# Patient Record
Sex: FEMALE | Race: WHITE | NOT HISPANIC OR LATINO | Employment: OTHER | URBAN - METROPOLITAN AREA
[De-identification: names, ages, dates, MRNs, and addresses within clinical notes are randomized per-mention and may not be internally consistent; named-entity substitution may affect disease eponyms.]

---

## 2017-03-06 ENCOUNTER — TRANSFERRED RECORDS (OUTPATIENT)
Dept: HEALTH INFORMATION MANAGEMENT | Facility: CLINIC | Age: 77
End: 2017-03-06

## 2017-03-06 LAB
ALT SERPL-CCNC: 18 IU/L (ref 5–35)
AST SERPL-CCNC: 22 U/L (ref 5–34)
CREAT SERPL-MCNC: 0.67 MG/DL (ref 0.5–1.3)
GFR SERPL CREATININE-BSD FRML MDRD: 90.5 ML/MIN/1.73M2

## 2017-03-22 ENCOUNTER — DOCUMENTATION ONLY (OUTPATIENT)
Dept: OTHER | Facility: CLINIC | Age: 77
End: 2017-03-22

## 2017-03-22 DIAGNOSIS — Z71.89 ACP (ADVANCE CARE PLANNING): Chronic | ICD-10-CM

## 2017-05-01 ENCOUNTER — OFFICE VISIT (OUTPATIENT)
Dept: FAMILY MEDICINE | Facility: CLINIC | Age: 77
End: 2017-05-01
Payer: MEDICARE

## 2017-05-01 VITALS
RESPIRATION RATE: 16 BRPM | SYSTOLIC BLOOD PRESSURE: 110 MMHG | HEART RATE: 61 BPM | WEIGHT: 130 LBS | DIASTOLIC BLOOD PRESSURE: 60 MMHG | TEMPERATURE: 97.5 F | BODY MASS INDEX: 26.21 KG/M2 | HEIGHT: 59 IN

## 2017-05-01 DIAGNOSIS — R29.898 LEFT HAND WEAKNESS: Primary | ICD-10-CM

## 2017-05-01 DIAGNOSIS — M05.79 RHEUMATOID ARTHRITIS INVOLVING MULTIPLE SITES WITH POSITIVE RHEUMATOID FACTOR (H): ICD-10-CM

## 2017-05-01 PROCEDURE — 99213 OFFICE O/P EST LOW 20 MIN: CPT | Performed by: FAMILY MEDICINE

## 2017-05-01 NOTE — NURSING NOTE
"Chief Complaint   Patient presents with     Musculoskeletal Problem     lt hand       Initial /60  Pulse 61  Temp 97.5  F (36.4  C) (Tympanic)  Resp 16  Ht 4' 11\" (1.499 m)  Wt 130 lb (59 kg)  BMI 26.26 kg/m2 Estimated body mass index is 26.26 kg/(m^2) as calculated from the following:    Height as of this encounter: 4' 11\" (1.499 m).    Weight as of this encounter: 130 lb (59 kg).  Medication Reconciliation: complete     Vicki Mcfadden CMA      "

## 2017-05-01 NOTE — PATIENT INSTRUCTIONS
I will send the patient for an EMG of her left upper extremity.  I did not find anything on her neurologic exam today that would fit with carpal tunnel syndrome.  Her neurologic exam is completely normal.  I will contact her when we get the results of her test back.

## 2017-05-01 NOTE — MR AVS SNAPSHOT
After Visit Summary   5/1/2017    Britt Pichardo    MRN: [id number]           Patient Information     Date Of Birth          1940        Visit Information        Provider Department      5/1/2017 2:00 PM Vineet Mendoza MD [facility]        Today's Diagnoses     Left hand weakness    -  1       Follow-ups after your visit        Additional Services     NEUROLOGY ADULT REFERRAL       Your provider has referred you to: N: [facility]rology - [place] [phone]   [email or web address]    Reason for Referral: EMG    Please be aware that coverage of these services is subject to the terms and limitations of your health insurance plan.  Call member services at your health plan with any benefit or coverage questions.      Please bring the following with you to your appointment:    (1) Any X-Rays, CTs or MRIs which have been performed.  Contact the facility where they were done to arrange for  prior to your scheduled appointment.    (2) List of current medications  (3) This referral request   (4) Any documents/labs given to you for this referral                  Who to contact     If you have questions or need follow up information about today's clinic visit or your schedule please contact [facility] directly at [phone].  Normal or non-critical lab and imaging results will be communicated to you by MyChart, letter or phone within 4 business days after the clinic has received the results. If you do not hear from us within 7 days, please contact the clinic through MyChart or phone. If you have a critical or abnormal lab result, we will notify you by phone as soon as possible.  Submit refill requests through [employer] or call your pharmacy and they will forward the refill request to us. Please allow 3 business days for your refill to be completed.          Additional Information About  "Your Visit        [email or web address] Information     [employer] lets you send messages to your doctor, view your test results, renew your prescriptions, schedule appointments and more. To sign up, go to [email or web address] . Click on \"Log in\" on the left side of the screen, which will take you to the Welcome page. Then click on \"Sign up Now\" on the right side of the page.     You will be asked to enter the access code listed below, as well as some personal information. Please follow the directions to create your username and password.     Your access code is: [email or web address]  Expires: 2017  2:13 PM     Your access code will  in 90 days. If you need help or a new code, please call your [place] clinic or [phone].        Care EveryWhere ID     This is your Care EveryWhere ID. This could be used by other organizations to access your [place] medical records  [id number]        Your Vitals Were     Pulse Temperature Respirations Height BMI (Body Mass Index)       61 97.5  F (36.4  C) (Tympanic) 16 4' 11\" (1.499 m) 26.26 kg/m2        Blood Pressure from Last 3 Encounters:   17 110/60   16 108/68   16 120/70    Weight from Last 3 Encounters:   17 130 lb (59 kg)   16 123 lb (55.8 kg)   16 124 lb (56.2 kg)              We Performed the Following     NEUROLOGY ADULT REFERRAL          Today's Medication Changes          These changes are accurate as of: 17  2:13 PM.  If you have any questions, ask your nurse or doctor.               These medicines have changed or have updated prescriptions.        Dose/Directions    PREMARIN cream   This may have changed:  See the new instructions.   Used for:  Estrogen deficient vulvovaginitis   Generic drug:  conjugated estrogens        PLACE 0.5 GRAMS VAGINALLY TWICE A WEEK AS DIRECTED.   Quantity:  30 g   Refills:  3                Primary Care Provider Office Phone # Fax #    Vineet Mendoza [phone]       FV " [facility] [address]  [facility] [address]        Thank you!     Thank you for choosing [facility]  for your care. Our goal is always to provide you with excellent care. Hearing back from our patients is one way we can continue to improve our services. Please take a few minutes to complete the written survey that you may receive in the mail after your visit with us. Thank you!             Your Updated Medication List - Protect others around you: Learn how to safely use, store and throw away your medicines at www.disposemymeds.org.          This list is accurate as of: 5/1/17  2:13 PM.  Always use your most recent med list.                   Brand Name Dispense Instructions for use    aspirin 81 MG tablet      Take 1 tablet by mouth daily.       calcium + D 600-200 MG-UNIT Tabs   Generic drug:  calcium carbonate-vitamin D      Take 1 tablet by mouth every other day       Ferrous Gluconate 240 (27 FE) MG Tabs      Take 1 tablet by mouth every other day       folic acid 1 MG tablet    FOLVITE     Take 1 mg by mouth daily.       lovastatin 20 MG tablet    MEVACOR    90 tablet    TAKE ONE TABLET BY MOUTH AT BEDTIME       methotrexate 2.5 MG tablet CHEMO      Take 7 tablets by mouth once a week       oxybutynin 5 MG 24 hr tablet    DITROPAN-XL    90 tablet    TAKE 1 TABLET (5 MG) BY ORAL ROUTE ONCE DAILY       PREMARIN cream   Generic drug:  conjugated estrogens     30 g    PLACE 0.5 GRAMS VAGINALLY TWICE A WEEK AS DIRECTED.       REMICADE IV      Inject 200 mg into the vein Patient takes this every 8 weeks       valACYclovir 1000 mg tablet    VALTREX    4 tablet    Take 2 tablets (2,000 mg) by mouth 2 times daily

## 2017-05-01 NOTE — PROGRESS NOTES
SUBJECTIVE:                                                    Britt Pichardo is a 77 year old female who presents to clinic today for the following health issues:      Musculoskeletal problem/pain      Duration: 6 months    Description  Location: lt hand and fingers    Intensity:  moderate    Accompanying signs and symptoms: weakness of hand, no pain at all    History  Previous similar problem: no   Previous evaluation:  none    Precipitating or alleviating factors:  Trauma or overuse: no   Aggravating factors include: cold or damp weather    Therapies tried and outcome: nothing       Musculoskeletal problem/pain      Duration: years    Description  Location: Multiple joints    Intensity:  moderate    Accompanying signs and symptoms: weakness of left hand    History  Previous similar problem: YES  Previous evaluation:  Rheumatology    Precipitating or alleviating factors:  Trauma or overuse: no   Aggravating factors include: exercise and cold or damp weather    Therapies tried and outcome: Remicade and methotrexate with good results.  Followed by rheumatology.         Problem list and histories reviewed & adjusted, as indicated.  Additional history: as documented    Patient Active Problem List   Diagnosis     Health Care Home     Family history of coronary artery disease     Hyperlipidemia LDL goal <130     Vitamin D deficiency     Estrogen deficient vulvovaginitis     Recurrent HSV (herpes simplex virus)     ACP (advance care planning)     Rheumatoid arthritis involving multiple sites with positive rheumatoid factor (H)     Past Surgical History:   Procedure Laterality Date     BACK SURGERY  2002,2004       Social History   Substance Use Topics     Smoking status: Former Smoker     Types: Cigarettes     Quit date: 12/28/1976     Smokeless tobacco: Never Used     Alcohol use Yes      Comment: occasional - special events/holidays only     Family History   Problem Relation Age of Onset     C.A.D. Mother 88      "CANCER Father      leukemia     HEART DISEASE Brother      C.A.D. Brother      CANCER Other      esophagus           Reviewed and updated as needed this visit by clinical staff  Tobacco  Allergies  Meds  Med Hx  Surg Hx  Fam Hx  Soc Hx      Reviewed and updated as needed this visit by Provider         ROS:  CONSTITUTIONAL:NEGATIVE for fever, chills, change in weight  MUSCULOSKELETAL: POSITIVE  for arthralgias   NEURO: POSITIVE for weakness left hand particularly in the pincer movements of the thumb and index finger.    OBJECTIVE:                                                    /60  Pulse 61  Temp 97.5  F (36.4  C) (Tympanic)  Resp 16  Ht 4' 11\" (1.499 m)  Wt 130 lb (59 kg)  BMI 26.26 kg/m2  Body mass index is 26.26 kg/(m^2).  GENERAL APPEARANCE: healthy, alert and no distress  MS: extremities normal- no gross deformities noted  NEURO: Normal strength and tone, mentation intact, speech normal and Tinel's sign is negative.  Strength is symmetric in both upper extremities.         ASSESSMENT/PLAN:                                                        ICD-10-CM    1. Left hand weakness M62.81 NEUROLOGY ADULT REFERRAL   2. Rheumatoid arthritis involving multiple sites with positive rheumatoid factor (H) M05.79        Patient Instructions   I will send the patient for an EMG of her left upper extremity.  I did not find anything on her neurologic exam today that would fit with carpal tunnel syndrome.  Her neurologic exam is completely normal.  I will contact her when we get the results of her test back.      Vineet Mendoza MD  [facility]    "

## 2017-05-08 ENCOUNTER — TRANSFERRED RECORDS (OUTPATIENT)
Dept: HEALTH INFORMATION MANAGEMENT | Facility: CLINIC | Age: 77
End: 2017-05-08

## 2017-05-08 LAB
ALT SERPL-CCNC: 13 IU/L (ref 5–35)
AST SERPL-CCNC: 23 U/L (ref 5–34)
CREAT SERPL-MCNC: 0.66 MG/DL (ref 0.5–1.3)
GFR SERPL CREATININE-BSD FRML MDRD: 92.1 ML/MIN/1.73M2

## 2017-05-09 DIAGNOSIS — R32 URINARY INCONTINENCE: ICD-10-CM

## 2017-05-09 DIAGNOSIS — E78.5 HYPERLIPIDEMIA LDL GOAL <130: ICD-10-CM

## 2017-05-10 RX ORDER — LOVASTATIN 20 MG
TABLET ORAL
Qty: 90 TABLET | Refills: 1 | Status: SHIPPED | OUTPATIENT
Start: 2017-05-10 | End: 2017-10-30

## 2017-05-10 RX ORDER — OXYBUTYNIN CHLORIDE 5 MG/1
TABLET, EXTENDED RELEASE ORAL
Qty: 90 TABLET | Refills: 3 | Status: SHIPPED | OUTPATIENT
Start: 2017-05-10 | End: 2018-05-01

## 2017-05-16 ENCOUNTER — TRANSFERRED RECORDS (OUTPATIENT)
Dept: HEALTH INFORMATION MANAGEMENT | Facility: CLINIC | Age: 77
End: 2017-05-16

## 2017-06-05 ENCOUNTER — TELEPHONE (OUTPATIENT)
Dept: FAMILY MEDICINE | Facility: CLINIC | Age: 77
End: 2017-06-05

## 2017-06-05 DIAGNOSIS — R94.131 DENERVATION CHANGES PRESENT ON ELECTROMYOGRAPHY: ICD-10-CM

## 2017-06-05 DIAGNOSIS — R29.898 LEFT HAND WEAKNESS: Primary | ICD-10-CM

## 2017-06-05 NOTE — TELEPHONE ENCOUNTER
Reason for Call:  Report for EMG      Detailed comments: Pt said they had an EMG at the neurologist that we referred them to. But she has not heard anything on the results. Have we received their report? Please call her back.     Phone Number Patient can be reached at: Cell number on file:    Telephone Information:   Mobile [phone]       Best Time: anytime     Can we leave a detailed message on this number? YES    Call taken on 6/5/2017 at 9:41 AM by Aura Elizabeth

## 2017-06-05 NOTE — TELEPHONE ENCOUNTER
Called [place] of Neurology @ [phone] to request EMG report be faxed to  [phone].     Patient would like a call when the results are received and read by the PCP. Has not heard anything since she was seen in early May.     Lizeth Rowe RN  06/05/17  10:17 AM

## 2017-06-07 NOTE — TELEPHONE ENCOUNTER
Patient was called, she already talked to the provider and is waiting for a neurosurgery call.   Informed to call if she does not hear from them: [facility]: [facility] [phone]

## 2017-06-19 ENCOUNTER — OFFICE VISIT (OUTPATIENT)
Dept: NEUROSURGERY | Facility: CLINIC | Age: 77
End: 2017-06-19
Attending: FAMILY MEDICINE
Payer: MEDICARE

## 2017-06-19 ENCOUNTER — HOSPITAL ENCOUNTER (OUTPATIENT)
Dept: MRI IMAGING | Facility: CLINIC | Age: 77
Discharge: HOME | End: 2017-06-19
Attending: NURSE PRACTITIONER | Admitting: NURSE PRACTITIONER
Payer: MEDICARE

## 2017-06-19 VITALS
OXYGEN SATURATION: 95 % | SYSTOLIC BLOOD PRESSURE: 119 MMHG | BODY MASS INDEX: 25.84 KG/M2 | TEMPERATURE: 97 F | DIASTOLIC BLOOD PRESSURE: 62 MMHG | HEIGHT: 59 IN | HEART RATE: 60 BPM | WEIGHT: 128.2 LBS

## 2017-06-19 DIAGNOSIS — M54.12 CERVICAL RADICULOPATHY: ICD-10-CM

## 2017-06-19 DIAGNOSIS — M54.12 CERVICAL RADICULOPATHY: Primary | ICD-10-CM

## 2017-06-19 PROCEDURE — 72141 MRI NECK SPINE W/O DYE: CPT

## 2017-06-19 PROCEDURE — 99203 OFFICE O/P NEW LOW 30 MIN: CPT | Performed by: NURSE PRACTITIONER

## 2017-06-19 PROCEDURE — 99211 OFF/OP EST MAY X REQ PHY/QHP: CPT | Mod: 25 | Performed by: NURSE PRACTITIONER

## 2017-06-19 NOTE — PATIENT INSTRUCTIONS
Schedule cervical MRI  We will contact you with results.  Please contact the clinic if pain persists at [phone].

## 2017-06-19 NOTE — NURSING NOTE
"Britt Pichardo is a 77 year old female who presents for:  Chief Complaint   Patient presents with     Neurologic Problem     Denervation changes present on electromyography primarily affecting the left hand (weakness)        Initial Vitals:  /62 (BP Location: Right arm, Patient Position: Chair, Cuff Size: Adult Large)  Pulse 60  Temp 97  F (36.1  C) (Oral)  Ht 4' 11.06\" (1.5 m)  Wt 128 lb 3.2 oz (58.2 kg)  SpO2 95%  BMI 25.85 kg/m2 Estimated body mass index is 25.85 kg/(m^2) as calculated from the following:    Height as of this encounter: 4' 11.06\" (1.5 m).    Weight as of this encounter: 128 lb 3.2 oz (58.2 kg).. Body surface area is 1.56 meters squared. BP completed using cuff size: large  Data Unavailable    Do you feel safe in your environment?  Yes  Do you need any refills today? No    Nursing Comments: .Denervation changes present on electromyography primarily affecting the left hand (weakness)  Patient rates 0 pain today as 6/19/17      5 min. nursing intake time  Gisselle Kim CMA      Discharge plan: See Nurse Practioner dictation  2 min. nursing discharge time  Gisselle Kim CMA         "

## 2017-06-19 NOTE — PROGRESS NOTES
"Dr. Chuck Krause  [facility]  Neurosurgery Clinic Visit    CC: Left hand weakness    Primary care Provider: Vineet Mendoza      Reason For Visit:   I was asked by Dr. Mendoza to consult on the patient for left upper extremity weakness.      HPI: Britt Pichardo is a 77 year old female with left hand weakness for the past year, and she states it seems to have worsened over the past 6 months.  She states on occasion she experiences a sharp pain to the left posterior neck, \"And it goes away right away.\"  She denies arm pain or associated symptoms such as N/T.  She denies dropping objects, although she states she often switches to the right hand when holding objects.  She notes weakness in her grasp on the left.  She denies weakness to BLE or changes to bowel or bladder.  She has not had PT or injections.    Her history is significant for previous lumbar fusion 10 years ago and she states she did well afterwards.     Pain right now:  0    Past Medical History:   Diagnosis Date     Hypercholesteremia      Hyperlipidemia with target LDL less than 130      Recurrent HSV (herpes simplex virus)      Rheumatoid arthritis(714.0)     see Dr. Crawford's note 12/12       Past Medical History reviewed with patient during visit.    Past Surgical History:   Procedure Laterality Date     BACK SURGERY  2002,2004     Past Surgical History reviewed with patient during visit.    Current Outpatient Prescriptions   Medication     oxybutynin (DITROPAN-XL) 5 MG 24 hr tablet     lovastatin (MEVACOR) 20 MG tablet     PREMARIN vaginal cream     valACYclovir (VALTREX) 1000 mg tablet     InFLIXimab (REMICADE IV)     methotrexate 2.5 MG tablet     folic acid (FOLVITE) 1 MG tablet     aspirin 81 MG tablet     calcium carbonate-vitamin D (CALCIUM + D) 600-200 MG-UNIT TABS     Ferrous Gluconate 240 (27 FE) MG TABS     [DISCONTINUED] oxybutynin (DITROPAN-XL) 5 MG 24 hr tablet     No current facility-administered medications " "for this visit.        No Known Allergies    Social History     Social History     Marital status:      Spouse name: N/A     Number of children: N/A     Years of education: N/A     Social History Main Topics     Smoking status: Former Smoker     Types: Cigarettes     Quit date: 12/28/1976     Smokeless tobacco: Never Used     Alcohol use Yes      Comment: occasional - special events/holidays only     Drug use: No     Sexual activity: No     Other Topics Concern     Parent/Sibling W/ Cabg, Mi Or Angioplasty Before 65f 55m? Yes     brother had bypass in his 50's     Social History Narrative       Family History   Problem Relation Age of Onset     C.A.D. Mother 88     CANCER Father      leukemia     HEART DISEASE Brother      C.A.D. Brother      CANCER Other      esophagus         Review Of Systems  ROS: 10 point ROS neg other than the symptoms noted above in the HPI.    Vital Signs: /62 (BP Location: Right arm, Patient Position: Chair, Cuff Size: Adult Large)  Pulse 60  Temp 97  F (36.1  C) (Oral)  Ht 4' 11.06\" (1.5 m)  Wt 128 lb 3.2 oz (58.2 kg)  SpO2 95%  BMI 25.85 kg/m2    Examination:  Constitutional:  Alert, well nourished, NAD.  HEENT: Normocephalic, atraumatic.   Pulm:  Without shortness of breath   CV:  No pitting edema of BLE.    Neurological:  Awake  Alert  Oriented x 3  Speech clear  Cranial nerves II - XII intact    Motor exam   Shoulder Abduction:  Right:  5/5   Left:  5/5  Biceps:                      Right:  5/5   Left:  5/5  Triceps:                     Right:  5/5   Left:  5/5  Wrist Extensors:       Right:  5/5   Left:  5/5  Wrist Flexors:           Right:  5/5   Left:  5/5  Intrinsics:                   Right:  5/5   Left:  5/5   Hip Flexor:                Right: 5/5  Left:  5/5  Hip Adductor:             Right:  5/5  Left:  5/5  Hip Abductor:             Right:  5/5  Left:  5/5  Gastroc Soleus:        Right:  5/5  Left:  5/5  Tib/Ant:                      Right:  5/5  Left:  " "5/5  EHL:                          Right:  5/5  Left:  5/5   Sensation normal to bilateral upper and lower extremities  Clonus negative  DTRs 2+ symmetric  Gait: Able to stand from a seated position. Normal non-antalgic, non-myelopathic gait.  Able to heel/toe walk without loss of balance  Cervical examination reveals good range of motion.  No tenderness to palpation of the cervical spine or paraspinous muscles bilaterally.    Lumbar examination reveals no tenderness of the spine or paraspinous muscles.  Hip height is symmetrical. Negative SI joint, sciatic notch or greater trochanteric tenderness to palpation bilaterally.  Straight leg raise is negative bilaterally.      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  She states on occasion she experiences a sharp pain to the left posterior neck, \"And it goes away right away.\"  She denies arm pain or associated symptoms such as N/T.  She denies dropping objects or issues with fine motor skills on the left or right.  She has full strength on exam and no neuro deficits noted.  We reviewed EMG findings and are recommending a cervical MRI.  The patient is agreeable and will schedule at her convenience.    Patient Instructions   Schedule cervical MRI  We will contact you with results.  Please contact the clinic if pain persists at [phone].        Torri Grossman [facility]  [facility]  [address] [address]    Tel [phone]  Pager [phone]    "

## 2017-06-19 NOTE — MR AVS SNAPSHOT
"              After Visit Summary   6/19/2017    Britt Pichardo    MRN: [id number]           Patient Information     Date Of Birth          1940        Visit Information        Provider Department      6/19/2017 10:10 AM Torri Grossman NP [facility] Neurosurgery [facility]        Today's Diagnoses     Cervical radiculopathy    -  1      Care Instructions    Schedule cervical MRI  We will contact you with results.  Please contact the clinic if pain persists at [phone].            Follow-ups after your visit        Future tests that were ordered for you today     Open Future Orders        Priority Expected Expires Ordered    MR Cervical Spine w/o Contrast Routine  6/19/2018 6/19/2017            Who to contact     If you have questions or need follow up information about today's clinic visit or your schedule please contact [facility] NEUROSURGERY [facility] directly at [phone].  Normal or non-critical lab and imaging results will be communicated to you by MyChart, letter or phone within 4 business days after the clinic has received the results. If you do not hear from us within 7 days, please contact the clinic through MyChart or phone. If you have a critical or abnormal lab result, we will notify you by phone as soon as possible.  Submit refill requests through [employer] or call your pharmacy and they will forward the refill request to us. Please allow 3 business days for your refill to be completed.          Additional Information About Your Visit        MyChart Information     [employer] lets you send messages to your doctor, view your test results, renew your prescriptions, schedule appointments and more. To sign up, go to [email or web address] . Click on \"Log in\" on the left side of the screen, which will take you to the Welcome page. Then click on \"Sign up Now\" on the right side of the page.     You will be asked to enter the access code listed below, as well as some personal " "information. Please follow the directions to create your username and password.     Your access code is: [email or web address]  Expires: 2017  2:13 PM     Your access code will  in 90 days. If you need help or a new code, please call your [place] clinic or [phone].        Care EveryWhere ID     This is your Care EveryWhere ID. This could be used by other organizations to access your [place] medical records  [id number]        Your Vitals Were     Pulse Temperature Height Pulse Oximetry BMI (Body Mass Index)       60 97  F (36.1  C) (Oral) 4' 11.06\" (1.5 m) 95% 25.85 kg/m2        Blood Pressure from Last 3 Encounters:   17 119/62   17 110/60   16 108/68    Weight from Last 3 Encounters:   17 128 lb 3.2 oz (58.2 kg)   17 130 lb (59 kg)   16 123 lb (55.8 kg)                 Today's Medication Changes          These changes are accurate as of: 17 10:27 AM.  If you have any questions, ask your nurse or doctor.               These medicines have changed or have updated prescriptions.        Dose/Directions    PREMARIN cream   This may have changed:  See the new instructions.   Used for:  Estrogen deficient vulvovaginitis   Generic drug:  conjugated estrogens        PLACE 0.5 GRAMS VAGINALLY TWICE A WEEK AS DIRECTED.   Quantity:  30 g   Refills:  3                Primary Care Provider Office Phone # Fax #    Vineet Mendoza [phone]       [facility] [address] [address] [facility] [address]        Thank you!     Thank you for choosing [facility] NEUROSURGERY [facility]  for your care. Our goal is always to provide you with excellent care. Hearing back from our patients is one way we can continue to improve our services. Please take a few minutes to complete the written survey that you may receive in the mail after your visit with us. Thank you!             Your Updated Medication List - Protect others around you: Learn how to " safely use, store and throw away your medicines at www.disposemymeds.org.          This list is accurate as of: 6/19/17 10:27 AM.  Always use your most recent med list.                   Brand Name Dispense Instructions for use    aspirin 81 MG tablet      Take 1 tablet by mouth daily.       calcium + D 600-200 MG-UNIT Tabs   Generic drug:  calcium carbonate-vitamin D      Take 1 tablet by mouth every other day       Ferrous Gluconate 240 (27 FE) MG Tabs      Take 1 tablet by mouth every other day       folic acid 1 MG tablet    FOLVITE     Take 1 mg by mouth daily.       lovastatin 20 MG tablet    MEVACOR    90 tablet    TAKE ONE TABLET BY MOUTH AT BEDTIME       methotrexate 2.5 MG tablet CHEMO      Take 7 tablets by mouth once a week       oxybutynin 5 MG 24 hr tablet    DITROPAN-XL    90 tablet    TAKE 1 TABLET (5 MG) BY ORAL ROUTE ONCE DAILY       PREMARIN cream   Generic drug:  conjugated estrogens     30 g    PLACE 0.5 GRAMS VAGINALLY TWICE A WEEK AS DIRECTED.       REMICADE IV      Inject 200 mg into the vein Patient takes this every 8 weeks       valACYclovir 1000 mg tablet    VALTREX    4 tablet    Take 2 tablets (2,000 mg) by mouth 2 times daily

## 2017-06-22 ENCOUNTER — TELEPHONE (OUTPATIENT)
Dept: NEUROSURGERY | Facility: CLINIC | Age: 77
End: 2017-06-22

## 2017-06-22 NOTE — TELEPHONE ENCOUNTER
TC to the patient to review MRI results.  Per Dr. Krause he would like patient to schedule appointment with him in clinic to review MRI and discussion options.  Patient is agreeable to calling.

## 2017-06-26 ENCOUNTER — OFFICE VISIT (OUTPATIENT)
Dept: NEUROSURGERY | Facility: CLINIC | Age: 77
End: 2017-06-26
Attending: NEUROLOGICAL SURGERY
Payer: MEDICARE

## 2017-06-26 VITALS
HEART RATE: 54 BPM | SYSTOLIC BLOOD PRESSURE: 115 MMHG | BODY MASS INDEX: 25.17 KG/M2 | TEMPERATURE: 97 F | HEIGHT: 60 IN | OXYGEN SATURATION: 97 % | WEIGHT: 128.2 LBS | DIASTOLIC BLOOD PRESSURE: 72 MMHG

## 2017-06-26 DIAGNOSIS — R29.898 LEFT HAND WEAKNESS: Primary | ICD-10-CM

## 2017-06-26 PROCEDURE — 99214 OFFICE O/P EST MOD 30 MIN: CPT | Performed by: NEUROLOGICAL SURGERY

## 2017-06-26 PROCEDURE — 99211 OFF/OP EST MAY X REQ PHY/QHP: CPT | Performed by: PHYSICIAN ASSISTANT

## 2017-06-26 ASSESSMENT — PAIN SCALES - GENERAL: PAINLEVEL: NO PAIN (0)

## 2017-06-26 NOTE — NURSING NOTE
"Britt Pichardo is a 77 year old female who presents for:  Chief Complaint   Patient presents with     Neurologic Problem     Review MRI, Discuss surgery L hand weakness and twitching         Initial Vitals:  /72 (BP Location: Right arm, Patient Position: Sitting, Cuff Size: Adult Regular)  Pulse 54  Temp 97  F (36.1  C) (Oral)  Ht 5' 0.24\" (1.53 m)  Wt 128 lb 3.2 oz (58.2 kg)  SpO2 97%  BMI 24.84 kg/m2 Estimated body mass index is 24.84 kg/(m^2) as calculated from the following:    Height as of this encounter: 5' 0.24\" (1.53 m).    Weight as of this encounter: 128 lb 3.2 oz (58.2 kg).. Body surface area is 1.57 meters squared. BP completed using cuff size: regular  No Pain (0)    Do you feel safe in your environment?  Yes  Do you need any refills today? No    Nursing Comments: Review MRI, Discuss surgery L hand weakness and twitching.        5 min. nursing intake time  Joan Green MA       Discharge plan: Follow up with Dr. Miller in clinic: 7/11/17 at 8:40am   2 min. nursing discharge time  Joan Green MA        "

## 2017-06-26 NOTE — MR AVS SNAPSHOT
"              After Visit Summary   6/26/2017    Britt Pichardo    MRN: [id number]           Patient Information     Date Of Birth          1940        Visit Information        Provider Department      6/26/2017 10:20 AM Chuck Krause MD [facility]        Care Instructions    Follow up with Dr. Miller in clinic: 7/11/17 at 8:40am          Follow-ups after your visit        Your next 10 appointments already scheduled     Jul 11, 2017  8:40 AM CDT   Return Visit with Wild Miller MD   [facility] ([facility])    [address] [place] [id number] [phone]              Who to contact     If you have questions or need follow up information about today's clinic visit or your schedule please contact [facility] directly at [phone].  Normal or non-critical lab and imaging results will be communicated to you by MyChart, letter or phone within 4 business days after the clinic has received the results. If you do not hear from us within 7 days, please contact the clinic through MyChart or phone. If you have a critical or abnormal lab result, we will notify you by phone as soon as possible.  Submit refill requests through BlueKite or call your pharmacy and they will forward the refill request to us. Please allow 3 business days for your refill to be completed.          Additional Information About Your Visit        CloudEndurehart Information     BlueKite lets you send messages to your doctor, view your test results, renew your prescriptions, schedule appointments and more. To sign up, go to [email or web address] . Click on \"Log in\" on the left side of the screen, which will take you to the Welcome page. Then click on \"Sign up Now\" on the right side of the page.     You will be asked to enter the access code listed below, as well as some personal information. Please follow the " "directions to create your username and password.     Your access code is: [email or web address]  Expires: 2017  2:13 PM     Your access code will  in 90 days. If you need help or a new code, please call your [facility] clinic or [phone].        Care EveryWhere ID     This is your Care EveryWhere ID. This could be used by other organizations to access your [facility] medical records  [id number]        Your Vitals Were     Pulse Temperature Height Pulse Oximetry BMI (Body Mass Index)       54 97  F (36.1  C) (Oral) 5' 0.24\" (1.53 m) 97% 24.84 kg/m2        Blood Pressure from Last 3 Encounters:   17 115/72   17 119/62   17 110/60    Weight from Last 3 Encounters:   17 128 lb 3.2 oz (58.2 kg)   17 128 lb 3.2 oz (58.2 kg)   17 130 lb (59 kg)              Today, you had the following     No orders found for display         Today's Medication Changes          These changes are accurate as of: 17 10:57 AM.  If you have any questions, ask your nurse or doctor.               These medicines have changed or have updated prescriptions.        Dose/Directions    PREMARIN cream   This may have changed:  See the new instructions.   Used for:  Estrogen deficient vulvovaginitis   Generic drug:  conjugated estrogens        PLACE 0.5 GRAMS VAGINALLY TWICE A WEEK AS DIRECTED.   Quantity:  30 g   Refills:  3                Primary Care Provider Office Phone # Fax #    Vineet Perfecto Mendoza [phone]       [facility] [address]  [facility] [address]        Equal Access to Services     IMCHAEL HAMILTON AH: Hadii amy Vazquez, wagraceda luqadaha, qaybta kaalmada osile escoto. So [facility] [phone].    ATENCIÓN: Si habla español, tiene a glass disposición servicios gratuitos de asistencia lingüística. Llame al [phone].    We comply with applicable federal civil rights laws and [place] laws. We do not " discriminate on the basis of race, color, national origin, age, disability sex, sexual orientation or gender identity.            Thank you!     Thank you for choosing [facility]  for your care. Our goal is always to provide you with excellent care. Hearing back from our patients is one way we can continue to improve our services. Please take a few minutes to complete the written survey that you may receive in the mail after your visit with us. Thank you!             Your Updated Medication List - Protect others around you: Learn how to safely use, store and throw away your medicines at www.disposemymeds.org.          This list is accurate as of: 6/26/17 10:57 AM.  Always use your most recent med list.                   Brand Name Dispense Instructions for use Diagnosis    aspirin 81 MG tablet      Take 1 tablet by mouth daily.        calcium + D 600-200 MG-UNIT Tabs   Generic drug:  calcium carbonate-vitamin D      Take 1 tablet by mouth every other day        Ferrous Gluconate 240 (27 FE) MG Tabs      Take 1 tablet by mouth every other day        folic acid 1 MG tablet    FOLVITE     Take 1 mg by mouth daily.        lovastatin 20 MG tablet    MEVACOR    90 tablet    TAKE ONE TABLET BY MOUTH AT BEDTIME    Hyperlipidemia LDL goal <130       methotrexate 2.5 MG tablet CHEMO      Take 7 tablets by mouth once a week        oxybutynin 5 MG 24 hr tablet    DITROPAN-XL    90 tablet    TAKE 1 TABLET (5 MG) BY ORAL ROUTE ONCE DAILY    Urinary incontinence       PREMARIN cream   Generic drug:  conjugated estrogens     30 g    PLACE 0.5 GRAMS VAGINALLY TWICE A WEEK AS DIRECTED.    Estrogen deficient vulvovaginitis       REMICADE IV      Inject 200 mg into the vein Patient takes this every 8 weeks        valACYclovir 1000 mg tablet    VALTREX    4 tablet    Take 2 tablets (2,000 mg) by mouth 2 times daily    Recurrent herpes simplex

## 2017-06-26 NOTE — PROGRESS NOTES
"Britt Pichardo is a 77 year old female with left hand weakness for the past year, and she states it seems to have worsened over the past 6 months.  She states on occasion she experiences a sharp pain to the left posterior neck, \"And it goes away right away.\"  She denies arm pain or associated symptoms such as N/T.  She denies dropping objects, although she states she often switches to the right hand when holding objects.  She notes weakness in her grasp on the left.  She denies weakness to BLE or changes to bowel or bladder.  She has not had PT or injections.    Presents today for follow-up.  She underwent an MRI of the neck.  This was interpreted by radiology as not demonstrating significant left-sided stenosis.  She does note significant hand weakness, worst in the second and third digits.  She also notes significant fasciculations and tremor at night in the hand.  Denies significant pain or sensory changes.    Past Medical History:   Diagnosis Date     Hypercholesteremia      Hyperlipidemia with target LDL less than 130      Recurrent HSV (herpes simplex virus)      Rheumatoid arthritis(714.0)     see Dr. Crawford's note 12/12       Past Medical History reviewed with patient during visit.    Past Surgical History:   Procedure Laterality Date     BACK SURGERY  2002,2004     Past Surgical History reviewed with patient during visit.    Current Outpatient Prescriptions   Medication     oxybutynin (DITROPAN-XL) 5 MG 24 hr tablet     lovastatin (MEVACOR) 20 MG tablet     PREMARIN vaginal cream     valACYclovir (VALTREX) 1000 mg tablet     InFLIXimab (REMICADE IV)     methotrexate 2.5 MG tablet     folic acid (FOLVITE) 1 MG tablet     aspirin 81 MG tablet     calcium carbonate-vitamin D (CALCIUM + D) 600-200 MG-UNIT TABS     Ferrous Gluconate 240 (27 FE) MG TABS     [DISCONTINUED] oxybutynin (DITROPAN-XL) 5 MG 24 hr tablet     No current facility-administered medications for this visit.        No Known " "Allergies    Social History     Social History     Marital status:      Spouse name: N/A     Number of children: N/A     Years of education: N/A     Social History Main Topics     Smoking status: Former Smoker     Types: Cigarettes     Quit date: 12/28/1976     Smokeless tobacco: Never Used     Alcohol use Yes      Comment: occasional - special events/holidays only     Drug use: No     Sexual activity: No     Other Topics Concern     Parent/Sibling W/ Cabg, Mi Or Angioplasty Before 65f 55m? Yes     brother had bypass in his 50's     Social History Narrative       Family History   Problem Relation Age of Onset     C.A.D. Mother 88     CANCER Father      leukemia     HEART DISEASE Brother      C.A.D. Brother      CANCER Other      esophagus         Review Of Systems  ROS: 10 point ROS neg other than the symptoms noted above in the HPI.    Vital Signs: /72 (BP Location: Right arm, Patient Position: Sitting, Cuff Size: Adult Regular)  Pulse 54  Temp 97  F (36.1  C) (Oral)  Ht 1.53 m (5' 0.24\")  Wt 58.2 kg (128 lb 3.2 oz)  SpO2 97%  BMI 24.84 kg/m2    Examination:  Constitutional:  Alert, well nourished, NAD.  HEENT: Normocephalic, atraumatic.   Pulm:  Without shortness of breath   CV:  No pitting edema of BLE.    Neurological:  Awake  Alert  Oriented x 3  Speech clear  Cranial nerves II - XII intact    Motor exam   Shoulder Abduction:  Right:  5/5   Left:  5/5  Biceps:                      Right:  5/5   Left:  5/5  Triceps:                     Right:  5/5   Left:  5/5  Wrist Extensors:       Right:  5/5   Left:  5/5  Wrist Flexors:           Right:  5/5   Left:  5/5  Intrinsics:                   Right:  5/5   Left:  3/5   Hip Flexor:                Right: 5/5  Left:  5/5  Hip Adductor:             Right:  5/5  Left:  5/5  Hip Abductor:             Right:  5/5  Left:  5/5  Gastroc Soleus:        Right:  5/5  Left:  5/5  Tib/Ant:                      Right:  5/5  Left:  5/5  EHL:                        "   Right:  5/5  Left:  5/5   Sensation normal to bilateral upper and lower extremities  Clonus negative  DTRs 2+ symmetric  Gait: Able to stand from a seated position. Normal non-antalgic, non-myelopathic gait.  Able to heel/toe walk without loss of balance  Cervical examination reveals good range of motion.  No tenderness to palpation of the cervical spine or paraspinous muscles bilaterally.    Lumbar examination reveals no tenderness of the spine or paraspinous muscles.  Hip height is symmetrical. Negative SI joint, sciatic notch or greater trochanteric tenderness to palpation bilaterally.  Straight leg raise is negative bilaterally.      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    We discussed that although her MRI was not interpreted as demonstrating a left-sided stenosis, there was concern by a left paracentral disc herniation at C7-T1, which could because of some pressure on the C8 nerve root  I was also concerned by an abnormal of the vertebral artery at C5 6 on the left side, that given the limitations of the study, it is difficult to exclude for vascular compression of the C6 nerve root  However, we discussed that most radicular symptoms will also consist of pain and sensory changes in addition to motor changes  Given the risk profile of a interventional procedure involving the vertebral artery, I counseled her that I felt that the lowest risk, realistic intervention, was to simply start with the C7-T1 ACDF, and determine how much resolution of her symptoms she achieved  She is also considering observation without intervention  I referred her to also meet Dr. Miller, as she was interested in a second opinion

## 2017-07-10 ENCOUNTER — TRANSFERRED RECORDS (OUTPATIENT)
Dept: HEALTH INFORMATION MANAGEMENT | Facility: CLINIC | Age: 77
End: 2017-07-10

## 2017-07-10 LAB
ALT SERPL-CCNC: 15 IU/L (ref 5–35)
AST SERPL-CCNC: 25 U/L (ref 5–34)
CREAT SERPL-MCNC: 0.58 MG/DL (ref 0.5–1.3)

## 2017-07-11 ENCOUNTER — OFFICE VISIT (OUTPATIENT)
Dept: NEUROSURGERY | Facility: CLINIC | Age: 77
End: 2017-07-11
Attending: NEUROLOGICAL SURGERY
Payer: MEDICARE

## 2017-07-11 VITALS
DIASTOLIC BLOOD PRESSURE: 58 MMHG | TEMPERATURE: 97.4 F | HEIGHT: 59 IN | SYSTOLIC BLOOD PRESSURE: 98 MMHG | OXYGEN SATURATION: 95 % | WEIGHT: 127.8 LBS | BODY MASS INDEX: 25.76 KG/M2 | HEART RATE: 71 BPM

## 2017-07-11 DIAGNOSIS — M54.12 CERVICAL RADICULOPATHY: Primary | ICD-10-CM

## 2017-07-11 DIAGNOSIS — R29.898 LEFT HAND WEAKNESS: ICD-10-CM

## 2017-07-11 PROCEDURE — 99211 OFF/OP EST MAY X REQ PHY/QHP: CPT | Performed by: NEUROLOGICAL SURGERY

## 2017-07-11 PROCEDURE — 99213 OFFICE O/P EST LOW 20 MIN: CPT | Performed by: NEUROLOGICAL SURGERY

## 2017-07-11 RX ORDER — METHYLPREDNISOLONE 4 MG
TABLET, DOSE PACK ORAL
Qty: 21 TABLET | Refills: 0 | Status: SHIPPED | OUTPATIENT
Start: 2017-07-11 | End: 2018-01-09

## 2017-07-11 ASSESSMENT — PAIN SCALES - GENERAL: PAINLEVEL: NO PAIN (0)

## 2017-07-11 NOTE — PATIENT INSTRUCTIONS
-Medrol dose pack  -Physical therapy for neck and left hand  -if no better from PT, then cervical epidural steroid injection. Please call clinic [phone] and we can place order.   -follow up with Dr. Krause as needed

## 2017-07-11 NOTE — PROGRESS NOTES
Neurosurgery Follow Up Clinic Visit      Britt Pichardo returns for follow up visit regarding her LUE    Currently the patient describes having numbness in the left index finger. She says the pain is intermittent and she feels that she can live with it    Exam is stable  No focal weakness    We reviewed the MRI and EMG/NCV      Plan:   Pt would like to avoid surgery  Will try Medrol dospak, PT and FORREST  If she wants to further discuss surgery she will follow up with Dr. Krause

## 2017-07-11 NOTE — NURSING NOTE
"Britt Pichardo is a 77 year old female who presents for:  Chief Complaint   Patient presents with     Neurologic Problem     2nd opinion, cervical pain, weakness in the thumb and pointer finger, on ocassion has L shoulder pain         Initial Vitals:  BP 98/58 (BP Location: Right arm, Patient Position: Sitting, Cuff Size: Adult Regular)  Pulse 71  Temp 97.4  F (36.3  C) (Oral)  Ht 4' 11\" (1.499 m)  Wt 127 lb 12.8 oz (58 kg)  SpO2 95%  BMI 25.81 kg/m2 Estimated body mass index is 25.81 kg/(m^2) as calculated from the following:    Height as of this encounter: 4' 11\" (1.499 m).    Weight as of this encounter: 127 lb 12.8 oz (58 kg).. Body surface area is 1.55 meters squared. BP completed using cuff size: regular  No Pain (0)    Do you feel safe in your environment?  \"I guess so\"  Do you need any refills today? No    Nursing Comments: 2nd opinion, cervical pain, weakness in the thumb and pointer finger, on ocassion has L shoulder pain.        5 min. nursing intake time  Joan Green MA       Discharge plan: -Medrol dose pack  -Physical therapy for neck and left hand  -if no better from PT, then cervical epidural steroid injection. Please call clinic [phone] and we can place order.   -follow up with Dr. Krause as needed     2 min. nursing discharge time  Joan Green MA        "

## 2017-07-11 NOTE — MR AVS SNAPSHOT
After Visit Summary   7/11/2017    Britt Pichardo    MRN: [id number]           Patient Information     Date Of Birth          1940        Visit Information        Provider Department      7/11/2017 8:40 AM Wild Miller MD [facility]        Today's Diagnoses     Cervical radiculopathy    -  1    Left hand weakness          Care Instructions    -Medrol dose pack  -Physical therapy for neck and left hand  -if no better from PT, then cervical epidural steroid injection. Please call clinic [phone] and we can place order.   -follow up with Dr. Krause as needed               Follow-ups after your visit        Additional Services     NAYE PT, HAND, AND CHIROPRACTIC REFERRAL       **This order will print in the [facility] Scheduling Office**    Physical Therapy, Hand Therapy and Chiropractic Care are available through:    *[facility]  *[facility]  *[facility]    Call one number to schedule at any of the above locations: [phone].    Your provider has referred you to: Physical Therapy at [facility] or [facility]    Indication/Reason for Referral: Neck Pain and left hand weakness  Onset of Illness:   Therapy Orders: Evaluate and Treat  Special Programs: None  Special Request: None    Hernesto Eric      Additional Comments for the Therapist or Chiropractor:     Please be aware that coverage of these services is subject to the terms and limitations of your health insurance plan.  Call member services at your health plan with any benefit or coverage questions.      Please bring the following to your appointment:    *Your personal calendar for scheduling future appointments  *Comfortable clothing                  Who to contact     If you have questions or need follow up information about today's clinic visit or your schedule please contact [facility] directly at [phone].  Normal or  "non-critical lab and imaging results will be communicated to you by MyChart, letter or phone within 4 business days after the clinic has received the results. If you do not hear from us within 7 days, please contact the clinic through Appiphanyt or phone. If you have a critical or abnormal lab result, we will notify you by phone as soon as possible.  Submit refill requests through Cherry or call your pharmacy and they will forward the refill request to us. Please allow 3 business days for your refill to be completed.          Additional Information About Your Visit        Cherry Information     Cherry lets you send messages to your doctor, view your test results, renew your prescriptions, schedule appointments and more. To sign up, go to [email or web address] . Click on \"Log in\" on the left side of the screen, which will take you to the Welcome page. Then click on \"Sign up Now\" on the right side of the page.     You will be asked to enter the access code listed below, as well as some personal information. Please follow the directions to create your username and password.     Your access code is: [email or web address]  Expires: 2017  2:13 PM     Your access code will  in 90 days. If you need help or a new code, please call your [place] clinic or [phone].        Care EveryWhere ID     This is your Care EveryWhere ID. This could be used by other organizations to access your [place] medical records  [id number]        Your Vitals Were     Pulse Temperature Height Pulse Oximetry BMI (Body Mass Index)       71 97.4  F (36.3  C) (Oral) 4' 11\" (1.499 m) 95% 25.81 kg/m2        Blood Pressure from Last 3 Encounters:   17 98/58   17 115/72   17 119/62    Weight from Last 3 Encounters:   17 127 lb 12.8 oz (58 kg)   17 128 lb 3.2 oz (58.2 kg)   17 128 lb 3.2 oz (58.2 kg)              We Performed the Following     NAYE PT, HAND, AND CHIROPRACTIC REFERRAL          Today's Medication " Changes          These changes are accurate as of: 7/11/17  9:16 AM.  If you have any questions, ask your nurse or doctor.               Start taking these medicines.        Dose/Directions    methylPREDNISolone 4 MG tablet   Commonly known as:  MEDROL DOSEPAK   Used for:  Cervical radiculopathy   Started by:  Wild Miller MD        Follow package instructions   Quantity:  21 tablet   Refills:  0         These medicines have changed or have updated prescriptions.        Dose/Directions    PREMARIN cream   This may have changed:  See the new instructions.   Used for:  Estrogen deficient vulvovaginitis   Generic drug:  conjugated estrogens        PLACE 0.5 GRAMS VAGINALLY TWICE A WEEK AS DIRECTED.   Quantity:  30 g   Refills:  3            Where to get your medicines      These medications were sent to [id number] - [facility] [address]  [address] [facility] [address]     Phone:  [phone]     methylPREDNISolone 4 MG tablet                Primary Care Provider Office Phone # Fax #    Vineet Mendoza [phone]       [facility] [address] [facility] [address]        Equal Access to Services     DARREN HAMILTON : Hadii aad ku hadasho Soomaali, waaxda luqadaha, qaybta kaalmada aderimmayakathleen, osiel blum. So [facility] [phone].    ATENCIÓN: Si habla español, tiene a glass disposición servicios gratuitos de asistencia lingüística. Ll[facility] [phone].    We comply with applicable federal civil rights laws and [place] laws. We do not discriminate on the basis of race, color, national origin, age, disability sex, sexual orientation or gender identity.            Thank you!     Thank you for choosing [facility]  for your care. Our goal is always to provide you with excellent care. Hearing back from our patients is one way we can continue to improve our services. Please  take a few minutes to complete the written survey that you may receive in the mail after your visit with us. Thank you!             Your Updated Medication List - Protect others around you: Learn how to safely use, store and throw away your medicines at www.disposemymeds.org.          This list is accurate as of: 7/11/17  9:16 AM.  Always use your most recent med list.                   Brand Name Dispense Instructions for use Diagnosis    aspirin 81 MG tablet      Take 1 tablet by mouth daily.        calcium + D 600-200 MG-UNIT Tabs   Generic drug:  calcium carbonate-vitamin D      Take 1 tablet by mouth every other day        Ferrous Gluconate 240 (27 FE) MG Tabs      Take 1 tablet by mouth every other day        folic acid 1 MG tablet    FOLVITE     Take 1 mg by mouth daily.        lovastatin 20 MG tablet    MEVACOR    90 tablet    TAKE ONE TABLET BY MOUTH AT BEDTIME    Hyperlipidemia LDL goal <130       methotrexate 2.5 MG tablet CHEMO      Take 7 tablets by mouth once a week        methylPREDNISolone 4 MG tablet    MEDROL DOSEPAK    21 tablet    Follow package instructions    Cervical radiculopathy       oxybutynin 5 MG 24 hr tablet    DITROPAN-XL    90 tablet    TAKE 1 TABLET (5 MG) BY ORAL ROUTE ONCE DAILY    Urinary incontinence       PREMARIN cream   Generic drug:  conjugated estrogens     30 g    PLACE 0.5 GRAMS VAGINALLY TWICE A WEEK AS DIRECTED.    Estrogen deficient vulvovaginitis       REMICADE IV      Inject 200 mg into the vein Patient takes this every 8 weeks        valACYclovir 1000 mg tablet    VALTREX    4 tablet    Take 2 tablets (2,000 mg) by mouth 2 times daily    Recurrent herpes simplex

## 2017-08-08 ENCOUNTER — THERAPY VISIT (OUTPATIENT)
Dept: OCCUPATIONAL THERAPY | Facility: CLINIC | Age: 77
End: 2017-08-08
Payer: MEDICARE

## 2017-08-08 ENCOUNTER — THERAPY VISIT (OUTPATIENT)
Dept: PHYSICAL THERAPY | Facility: CLINIC | Age: 77
End: 2017-08-08
Payer: MEDICARE

## 2017-08-08 DIAGNOSIS — M54.2 CERVICALGIA: Primary | ICD-10-CM

## 2017-08-08 DIAGNOSIS — M54.12 CERVICAL RADICULOPATHY: ICD-10-CM

## 2017-08-08 DIAGNOSIS — R29.898 DEFICIENCIES OF LIMBS: ICD-10-CM

## 2017-08-08 DIAGNOSIS — M54.12 BRACHIAL NEURITIS: Primary | ICD-10-CM

## 2017-08-08 PROCEDURE — 97110 THERAPEUTIC EXERCISES: CPT | Mod: GO | Performed by: OCCUPATIONAL THERAPIST

## 2017-08-08 PROCEDURE — G8984 CARRY CURRENT STATUS: HCPCS | Mod: GO | Performed by: OCCUPATIONAL THERAPIST

## 2017-08-08 PROCEDURE — 97112 NEUROMUSCULAR REEDUCATION: CPT | Mod: GP | Performed by: PHYSICAL THERAPIST

## 2017-08-08 PROCEDURE — G8984 CARRY CURRENT STATUS: HCPCS | Mod: GP | Performed by: PHYSICAL THERAPIST

## 2017-08-08 PROCEDURE — 97161 PT EVAL LOW COMPLEX 20 MIN: CPT | Mod: GP | Performed by: PHYSICAL THERAPIST

## 2017-08-08 PROCEDURE — G8985 CARRY GOAL STATUS: HCPCS | Mod: GO | Performed by: OCCUPATIONAL THERAPIST

## 2017-08-08 PROCEDURE — 97110 THERAPEUTIC EXERCISES: CPT | Mod: GP | Performed by: PHYSICAL THERAPIST

## 2017-08-08 PROCEDURE — G8985 CARRY GOAL STATUS: HCPCS | Mod: GP | Performed by: PHYSICAL THERAPIST

## 2017-08-08 PROCEDURE — 97165 OT EVAL LOW COMPLEX 30 MIN: CPT | Mod: GO | Performed by: OCCUPATIONAL THERAPIST

## 2017-08-08 NOTE — MR AVS SNAPSHOT
After Visit Summary   8/8/2017    Britt Pichardo    MRN: [id number]           Patient Information     Date Of Birth          1940        Visit Information        Provider Department      8/8/2017 2:30 PM Montse Serrano, PT [facility]        Today's Diagnoses     Cervicalgia    -  1    Cervical radiculopathy           Follow-ups after your visit        Your next 10 appointments already scheduled     Aug 18, 2017 12:40 PM CDT   NAYE Spine with Montse Serrano PT   [facility] ([facility]  )    [address]  [place] [address]   [phone]            Aug 25, 2017 10:00 AM CDT   NAYE Spine with Montse Serrano PT   [facility] ([facility]  )    [address] [place] [address] [address]  [place] [id number] [phone]            Sep 05, 2017 11:00 AM CDT   NAYE Hand with Radha Og   [facility]    [address] [place] [address] [address]  [facility] [id number] [phone]              Who to contact     If you have questions or need follow up information about today's clinic visit or your schedule please contact [facility] directly at [phone].  Normal or non-critical lab and imaging results will be communicated to you by Catervahart, letter or phone within 4 business days after the clinic has received the results. If you do not hear from us within 7 days, please contact the clinic through [employer] or phone. If you have a critical or abnormal lab result, we will notify you by phone as soon as possible.  Submit refill requests through [employer] or call your pharmacy and they will forward the refill request to us. Please allow 3 business days for your refill to be completed.          Additional Information About Your Visit        CatervaharGimado Information     [employer] lets you send messages to  "your doctor, view your test results, renew your prescriptions, schedule appointments and more. To sign up, go to [email or web address] . Click on \"Log in\" on the left side of the screen, which will take you to the Welcome page. Then click on \"Sign up Now\" on the right side of the page.     You will be asked to enter the access code listed below, as well as some personal information. Please follow the directions to create your username and password.     Your access code is: [email or web address]  Expires: 2017  2:19 PM     Your access code will  in 90 days. If you need help or a new code, please call your [place] clinic or [phone].        Care EveryWhere ID     This is your Care EveryWhere ID. This could be used by other organizations to access your [place] medical records  [id number]         Blood Pressure from Last 3 Encounters:   17 98/58   17 115/72   17 119/62    Weight from Last 3 Encounters:   17 58 kg (127 lb 12.8 oz)   17 58.2 kg (128 lb 3.2 oz)   17 58.2 kg (128 lb 3.2 oz)              We Performed the Following     HC PT EVAL, LOW COMPLEXITY     NAYE CERT REPORT     NAYE INITIAL EVAL REPORT     NEUROMUSCULAR RE-EDUCATION     THERAPEUTIC EXERCISES          Today's Medication Changes          These changes are accurate as of: 17  3:09 PM.  If you have any questions, ask your nurse or doctor.               These medicines have changed or have updated prescriptions.        Dose/Directions    PREMARIN cream   This may have changed:  See the new instructions.   Used for:  Estrogen deficient vulvovaginitis   Generic drug:  conjugated estrogens        PLACE 0.5 GRAMS VAGINALLY TWICE A WEEK AS DIRECTED.   Quantity:  30 g   Refills:  3                Primary Care Provider Office Phone # Fax #    Vineet Mendoza [phone]       [facility] [address]  [facility] [address]        Equal Access to Services     MICHAEL HAMILTON AH: " Hadii aad ku hadronnieo Sojunieali, waaxda luqadaha, qaybta kaalfelisha escoto, osiel estradadayo blum. So [facility] [phone].    ATENCIÓN: Si clare clements, tiene a glass disposición servicios gratuitos de asistencia lingüística. Llame al [phone].    We comply with applicable federal civil rights laws and [place] laws. We do not discriminate on the basis of race, color, national origin, age, disability sex, sexual orientation or gender identity.            Thank you!     Thank you for choosing [facility]  for your care. Our goal is always to provide you with excellent care. Hearing back from our patients is one way we can continue to improve our services. Please take a few minutes to complete the written survey that you may receive in the mail after your visit with us. Thank you!             Your Updated Medication List - Protect others around you: Learn how to safely use, store and throw away your medicines at www.disposemymeds.org.          This list is accurate as of: 8/8/17  3:09 PM.  Always use your most recent med list.                   Brand Name Dispense Instructions for use Diagnosis    aspirin 81 MG tablet      Take 1 tablet by mouth daily.        calcium + D 600-200 MG-UNIT Tabs   Generic drug:  calcium carbonate-vitamin D      Take 1 tablet by mouth every other day        Ferrous Gluconate 240 (27 FE) MG Tabs      Take 1 tablet by mouth every other day        folic acid 1 MG tablet    FOLVITE     Take 1 mg by mouth daily.        lovastatin 20 MG tablet    MEVACOR    90 tablet    TAKE ONE TABLET BY MOUTH AT BEDTIME    Hyperlipidemia LDL goal <130       methotrexate 2.5 MG tablet CHEMO      Take 7 tablets by mouth once a week        methylPREDNISolone 4 MG tablet    MEDROL DOSEPAK    21 tablet    Follow package instructions    Cervical radiculopathy       oxybutynin 5 MG 24 hr tablet    DITROPAN-XL    90 tablet    TAKE 1 TABLET (5 MG) BY ORAL  ROUTE ONCE DAILY    Urinary incontinence       PREMARIN cream   Generic drug:  conjugated estrogens     30 g    PLACE 0.5 GRAMS VAGINALLY TWICE A WEEK AS DIRECTED.    Estrogen deficient vulvovaginitis       REMICADE IV      Inject 200 mg into the vein Patient takes this every 8 weeks        valACYclovir 1000 mg tablet    VALTREX    4 tablet    Take 2 tablets (2,000 mg) by mouth 2 times daily    Recurrent herpes simplex

## 2017-08-08 NOTE — LETTER
DEPARTMENT OF HEALTH AND HUMAN SERVICES  CENTERS FOR MEDICARE & MEDICAID SERVICES    PLAN/UPDATED PLAN OF PROGRESS FOR OUTPATIENT REHABILITATION    PATIENTS NAME:  Britt Pichardo   : 1940  PROVID[facility] NUMBER:  [id number]  [id number]   PROVIDER NAME: [facility]  MEDICAL RECORD NUMBER: [id number]   START OF CARE DATE:    SOC Date: 17   TYPE:  OT  PRIMARY/TREATMENT DIAGNOSIS: (Pertinent Medical Diagnosis)  Brachial neuritis  Deficiencies of limbs  VISITS FROM START OF CARE:  Rxs Used: 1     Hand Therapy Initial Evaluation  Current Date:  2017  Diagnosis:  Hand weakness  DOI:  One year ago (MD date of 2017 used for Medicare)     Subjective:  Patient reports symptoms of weakness/loss of strength of the left index and thumb which occurred due to radiculopathy. Since onset symptoms are Gradually getting worse.  Special tests:  EMG and MRI (+) neck.  Previous treatment: none.    General health as reported by patient is good.  Pertinent medical history includes:rheumatoid arthritis, osteoarthritis  Medical allergies:none.  Surgical history: other: 2 lumbar surgeries.  Medication history: high blood pressure.  Occupational Profile Information:  Current occupation is arthritis  Currently retired  Prior functional level:  no limitations  Barriers include:none  Mobility: No difficulty  Transportation: drives  Leisure activities/hobbies: knit, cook, cleaning, mow, pull weeds  Upper Extremity Functional Index Score:  SCORE:   Column Totals: /80: 62   (A lower score indicates greater disability.)    Objective:  Pain Level Report  VAS(0-10) 2017   At Rest: 0   With Use: 2 neck  0 hand   Report of Pain:  No pain  Edema:  NONE      PATIENTS NAME:  Britt Pichardo   : 1940    Sensation: WNL throughout all nerve distributions; per patient report  ROM:  Full  Median Nerve MMT: (Scale 0/5)   17      Pronator Teres 5/5      FCR 4/5      FDS 4/5      FDP I, II 4/5      FPL 4/5      APB 3+/5       Opponens Pollicus 3+/5      FPB 4-/5      Lumricals I, II 4/5        STRENGTH:   (Measured in pounds)   2017      Trials Right Left Right Left Right Left  Right Left   1  2  3  Av#       20#           3 pt Pinch 2017      Trials Right Left Right Left Right Left  Right Left   1  2  3  Avg:       10#       4#           Lateral Pinch 2017      Trials Right Left Right Left Right Left  Right Left   1  2  3  Avg:       10#       3#           Assessment:  Patient presents with symptoms consistent with diagnosis of hand weakness,  with conservative intervention.   Patient's limitations or Problem List includes:  Weakness of the left hand, thumb, index finger and long finger which interferes with the patient's ability to perform Self Care Tasks (dressing, eating, bathing, hygiene/toileting), Sleep Patterns, Recreational Activities, Household Chores and Driving  as compared to previous level of function.  Rehab Potential:  Good - Return to full activity, some limitations  Patient will benefit from skilled Occupational Therapy to increase overall strength,  strength and pinch strength to return to previous activity level and resume normal daily tasks and to reach their rehab potential.  Barriers to Learning:  No barrier    PATIENTS NAME:  Britt Pichardo   : 1940    Communication Issues:  Patient appears to be able to clearly communicate and understand verbal and written communication and follow directions correctly.  Chart Review: Simple history review with patient  Identified Performance Deficits: bathing/showering, dressing, hygiene and grooming, driving and community mobility, health management and maintenance, home establishment and management, meal preparation and cleanup, shopping and leisure activities    Assessment of Occupational Performance:  5 or more Performance Deficits  Clinical Decision Making (Complexity): Low complexity  Treatment Explanation:  The following has been  "discussed with the patient:  RX ordered/plan of care  Anticipated outcomes  Possible risks and side effects  Plan:  Frequency:  2 X per month, once daily  Duration:  for 3 months  Treatment Plan:   Therapeutic Exercise:  Isotonics  Discharge Plan:  Achieve all LTG.  Independent in home treatment program.  Reach maximal therapeutic benefit.  Home Exercise Program:  FDS strengthening with rubber ball  FDS/FDP strengthening with yellow digigrip  FPL strengthening with yellow digigrip  Pink nerf block for 3 Pt and Lateral pinch  Yellow nerf block for oppenens strengthening  APB resistance strengthening  Next Visit:  Advance strengthening exercises      Caregiver Signature/Credentials ______________________________ Date ________       Treating Provider: CHRISTIANE Johnson, CHT    I have reviewed and certified the need for these services and plan of treatment while under my care.    PHYSICIAN'S SIGNATURE:   _____________________________________ Date___________      Wild Miller    Certification period: Beginning of Cert date period: 08/08/17 End of Cert period date: 11/05/17     Functional Level Progress Report: Please see attached \"Goal Flow sheet for Functional level.\"    ___X_____ Continue Services or       ________ DC Services                Service dates: SOC Date: 08/08/17  to present                                                                     "

## 2017-08-08 NOTE — LETTER
DEPARTMENT OF HEALTH AND HUMAN SERVICES  CENTERS FOR MEDICARE & MEDICAID SERVICES    PLAN/UPDATED PLAN OF PROGRESS FOR OUTPATIENT REHABILITATION    PATIENTS NAME:  Britt Pichardo   : 1940  PROVIDER NUMBER:    [id number]  [id number]   PROVIDER NAME: [facility]  MEDICAL RECORD NUMBER: [id number]   START OF CARE DATE:  SOC Date: 17   TYPE:  PT  PRIMARY/TREATMENT DIAGNOSIS: (Pertinent Medical Diagnosis)  Cervicalgia  Cervical radiculopathy  VISITS FROM START OF CARE: 1 Rxs Used: 1   Physical Therapy Initial Evaluation   17   Precautions/Restrictions/MD instructions: PT eval and treat    Therapist Impression:   Pt is a 78 y/o female, with 1 year history of neck pain and left sided cervical radiculopathy (into left hand). Pt presents with pain, decreased ROM/moblilty, poor posture and decreased strength. These impairments limit their ability to dress and bath herself . Skilled PT services necessary in order to reduce impairments and improve independent function.  Subjective:   Chief Complaint: Left sided neck pain and radiculopathy into left hand. Noted left hand weakness. No trauma, no accident.   DOI/onset: 17 DOS: none  Location: left sided neck  Quality: sharp   Frequency: intermittent  Radiates: none really to note, other than left hand weakness; left hand intermittent twitching  Pain scale: Rest 1/10 Activity 3/10    Sleeping: not affected    Exacerbated by: dressing (buttoning) and bathing  Relieved by: none to note (currently taking predisone pack)  Progression: worse  Previous Treatment: none Effect of prior treatment: n/a    PMH and/or surgical history: lumbar spine surgery (X2 L2-L5 fusion )   Imaging: MRI (indicated pinch nerve on left side)     Occupation: retired  Job duties/hobbies: knitting, yard work, gardening    Current HEP/exercise regimen: walking  Patient's goals: hand stronger   Medications: RA meds; cholesterol    PATIENTS NAME:  Britt Pichardo YOB: 1940    General health as reported by patient: good  Return to MD: as needed   Red Flags: RA     Objective:  CERVICAL:  Posture: forward head, and rounded shoulders  Neurological:  Motor Deficit:  Myotomes L R   C4 (shoulder elevation)    C5 (shoulder abduction)    C6 (elbow flexion)    C7 (elbow extension)    C8 (thumb extension) 3/5 5/5   T1 (finger add/abd) 3/5 5/5    Strength (lb) 21lbs 45lbs   Sensory Deficit, Reflexes, Dural Signs: light touch reduced on L compared R (C4-T1)  AROM: (Major, Moderate, Minimal or Nil loss)  Movement Loss Junior Mod Min Nil Pain   Protrusion   X  none   Flexion   X  none   Retraction   X  L pain   Extension   X  none   Left Rotation   X  L pain   Right Rotation   X  none   Left Side Bending   X  none   Right Side bending   X  L pain   SPECIAL TESTS   R L   Spurlings/Quadrant - +   ULTT 1 (median) - +   ULTT 2 (ulnar)     ULTT 3 (radial)     Distraction     Alar Ligament Test - -   Transverse Ligament Test - -   Other Tests:  - Supine: Deep Neck Flexor Activation: poor    Assessment/Plan:    Patient is a 77 year old female with cervical complaints.    Patient has the following significant findings with corresponding treatment plan.                    PATIENTS NAME:  Britt Pichardo YOB: 1940    Diagnosis 1:  Neck pain, and L cervical radiculopathy  Pain -  hot/cold therapy, manual therapy, splint/taping/bracing/orthotics, self management, education, directional preference exercise and home program  Decreased ROM/flexibility - manual therapy and therapeutic exercise  Decreased joint mobility - manual therapy and therapeutic exercise  Decreased strength - therapeutic exercise and therapeutic activities  Inflammation - cold therapy and self management/home program  Impaired muscle performance - neuro re-education  Decreased function - therapeutic activities  Impaired posture - neuro  re-education  Instability -  Therapeutic Activity  Therapeutic Exercise  Therapy Evaluation Codes:   1) History comprised of:   Personal factors that impact the plan of care:      None.    Comorbidity factors that impact the plan of care are:      Rheumatoid arthritis and Weakness.     Medications impacting care: Steroids.  2) Examination of Body Systems comprised of:   Body structures and functions that impact the plan of care:      Cervical spine and Hand.   Activity limitations that impact the plan of care are:      Bathing, Cooking, Dressing and Grasping.  3) Clinical presentation characteristics are:   Stable/Uncomplicated.  4) Decision-Making    Low complexity using standardized patient assessment instrument and/or measureable assessment of functional outcome.  Cumulative Therapy Evaluation is: Low complexity.  Previous and current functional limitations:  (See Goal Flow Sheet for this information)    Short term and Long term goals: (See Goal Flow Sheet for this information)   Communication ability:  Patient appears to be able to clearly communicate and understand verbal and written communication and follow directions correctly.  Treatment Explanation - The following has been discussed with the patient:   RX ordered/plan of care  Anticipated outcomes  Possible risks and side effects  This patient would benefit from PT intervention to resume normal activities.   Rehab potential is good.  Frequency:  1 X week, once daily  Duration:  for 6 weeks  Discharge Plan:  Achieve all LTG.  Independent in home treatment program.  Reach maximal therapeutic benefit.          Caregiver Signature/Credentials _____________________________ Date ________       Treating Provider: Montse Serrano, PT, SCS   PATIENTS NAME:  Britt Pichardo   : 1940    I have reviewed and certified the need for these services and plan of treatment while under my care.        PHYSICIAN'S SIGNATURE:    "____________________________________Date___________     Wild Miller    Certification period:  Beginning of Cert date period: 08/08/17 to  End of Cert period date: 11/05/17     Functional Level Progress Report: Please see attached \"Goal Flow sheet for Functional level.\"    ____X____ Continue Services or       ________ DC Services                Service dates: From  SOC Date: 08/08/17 date to present                         "

## 2017-08-08 NOTE — PROGRESS NOTES
Hand Therapy Initial Evaluation    Current Date:  2017    Diagnosis:  Hand weakness  DOI:  One year ago (MD date of 2017 used for Medicare)     Subjective:  Britt Pichardo is a 77 year old right hand dominant female.    Patient reports symptoms of weakness/loss of strength of the left index and thumb which occurred due to radiculopathy. Since onset symptoms are Gradually getting worse.  Special tests:  EMG and MRI (+) neck.  Previous treatment: none.    General health as reported by patient is good.  Pertinent medical history includes:rheumatoid arthritis, osteoarthritis  Medical allergies:none.  Surgical history: other: 2 lumbar surgeries.  Medication history: high blood pressure.    Occupational Profile Information:  Current occupation is arthritis  Currently retired  Prior functional level:  no limitations  Barriers include:none  Mobility: No difficulty  Transportation: drives  Leisure activities/hobbies: knit, cook, cleaning, mow, pull weeds    Upper Extremity Functional Index Score:  SCORE:   Column Totals: /80: 62   (A lower score indicates greater disability.)    Objective:  Pain Level Report  VAS(0-10) 2017   At Rest: 0   With Use: 2 neck  0 hand     Report of Pain:  No pain  Edema:  NONE  Sensation: WNL throughout all nerve distributions; per patient report    ROM:  Full    Median Nerve MMT: (Scale 0/5)   17      Pronator Teres 5/5      FCR 4/5      FDS 4/5      FDP I, II 4/5      FPL 4/5      APB 3+/5      Opponens Pollicus 3+/5      FPB 4-/5      Lumricals I, II 4/5            STRENGTH:   (Measured in pounds)     2017      Trials Right Left Right Left Right Left  Right Left   1  2  3  Av#       20#           3 pt Pinch 2017      Trials Right Left Right Left Right Left  Right Left   1  2  3  Avg:       10#       4#           Lateral Pinch 2017      Trials Right Left Right Left Right Left  Right Left   1  2  3  Avg:       10#       3#                Assessment:  Patient presents with symptoms consistent with diagnosis of hand weakness,  with conservative intervention.     Patient's limitations or Problem List includes:  Weakness of the left hand, thumb, index finger and long finger which interferes with the patient's ability to perform Self Care Tasks (dressing, eating, bathing, hygiene/toileting), Sleep Patterns, Recreational Activities, Household Chores and Driving  as compared to previous level of function.    Rehab Potential:  Good - Return to full activity, some limitations    Patient will benefit from skilled Occupational Therapy to increase overall strength,  strength and pinch strength to return to previous activity level and resume normal daily tasks and to reach their rehab potential.    Barriers to Learning:  No barrier    Communication Issues:  Patient appears to be able to clearly communicate and understand verbal and written communication and follow directions correctly.    Chart Review: Simple history review with patient    Identified Performance Deficits: bathing/showering, dressing, hygiene and grooming, driving and community mobility, health management and maintenance, home establishment and management, meal preparation and cleanup, shopping and leisure activities    Assessment of Occupational Performance:  5 or more Performance Deficits    Clinical Decision Making (Complexity): Low complexity    Treatment Explanation:  The following has been discussed with the patient:  RX ordered/plan of care  Anticipated outcomes  Possible risks and side effects    Plan:  Frequency:  2 X per month, once daily  Duration:  for 3 months    Treatment Plan:   Therapeutic Exercise:  Isotonics  Discharge Plan:  Achieve all LTG.  Independent in home treatment program.  Reach maximal therapeutic benefit.    Home Exercise Program:  FDS strengthening with rubber ball  FDS/FDP strengthening with yellow digigrip  FPL strengthening with yellow digigrip  Pink  nerf block for 3 Pt and Lateral pinch  Yellow nerf block for oppenens strengthening  APB resistance strengthening    Next Visit:  Advance strengthening exercises

## 2017-08-08 NOTE — PROGRESS NOTES
Subjective:  Physical Therapy Initial Evaluation   8/8/17   Precautions/Restrictions/MD instructions: PT eval and treat    Therapist Impression:   Pt is a 76 y/o female, with 1 year history of neck pain and left sided cervical radiculopathy (into left hand). Pt presents with pain, decreased ROM/moblilty, poor posture and decreased strength. These impairments limit their ability to dress and bath herself . Skilled PT services necessary in order to reduce impairments and improve independent function.    Subjective:   Chief Complaint: Left sided neck pain and radiculopathy into left hand. Noted left hand weakness. No trauma, no accident.   DOI/onset: 7/21/17 DOS: none  Location: left sided neck  Quality: sharp   Frequency: intermittent  Radiates: none really to note, other than left hand weakness; left hand intermittent twitching  Pain scale: Rest 1/10 Activity 3/10    Sleeping: not affected    Exacerbated by: dressing (buttoning) and bathing  Relieved by: none to note (currently taking predisone pack)  Progression: worse  Previous Treatment: none Effect of prior treatment: n/a    PMH and/or surgical history: lumbar spine surgery (X2 L2-L5 fusion 2003)   Imaging: MRI (indicated pinch nerve on left side)     Occupation: retired  Job duties/hobbies: knitting, yard work, gardening    Current HEP/exercise regimen: walking  Patient's goals: hand stronger   Medications: RA meds; cholesterol   General health as reported by patient: good  Return to MD: as needed   Red Flags: RA       HPI                    Objective:  CERVICAL:    Posture: forward head, and rounded shoulders    Neurological:    Motor Deficit:  Myotomes L R   C4 (shoulder elevation) 5/5 5/5   C5 (shoulder abduction) 5/5 5/5   C6 (elbow flexion) 5/5 5/5   C7 (elbow extension) 5/5 5/5   C8 (thumb extension) 3/5 5/5   T1 (finger add/abd) 3/5 5/5    Strength (lb) 21lbs 45lbs     Sensory Deficit, Reflexes, Dural Signs: light touch reduced on L compared R  (C4-T1)    AROM: (Major, Moderate, Minimal or Nil loss)  Movement Loss Junior Mod Min Nil Pain   Protrusion   X  none   Flexion   X  none   Retraction   X  L pain   Extension   X  none   Left Rotation   X  L pain   Right Rotation   X  none   Left Side Bending   X  none   Right Side bending   X  L pain     SPECIAL TESTS   R L   Spurlings/Quadrant - +   ULTT 1 (median) - +   ULTT 2 (ulnar)     ULTT 3 (radial)     Distraction     Alar Ligament Test - -   Transverse Ligament Test - -       Other Tests:  - Supine: Deep Neck Flexor Activation: poor      System    Physical Exam    General     ROS    Assessment/Plan:      Patient is a 77 year old female with cervical complaints.    Patient has the following significant findings with corresponding treatment plan.                Diagnosis 1:  Neck pain, and L cervical radiculopathy  Pain -  hot/cold therapy, manual therapy, splint/taping/bracing/orthotics, self management, education, directional preference exercise and home program  Decreased ROM/flexibility - manual therapy and therapeutic exercise  Decreased joint mobility - manual therapy and therapeutic exercise  Decreased strength - therapeutic exercise and therapeutic activities  Inflammation - cold therapy and self management/home program  Impaired muscle performance - neuro re-education  Decreased function - therapeutic activities  Impaired posture - neuro re-education  Instability -  Therapeutic Activity  Therapeutic Exercise    Therapy Evaluation Codes:   1) History comprised of:   Personal factors that impact the plan of care:      None.    Comorbidity factors that impact the plan of care are:      Rheumatoid arthritis and Weakness.     Medications impacting care: Steroids.  2) Examination of Body Systems comprised of:   Body structures and functions that impact the plan of care:      Cervical spine and Hand.   Activity limitations that impact the plan of care are:      Bathing, Cooking, Dressing and  Grasping.  3) Clinical presentation characteristics are:   Stable/Uncomplicated.  4) Decision-Making    Low complexity using standardized patient assessment instrument and/or measureable assessment of functional outcome.  Cumulative Therapy Evaluation is: Low complexity.    Previous and current functional limitations:  (See Goal Flow Sheet for this information)    Short term and Long term goals: (See Goal Flow Sheet for this information)     Communication ability:  Patient appears to be able to clearly communicate and understand verbal and written communication and follow directions correctly.  Treatment Explanation - The following has been discussed with the patient:   RX ordered/plan of care  Anticipated outcomes  Possible risks and side effects  This patient would benefit from PT intervention to resume normal activities.   Rehab potential is good.    Frequency:  1 X week, once daily  Duration:  for 6 weeks  Discharge Plan:  Achieve all LTG.  Independent in home treatment program.  Reach maximal therapeutic benefit.    Please refer to the daily flowsheet for treatment today, total treatment time and time spent performing 1:1 timed codes.

## 2017-08-08 NOTE — MR AVS SNAPSHOT
"              After Visit Summary   8/8/2017    Britt Pichardo    MRN: [id number]           Patient Information     Date Of Birth          1940        Visit Information        Provider Department      8/8/2017 1:00 PM Radha Og [facility]        Today's Diagnoses     Brachial neuritis    -  1    Deficiencies of limbs           Follow-ups after your visit        Your next 10 appointments already scheduled     Aug 08, 2017  2:30 PM CDT   NAYE Spine with Montse Serrano PT   [facility] ([facility]  )    [address]  [place] [address]   [phone]            Sep 05, 2017 11:00 AM CDT   NAYE Hand with Radha Og   [facility] ([facility])    [address]  [place] [id number] [phone]              Who to contact     If you have questions or need follow up information about today's clinic visit or your schedule please contact [facility] directly at [phone].  Normal or non-critical lab and imaging results will be communicated to you by [employer], letter or phone within 4 business days after the clinic has received the results. If you do not hear from us within 7 days, please contact the clinic through [employer] or phone. If you have a critical or abnormal lab result, we will notify you by phone as soon as possible.  Submit refill requests through [employer] or call your pharmacy and they will forward the refill request to us. Please allow 3 business days for your refill to be completed.          Additional Information About Your Visit        [employer] Information     [employer] lets you send messages to your doctor, view your test results, renew your prescriptions, schedule appointments and more. To sign up, go to [email or web address] . Click on \"Log in\" on the left side of the screen, which will take you to the Welcome page. Then click on \"Sign up Now\" on the right side of the page. "     You will be asked to enter the access code listed below, as well as some personal information. Please follow the directions to create your username and password.     Your access code is: [email or web address]  Expires: 2017  2:19 PM     Your access code will  in 90 days. If you need help or a new code, please call your [facility] clinic or [phone].        Care EveryWhere ID     This is your Care EveryWhere ID. This could be used by other organizations to access your [facility] medical records  [id number]         Blood Pressure from Last 3 Encounters:   17 98/58   17 115/72   17 119/62    Weight from Last 3 Encounters:   17 58 kg (127 lb 12.8 oz)   17 58.2 kg (128 lb 3.2 oz)   17 58.2 kg (128 lb 3.2 oz)              We Performed the Following     HC OT EVAL, LOW COMPLEXITY     NAYE CERT REPORT     THERAPEUTIC EXERCISES          Today's Medication Changes          These changes are accurate as of: 17  2:19 PM.  If you have any questions, ask your nurse or doctor.               These medicines have changed or have updated prescriptions.        Dose/Directions    PREMARIN cream   This may have changed:  See the new instructions.   Used for:  Estrogen deficient vulvovaginitis   Generic drug:  conjugated estrogens        PLACE 0.5 GRAMS VAGINALLY TWICE A WEEK AS DIRECTED.   Quantity:  30 g   Refills:  3                Primary Care Provider Office Phone # Fax #    Vineet Mendoza [phone]       [facility] [facility] [address]        Equal Access to Services     [facility] : Hadii aad ku hadasho Soomaali, waaxda luqadaha, qaybta kaalmada osiel escoto. So [facility] [phone].    ATENCIÓN: Si habla español, tiene a glass disposición servicios gratuitos de asistencia lingüística. Llame al [phone].    We comply with applicable federal civil rights laws and [place] laws. We do not  discriminate on the basis of race, color, national origin, age, disability sex, sexual orientation or gender identity.            Thank you!     Thank you for choosing [facility]  for your care. Our goal is always to provide you with excellent care. Hearing back from our patients is one way we can continue to improve our services. Please take a few minutes to complete the written survey that you may receive in the mail after your visit with us. Thank you!             Your Updated Medication List - Protect others around you: Learn how to safely use, store and throw away your medicines at www.disposemymeds.org.          This list is accurate as of: 8/8/17  2:19 PM.  Always use your most recent med list.                   Brand Name Dispense Instructions for use Diagnosis    aspirin 81 MG tablet      Take 1 tablet by mouth daily.        calcium + D 600-200 MG-UNIT Tabs   Generic drug:  calcium carbonate-vitamin D      Take 1 tablet by mouth every other day        Ferrous Gluconate 240 (27 FE) MG Tabs      Take 1 tablet by mouth every other day        folic acid 1 MG tablet    FOLVITE     Take 1 mg by mouth daily.        lovastatin 20 MG tablet    MEVACOR    90 tablet    TAKE ONE TABLET BY MOUTH AT BEDTIME    Hyperlipidemia LDL goal <130       methotrexate 2.5 MG tablet CHEMO      Take 7 tablets by mouth once a week        methylPREDNISolone 4 MG tablet    MEDROL DOSEPAK    21 tablet    Follow package instructions    Cervical radiculopathy       oxybutynin 5 MG 24 hr tablet    DITROPAN-XL    90 tablet    TAKE 1 TABLET (5 MG) BY ORAL ROUTE ONCE DAILY    Urinary incontinence       PREMARIN cream   Generic drug:  conjugated estrogens     30 g    PLACE 0.5 GRAMS VAGINALLY TWICE A WEEK AS DIRECTED.    Estrogen deficient vulvovaginitis       REMICADE IV      Inject 200 mg into the vein Patient takes this every 8 weeks        valACYclovir 1000 mg tablet    VALTREX    4 tablet    Take 2 tablets (2,000 mg) by mouth 2  times daily    Recurrent herpes simplex

## 2017-08-18 ENCOUNTER — THERAPY VISIT (OUTPATIENT)
Dept: PHYSICAL THERAPY | Facility: CLINIC | Age: 77
End: 2017-08-18
Payer: MEDICARE

## 2017-08-18 DIAGNOSIS — R29.898 DEFICIENCIES OF LIMBS: ICD-10-CM

## 2017-08-18 DIAGNOSIS — M54.12 BRACHIAL NEURITIS: ICD-10-CM

## 2017-08-18 DIAGNOSIS — M54.12 CERVICAL RADICULOPATHY: ICD-10-CM

## 2017-08-18 DIAGNOSIS — M54.2 CERVICALGIA: ICD-10-CM

## 2017-08-18 PROCEDURE — 97112 NEUROMUSCULAR REEDUCATION: CPT | Mod: GP | Performed by: PHYSICAL THERAPIST

## 2017-08-18 PROCEDURE — 97140 MANUAL THERAPY 1/> REGIONS: CPT | Mod: GP | Performed by: PHYSICAL THERAPIST

## 2017-08-25 ENCOUNTER — THERAPY VISIT (OUTPATIENT)
Dept: PHYSICAL THERAPY | Facility: CLINIC | Age: 77
End: 2017-08-25
Payer: MEDICARE

## 2017-08-25 DIAGNOSIS — M54.2 CERVICALGIA: ICD-10-CM

## 2017-08-25 DIAGNOSIS — M54.12 CERVICAL RADICULOPATHY: ICD-10-CM

## 2017-08-25 PROCEDURE — 97112 NEUROMUSCULAR REEDUCATION: CPT | Mod: GP | Performed by: PHYSICAL THERAPIST

## 2017-08-25 PROCEDURE — 97140 MANUAL THERAPY 1/> REGIONS: CPT | Mod: GP | Performed by: PHYSICAL THERAPIST

## 2017-08-27 DIAGNOSIS — B00.9 RECURRENT HERPES SIMPLEX: ICD-10-CM

## 2017-08-28 NOTE — TELEPHONE ENCOUNTER
Valacyclovir 1 gm     Last Written Prescription Date: 12/21/15  Last Fill Quantity: 4, # refills: PRN  Last Office Visit with [facility], [facility] or [facility] prescribing provider: 5/1/17        Creatinine   Date Value Ref Range Status   07/10/2017 0.580 0.500 - 1.300 mg/dL Final

## 2017-08-29 RX ORDER — VALACYCLOVIR HYDROCHLORIDE 1 G/1
TABLET, FILM COATED ORAL
Qty: 4 TABLET | Refills: 5 | Status: SHIPPED | OUTPATIENT
Start: 2017-08-29 | End: 2018-11-26

## 2017-08-31 ENCOUNTER — THERAPY VISIT (OUTPATIENT)
Dept: PHYSICAL THERAPY | Facility: CLINIC | Age: 77
End: 2017-08-31
Payer: MEDICARE

## 2017-08-31 DIAGNOSIS — M54.12 CERVICAL RADICULOPATHY: ICD-10-CM

## 2017-08-31 DIAGNOSIS — M54.2 CERVICALGIA: ICD-10-CM

## 2017-08-31 PROCEDURE — 97112 NEUROMUSCULAR REEDUCATION: CPT | Mod: GP | Performed by: PHYSICAL THERAPIST

## 2017-08-31 PROCEDURE — 97140 MANUAL THERAPY 1/> REGIONS: CPT | Mod: GP | Performed by: PHYSICAL THERAPIST

## 2017-09-05 ENCOUNTER — THERAPY VISIT (OUTPATIENT)
Dept: OCCUPATIONAL THERAPY | Facility: CLINIC | Age: 77
End: 2017-09-05
Payer: MEDICARE

## 2017-09-05 DIAGNOSIS — R29.898 DEFICIENCIES OF LIMBS: ICD-10-CM

## 2017-09-05 DIAGNOSIS — M54.12 BRACHIAL NEURITIS: ICD-10-CM

## 2017-09-05 PROCEDURE — 97110 THERAPEUTIC EXERCISES: CPT | Mod: GO | Performed by: OCCUPATIONAL THERAPIST

## 2017-09-05 NOTE — MR AVS SNAPSHOT
"              After Visit Summary   9/5/2017    Britt Pichardo    MRN: [id number]           Patient Information     Date Of Birth          1940        Visit Information        Provider Department      9/5/2017 11:00 AM Radha Og [facility]        Today's Diagnoses     Deficiencies of limbs        Brachial neuritis           Follow-ups after your visit        Your next 10 appointments already scheduled     Oct 03, 2017  1:00 PM CDT   NAYE Hand with Radha Earle   [facility] ([facility])    [address] [place] [id number] [phone]              Who to contact     If you have questions or need follow up information about today's clinic visit or your schedule please contact [facility] directly at [phone].  Normal or non-critical lab and imaging results will be communicated to you by MyChart, letter or phone within 4 business days after the clinic has received the results. If you do not hear from us within 7 days, please contact the clinic through MyChart or phone. If you have a critical or abnormal lab result, we will notify you by phone as soon as possible.  Submit refill requests through [employer] or call your pharmacy and they will forward the refill request to us. Please allow 3 business days for your refill to be completed.          Additional Information About Your Visit        MyChart Information     [employer] lets you send messages to your doctor, view your test results, renew your prescriptions, schedule appointments and more. To sign up, go to [email or web address] . Click on \"Log in\" on the left side of the screen, which will take you to the Welcome page. Then click on \"Sign up Now\" on the right side of the page.     You will be asked to enter the access code listed below, as well as some personal information. Please follow the directions to create your username and password.     Your access code is: [email or web address]  Expires: 11/6/2017  " 2:19 PM     Your access code will  in 90 days. If you need help or a new code, please call your [facility] or [phone].        Care EveryWhere ID     This is your Care EveryWhere ID. This could be used by other organizations to access your [facility] records  [id number]         Blood Pressure from Last 3 Encounters:   17 98/58   17 115/72   17 119/62    Weight from Last 3 Encounters:   17 58 kg (127 lb 12.8 oz)   17 58.2 kg (128 lb 3.2 oz)   17 58.2 kg (128 lb 3.2 oz)              We Performed the Following     NAYE PROGRESS NOTES REPORT     THERAPEUTIC EXERCISES          Today's Medication Changes          These changes are accurate as of: 17 11:45 AM.  If you have any questions, ask your nurse or doctor.               These medicines have changed or have updated prescriptions.        Dose/Directions    PREMARIN cream   This may have changed:  See the new instructions.   Used for:  Estrogen deficient vulvovaginitis   Generic drug:  conjugated estrogens        PLACE 0.5 GRAMS VAGINALLY TWICE A WEEK AS DIRECTED.   Quantity:  30 g   Refills:  3                Primary Care Provider Office Phone # Fax #    Vineet Mendoza [phone]       [address] [facility] [address]        Equal Access to Services     MICHAEL [place] : Hadii amy ku hadasho Soomaali, waaxda luqadaha, qaybta kaalmada adeegyada, osiel blum. So [facility] [phone].    ATENCIÓN: Si habla español, tiene a glass disposición servicios gratuitos de asistencia lingüística. Llame al [phone].    We comply with applicable federal civil rights laws and [place] laws. We do not discriminate on the basis of race, color, national origin, age, disability sex, sexual orientation or gender identity.            Thank you!     Thank you for choosing [facility]  for your care. Our goal is always to provide you with excellent care. Hearing back  from our patients is one way we can continue to improve our services. Please take a few minutes to complete the written survey that you may receive in the mail after your visit with us. Thank you!             Your Updated Medication List - Protect others around you: Learn how to safely use, store and throw away your medicines at www.disposemymeds.org.          This list is accurate as of: 9/5/17 11:45 AM.  Always use your most recent med list.                   Brand Name Dispense Instructions for use Diagnosis    aspirin 81 MG tablet      Take 1 tablet by mouth daily.        calcium + D 600-200 MG-UNIT Tabs   Generic drug:  calcium carbonate-vitamin D      Take 1 tablet by mouth every other day        Ferrous Gluconate 240 (27 FE) MG Tabs      Take 1 tablet by mouth every other day        folic acid 1 MG tablet    FOLVITE     Take 1 mg by mouth daily.        lovastatin 20 MG tablet    MEVACOR    90 tablet    TAKE ONE TABLET BY MOUTH AT BEDTIME    Hyperlipidemia LDL goal <130       methotrexate 2.5 MG tablet CHEMO      Take 7 tablets by mouth once a week        methylPREDNISolone 4 MG tablet    MEDROL DOSEPAK    21 tablet    Follow package instructions    Cervical radiculopathy       oxybutynin 5 MG 24 hr tablet    DITROPAN-XL    90 tablet    TAKE 1 TABLET (5 MG) BY ORAL ROUTE ONCE DAILY    Urinary incontinence       PREMARIN cream   Generic drug:  conjugated estrogens     30 g    PLACE 0.5 GRAMS VAGINALLY TWICE A WEEK AS DIRECTED.    Estrogen deficient vulvovaginitis       REMICADE IV      Inject 200 mg into the vein Patient takes this every 8 weeks        valACYclovir 1000 mg tablet    VALTREX    4 tablet    TAKE 2 TABLETS (2,000 MG) BY MOUTH 2 TIMES DAILY    Recurrent herpes simplex

## 2017-09-05 NOTE — PROGRESS NOTES
Progress Note - Hand Therapy    Current Date:  2017    Diagnosis:  Hand weakness  DOI:  One year ago (MD date of 2017 used for Medicare)    Number of visits to date:  1    Reporting period is 2017 to 2017.    Subjectve:   Subjective changes as noted by patient:  I have been doing my exercises.  I have a few questions.  I feel  like my hand strength is about the same.     Functional changes noted by patient:  Improvement in Self Care Tasks (eating) and Household Chores  Patient has noted adverse reaction to:  None        Objective:  Pain Level Report  VAS(0-10) 2017   At Rest: 0 0   With Use: 2 neck  0 hand 1 neck  0 hand     Report of Pain:  No pain  Edema:  NONE  Sensation: WNL throughout all nerve distributions; per patient report    ROM:  Full    Median Nerve MMT: (Scale 0/5)   17     Pronator Teres 5/5 5/5     FCR 4/5 4/5     FDS 4/5 4/5     FDP I, II 4/5 4/5     FPL 4/5 5/5     APB 3+/5 4-/5     Opponens Pollicus 3+/5 4-/5     FPB 4-/5 4/5     Lumricals I, II 4/5 4/5       Ulnar Nerve MMT: (Scale 0/5)   17      FCU 4+/5      FDP II, IV 4/5, 3+/5      ADM 3+/5      ODM 3+/5      FDM 3+/5      Palmar Interossei 3+/5      Dorsal Interossei 3+/5      Lumbricals III, IV 3+/5      Adductor Pollicis 4-/5      FPB 4/5            STRENGTH:   (Measured in pounds)     2017     Trials Right Left Right Left Right Left  Right Left   1  2  3  Av#       20#        23#         3 pt Pinch 2017     Trials Right Left Right Left Right Left  Right Left   1  2  3  Avg:       10#       4#        4#         Lateral Pinch 2017     Trials Right Left Right Left Right Left  Right Left   1  2  3  Avg:       10#       3#        3#           Assessment:  Response to therapy has been improvement to:  Strength:   and pinch and median innervated muscles.  Ulnar nerve muscles weak.  Assessed further today.    Overall Assessment:  Patient's  symptoms are resolving.  STG/LTG:  STGoals have been reviewed and no progress has been made;  see goal sheet for details and changes.    Plan:  Frequency/Duration:  Recommend continuing to see patient  1 X a month, 3 months  Appropriateness of Rx I have re-evaluated this patient and find that the nature, scope, duration and intensity of the therapy is appropriate for the medical condition of the patient.    Recommendations for Continued Therapy      Home Exercise Program:  FDS strengthening with rubber ball  FDS/FDP strengthening with yellow digigrip  FPL strengthening with yellow digigrip  Pink nerf block for 3 Pt and Lateral pinch  Yellow nerf block for opponens strengthening  APB resistance strengthening    Next Visit:  Instruct in more ulnar nerve strengthening (Pt. Doesn't want anymore exercises today).  Advance strengthening exercises

## 2017-09-18 ENCOUNTER — TRANSFERRED RECORDS (OUTPATIENT)
Dept: HEALTH INFORMATION MANAGEMENT | Facility: CLINIC | Age: 77
End: 2017-09-18

## 2017-09-18 LAB
ALT SERPL-CCNC: 13 IU/L (ref 5–35)
AST SERPL-CCNC: 23 U/L (ref 5–34)
CREAT SERPL-MCNC: 0.67 MG/DL (ref 0.5–1.3)
GFR SERPL CREATININE-BSD FRML MDRD: 90.5 ML/MIN/1.73M2

## 2017-10-03 ENCOUNTER — THERAPY VISIT (OUTPATIENT)
Dept: OCCUPATIONAL THERAPY | Facility: CLINIC | Age: 77
End: 2017-10-03
Payer: MEDICARE

## 2017-10-03 DIAGNOSIS — R29.898 DEFICIENCIES OF LIMBS: ICD-10-CM

## 2017-10-03 DIAGNOSIS — M54.12 BRACHIAL NEURITIS: ICD-10-CM

## 2017-10-03 PROCEDURE — 97110 THERAPEUTIC EXERCISES: CPT | Mod: GO | Performed by: OCCUPATIONAL THERAPIST

## 2017-10-03 NOTE — MR AVS SNAPSHOT
"              After Visit Summary   10/3/2017    Britt Pichardo    MRN: [id number]           Patient Information     Date Of Birth          1940        Visit Information        Provider Department      10/3/2017 1:00 PM Radha Og [facility]        Today's Diagnoses     Deficiencies of limbs        Brachial neuritis           Follow-ups after your visit        Your next 10 appointments already scheduled     Nov 02, 2017  1:00 PM CDT   NAYE Hand with Radha Earle   [facility] ([facility])    [address] [place] [id number] [phone]              Who to contact     If you have questions or need follow up information about today's clinic visit or your schedule please contact [facility] directly at [phone].  Normal or non-critical lab and imaging results will be communicated to you by MyChart, letter or phone within 4 business days after the clinic has received the results. If you do not hear from us within 7 days, please contact the clinic through MyChart or phone. If you have a critical or abnormal lab result, we will notify you by phone as soon as possible.  Submit refill requests through [email or web address] or call your pharmacy and they will forward the refill request to us. Please allow 3 business days for your refill to be completed.          Additional Information About Your Visit        MyChart Information     [email or web address] lets you send messages to your doctor, view your test results, renew your prescriptions, schedule appointments and more. To sign up, go to [email or web address] . Click on \"Log in\" on the left side of the screen, which will take you to the Welcome page. Then click on \"Sign up Now\" on the right side of the page.     You will be asked to enter the access code listed below, as well as some personal information. Please follow the directions to create your username and password.     Your access code is: [email or web address]  Expires: 11/6/2017 "  2:19 PM     Your access code will  in 90 days. If you need help or a new code, please call your [place] clinic or [phone].        Care EveryWhere ID     This is your Care EveryWhere ID. This could be used by other organizations to access your [place] medical records  [id number]         Blood Pressure from Last 3 Encounters:   17 98/58   17 115/72   17 119/62    Weight from Last 3 Encounters:   17 58 kg (127 lb 12.8 oz)   17 58.2 kg (128 lb 3.2 oz)   17 58.2 kg (128 lb 3.2 oz)              We Performed the Following     NAYE PROGRESS NOTES REPORT     THERAPEUTIC EXERCISES          Today's Medication Changes          These changes are accurate as of: 10/3/17  2:18 PM.  If you have any questions, ask your nurse or doctor.               These medicines have changed or have updated prescriptions.        Dose/Directions    PREMARIN cream   This may have changed:  See the new instructions.   Used for:  Estrogen deficient vulvovaginitis   Generic drug:  conjugated estrogens        PLACE 0.5 GRAMS VAGINALLY TWICE A WEEK AS DIRECTED.   Quantity:  30 g   Refills:  3                Primary Care Provider Office Phone # Fax #    Vineet Mendoza [phone]       [address] [facility] [address]        Equal Access to Services     MICHAEL HAMILTON : Hadii amy ku hadasho Soomaali, waaxda luqadaha, qaybta kaalmada adeegyada, osiel blum. So [facility] [phone].    ATENCIÓN: Si habla español, tiene a glass disposición servicios gratuitos de asistencia lingüística. Llame al [phone].    We comply with applicable federal civil rights laws and [place] laws. We do not discriminate on the basis of race, color, national origin, age, disability, sex, sexual orientation, or gender identity.            Thank you!     Thank you for choosing [facility]  for your care. Our goal is always to provide you with excellent care. Hearing  back from our patients is one way we can continue to improve our services. Please take a few minutes to complete the written survey that you may receive in the mail after your visit with us. Thank you!             Your Updated Medication List - Protect others around you: Learn how to safely use, store and throw away your medicines at www.disposemymeds.org.          This list is accurate as of: 10/3/17  2:18 PM.  Always use your most recent med list.                   Brand Name Dispense Instructions for use Diagnosis    aspirin 81 MG tablet      Take 1 tablet by mouth daily.        calcium + D 600-200 MG-UNIT Tabs   Generic drug:  calcium carbonate-vitamin D      Take 1 tablet by mouth every other day        Ferrous Gluconate 240 (27 FE) MG Tabs      Take 1 tablet by mouth every other day        folic acid 1 MG tablet    FOLVITE     Take 1 mg by mouth daily.        lovastatin 20 MG tablet    MEVACOR    90 tablet    TAKE ONE TABLET BY MOUTH AT BEDTIME    Hyperlipidemia LDL goal <130       methotrexate 2.5 MG tablet CHEMO      Take 7 tablets by mouth once a week        methylPREDNISolone 4 MG tablet    MEDROL DOSEPAK    21 tablet    Follow package instructions    Cervical radiculopathy       oxybutynin 5 MG 24 hr tablet    DITROPAN-XL    90 tablet    TAKE 1 TABLET (5 MG) BY ORAL ROUTE ONCE DAILY    Urinary incontinence       PREMARIN cream   Generic drug:  conjugated estrogens     30 g    PLACE 0.5 GRAMS VAGINALLY TWICE A WEEK AS DIRECTED.    Estrogen deficient vulvovaginitis       REMICADE IV      Inject 200 mg into the vein Patient takes this every 8 weeks        valACYclovir 1000 mg tablet    VALTREX    4 tablet    TAKE 2 TABLETS (2,000 MG) BY MOUTH 2 TIMES DAILY    Recurrent herpes simplex

## 2017-10-03 NOTE — PROGRESS NOTES
Progress Note - Hand Therapy    Current Date: 10/3/2017    Diagnosis:  Hand weakness  DOI:  One year ago (MD date of 2017 used for Medicare)    Number of visits to date:  2    Reporting period is 2017 to 10/3/2017    Subjectve:   Subjective changes as noted by patient:   I feel like my hand strength is about the same.  I feel like some of my function has decreased.  Functional changes noted by patient:  Decrease in Self Care Tasks (eating) and Household Chores  Patient has noted adverse reaction to:  None        Objective:  Pain Level Report  VAS(0-10) 2017 2017 10/3/2017   At Rest: 0 0 0   With Use: 2 neck  0 hand 1 neck  0 hand 0 neck  0 hand     Report of Pain:  No pain  Edema:  NONE  Sensation: WNL throughout all nerve distributions; per patient report    ROM:  Full    Median Nerve MMT: (Scale 0/5)   8/8/17 9/5/17 10/3/17    Pronator Teres 5/5 5/5 5/5    FCR 4/5 4/5 4/5    FDS 4/5 4/5 4/5    FDP I, II 4/5 4/5 4-/5    FPL 4/5 5/5 5/5    APB 3+/5 4-/5 4/5    Opponens Pollicus 3+/5 4-/5 4-/5    FPB 4-/5 4/5 4/5    Lumricals I, II 4/5 4/5 4/5      Ulnar Nerve MMT: (Scale 0/5)   9/5/17 10/3/17     FCU 4+/5 4+/5     FDP II, IV 4/5, 3+/5 4-/5  4-/5     ADM 3+/5 4-/5     ODM 3+/5 3+/5     FDM 3+/5 4-/5     Palmar Interossei 3+/5 3+/5     Dorsal Interossei 3+/5 3+/5     Lumbricals III, IV 3+/5 3+/5     Adductor Pollicis 4-/5 4/5     FPB 4/5 4/5           STRENGTH:   (Measured in pounds)     2017 2017 10/3/2017    Trials Right Left Right Left Right Left  Right Left   1  2  3  Av#       20#        23#       40#       20#       3 pt Pinch 2017 2017 10/3/2017    Trials Right Left Right Left Right Left  Right Left   1  2  3  Avg:       10#       4#        4#       10#       4#       Lateral Pinch 2017 2017 10/3/2017    Trials Right Left Right Left Right Left  Right Left   1  2  3  Avg:       10#       3#        3#       10#       3#         Assessment:  Response to  therapy has been no change to:  Strength:   and pinch and median innervated muscles.    Overall Assessment:  Patient's symptoms are resolving.  STG/LTG:  STGoals have been reviewed and no progress has been made;  see goal sheet for details and changes.    Plan:  Frequency/Duration:  Recommend continuing to see patient  1 X a month, 3 months  Appropriateness of Rx I have re-evaluated this patient and find that the nature, scope, duration and intensity of the therapy is appropriate for the medical condition of the patient.    Recommendations for Continued Therapy      Home Exercise Program:  FDS strengthening with rubber ball  FDS/FDP strengthening with yellow digigrip  FPL strengthening with yellow digigrip  Pink nerf block for 3 Pt and Lateral pinch  Yellow nerf block for opponens strengthening  APB resistance strengthening (APB and EPB) -- rubber band and self resistance  Ignacio and Dorsal interossei (using putty and rubber bands)  FPB with putty    Next Visit:  Instruct in more ulnar nerve strengthening (Pt. Doesn't want anymore exercises today).  Advance strengthening exercises

## 2017-10-30 DIAGNOSIS — E78.5 HYPERLIPIDEMIA LDL GOAL <130: ICD-10-CM

## 2017-10-31 RX ORDER — LOVASTATIN 20 MG
TABLET ORAL
Qty: 90 TABLET | Refills: 0 | Status: SHIPPED | OUTPATIENT
Start: 2017-10-31 | End: 2018-01-29

## 2017-11-02 ENCOUNTER — THERAPY VISIT (OUTPATIENT)
Dept: OCCUPATIONAL THERAPY | Facility: CLINIC | Age: 77
End: 2017-11-02
Payer: MEDICARE

## 2017-11-02 ENCOUNTER — TELEPHONE (OUTPATIENT)
Dept: NEUROSURGERY | Facility: CLINIC | Age: 77
End: 2017-11-02

## 2017-11-02 DIAGNOSIS — M54.12 BRACHIAL NEURITIS: ICD-10-CM

## 2017-11-02 DIAGNOSIS — R29.898 DEFICIENCIES OF LIMBS: ICD-10-CM

## 2017-11-02 PROCEDURE — 97110 THERAPEUTIC EXERCISES: CPT | Mod: GO | Performed by: OCCUPATIONAL THERAPIST

## 2017-11-02 NOTE — TELEPHONE ENCOUNTER
Called and spoke to patient. Patient said PT is going well and helping. Dr Miller also recommended FORREST from clinic visit 7/11/17. Discussed injection with patient. She is going to think about it and call clinic if she would like an order for it. Currently would like to avoid surgery at this time but will call back if she wishes to proceed. Dr Miller recommends patient f/u with Dr Krause to discuss surgery.

## 2017-11-02 NOTE — MR AVS SNAPSHOT
"              After Visit Summary   2017    Britt Pichardo    MRN: [id number]           Patient Information     Date Of Birth          1940        Visit Information        Provider Department      2017 1:00 PM Radha Og [facility]        Today's Diagnoses     Deficiencies of limbs        Brachial neuritis           Follow-ups after your visit        Who to contact     If you have questions or need follow up information about today's clinic visit or your schedule please contact [facility] directly at [phone].  Normal or non-critical lab and imaging results will be communicated to you by MyChart, letter or phone within 4 business days after the clinic has received the results. If you do not hear from us within 7 days, please contact the clinic through Zoomingohart or phone. If you have a critical or abnormal lab result, we will notify you by phone as soon as possible.  Submit refill requests through BeMyEye or call your pharmacy and they will forward the refill request to us. Please allow 3 business days for your refill to be completed.          Additional Information About Your Visit        MyChart Information     BeMyEye lets you send messages to your doctor, view your test results, renew your prescriptions, schedule appointments and more. To sign up, go to [email or web address] . Click on \"Log in\" on the left side of the screen, which will take you to the Welcome page. Then click on \"Sign up Now\" on the right side of the page.     You will be asked to enter the access code listed below, as well as some personal information. Please follow the directions to create your username and password.     Your access code is: [email or web address]  Expires: 2017  2:19 PM     Your access code will  in 90 days. If you need help or a new code, please call your [place] clinic or [phone].        Care EveryWhere ID     This is your Care EveryWhere ID. This could be used by other " organizations to access your [place] medical records  [id number]         Blood Pressure from Last 3 Encounters:   07/11/17 98/58   06/26/17 115/72   06/19/17 119/62    Weight from Last 3 Encounters:   07/11/17 58 kg (127 lb 12.8 oz)   06/26/17 58.2 kg (128 lb 3.2 oz)   06/19/17 58.2 kg (128 lb 3.2 oz)              We Performed the Following     NAYE PROGRESS NOTES REPORT     THERAPEUTIC EXERCISES          Today's Medication Changes          These changes are accurate as of: 11/2/17  2:14 PM.  If you have any questions, ask your nurse or doctor.               These medicines have changed or have updated prescriptions.        Dose/Directions    PREMARIN cream   This may have changed:  See the new instructions.   Used for:  Estrogen deficient vulvovaginitis   Generic drug:  conjugated estrogens        PLACE 0.5 GRAMS VAGINALLY TWICE A WEEK AS DIRECTED.   Quantity:  30 g   Refills:  3                Primary Care Provider Office Phone # Fax #    Vineet Mendoza [phone] [address] [facility] [address]        Equal Access to Services     [facility] : Hadii amy Vazquez, waaxda gelacio, qaybta norma escoto, osiel blum. So [facility] [phone].    ATENCIÓN: Si habla español, tiene a glass disposición servicios gratuitos de asistencia lingüística. [facility] [phone].    We comply with applicable federal civil rights laws and [place] laws. We do not discriminate on the basis of race, color, national origin, age, disability, sex, sexual orientation, or gender identity.            Thank you!     Thank you for choosing [facility]  for your care. Our goal is always to provide you with excellent care. Hearing back from our patients is one way we can continue to improve our services. Please take a few minutes to complete the written survey that you may receive in the mail after your visit with us. Thank you!             Your Updated  Medication List - Protect others around you: Learn how to safely use, store and throw away your medicines at www.disposemymeds.org.          This list is accurate as of: 11/2/17  2:14 PM.  Always use your most recent med list.                   Brand Name Dispense Instructions for use Diagnosis    aspirin 81 MG tablet      Take 1 tablet by mouth daily.        calcium + D 600-200 MG-UNIT Tabs   Generic drug:  calcium carbonate-vitamin D      Take 1 tablet by mouth every other day        Ferrous Gluconate 240 (27 FE) MG Tabs      Take 1 tablet by mouth every other day        folic acid 1 MG tablet    FOLVITE     Take 1 mg by mouth daily.        lovastatin 20 MG tablet    MEVACOR    90 tablet    TAKE ONE TABLET BY MOUTH AT BEDTIME    Hyperlipidemia LDL goal <130       methotrexate 2.5 MG tablet CHEMO      Take 7 tablets by mouth once a week        methylPREDNISolone 4 MG tablet    MEDROL DOSEPAK    21 tablet    Follow package instructions    Cervical radiculopathy       oxybutynin 5 MG 24 hr tablet    DITROPAN-XL    90 tablet    TAKE 1 TABLET (5 MG) BY ORAL ROUTE ONCE DAILY    Urinary incontinence       PREMARIN cream   Generic drug:  conjugated estrogens     30 g    PLACE 0.5 GRAMS VAGINALLY TWICE A WEEK AS DIRECTED.    Estrogen deficient vulvovaginitis       REMICADE IV      Inject 200 mg into the vein Patient takes this every 8 weeks        valACYclovir 1000 mg tablet    VALTREX    4 tablet    TAKE 2 TABLETS (2,000 MG) BY MOUTH 2 TIMES DAILY    Recurrent herpes simplex

## 2017-11-02 NOTE — PROGRESS NOTES
D/C Progress Note - Hand Therapy    Current Date: 2017    Diagnosis:  Hand weakness  DOI:  One year ago (MD date of 2017 used for Medicare)    Number of visits to date:  3    Reporting period is 10/3/2017 to 2017    Subjectve:   Subjective changes as noted by patient:  Hand function has decreased.  The cold weather makes it hard to do anything.  Functional changes noted by patient:  Decrease in Self Care Tasks (eating) and Household Chores  Patient has noted adverse reaction to:  None        Objective:  Pain Level Report  VAS(0-10) 2017 2017 10/3/2017 11/2/17   At Rest: 0 0 0 0   With Use: 2 neck  0 hand 1 neck  0 hand 0 neck  0 hand 0 neck  0 hand     Report of Pain:  No pain  Edema:  NONE  Sensation: WNL throughout all nerve distributions; per patient report    ROM:  Full    Median Nerve MMT: (Scale 0/5)   8/8/17 9/5/17 10/3/17 2017   Pronator Teres 5/5 5/5 5/5 4+/5   FCR 4/5 4/5 4/5 4-/5   FDS 4/5 4/5 4/5 4/5   FDP I, II 4/5 4/5 4-/5 4/5   FPL 4/5 5/5 5/5 4/5   APB 3+/5 4-/5 4/5 4-/5   Opponens Pollicus 3+/5 4-/5 4-/5 4-/5   FPB 4-/5 4/5 4/5 4/5   Lumricals I, II 4/5 4/5 4/5 4/5     Ulnar Nerve MMT: (Scale 0/5)   9/5/17 10/3/17 11/2/17    FCU 4+/5 4+/5 4+/5    FDP II, IV 4/5, 3+/5 4-/5  4-/5 4-/5  4-/5    ADM 3+/5 4-/5 4-/5    ODM 3+/5 3+/5 4/5    FDM 3+/5 4-/5 4/5    Palmar Interossei 3+/5 3+/5 4-/5    Dorsal Interossei 3+/5 3+/5 4-/5    Lumbricals III, IV 3+/5 3+/5 3+/5    Adductor Pollicis 4-/5 4/5 4/5    FPB 4/5 4/5 4/5          STRENGTH:   (Measured in pounds)     2017 2017 10/3/2017 2017   Trials Right Left Right Left Right Left  Right Left   1  2  3  Av#       20#        23#       40#       20#       43#       21#     3 pt Pinch 2017 2017 10/3/2017 2017   Trials Right Left Right Left Right Left  Right Left   1  2  3  Avg:       10#       4#        4#       10#       4#       13#       5#     Lateral Pinch 2017 2017 10/3/2017  11/2/2017   Trials Right Left Right Left Right Left  Right Left   1  2  3  Avg:       10#       3#        3#       10#       3#       13#       5#       Assessment:  Response to therapy has been small improvement to:  Strength:   and pinch.  However, during individual testing of muscles, a small decrease in strength noted.  (Due to cold weather?)    Overall Assessment:  Patient's symptoms are resolving very slowly.  Functional activity report is worsening (due to cold weather?)  STG/LTG:  STGoals have been reviewed and no progress has been made;  see goal sheet for details and changes.    Plan:  Frequency/Duration: D/C [facility].    Home Exercise Program:  FDS strengthening with rubber ball  FDS/FDP strengthening with yellow digigrip  FPL strengthening with yellow digigrip  Pink nerf block for 3 Pt and Lateral pinch  Yellow nerf block for opponens strengthening  APB resistance strengthening (APB and EPB) -- rubber band and self resistance  Ignacio and Dorsal interossei (using putty and rubber bands)  FPB with putty

## 2017-11-13 ENCOUNTER — TRANSFERRED RECORDS (OUTPATIENT)
Dept: HEALTH INFORMATION MANAGEMENT | Facility: CLINIC | Age: 77
End: 2017-11-13

## 2017-11-13 LAB
ALT SERPL-CCNC: 13 IU/L (ref 5–35)
AST SERPL-CCNC: 21 U/L (ref 5–34)
CREAT SERPL-MCNC: 0.57 MG/DL (ref 0.5–1.3)
GFR SERPL CREATININE-BSD FRML MDRD: 109 ML/MIN/1.73M2

## 2017-12-11 ENCOUNTER — TELEPHONE (OUTPATIENT)
Dept: NEUROSURGERY | Facility: CLINIC | Age: 77
End: 2017-12-11

## 2017-12-11 DIAGNOSIS — M54.12 CERVICAL RADICULOPATHY: Primary | ICD-10-CM

## 2017-12-11 NOTE — TELEPHONE ENCOUNTER
Order placed for cervical epidural steroid injection for [facility]. Called and informed patient order has been placed. Provided number to central scheduling.  Advised patient to contact clinic if pain persists 2 weeks post FORREST. Patient verbalized understanding.

## 2017-12-11 NOTE — TELEPHONE ENCOUNTER
REASON FOR CALL:  Patient interested in moving forward with FORREST as previously discussed.  She would like to do this at the  location.  She would like to do this on 12/18/17 if available as her daughter is in town at that time and could accompany her.      Detailed message can be left:  YES

## 2017-12-15 ENCOUNTER — TELEPHONE (OUTPATIENT)
Dept: OCCUPATIONAL THERAPY | Facility: CLINIC | Age: 77
End: 2017-12-15

## 2017-12-15 DIAGNOSIS — M54.12 BRACHIAL NEURITIS: ICD-10-CM

## 2017-12-15 DIAGNOSIS — R29.898 DEFICIENCIES OF LIMBS: ICD-10-CM

## 2017-12-18 ENCOUNTER — HOSPITAL ENCOUNTER (OUTPATIENT)
Facility: CLINIC | Age: 77
Discharge: HOME | End: 2017-12-18
Attending: RADIOLOGY | Admitting: RADIOLOGY
Payer: MEDICARE

## 2017-12-18 ENCOUNTER — HOSPITAL ENCOUNTER (OUTPATIENT)
Dept: GENERAL RADIOLOGY | Facility: CLINIC | Age: 77
End: 2017-12-18
Attending: NEUROLOGICAL SURGERY | Admitting: RADIOLOGY
Payer: MEDICARE

## 2017-12-18 VITALS
SYSTOLIC BLOOD PRESSURE: 120 MMHG | HEIGHT: 59 IN | WEIGHT: 123 LBS | BODY MASS INDEX: 24.8 KG/M2 | RESPIRATION RATE: 16 BRPM | OXYGEN SATURATION: 97 % | HEART RATE: 59 BPM | DIASTOLIC BLOOD PRESSURE: 62 MMHG | TEMPERATURE: 96.5 F

## 2017-12-18 DIAGNOSIS — M54.12 CERVICAL RADICULOPATHY: ICD-10-CM

## 2017-12-18 PROCEDURE — 25500064 ZZH RX 255 OP 636: Performed by: PHYSICIAN ASSISTANT

## 2017-12-18 PROCEDURE — 25000128 H RX IP 250 OP 636: Performed by: PHYSICIAN ASSISTANT

## 2017-12-18 PROCEDURE — 40000863 ZZH STATISTIC RADIOLOGY XRAY, US, CT, MAR, NM

## 2017-12-18 PROCEDURE — 25000125 ZZHC RX 250: Performed by: PHYSICIAN ASSISTANT

## 2017-12-18 PROCEDURE — 27211111 XR CERVICAL/THORACIC EPIDURAL INJ, INCL IMAGING

## 2017-12-18 PROCEDURE — 27210986 XR CERVICAL/THORACIC EPIDURAL INJ, INCL IMAGING

## 2017-12-18 RX ORDER — DEXAMETHASONE SODIUM PHOSPHATE 10 MG/ML
10 INJECTION, SOLUTION INTRAMUSCULAR; INTRAVENOUS ONCE
Status: COMPLETED | OUTPATIENT
Start: 2017-12-18 | End: 2017-12-18

## 2017-12-18 RX ORDER — IOPAMIDOL 408 MG/ML
10 INJECTION, SOLUTION INTRATHECAL ONCE
Status: COMPLETED | OUTPATIENT
Start: 2017-12-18 | End: 2017-12-18

## 2017-12-18 RX ADMIN — DEXAMETHASONE SODIUM PHOSPHATE 20 MG: 10 INJECTION, SOLUTION INTRAMUSCULAR; INTRAVENOUS at 11:11

## 2017-12-18 RX ADMIN — IOPAMIDOL 1 ML: 408 INJECTION, SOLUTION INTRATHECAL at 11:13

## 2017-12-18 RX ADMIN — LIDOCAINE HYDROCHLORIDE 2 ML: 10 INJECTION, SOLUTION EPIDURAL; INFILTRATION; INTRACAUDAL; PERINEURAL at 11:10

## 2017-12-18 NOTE — IP AVS SNAPSHOT
[place] [address]    [place] [address]    Phone:  [phone]                                       After Visit Summary   12/18/2017    Britt Pichardo    MRN: [id number]           After Visit Summary Signature Page     I have received my discharge instructions, and my questions have been answered. I have discussed any challenges I see with this plan with the nurse or doctor.    ..........................................................................................................................................  Patient/Patient Representative Signature      ..........................................................................................................................................  Patient Representative Print Name and Relationship to Patient    ..................................................               ................................................  Date                                            Time    ..........................................................................................................................................  Reviewed by Signature/Title    ...................................................              ..............................................  Date                                                            Time

## 2017-12-18 NOTE — PROGRESS NOTES
Patient was back in her room. Denied pain. Up without any help or difficulties. Discharge instructions gone over with patient and she was discharged home with her family member.

## 2017-12-18 NOTE — DISCHARGE INSTRUCTIONS
Steroid Injection Discharge Instructions     After you go home:      You may resume your normal diet.    Care of Puncture Site:      If you have a bandaid on your puncture site, you may remove it the next morning    You may shower tomorrow    No bath tubs, whirlpools or swimming for at least 3 days     Activity:      You may go back to normal activity in 24 hours    You should let pain be your guide as to the extent of your activities    Maintain any activity limitations as ordered by your provider    Do NOT drive a vehicle until tomorrow    Medicines:      You may resume all medications    Resume your Warfarin/Coumadin at your regular dose today. Follow up with your provider to have your INR rechecked    Resume your Platelet Inhibitors and Aspirin tomorrow at your regular dose    For minor pain, you may take Acetaminophen (Tylenol) or Ibuprofen (Advil)    Pain:       You may experience increased or different pain over the next 24-48 hours    For the next 48 hrs - you may use ice packs for discomfort     Call your primary care doctor if:      You have severe pain that does not improve with pain medication    You have chills or a fever greater than 101 F (38 C)    The site is red, swollen, hot or tender    New problems with your bowel or bladder    Any questions or concerns    Other Instructions:      New numbness down your leg post injection is temporary and may last for up to 6 hours. You may need assistance with activity until your leg has normal sensation.    If you are diabetic, monitor your blood sugar closely. Contact the provider who manages your diabetes to help you control your blood sugar if needed.    For Your Information:      A steroid was injected to help decrease swelling and may help to reduce pain. It may take up to 7-10 days to obtain full results.    Some patients will get lasting relief from a single injection. Others may require up to 3 injections to get results. If you have more than one  steroid injection, they should be given 2 weeks apart.    Side effects of your steroid injection are mild and will go away in 2-3 days  - Insomnia  - Heartburn  - Flushed face  - Water retention  - Increased appetite  - Increased blood sugar      If you have questions call:        [facility] Radiology Dept @ [phone]      The provider who performed your procedure was ____Dr. borrero_____________.

## 2017-12-18 NOTE — IP AVS SNAPSHOT
MRN:[id number]                      After Visit Summary   12/18/2017    Britt Pichardo    MRN: [id number]           Visit Information        Department      12/18/2017  9:57 AM [facility]          Review of your medicines      UNREVIEWED medicines. Ask your doctor about these medicines        Dose / Directions    aspirin 81 MG tablet        Dose:  1 tablet   Take 1 tablet by mouth daily.   Refills:  0       calcium + D 600-200 MG-UNIT Tabs   Generic drug:  calcium carbonate-vitamin D        Dose:  1 tablet   Take 1 tablet by mouth every other day   Refills:  0       Ferrous Gluconate 240 (27 FE) MG Tabs        Dose:  1 tablet   Take 1 tablet by mouth every other day   Refills:  0       folic acid 1 MG tablet   Commonly known as:  FOLVITE        Dose:  1 mg   Take 1 mg by mouth daily.   Refills:  0       lovastatin 20 MG tablet   Commonly known as:  MEVACOR   Used for:  Hyperlipidemia LDL goal <130        TAKE ONE TABLET BY MOUTH AT BEDTIME   Quantity:  90 tablet   Refills:  0       methotrexate 2.5 MG tablet CHEMO        Dose:  7 tablet   Take 7 tablets by mouth once a week   Refills:  0       methylPREDNISolone 4 MG tablet   Commonly known as:  MEDROL DOSEPAK   Used for:  Cervical radiculopathy        Follow package instructions   Quantity:  21 tablet   Refills:  0       oxybutynin 5 MG 24 hr tablet   Commonly known as:  DITROPAN-XL   Used for:  Urinary incontinence        TAKE 1 TABLET (5 MG) BY ORAL ROUTE ONCE DAILY   Quantity:  90 tablet   Refills:  3       PREMARIN cream   Used for:  Estrogen deficient vulvovaginitis   Generic drug:  conjugated estrogens        PLACE 0.5 GRAMS VAGINALLY TWICE A WEEK AS DIRECTED.   Quantity:  30 g   Refills:  3       REMICADE IV        Dose:  200 mg   Inject 200 mg into the vein Patient takes this every 8 weeks   Refills:  0       valACYclovir 1000 mg tablet   Commonly known as:  VALTREX   Used for:  Recurrent herpes simplex        TAKE 2  TABLETS (2,000 MG) BY MOUTH 2 TIMES DAILY   Quantity:  4 tablet   Refills:  5                Protect others around you: Learn how to safely use, store and throw away your medicines at www.disposemymeds.org.         Follow-ups after your visit         Care Instructions        Further instructions from your care team       Steroid Injection Discharge Instructions     After you go home:      You may resume your normal diet.    Care of Puncture Site:      If you have a bandaid on your puncture site, you may remove it the next morning    You may shower tomorrow    No bath tubs, whirlpools or swimming for at least 3 days     Activity:      You may go back to normal activity in 24 hours    You should let pain be your guide as to the extent of your activities    Maintain any activity limitations as ordered by your provider    Do NOT drive a vehicle until tomorrow    Medicines:      You may resume all medications    Resume your Warfarin/Coumadin at your regular dose today. Follow up with your provider to have your INR rechecked    Resume your Platelet Inhibitors and Aspirin tomorrow at your regular dose    For minor pain, you may take Acetaminophen (Tylenol) or Ibuprofen (Advil)    Pain:       You may experience increased or different pain over the next 24-48 hours    For the next 48 hrs - you may use ice packs for discomfort     Call your primary care doctor if:      You have severe pain that does not improve with pain medication    You have chills or a fever greater than 101 F (38 C)    The site is red, swollen, hot or tender    New problems with your bowel or bladder    Any questions or concerns    Other Instructions:      New numbness down your leg post injection is temporary and may last for up to 6 hours. You may need assistance with activity until your leg has normal sensation.    If you are diabetic, monitor your blood sugar closely. Contact the provider who manages your diabetes to help you control your blood sugar  "if needed.    For Your Information:      A steroid was injected to help decrease swelling and may help to reduce pain. It may take up to 7-10 days to obtain full results.    Some patients will get lasting relief from a single injection. Others may require up to 3 injections to get results. If you have more than one steroid injection, they should be given 2 weeks apart.    Side effects of your steroid injection are mild and will go away in 2-3 days  - Insomnia  - Heartburn  - Flushed face  - Water retention  - Increased appetite  - Increased blood sugar      If you have questions call:        [facility] Radiology Dept @ [phone]      The provider who performed your procedure was ____Dr. borrero_____________.         Additional Information About Your Visit        MyChart Information     [employer] lets you send messages to your doctor, view your test results, renew your prescriptions, schedule appointments and more. To sign up, go to [email or web address] . Click on \"Log in\" on the left side of the screen, which will take you to the Welcome page. Then click on \"Sign up Now\" on the right side of the page.     You will be asked to enter the access code listed below, as well as some personal information. Please follow the directions to create your username and password.     Your access code is: [email or web address]  Expires: 3/18/2018 11:23 AM     Your access code will  in 90 days. If you need help or a new code, please call your [place] clinic or [phone].        Care EveryWhere ID     This is your Care EveryWhere ID. This could be used by other organizations to access your [place] medical records  [id number]        Your Vitals Were     Blood Pressure Pulse Temperature Respirations Height Weight    120/51 (BP Location: Right arm) 65 95.5  F (35.3  C) (Oral) 16 1.499 m (4' 11\") 55.8 kg (123 lb)    Pulse Oximetry BMI (Body Mass Index)                96% 24.84 kg/m2           Primary Care Provider Office " Phone # Fax #    Vineet Mendoza [phone]      Equal Access to Services     MICHAEL HAMILTON : Hadii aad ku hadronniesharmin Vazquez, min amandamilanha, karo jeffkari vianeystefano, osiel estradain hayaajose wintersrimma ahumada lafritzjose viktoria. So [facility] [phone].    ATENCIÓN: Si habla español, tiene a glass disposición servicios gratuitos de asistencia lingüística. Llame al [phone].    We comply with applicable federal civil rights laws and [place] laws. We do not discriminate on the basis of race, color, national origin, age, disability, sex, sexual orientation, or gender identity.            Thank you!     Thank you for choosing [facility] for your care. Our goal is always to provide you with excellent care. Hearing back from our patients is one way we can continue to improve our services. Please take a few minutes to complete the written survey that you may receive in the mail after you visit with us. Thank you!             Medication List: This is a list of all your medications and when to take them. Check marks below indicate your daily home schedule. Keep this list as a reference.      Medications           Morning Afternoon Evening Bedtime As Needed    aspirin 81 MG tablet   Take 1 tablet by mouth daily.                                calcium + D 600-200 MG-UNIT Tabs   Take 1 tablet by mouth every other day   Generic drug:  calcium carbonate-vitamin D                                Ferrous Gluconate 240 (27 FE) MG Tabs   Take 1 tablet by mouth every other day                                folic acid 1 MG tablet   Commonly known as:  FOLVITE   Take 1 mg by mouth daily.                                lovastatin 20 MG tablet   Commonly known as:  MEVACOR   TAKE ONE TABLET BY MOUTH AT BEDTIME                                methotrexate 2.5 MG tablet CHEMO   Take 7 tablets by mouth once a week                                methylPREDNISolone 4 MG tablet   Commonly known as:  MEDROL DOSEPAK   Follow package  instructions                                oxybutynin 5 MG 24 hr tablet   Commonly known as:  DITROPAN-XL   TAKE 1 TABLET (5 MG) BY ORAL ROUTE ONCE DAILY                                PREMARIN cream   PLACE 0.5 GRAMS VAGINALLY TWICE A WEEK AS DIRECTED.   Generic drug:  conjugated estrogens                                REMICADE IV   Inject 200 mg into the vein Patient takes this every 8 weeks                                valACYclovir 1000 mg tablet   Commonly known as:  VALTREX   TAKE 2 TABLETS (2,000 MG) BY MOUTH 2 TIMES DAILY

## 2017-12-23 DIAGNOSIS — N76.0 ESTROGEN DEFICIENT VULVOVAGINITIS: ICD-10-CM

## 2017-12-27 NOTE — TELEPHONE ENCOUNTER
Requested Prescriptions   Pending Prescriptions Disp Refills     PREMARIN cream [Pharmacy Med Name: Premarin Vaginal Cream 0.625 MG/GM]  Last Written Prescription Date:  2/8/2016  Last Fill Quantity: 30 g,  # refills: 3   Last Office Visit with FMYONAS, KARI or [facility] prescribing provider:  5/1/2017   Future Office Visit:     Last Mammogram: 6/19/2017 30 g PRN     Sig: PLACE 0.5 G VAGINALLY ONCE A WEEK    Hormone Replacement Therapy Passed    12/23/2017  1:25 PM       Passed - Blood pressure on record and is less than 140/90 in past year    BP Readings from Last 3 Encounters:   12/18/17 120/62   07/11/17 98/58   06/26/17 115/72          Passed - Recent or future visit with authorizing provider's specialty    Patient had office visit in the last year or has a visit in the next 30 days with authorizing provider.  See chart review.        Passed - Patient is 18 years of age or older       Passed - No active pregnancy on record       Passed - No positive pregnancy test on record in past 12 months

## 2017-12-28 ENCOUNTER — TELEPHONE (OUTPATIENT)
Dept: FAMILY MEDICINE | Facility: CLINIC | Age: 77
End: 2017-12-28

## 2017-12-28 RX ORDER — CONJUGATED ESTROGENS 0.62 MG/G
CREAM VAGINAL
Qty: 30 G | Refills: 3 | Status: SHIPPED | OUTPATIENT
Start: 2017-12-28 | End: 2018-12-05

## 2017-12-28 NOTE — TELEPHONE ENCOUNTER
Prior Authorization Request    1. Prior Authorization for the medication PREMARIN cream        Requesting Provider: Vineet Mendoza          Pt name: Britt Pichardo        Pt : 1940        Pt MRN: [id number]        Last Office Visit: Visit date not found           Insurance: Payor: MEDICARE / Plan: MEDICARE / Product Type: Medicare /         Insurance ID Number: [unfilled]         Prior Auth Contact Phone number:     BIN#:   PCN#:     CMM KEY: [id number]       To be completed by provider:     2.   Refuse or Start Prior Auth:  Please start Prior Auth.      If requesting prior auth initiation:       Diagnosis (with code): estrogen deficient vulvovaginitis (N 76.0)      Patient has been on this medication since:       Prior Medications, dates used, reason for failure: the patient has been on this for 13 years and done well. Other medications have not been tried.

## 2018-01-03 ENCOUNTER — HOSPITAL ENCOUNTER (OUTPATIENT)
Dept: MAMMOGRAPHY | Facility: CLINIC | Age: 78
Discharge: HOME | End: 2018-01-03
Attending: FAMILY MEDICINE | Admitting: FAMILY MEDICINE
Payer: MEDICARE

## 2018-01-03 DIAGNOSIS — Z12.31 VISIT FOR SCREENING MAMMOGRAM: ICD-10-CM

## 2018-01-03 PROCEDURE — 77067 SCR MAMMO BI INCL CAD: CPT

## 2018-01-08 ENCOUNTER — TRANSFERRED RECORDS (OUTPATIENT)
Dept: HEALTH INFORMATION MANAGEMENT | Facility: CLINIC | Age: 78
End: 2018-01-08

## 2018-01-08 LAB
ALT SERPL-CCNC: 21 IU/L (ref 5–35)
AST SERPL-CCNC: 23 U/L (ref 5–34)
CREAT SERPL-MCNC: 0.65 MG/DL (ref 0.5–1.3)
GFR SERPL CREATININE-BSD FRML MDRD: 93.7 ML/MIN/1.73M2

## 2018-01-09 ENCOUNTER — OFFICE VISIT (OUTPATIENT)
Dept: FAMILY MEDICINE | Facility: CLINIC | Age: 78
End: 2018-01-09
Payer: MEDICARE

## 2018-01-09 VITALS
HEIGHT: 59 IN | WEIGHT: 128 LBS | DIASTOLIC BLOOD PRESSURE: 60 MMHG | TEMPERATURE: 98 F | BODY MASS INDEX: 25.8 KG/M2 | HEART RATE: 68 BPM | SYSTOLIC BLOOD PRESSURE: 116 MMHG | RESPIRATION RATE: 16 BRPM

## 2018-01-09 DIAGNOSIS — E55.9 VITAMIN D DEFICIENCY: ICD-10-CM

## 2018-01-09 DIAGNOSIS — M54.12 CERVICAL RADICULOPATHY: ICD-10-CM

## 2018-01-09 DIAGNOSIS — N76.0 ESTROGEN DEFICIENT VULVOVAGINITIS: ICD-10-CM

## 2018-01-09 DIAGNOSIS — M05.79 RHEUMATOID ARTHRITIS INVOLVING MULTIPLE SITES WITH POSITIVE RHEUMATOID FACTOR (H): ICD-10-CM

## 2018-01-09 DIAGNOSIS — B00.9 RECURRENT HSV (HERPES SIMPLEX VIRUS): ICD-10-CM

## 2018-01-09 DIAGNOSIS — Z23 NEED FOR PROPHYLACTIC VACCINATION WITH TETANUS-DIPHTHERIA (TD): ICD-10-CM

## 2018-01-09 DIAGNOSIS — Z00.00 ROUTINE MEDICAL EXAM: Primary | ICD-10-CM

## 2018-01-09 DIAGNOSIS — E78.5 HYPERLIPIDEMIA LDL GOAL <130: ICD-10-CM

## 2018-01-09 PROCEDURE — G0439 PPPS, SUBSEQ VISIT: HCPCS | Performed by: FAMILY MEDICINE

## 2018-01-09 PROCEDURE — 90471 IMMUNIZATION ADMIN: CPT | Performed by: FAMILY MEDICINE

## 2018-01-09 PROCEDURE — 90714 TD VACC NO PRESV 7 YRS+ IM: CPT | Performed by: FAMILY MEDICINE

## 2018-01-09 ASSESSMENT — ACTIVITIES OF DAILY LIVING (ADL)
I_NEED_ASSISTANCE_FOR_THE_FOLLOWING_DAILY_ACTIVITIES:: NO ASSISTANCE IS NEEDED
CURRENT_FUNCTION: NO ASSISTANCE NEEDED

## 2018-01-09 NOTE — NURSING NOTE
Screening Questionnaire for Adult Immunization    Are you sick today?   No   Do you have allergies to medications, food, a vaccine component or latex?   No   Have you ever had a serious reaction after receiving a vaccination?   No   Do you have a long-term health problem with heart disease, lung disease, asthma, kidney disease, metabolic disease (e.g. diabetes), anemia, or other blood disorder?   No   Do you have cancer, leukemia, HIV/AIDS, or any other immune system problem?   No   In the past 3 months, have you taken medications that affect  your immune system, such as prednisone, other steroids, or anticancer drugs; drugs for the treatment of rheumatoid arthritis, Crohn s disease, or psoriasis; or have you had radiation treatments?   No   Have you had a seizure, or a brain or other nervous system problem?   No   During the past year, have you received a transfusion of blood or blood     products, or been given immune (gamma) globulin or antiviral drug?   No   For women: Are you pregnant or is there a chance you could become        pregnant during the next month?   No   Have you received any vaccinations in the past 4 weeks?   No     Immunization questionnaire answers were all negative.        Per orders of Dr. Mendoza, injection of td given by Vicki Mcfadden. Patient instructed to remain in clinic for 15 minutes afterwards, and to report any adverse reaction to me immediately.       Screening performed by Vicki Mcfadden on 1/9/2018 at 2:46 PM.

## 2018-01-09 NOTE — NURSING NOTE
"Chief Complaint   Patient presents with     Physical       Initial /60  Pulse 68  Temp 98  F (36.7  C) (Tympanic)  Resp 16  Ht 4' 11\" (1.499 m)  Wt 128 lb (58.1 kg)  BMI 25.85 kg/m2 Estimated body mass index is 25.85 kg/(m^2) as calculated from the following:    Height as of this encounter: 4' 11\" (1.499 m).    Weight as of this encounter: 128 lb (58.1 kg).  Medication Reconciliation: complete       Vicki Mcfadden CMA      "

## 2018-01-09 NOTE — MR AVS SNAPSHOT
After Visit Summary   1/9/2018    Britt Pichardo    MRN: [id number]           Patient Information     Date Of Birth          1940        Visit Information        Provider Department      1/9/2018 1:45 PM Vineet Mendoza MD [facility]        Today's Diagnoses     Routine medical exam    -  1    Need for prophylactic vaccination with tetanus-diphtheria (TD)        Cervical radiculopathy          Care Instructions      Preventive Health Recommendations  Female Ages 65 +    Yearly exam:     See your health care provider every year in order to  o Review health changes.   o Discuss preventive care.    o Review your medicines if your doctor has prescribed any.      You no longer need a yearly Pap test unless you've had an abnormal Pap test in the past 10 years. If you have vaginal symptoms, such as bleeding or discharge, be sure to talk with your provider about a Pap test.      Every 1 to 2 years, have a mammogram.  If you are over 69, talk with your health care provider about whether or not you want to continue having screening mammograms.      Every 10 years, have a colonoscopy. Or, have a yearly FIT test (stool test). These exams will check for colon cancer.       Have a cholesterol test every 5 years, or more often if your doctor advises it.       Have a diabetes test (fasting glucose) every three years. If you are at risk for diabetes, you should have this test more often.       At age 65, have a bone density scan (DEXA) to check for osteoporosis (brittle bone disease).    Shots:    Get a flu shot each year.    Get a tetanus shot every 10 years.    Talk to your doctor about your pneumonia vaccines. There are now two you should receive - Pneumovax (PPSV 23) and Prevnar (PCV 13).    Talk to your doctor about the shingles vaccine.    Talk to your doctor about the hepatitis B vaccine.    Nutrition:     Eat at least 5 servings of fruits and vegetables each  day.      Eat whole-grain bread, whole-wheat pasta and brown rice instead of white grains and rice.      Talk to your provider about Calcium and Vitamin D.     Lifestyle    Exercise at least 150 minutes a week (30 minutes a day, 5 days a week). This will help you control your weight and prevent disease.      Limit alcohol to one drink per day.      No smoking.       Wear sunscreen to prevent skin cancer.       See your dentist twice a year for an exam and cleaning.      See your eye doctor every 1 to 2 years to screen for conditions such as glaucoma, macular degeneration, cataracts, etc           Follow-ups after your visit        Additional Services     SPINE SURGERY REFERRAL       Please choose Medical Spine Specialist (unless patient was seen by a Medical Spine Specialist within the past 6 months).  Surgical Evaluation is advised if the patient presents with one or more of the following red flags:     **Cauda Equina Syndrome  **Evidence of Spinal Tumor  **Fracture  **Infection  **Loss of Bowel or Bladder Control  **Sudden or Progressive Weakness  **Any other documented emergent neurological condition resulting from a Lumbar Spinal Condition.    You have been referred to: Spine Cervical / Thoracic: Spine Surgeon: [facility]  [place] [phone]   [email or web address]    Please be aware that coverage of these services is subject to the terms and limitations of your health insurance plan.  Call member services at your health plan with any benefit or coverage questions.      Please bring the following to your appointment:    **Any x-rays, CTs or MRIs which have been performed.  Contact the facility where they were done to arrange for  prior to your scheduled appointment.    **List of current medications   **This referral request   **Any documents/labs given to you regarding this referral                  Who to contact     If you have questions or need follow up information  "about today's clinic visit or your schedule please contact [facility] directly at [phone].  Normal or non-critical lab and imaging results will be communicated to you by [employer], letter or phone within 4 business days after the clinic has received the results. If you do not hear from us within 7 days, please contact the clinic through [employer] or phone. If you have a critical or abnormal lab result, we will notify you by phone as soon as possible.  Submit refill requests through [employer] or call your pharmacy and they will forward the refill request to us. Please allow 3 business days for your refill to be completed.          Additional Information About Your Visit        [employer] Information     [employer] lets you send messages to your doctor, view your test results, renew your prescriptions, schedule appointments and more. To sign up, go to [email or web address] . Click on \"Log in\" on the left side of the screen, which will take you to the Welcome page. Then click on \"Sign up Now\" on the right side of the page.     You will be asked to enter the access code listed below, as well as some personal information. Please follow the directions to create your username and password.     Your access code is: [email or web address]  Expires: 3/18/2018 11:23 AM     Your access code will  in 90 days. If you need help or a new code, please call your [place] clinic or [phone].        Care EveryWhere ID     This is your Care EveryWhere ID. This could be used by other organizations to access your [place] medical records  [id number]        Your Vitals Were     Pulse Temperature Respirations Height BMI (Body Mass Index)       68 98  F (36.7  C) (Tympanic) 16 4' 11\" (1.499 m) 25.85 kg/m2        Blood Pressure from Last 3 Encounters:   18 116/60   17 120/62   17 98/58    Weight from Last 3 Encounters:   18 128 lb (58.1 kg)   17 123 lb (55.8 kg)   17 127 lb 12.8 " oz (58 kg)              We Performed the Following     SPINE SURGERY REFERRAL        Primary Care Provider Office Phone # Fax #    Vineet Mendoza [phone]       [address] [facility] [address]        Equal Access to Services     OSWALDDARREN JOY : Hadii aad ku hadasho Soomaali, waaxda luqadaha, qaybta kaalmada adeegyada, waxay idiin hayaan adeeg kharash lanamrata blum. So [facility] [phone].    ATENCIÓN: Si habla español, tiene a glass disposición servicios gratuitos de asistencia lingüística. Llame al [phone].    We comply with applicable federal civil rights laws and [place] laws. We do not discriminate on the basis of race, color, national origin, age, disability, sex, sexual orientation, or gender identity.            Thank you!     Thank you for choosing [facility]  for your care. Our goal is always to provide you with excellent care. Hearing back from our patients is one way we can continue to improve our services. Please take a few minutes to complete the written survey that you may receive in the mail after your visit with us. Thank you!             Your Updated Medication List - Protect others around you: Learn how to safely use, store and throw away your medicines at www.disposemymeds.org.          This list is accurate as of: 1/9/18  2:40 PM.  Always use your most recent med list.                   Brand Name Dispense Instructions for use Diagnosis    aspirin 81 MG tablet      Take 1 tablet by mouth daily.        calcium + D 600-200 MG-UNIT Tabs   Generic drug:  calcium carbonate-vitamin D      Take 1 tablet by mouth every other day        Ferrous Gluconate 240 (27 FE) MG Tabs      Take 1 tablet by mouth every other day        folic acid 1 MG tablet    FOLVITE     Take 1 mg by mouth daily.        lovastatin 20 MG tablet    MEVACOR    90 tablet    TAKE ONE TABLET BY MOUTH AT BEDTIME    Hyperlipidemia LDL goal <130       methotrexate 2.5 MG tablet  CHEMO      Take 7 tablets by mouth once a week        oxybutynin 5 MG 24 hr tablet    DITROPAN-XL    90 tablet    TAKE 1 TABLET (5 MG) BY ORAL ROUTE ONCE DAILY    Urinary incontinence       PREMARIN cream   Generic drug:  conjugated estrogens     30 g    PLACE 0.5 G VAGINALLY ONCE A WEEK    Estrogen deficient vulvovaginitis       REMICADE IV      Inject 200 mg into the vein Patient takes this every 8 weeks        valACYclovir 1000 mg tablet    VALTREX    4 tablet    TAKE 2 TABLETS (2,000 MG) BY MOUTH 2 TIMES DAILY    Recurrent herpes simplex

## 2018-01-09 NOTE — PROGRESS NOTES
SUBJECTIVE:   Britt Pichardo is a 77 year old female who presents for Preventive Visit.  click delete button to remove this line now  click delete button to remove this line now  Are you in the first 12 months of your Medicare coverage?  No    Physical   Annual:     Getting at least 3 servings of Calcium per day::  Yes    Bi-annual eye exam::  Yes    Dental care twice a year::  Yes    Sleep apnea or symptoms of sleep apnea::  None    Diet::  Regular (no restrictions)    Frequency of exercise::  4-5 days/week    Duration of exercise::  15-30 minutes    Taking medications regularly::  Yes    Ability to successfully perform activities of daily living: no assistance needed  Home Safety:  Throw rugs in the hallway and lack of grab bars in the bathroom  Hearing Impairment: difficult to understand a speaker at a public meeting or [employer] service and no hearing concerns    Ability to successfully perform activities of daily living: Yes, no assistance needed  Home safety:  none identified   Hearing impairment: Yes, Difficult to understand a speaker at a public meeting or Episcopal service.      Fall risk:  Fallen 2 or more times in the past year?: No  Any fall with injury in the past year?: No    click delete button to remove this line nowCOGNITIVE SCREEN  1) Repeat 3 items (Banana, Sunrise, Chair)    2) Clock draw: NORMAL  3) 3 item recall: Recalls 2 objects   Results: NORMAL clock, 1-2 items recalled: COGNITIVE IMPAIRMENT LESS LIKELY    Mini-CogTM Copyright [employer]isco[employer]. Licensed by the author for use in [facility]; reprinted with permission (cristobal@[facility]). All rights reserved.          Reviewed and updated as needed this visit by clinical staffTobacco  Allergies  Meds  Med Hx  Surg Hx  Fam Hx  Soc Hx        Reviewed and updated as needed this visit by Provider        Social History   Substance Use Topics     Smoking status: Former Smoker     Types: Cigarettes     Quit date: 12/28/1976     Smokeless  tobacco: Never Used     Alcohol use Yes      Comment: occasional - special events/holidays only       Alcohol Use 1/9/2018   If you drink alcohol, do you typically have greater than 3 drinks per day OR greater than 7 drinks per week?   No   No flowsheet data found.        Today's PHQ-2 Score:   PHQ-2 ( 1999 [employer]) 1/9/2018   Q1: Little interest or pleasure in doing things 0   Q2: Feeling down, depressed or hopeless 0   PHQ-2 Score 0   Q1: Little interest or pleasure in doing things Not at all   Q2: Feeling down, depressed or hopeless Not at all   PHQ-2 Score 0       Do you feel safe in your environment - Yes    Do you have a Health Care Directive?: Yes: Advance Directive has been received and scanned.    Current providers sharing in care for this patient include:   Patient Care Team:  Vineet Mendoza MD as PCP - General (Family Practice)    The following health maintenance items are reviewed in Epic and correct as of today:  Health Maintenance   Topic Date Due     FALL RISK ASSESSMENT  05/01/2018     LIPID SCREEN Q5 YR FEMALE (SYSTEM ASSIGNED)  12/28/2021     ADVANCE DIRECTIVE PLANNING Q5 YRS  03/22/2022     TETANUS IMMUNIZATION (SYSTEM ASSIGNED)  01/09/2028     INFLUENZA VACCINE (SYSTEM ASSIGNED)  Completed     DEXA SCAN SCREENING (SYSTEM ASSIGNED)  Completed     PNEUMOCOCCAL  Completed             Review of Systems  C: NEGATIVE for fever, chills, change in weight  I: NEGATIVE for worrisome rashes, moles or lesions  E: NEGATIVE for vision changes or irritation  E/M: NEGATIVE for ear, mouth and throat problems  R: NEGATIVE for significant cough or SOB  B: NEGATIVE for masses, tenderness or discharge  CV: NEGATIVE for chest pain, palpitations or peripheral edema  GI: NEGATIVE for nausea, abdominal pain, heartburn, or change in bowel habits  : NEGATIVE for frequency, dysuria, or hematuria  M: NEGATIVE for significant arthralgias or myalgia  NEURO: paresthesias left hand  E: NEGATIVE for temperature  "intolerance, skin/hair changes  H: NEGATIVE for bleeding problems  P: NEGATIVE for changes in mood or affect    OBJECTIVE:   /60  Pulse 68  Temp 98  F (36.7  C) (Tympanic)  Resp 16  Ht 4' 11\" (1.499 m)  Wt 128 lb (58.1 kg)  BMI 25.85 kg/m2 Estimated body mass index is 25.85 kg/(m^2) as calculated from the following:    Height as of this encounter: 4' 11\" (1.499 m).    Weight as of this encounter: 128 lb (58.1 kg).  Physical Exam  GENERAL APPEARANCE: healthy, alert and no distress  EYES: Eyes grossly normal to inspection, PERRL and conjunctivae and sclerae normal  HENT: ear canals and TM's normal, nose and mouth without ulcers or lesions, oropharynx clear and oral mucous membranes moist  NECK: no adenopathy, no asymmetry, masses, or scars and thyroid normal to palpation  RESP: lungs clear to auscultation - no rales, rhonchi or wheezes  BREAST: normal without masses, tenderness or nipple discharge and no palpable axillary masses or adenopathy  CV: regular rate and rhythm, normal S1 S2, no S3 or S4, no murmur, click or rub, no peripheral edema and peripheral pulses strong  ABDOMEN: soft, nontender, no hepatosplenomegaly, no masses and bowel sounds normal  MS: no musculoskeletal defects are noted and gait is age appropriate without ataxia  SKIN: no suspicious lesions or rashes  NEURO: Normal strength and tone, mentation intact, speech normal and sensory deficit left hand  PSYCH: mentation appears normal and affect normal/bright    ASSESSMENT / PLAN:       ICD-10-CM    1. Routine medical exam Z00.00    2. Need for prophylactic vaccination with tetanus-diphtheria (TD) Z23 TD (ADULT, 7+) PRESERVE FREE     ADMIN 1st VACCINE   3. Cervical radiculopathy M54.12 SPINE SURGERY REFERRAL    left C6-8   4. Rheumatoid arthritis involving multiple sites with positive rheumatoid factor (H) M05.79    5. Recurrent HSV (herpes simplex virus) B00.9    6. Vitamin D deficiency E55.9    7. Hyperlipidemia LDL goal <130 E78.5    8. 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  Practitioner is supportive of decision.    COUNSELING:  Reviewed preventive health counseling, as reflected in patient instructions       Immunizations    Vaccinated for: Td      I will refer the patient to [facility] spine surgeons for a second opinion about her left C6 through C8 radiculopathy.  See her EMG done at [facility] last summer.        Estimated body mass index is 25.85 kg/(m^2) as calculated from the following:    Height as of this encounter: 4' 11\" (1.499 m).    Weight as of this encounter: 128 lb (58.1 kg).     reports that she quit smoking about 41 years ago. Her smoking use included Cigarettes. She has never used smokeless tobacco.        Appropriate preventive services were discussed with this patient, including applicable screening as appropriate for cardiovascular disease, diabetes, osteopenia/osteoporosis, and glaucoma.  As appropriate for age/gender, discussed screening for colorectal cancer, prostate cancer, breast cancer, and cervical cancer. Checklist reviewing preventive services available has been given to the patient.    Reviewed patients plan of care and provided an AVS. The Basic Care Plan (routine screening as documented in Health Maintenance) for Britt meets the Care Plan requirement. This Care Plan has been established and reviewed with the Patient.    Counseling Resources:  ATP IV Guidelines  Pooled Cohorts Equation Calculator  Breast Cancer Risk Calculator  FRAX Risk Assessment  ICSI Preventive Guidelines  Dietary Guidelines for Americans, 2010  [email or web address]'s MyPlate  ASA Prophylaxis  Lung CA Screening    Vineet Mendoza MD  [facility] XERXESAnswers for HPI/ROS submitted by the patient on 1/9/2018   PHQ-2 Score: 0    "

## 2018-01-18 ENCOUNTER — TRANSFERRED RECORDS (OUTPATIENT)
Dept: HEALTH INFORMATION MANAGEMENT | Facility: CLINIC | Age: 78
End: 2018-01-18

## 2018-01-29 DIAGNOSIS — E78.5 HYPERLIPIDEMIA LDL GOAL <130: ICD-10-CM

## 2018-01-29 NOTE — TELEPHONE ENCOUNTER
"Requested Prescriptions   Pending Prescriptions Disp Refills     Lovastat.20  Last Written Prescription Date:  10/31/2017  Last Fill Quantity: 90,  # refills: 0   Last Office Visit  1/9/2018        with  [facility], P or [facility] prescribing provider:     Future Office Visit:         in (MEVACOR) 20 MG tablet [Pharmacy Med Name: Lovastatin Oral Tablet 20 MG] 90 tablet 0     Sig: TAKE ONE TABLET BY MOUTH AT BEDTIME    Statins Protocol Failed    1/29/2018  9:24 AM       Failed - LDL on file in past 12 months    Recent Labs   Lab Test  12/28/16   1213   LDL  77            Passed - No abnormal creatine kinase in past 12 months    No lab results found.         Passed - Recent or future visit with authorizing provider    Patient had office visit in the last year or has a visit in the next 30 days with authorizing provider.  See \"Patient Info\" tab in inbasket, or \"Choose Columns\" in Meds & Orders section of the refill encounter.            Passed - Patient is age 18 or older       Passed - No active pregnancy on record       Passed - No positive pregnancy test in past 12 months        "

## 2018-02-01 RX ORDER — LOVASTATIN 20 MG
TABLET ORAL
Qty: 30 TABLET | Refills: 0 | Status: SHIPPED | OUTPATIENT
Start: 2018-02-01 | End: 2018-03-05

## 2018-02-05 ENCOUNTER — TRANSFERRED RECORDS (OUTPATIENT)
Dept: HEALTH INFORMATION MANAGEMENT | Facility: CLINIC | Age: 78
End: 2018-02-05

## 2018-03-12 ENCOUNTER — TRANSFERRED RECORDS (OUTPATIENT)
Dept: HEALTH INFORMATION MANAGEMENT | Facility: CLINIC | Age: 78
End: 2018-03-12

## 2018-03-12 LAB
ALT SERPL-CCNC: 12 IU/L (ref 5–35)
AST SERPL-CCNC: 21 U/L (ref 5–34)
CREAT SERPL-MCNC: 0.65 MG/DL (ref 0.5–1.3)
GFR SERPL CREATININE-BSD FRML MDRD: 93.5 ML/MIN/1.73M2

## 2018-04-02 ENCOUNTER — TRANSFERRED RECORDS (OUTPATIENT)
Dept: HEALTH INFORMATION MANAGEMENT | Facility: CLINIC | Age: 78
End: 2018-04-02

## 2018-04-02 ENCOUNTER — TELEPHONE (OUTPATIENT)
Dept: OTHER | Facility: CLINIC | Age: 78
End: 2018-04-02

## 2018-04-02 DIAGNOSIS — R20.8 OTHER DISTURBANCES OF SKIN SENSATION: ICD-10-CM

## 2018-04-02 DIAGNOSIS — R29.898 WEAKNESS OF LEFT HAND: Primary | ICD-10-CM

## 2018-04-02 NOTE — TELEPHONE ENCOUNTER
"Referral received via fax    Pt referred to [facility] by Dr. Ware for possible subclavian steal syndrome. Pt has Left hand weakness. Pt is right hand dominant.     Dr. Ware notes on 4-2-18   \"symptoms progressively worsening over the past 6 months\", \"some diffuse atrophy of left upper extremity and hand\", \"increased symptoms with cold\", \"no pain, hand  weakness and decreased pinch strength.\"    Pt had EMG of left upper extremity and a cervical spine MRI.     Pt needs to be scheduled for bilateral carotid u/s and consult with vascular surgery. Will route to scheduling to coordinate an appointment at next available.     Leana Pearson, PEYTONN, RN    "

## 2018-04-13 ENCOUNTER — HOSPITAL ENCOUNTER (OUTPATIENT)
Dept: ULTRASOUND IMAGING | Facility: CLINIC | Age: 78
End: 2018-04-13
Attending: SURGERY
Payer: MEDICARE

## 2018-04-13 ENCOUNTER — HOSPITAL ENCOUNTER (OUTPATIENT)
Dept: ULTRASOUND IMAGING | Facility: CLINIC | Age: 78
Discharge: HOME | End: 2018-04-13
Attending: SURGERY | Admitting: SURGERY
Payer: MEDICARE

## 2018-04-13 ENCOUNTER — OFFICE VISIT (OUTPATIENT)
Dept: OTHER | Facility: CLINIC | Age: 78
End: 2018-04-13
Attending: SURGERY
Payer: MEDICARE

## 2018-04-13 VITALS — SYSTOLIC BLOOD PRESSURE: 95 MMHG | HEART RATE: 71 BPM | DIASTOLIC BLOOD PRESSURE: 58 MMHG

## 2018-04-13 DIAGNOSIS — R20.8 OTHER DISTURBANCES OF SKIN SENSATION: ICD-10-CM

## 2018-04-13 DIAGNOSIS — R29.898 WEAKNESS OF LEFT UPPER EXTREMITY: Primary | ICD-10-CM

## 2018-04-13 DIAGNOSIS — M79.603 ARM PAIN: ICD-10-CM

## 2018-04-13 DIAGNOSIS — Z01.818 ENCOUNTER FOR OTHER PREPROCEDURAL EXAMINATION: ICD-10-CM

## 2018-04-13 DIAGNOSIS — M79.89 OTHER SPECIFIED SOFT TISSUE DISORDERS (CODE): ICD-10-CM

## 2018-04-13 DIAGNOSIS — R29.898 WEAKNESS OF LEFT HAND: ICD-10-CM

## 2018-04-13 PROCEDURE — G0463 HOSPITAL OUTPT CLINIC VISIT: HCPCS

## 2018-04-13 PROCEDURE — 93971 EXTREMITY STUDY: CPT | Mod: LT

## 2018-04-13 PROCEDURE — 93880 EXTRACRANIAL BILAT STUDY: CPT

## 2018-04-13 PROCEDURE — 93931 UPPER EXTREMITY STUDY: CPT | Mod: LT

## 2018-04-13 PROCEDURE — 99204 OFFICE O/P NEW MOD 45 MIN: CPT | Mod: ZP | Performed by: SURGERY

## 2018-04-13 NOTE — PROGRESS NOTES
Presenting complaint: Left hand weakness.    History of presenting illness: This is a 78-year-old female who has been referred to us by Dr. Ware from [facility] orth[facility]ics.  Patient reports left hand weakness for the past 1 year.  She reports that the weakness is mostly when she is dying to oppose her little finger and ring finger to her thumb.  She feels the weakness is exacerbated by raising her left arm above the shoulder.  She also reports that she feels coolness in the fingers and the left hand when it is exposed to cold temperature gets cold sooner and takes time warming.  She has been evaluated by spine and orthopedics as well as neurosurgery and in hand surgery.    Medical history: She has rheumatoid arthritis for which she is on methotrexate and gets injections of Remicade every 8 weeks.  She also has recurrent herpes simplex virus, hyperlipidemia and hypercholesterolemia.    Past surgical history: She has had lumbar spine surgery.    Social history: She occasionally consumes alcohol and does not consume tobacco products.    Family history: Both parents are .  Her father had leukemia and mother  of old age and coronary artery disease.    Review of systems: As noted in history of presenting illness otherwise negative.    On examination: She appears comfortable and she is in no acute distress.  He is pleasant and cooperative.  Vital signs are reviewed.  HEENT: Head is atraumatic and normocephalic.  Mucosa pink.  Eyes: Extraocular motions are intact.  Sclerae are anicteric.  Mental: She is alert and oriented ×4.  Judgment and insight are good.  Lymphatic: No supraclavicular cervical adenopathy is noted.  Cardiac: Regular rate and rhythm, S1 plus S2 +0.  Respiratory: Good air entry bilaterally, good respiratory effort and no accessory muscle use.  Chest clear to auscultation bilaterally.  Abdomen: Abdomen soft and nontender.  No hepatosplenomegaly noted.  Vascular: No carotid bruits, 2+ bilateral  radial pulses, 2+ bilateral ulnar pulses and 2+ bilateral femoral pulses.   There is no change in the quality of the left radial pulse with 90  and 180  abduction.  Left upper extremity felt weak with extended arm stress test. There is tenderness over the infraclavicular fossa on the left. No tenderness on the right.     Diagnosis: Left hand weakness.    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  Interestingly she does not have any pain but she does have tenderness in the left infraclavicular fossa.  There is no evidence of arterial or venous insufficiency in stressed positions.  We will obtain noninvasive duplex studies to corroborate evidence.  He is on Remicade and methotrexate, I would first try goal-directed physical therapy for thoracic aorta syndrome.

## 2018-04-13 NOTE — LETTER
[facility] [address]  [address]  [place] [address]  Phone: [phone]  Fax: [phone]          2018    RE: Britt Pichardo, : 1940        Presenting complaint: Left hand weakness.     History of presenting illness: This is a 78-year-old female who has been referred to us by Dr. Ware from [facility] orth[facility].  Patient reports left hand weakness for the past 1 year.  She reports that the weakness is mostly when she is dying to oppose her little finger and ring finger to her thumb.  She feels the weakness is exacerbated by raising her left arm above the shoulder.  She also reports that she feels coolness in the fingers and the left hand when it is exposed to cold temperature gets cold sooner and takes time warming.  She has been evaluated by spine and orthopedics as well as neurosurgery and in hand surgery.     Medical history: She has rheumatoid arthritis for which she is on methotrexate and gets injections of Remicade every 8 weeks.  She also has recurrent herpes simplex virus, hyperlipidemia and hypercholesterolemia.     Past surgical history: She has had lumbar spine surgery.     Social history: She occasionally consumes alcohol and does not consume tobacco products.     Family history: Both parents are .  Her father had leukemia and mother  of old age and coronary artery disease.     Review of systems: As noted in history of presenting illness otherwise negative.     On examination: She appears comfortable and she is in no acute distress.  He is pleasant and cooperative.  Vital signs are reviewed.  HEENT: Head is atraumatic and normocephalic.  Mucosa pink.  Eyes: Extraocular motions are intact.  Sclerae are anicteric.  Mental: She is alert and oriented ×4.  Judgment and insight are good.  Lymphatic: No supraclavicular cervical adenopathy is noted.  Cardiac: Regular rate and rhythm, S1 plus S2 +0.  Respiratory: Good air entry bilaterally,  good respiratory effort and no accessory muscle use.  Chest clear to auscultation bilaterally.  Abdomen: Abdomen soft and nontender.  No hepatosplenomegaly noted.  Vascular: No carotid bruits, 2+ bilateral radial pulses, 2+ bilateral ulnar pulses and 2+ bilateral femoral pulses.   There is no change in the quality of the left radial pulse with 90  and 180  abduction.  Left upper extremity felt weak with extended arm stress test. There is tenderness over the infraclavicular fossa on the left. No tenderness on the right.      Diagnosis: Left hand weakness.     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  Interestingly she does not have any pain but she does have tenderness in the left infraclavicular fossa.  There is no evidence of arterial or venous insufficiency in stressed positions.  We will obtain noninvasive duplex studies to corroborate evidence.  He is on Remicade and methotrexate, I would first try goal-directed physical therapy for thoracic aorta syndrome.               Sincerely,     Rizwan Rosario MD        [facility]  [facility] [address]  [facility] [address]  Phone: [phone]  Fax: [phone]

## 2018-04-13 NOTE — NURSING NOTE
"Chief Complaint   Patient presents with     Consult     Mk carotid (9:15 [facility]; 10:00 KMK) History of left hand weakness, eval for subclavian steal syndrome. Pt to consult with vascular surgery        Initial BP 95/58 (BP Location: Left arm, Patient Position: Chair, Cuff Size: Adult Regular)  Pulse 71  Breastfeeding? No Estimated body mass index is 25.85 kg/(m^2) as calculated from the following:    Height as of 1/9/18: 4' 11\" (1.499 m).    Weight as of 1/9/18: 128 lb (58.1 kg).  Medication Reconciliation: neo Grigsby on 4/13/2018 at 9:49 AM      "

## 2018-04-13 NOTE — MR AVS SNAPSHOT
"              After Visit Summary   4/13/2018    Britt Pichardo    MRN: [id number]           Patient Information     Date Of Birth          1940        Visit Information        Provider Department      4/13/2018 10:00 AM Rizwan Rosario MD [facility]      Today's Diagnoses     Weakness of left upper extremity    -  1       Follow-ups after your visit        Future tests that were ordered for you today     Open Future Orders        Priority Expected Expires Ordered    US Extremity Upper Venous  lt Routine 4/13/2018 4/13/2019 4/13/2018    US Upper Extremity Arterial Duplex Left Routine 4/13/2018 4/13/2019 4/13/2018            Who to contact     If you have questions or need follow up information about today's clinic visit or your schedule please contact [facility] directly at [phone].  Normal or non-critical lab and imaging results will be communicated to you by MyChart, letter or phone within 4 business days after the clinic has received the results. If you do not hear from us within 7 days, please contact the clinic through MyChart or phone. If you have a critical or abnormal lab result, we will notify you by phone as soon as possible.  Submit refill requests through FlowJob or call your pharmacy and they will forward the refill request to us. Please allow 3 business days for your refill to be completed.          Additional Information About Your Visit        MyChart Information     FlowJob lets you send messages to your doctor, view your test results, renew your prescriptions, schedule appointments and more. To sign up, go to [email or web address] . Click on \"Log in\" on the left side of the screen, which will take you to the Welcome page. Then click on \"Sign up Now\" on the right side of the page.     You will be asked to enter the access code listed below, as well as some personal information. Please " follow the directions to create your username and password.     Your access code is: [email or web address]  Expires: 2018 10:57 AM     Your access code will  in 90 days. If you need help or a new code, please call your [place] clinic or [phone].        Care EveryWhere ID     This is your Care EveryWhere ID. This could be used by other organizations to access your [facility] [place] medical records  [id number]        Your Vitals Were     Pulse Breastfeeding?                71 No           Blood Pressure from Last 3 Encounters:   18 95/58   18 116/60   17 120/62    Weight from Last 3 Encounters:   18 128 lb (58.1 kg)   17 123 lb (55.8 kg)   17 127 lb 12.8 oz (58 kg)               Primary Care Provider Office Phone # Fax #    Vineet Mendoza [phone]       [address] [facility] [address]        Equal Access to Services     [facility]: Hadii aad ku hadasho Soomaali, waaxda luqadaha, qaybta kaalmada adeegyada, waxay idiin hayaan norbert kharajames liu . So [facility] [phone].    ATENCIÓN: Si habla español, tiene a glass disposición servicios gratuitos de asistencia lingüística. Llame al [phone].    We comply with applicable federal civil rights laws and [place] laws. We do not discriminate on the basis of race, color, national origin, age, disability, sex, sexual orientation, or gender identity.            Thank you!     Thank you for choosing [facility] [place]  for your care. Our goal is always to provide you with excellent care. Hearing back from our patients is one way we can continue to improve our services. Please take a few minutes to complete the written survey that you may receive in the mail after your visit with us. Thank you!             Your Updated Medication List - Protect others around you: Learn how to safely use, store and throw away your medicines at www.disposemymeds.org.          This list is accurate as of  4/13/18 10:57 AM.  Always use your most recent med list.                   Brand Name Dispense Instructions for use Diagnosis    aspirin 81 MG tablet      Take 1 tablet by mouth daily.        calcium + D 600-200 MG-UNIT Tabs   Generic drug:  calcium carbonate-vitamin D      Take 1 tablet by mouth every other day        Ferrous Gluconate 240 (27 Fe) MG Tabs      Take 1 tablet by mouth every other day        folic acid 1 MG tablet    FOLVITE     Take 1 mg by mouth daily.        lovastatin 20 MG tablet    MEVACOR    30 tablet    TAKE ONE TABLET BY MOUTH AT BEDTIME    Hyperlipidemia LDL goal <130       methotrexate 2.5 MG tablet CHEMO      Take 7 tablets by mouth once a week        oxybutynin 5 MG 24 hr tablet    DITROPAN-XL    90 tablet    TAKE 1 TABLET (5 MG) BY ORAL ROUTE ONCE DAILY    Urinary incontinence       PREMARIN cream   Generic drug:  conjugated estrogens     30 g    PLACE 0.5 G VAGINALLY ONCE A WEEK    Estrogen deficient vulvovaginitis       REMICADE IV      Inject 200 mg into the vein Patient takes this every 8 weeks        valACYclovir 1000 mg tablet    VALTREX    4 tablet    TAKE 2 TABLETS (2,000 MG) BY MOUTH 2 TIMES DAILY    Recurrent herpes simplex

## 2018-04-17 ENCOUNTER — TELEPHONE (OUTPATIENT)
Dept: OTHER | Facility: CLINIC | Age: 78
End: 2018-04-17

## 2018-04-17 NOTE — TELEPHONE ENCOUNTER
I called to tell her that ultrasound studies are normal. Even so, working diagnosis is still neurogenic thoracic outlet in the left upper extremity. I would not recommend surgery. We will go with thoracic outlet specific physical therapy. She can follow up PRN.

## 2018-04-18 ENCOUNTER — TELEPHONE (OUTPATIENT)
Dept: OTHER | Facility: CLINIC | Age: 78
End: 2018-04-18

## 2018-04-18 DIAGNOSIS — G54.0 TOS (THORACIC OUTLET SYNDROME): Primary | ICD-10-CM

## 2018-04-18 NOTE — TELEPHONE ENCOUNTER
Per Dr. Rosario orders, referral for PT placed in EPIC.     I then called pt to explain PT order and provided PT scheduling tele number if she does not hear from them in a few business days. Pt notes understanding.     Leana Pearson, PEYTONN, RN

## 2018-04-27 ENCOUNTER — THERAPY VISIT (OUTPATIENT)
Dept: PHYSICAL THERAPY | Facility: CLINIC | Age: 78
End: 2018-04-27
Payer: MEDICARE

## 2018-04-27 DIAGNOSIS — R29.898 WEAKNESS OF LEFT HAND: Primary | ICD-10-CM

## 2018-04-27 PROCEDURE — 97112 NEUROMUSCULAR REEDUCATION: CPT | Mod: GP | Performed by: PHYSICAL THERAPIST

## 2018-04-27 PROCEDURE — 97161 PT EVAL LOW COMPLEX 20 MIN: CPT | Mod: GP | Performed by: PHYSICAL THERAPIST

## 2018-04-27 PROCEDURE — G8985 CARRY GOAL STATUS: HCPCS | Mod: GP | Performed by: PHYSICAL THERAPIST

## 2018-04-27 PROCEDURE — G8984 CARRY CURRENT STATUS: HCPCS | Mod: GP | Performed by: PHYSICAL THERAPIST

## 2018-04-27 NOTE — PROGRESS NOTES
[facility] Initial Evaluation  Subjective:  HPI                    Objective:  System    Physical Exam    General     ROS    Assessment/Plan:

## 2018-04-27 NOTE — MR AVS SNAPSHOT
After Visit Summary   4/27/2018    Britt Pichardo    MRN: [id number]           Patient Information     Date Of Birth          1940        Visit Information        Provider Department      4/27/2018 10:00 AM Alecia Rollins, PT [facility]        Today's Diagnoses     Weakness of left hand    -  1       Follow-ups after your visit        Your next 10 appointments already scheduled     May 07, 2018 12:30 PM CDT   NAYE Extremity with Alecia Rollins PT   [facility] ([place] [place]  )    [address]  [facility] [address]   [phone]            May 14, 2018 10:40 AM CDT   NAYE Extremity with Alecia Rollins PT   [facility] ([facility] [place]  )    [address]  [facility] [address]   [phone]            May 21, 2018 10:00 AM CDT   NAYE Extremity with Alecia Rollins PT   [facility] ([facility] [place]  )    [address] [place], [place] [address]  [facility] [address]   [phone]              Who to contact     If you have questions or need follow up information about today's clinic visit or your schedule please contact [facility] directly at [phone].  Normal or non-critical lab and imaging results will be communicated to you by MyChart, letter or phone within 4 business days after the clinic has received the results. If you do not hear from us within 7 days, please contact the clinic through [employer] or phone. If you have a critical or abnormal lab result, we will notify you by phone as soon as possible.  Submit refill requests through [employer] or call your pharmacy and they will forward the refill request to us. Please allow 3 business days for your refill to be completed.          Additional Information About Your Visit        MyChart Information     [employer]  "lets you send messages to your doctor, view your test results, renew your prescriptions, schedule appointments and more. To sign up, go to [email or web address] . Click on \"Log in\" on the left side of the screen, which will take you to the Welcome page. Then click on \"Sign up Now\" on the right side of the page.     You will be asked to enter the access code listed below, as well as some personal information. Please follow the directions to create your username and password.     Your access code is: [email or web address]  Expires: 2018 10:57 AM     Your access code will  in 90 days. If you need help or a new code, please call your [place] clinic or [phone].        Care EveryWhere ID     This is your Care EveryWhere ID. This could be used by other organizations to access your [place] medical records  [id number]         Blood Pressure from Last 3 Encounters:   18 95/58   18 116/60   17 120/62    Weight from Last 3 Encounters:   18 58.1 kg (128 lb)   17 55.8 kg (123 lb)   17 58 kg (127 lb 12.8 oz)              We Performed the Following     HC PT EVAL, LOW COMPLEXITY     NAYE CERT REPORT     NAYE INITIAL EVAL REPORT     NEUROMUSCULAR RE-EDUCATION        Primary Care Provider Office Phone # Fax #    Vineet Mendoza [phone] [address] [facility] [address]        Equal Access to Services     DARREN HAMILTON : Hadii amy ku hadasho Soomaali, waaxda luqadaha, qaybta kaalmada adestefano, osiel blum. So [facility] [phone].    ATENCIÓN: Si habla español, tiene a glass disposición servicios gratuitos de asistencia lingüística. Llame al [phone].    We comply with applicable federal civil rights laws and [place] laws. We do not discriminate on the basis of race, color, national origin, age, disability, sex, sexual orientation, or gender identity.            Thank you!     [facility] you for choosing [facility]  for your care. Our goal is always to provide you with excellent care. Hearing back from our patients is one way we can continue to improve our services. Please take a few minutes to complete the written survey that you may receive in the mail after your visit with us. Thank you!             Your Updated Medication List - Protect others around you: Learn how to safely use, store and throw away your medicines at www.disposemymeds.org.          This list is accurate as of 4/27/18 12:12 PM.  Always use your most recent med list.                   Brand Name Dispense Instructions for use Diagnosis    aspirin 81 MG tablet      Take 1 tablet by mouth daily.        calcium + D 600-200 MG-UNIT Tabs   Generic drug:  calcium carbonate-vitamin D      Take 1 tablet by mouth every other day        Ferrous Gluconate 240 (27 Fe) MG Tabs      Take 1 tablet by mouth every other day        folic acid 1 MG tablet    FOLVITE     Take 1 mg by mouth daily.        lovastatin 20 MG tablet    MEVACOR    30 tablet    TAKE ONE TABLET BY MOUTH AT BEDTIME    Hyperlipidemia LDL goal <130       methotrexate 2.5 MG tablet CHEMO      Take 7 tablets by mouth once a week        oxybutynin 5 MG 24 hr tablet    DITROPAN-XL    90 tablet    TAKE 1 TABLET (5 MG) BY ORAL ROUTE ONCE DAILY    Urinary incontinence       PREMARIN cream   Generic drug:  conjugated estrogens     30 g    PLACE 0.5 G VAGINALLY ONCE A WEEK    Estrogen deficient vulvovaginitis       REMICADE IV      Inject 200 mg into the vein Patient takes this every 8 weeks        valACYclovir 1000 mg tablet    VALTREX    4 tablet    TAKE 2 TABLETS (2,000 MG) BY MOUTH 2 TIMES DAILY    Recurrent herpes simplex

## 2018-04-27 NOTE — LETTER
DEPARTMENT OF HEALTH AND HUMAN SERVICES  CENTERS FOR MEDICARE & MEDICAID SERVICES    PLAN/UPDATED PLAN OF PROGRESS FOR OUTPATIENT REHABILITATION  PATIENTS NAME:  Britt Pichardo   : 1940  PROVIDER NUMBER:    [id number]  [id number]  PROVIDER NAME: [facility]  MEDICAL RECORD NUMBER: [id number]   START OF CARE DATE:  SOC Date: 18   TYPE:  PT  PRIMARY/TREATMENT DIAGNOSIS: (Pertinent Medical Diagnosis)  Weakness of left hand  VISITS FROM START OF CARE:  1 Rxs Used: 1   [place] [facility] Initial Evaluation  Subjective:  Britt Pichardo is a right handed 78 year old female referred to PT for evaluation and treatment of thoracic outlet syndrome. MD referral dated 18. Primary symptom location is weakness through the left hand. Patient denies any red flags including painful cough/sneeze, saddle anaesthesia, severe night pain, peripheral motor deficit, recent bowel/bladder change, recent vision change, ringing in the ears or pain with swallowing. Patient states that symptoms began a year ago, insidious onset. Since onset, symptoms have been getting worse. Aggravating activities include tying shoes, picking things up. Relieving activities include none identified. Current pain rating is 0/10.  Past Medical History: Rheumatoid arthritis managed with medications  Patient reports that general health is good   Regular exercise routine: walks, Curves 3x/week  Current Occupation: retired - active  Previous Functional Status: no restrictions  Pt goals for PT: See if she can figure out what is going on with the hand  Objective:  CERVICAL:  Posture: seated loss of lumbar lordosis, rounded shoulders, forward head posture  Neurological:  Motor Deficit:  Myotomes L R   C4 (shoulder elevation) 5 5   C5 (shoulder abduction) 4 5   C6 (elbow flexion) 4 5   C7 (elbow extension) 5 5   C8 (thumb extension) 4 5   T1 (finger add/abd) 4 5    Strength (lb) 10, 10,  9 21, 19, 20   Sensory Deficit, Reflexes, Dural Signs:    Light Touch: In tact bilaterally C4-T1   Biceps Reflex: 1+ bilat   Triceps Reflex: 1+ bilat   Brachioradialis Reflex: 1+ bilat   Scapulothoracic Reflex: negative  AROM: (Major, Moderate, Minimal or Nil loss)  Movement Loss Junior Mod Min Nil Pain   Protrusion    x    Flexion    x    Retraction  x      Extension   x  Right deviation   Left Rotation  x   Upper shoulder pain   Right Rotation        Left Side Bending   x     Right Side bending   x     Special Tests:    Pam: + for tingling in the L hand   Cervical Flexion Rotation: + L rib   ULTT 1: - bilaterally  Full Chain Shoulder Elevation:   Restricted on the L with overpressure. Equal AROM bilat  Assessment/Plan:    Patient is a 78 year old female with left side wrist/hand complaints.    Patient has the following significant findings with corresponding treatment plan.                Diagnosis 1:  Left hand weakness due to thoracic outlet  Decreased strength - therapeutic exercise, therapeutic activities, home program, neuromuscular re-education, manual therapy, therapeutic exercise  Therapy Evaluation Codes:   1) History comprised of:   Personal factors that impact the plan of care:      Time since onset of symptoms.    Comorbidity factors that impact the plan of care are:      Rheumatoid arthritis.     Medications impacting care: None.  2) Examination of Body Systems comprised of:   Body structures and functions that impact the plan of care:      Cervical spine, Fingers, Hand and Thoracic Spine.   Activity limitations that impact the plan of care are:      Cooking and Grasping.  3) Clinical presentation characteristics are:   Stable/Uncomplicated.  4) Decision-Making    Low complexity using standardized patient assessment instrument and/or measureable assessment of functional outcome.  Cumulative Therapy Evaluation is: Low complexity.  Previous and current functional limitations:  (See Goal Flow Sheet for  "this information)    Short term and Long term goals: (See Goal Flow Sheet for this information)   Communication ability:  Patient appears to be able to clearly communicate and understand verbal and written communication and follow directions correctly.  Treatment Explanation - The following has been discussed with the patient:   RX ordered/plan of care  Anticipated outcomes  Possible risks and side effects  This patient would benefit from PT intervention to resume normal activities.   Rehab potential is fair.  Frequency:  1 X week, once daily  Duration:  for 8 weeks  Discharge Plan:  Achieve all LTG.  Independent in home treatment program.  Reach maximal therapeutic benefit.    PATIENTS NAME:  Britt Pichardo   : 1940    Caregiver Signature/Credentials _____________________________ Date ________       Treating Provider: Alecia Rollins DPT   I have reviewed and certified the need for these services and plan of treatment while under my care.        PHYSICIAN'S SIGNATURE:   _____________________________________  Date___________     Rizwan Rosario MD     Certification period:  Beginning of Cert date period: 18 to  End of Cert period date: 18     Functional Level Progress Report: Please see attached \"Goal Flow sheet for Functional level.\"    ____X____ Continue Services or       ________ DC Services                Service dates: From  SOC Date: 18 date to present                         "

## 2018-04-27 NOTE — PROGRESS NOTES
[facility] Initial Evaluation  Subjective:  HPI                  Britt Pichardo is a right handed 78 year old female referred to PT for evaluation and treatment of thoracic outlet syndrome. MD referral dated 4/18/18. Primary symptom location is weakness through the left hand. Patient denies any red flags including painful cough/sneeze, saddle anaesthesia, severe night pain, peripheral motor deficit, recent bowel/bladder change, recent vision change, ringing in the ears or pain with swallowing. Patient states that symptoms began a year ago, insidious onset. Since onset, symptoms have been getting worse. Aggravating activities include tying shoes, picking things up. Relieving activities include none identified. Current pain rating is 0/10.    Past Medical History: Rheumatoid arthritis managed with medications  Patient reports that general health is good   Regular exercise routine: walks, Curves 3x/week  Current Occupation: retired - active  Previous Functional Status: no restrictions  Pt goals for PT: See if she can figure out what is going on with the hand    Objective:  System    Physical Exam    General     ROS  CERVICAL:    Posture: seated loss of lumbar lordosis, rounded shoulders, forward head posture    Neurological:    Motor Deficit:  Myotomes L R   C4 (shoulder elevation) 5 5   C5 (shoulder abduction) 4 5   C6 (elbow flexion) 4 5   C7 (elbow extension) 5 5   C8 (thumb extension) 4 5   T1 (finger add/abd) 4 5    Strength (lb) 10, 10, 9 21, 19, 20     Sensory Deficit, Reflexes, Dural Signs:    Light Touch: In tact bilaterally C4-T1   Biceps Reflex: 1+ bilat   Triceps Reflex: 1+ bilat   Brachioradialis Reflex: 1+ bilat   Scapulothoracic Reflex: negative    AROM: (Major, Moderate, Minimal or Nil loss)  Movement Loss Junior Mod Min Nil Pain   Protrusion    x    Flexion    x    Retraction  x      Extension   x  Right deviation   Left Rotation  x   Upper shoulder pain   Right Rotation         Left Side Bending   x     Right Side bending   x         Special Tests:    Pam: + for tingling in the L hand   Cervical Flexion Rotation: + L rib   ULTT 1: - bilaterally    Full Chain Shoulder Elevation:   Restricted on the L with overpressure. Equal AROM bilat    Assessment/Plan:    Patient is a 78 year old female with left side wrist/hand complaints.    Patient has the following significant findings with corresponding treatment plan.                Diagnosis 1:  Left hand weakness due to thoracic outlet  Decreased strength - therapeutic exercise, therapeutic activities, home program, neuromuscular re-education, manual therapy, therapeutic exercise    Therapy Evaluation Codes:   1) History comprised of:   Personal factors that impact the plan of care:      Time since onset of symptoms.    Comorbidity factors that impact the plan of care are:      Rheumatoid arthritis.     Medications impacting care: None.  2) Examination of Body Systems comprised of:   Body structures and functions that impact the plan of care:      Cervical spine, Fingers, Hand and Thoracic Spine.   Activity limitations that impact the plan of care are:      Cooking and Grasping.  3) Clinical presentation characteristics are:   Stable/Uncomplicated.  4) Decision-Making    Low complexity using standardized patient assessment instrument and/or measureable assessment of functional outcome.  Cumulative Therapy Evaluation is: Low complexity.    Previous and current functional limitations:  (See Goal Flow Sheet for this information)    Short term and Long term goals: (See Goal Flow Sheet for this information)     Communication ability:  Patient appears to be able to clearly communicate and understand verbal and written communication and follow directions correctly.  Treatment Explanation - The following has been discussed with the patient:   RX ordered/plan of care  Anticipated outcomes  Possible risks and side effects  This patient would benefit  from PT intervention to resume normal activities.   Rehab potential is fair.    Frequency:  1 X week, once daily  Duration:  for 8 weeks  Discharge Plan:  Achieve all LTG.  Independent in home treatment program.  Reach maximal therapeutic benefit.    Please refer to the daily flowsheet for treatment today, total treatment time and time spent performing 1:1 timed codes.

## 2018-05-07 ENCOUNTER — THERAPY VISIT (OUTPATIENT)
Dept: PHYSICAL THERAPY | Facility: CLINIC | Age: 78
End: 2018-05-07
Payer: MEDICARE

## 2018-05-07 DIAGNOSIS — R29.898 WEAKNESS OF LEFT HAND: ICD-10-CM

## 2018-05-07 PROCEDURE — 97110 THERAPEUTIC EXERCISES: CPT | Mod: GP | Performed by: PHYSICAL THERAPIST

## 2018-05-07 PROCEDURE — 97112 NEUROMUSCULAR REEDUCATION: CPT | Mod: GP | Performed by: PHYSICAL THERAPIST

## 2018-05-14 ENCOUNTER — TRANSFERRED RECORDS (OUTPATIENT)
Dept: HEALTH INFORMATION MANAGEMENT | Facility: CLINIC | Age: 78
End: 2018-05-14

## 2018-05-14 ENCOUNTER — THERAPY VISIT (OUTPATIENT)
Dept: PHYSICAL THERAPY | Facility: CLINIC | Age: 78
End: 2018-05-14
Payer: MEDICARE

## 2018-05-14 DIAGNOSIS — R29.898 WEAKNESS OF LEFT HAND: ICD-10-CM

## 2018-05-14 LAB
ALT SERPL-CCNC: 16 IU/L (ref 5–35)
AST SERPL-CCNC: 28 U/L (ref 5–34)
CREAT SERPL-MCNC: 0.69 MG/DL (ref 0.5–1.3)
GFR SERPL CREATININE-BSD FRML MDRD: 87.2 ML/MIN/1.73M2

## 2018-05-14 PROCEDURE — 97110 THERAPEUTIC EXERCISES: CPT | Mod: GP | Performed by: PHYSICAL THERAPIST

## 2018-05-14 PROCEDURE — 97112 NEUROMUSCULAR REEDUCATION: CPT | Mod: GP | Performed by: PHYSICAL THERAPIST

## 2018-05-21 ENCOUNTER — THERAPY VISIT (OUTPATIENT)
Dept: PHYSICAL THERAPY | Facility: CLINIC | Age: 78
End: 2018-05-21
Payer: MEDICARE

## 2018-05-21 DIAGNOSIS — R29.898 WEAKNESS OF LEFT HAND: ICD-10-CM

## 2018-05-21 PROCEDURE — 97112 NEUROMUSCULAR REEDUCATION: CPT | Mod: GP | Performed by: PHYSICAL THERAPIST

## 2018-05-21 PROCEDURE — 97140 MANUAL THERAPY 1/> REGIONS: CPT | Mod: GP | Performed by: PHYSICAL THERAPIST

## 2018-05-21 PROCEDURE — 97530 THERAPEUTIC ACTIVITIES: CPT | Mod: GP | Performed by: PHYSICAL THERAPIST

## 2018-05-21 NOTE — MR AVS SNAPSHOT
"              After Visit Summary   2018    Britt Pichardo    MRN: [id number]           Patient Information     Date Of Birth          1940        Visit Information        Provider Department      2018 10:00 AM Alecia Rollins PT [facility]        Today's Diagnoses     Weakness of left hand           Follow-ups after your visit        Who to contact     If you have questions or need follow up information about today's clinic visit or your schedule please contact [facility] directly at [phone].  Normal or non-critical lab and imaging results will be communicated to you by [employer], letter or phone within 4 business days after the clinic has received the results. If you do not hear from us within 7 days, please contact the clinic through [employer] or phone. If you have a critical or abnormal lab result, we will notify you by phone as soon as possible.  Submit refill requests through [employer] or call your pharmacy and they will forward the refill request to us. Please allow 3 business days for your refill to be completed.          Additional Information About Your Visit        MyChart Information     [employer] lets you send messages to your doctor, view your test results, renew your prescriptions, schedule appointments and more. To sign up, go to [email or web address] . Click on \"Log in\" on the left side of the screen, which will take you to the Welcome page. Then click on \"Sign up Now\" on the right side of the page.     You will be asked to enter the access code listed below, as well as some personal information. Please follow the directions to create your username and password.     Your access code is: [email or web address]  Expires: 2018 10:57 AM     Your access code will  in 90 days. If you need help or a new code, please call your [place] clinic or [phone].        Care EveryWhere ID     This is " your Care EveryWhere ID. This could be used by other organizations to access your [place] medical records  [id number]         Blood Pressure from Last 3 Encounters:   04/13/18 95/58   01/09/18 116/60   12/18/17 120/62    Weight from Last 3 Encounters:   01/09/18 58.1 kg (128 lb)   12/18/17 55.8 kg (123 lb)   07/11/17 58 kg (127 lb 12.8 oz)              We Performed the Following     MANUAL THER TECH,1+REGIONS,EA 15 MIN     NEUROMUSCULAR RE-EDUCATION     Therapeutic Activities        Primary Care Provider Office Phone # Fax #    Vineet Mendoza [phone] [address] [facility] [address]        Equal Access to Services     MICHAEL HAMILTON : Hadii amy fitzgerald hadasho Soomaali, waaxda luqadaha, qaybta kaalmada adeegyada, osiel liu . So [facility] [phone].    ATENCIÓN: Si habla español, tiene a glass disposición servicios gratuitos de asistencia lingüística. [facility] [phone].    We comply with applicable federal civil rights laws and [place] laws. We do not discriminate on the basis of race, color, national origin, age, disability, sex, sexual orientation, or gender identity.            Thank you!     Thank you for choosing [facility]  for your care. Our goal is always to provide you with excellent care. Hearing back from our patients is one way we can continue to improve our services. Please take a few minutes to complete the written survey that you may receive in the mail after your visit with us. Thank you!             Your Updated Medication List - Protect others around you: Learn how to safely use, store and throw away your medicines at www.disposemymeds.org.          This list is accurate as of 5/21/18 11:19 AM.  Always use your most recent med list.                   Brand Name Dispense Instructions for use Diagnosis    aspirin 81 MG tablet      Take 1 tablet by mouth daily.        calcium + D 600-200  MG-UNIT Tabs   Generic drug:  calcium carbonate-vitamin D      Take 1 tablet by mouth every other day        Ferrous Gluconate 240 (27 Fe) MG Tabs      Take 1 tablet by mouth every other day        folic acid 1 MG tablet    FOLVITE     Take 1 mg by mouth daily.        lovastatin 20 MG tablet    MEVACOR    30 tablet    TAKE ONE TABLET BY MOUTH AT BEDTIME    Hyperlipidemia LDL goal <130       methotrexate 2.5 MG tablet CHEMO      Take 7 tablets by mouth once a week        oxybutynin 5 MG 24 hr tablet    DITROPAN-XL    90 tablet    TAKE ONE TABLET BY MOUTH ONE TIME DAILY    Urinary incontinence       PREMARIN cream   Generic drug:  conjugated estrogens     30 g    PLACE 0.5 G VAGINALLY ONCE A WEEK    Estrogen deficient vulvovaginitis       REMICADE IV      Inject 200 mg into the vein Patient takes this every 8 weeks        valACYclovir 1000 mg tablet    VALTREX    4 tablet    TAKE 2 TABLETS (2,000 MG) BY MOUTH 2 TIMES DAILY    Recurrent herpes simplex

## 2018-06-19 PROBLEM — M54.12 BRACHIAL NEURITIS: Status: RESOLVED | Noted: 2017-08-08 | Resolved: 2018-06-19

## 2018-06-19 PROBLEM — R29.898 DEFICIENCIES OF LIMBS: Status: RESOLVED | Noted: 2017-08-08 | Resolved: 2018-06-19

## 2018-06-22 ENCOUNTER — TELEPHONE (OUTPATIENT)
Dept: OTHER | Facility: CLINIC | Age: 78
End: 2018-06-22

## 2018-06-22 NOTE — TELEPHONE ENCOUNTER
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     Went four times to PT, neck became very sore then relieved after a week with no PT.    F/u OV setup with Dr. Rosario.     Leana Pearson, BSN, RN

## 2018-06-29 ENCOUNTER — OFFICE VISIT (OUTPATIENT)
Dept: OTHER | Facility: CLINIC | Age: 78
End: 2018-06-29
Attending: SURGERY
Payer: MEDICARE

## 2018-06-29 VITALS — OXYGEN SATURATION: 96 % | SYSTOLIC BLOOD PRESSURE: 108 MMHG | HEART RATE: 74 BPM | DIASTOLIC BLOOD PRESSURE: 64 MMHG

## 2018-06-29 DIAGNOSIS — G54.0 NEUROGENIC THORACIC OUTLET SYNDROME: Primary | ICD-10-CM

## 2018-06-29 PROCEDURE — G0463 HOSPITAL OUTPT CLINIC VISIT: HCPCS

## 2018-06-29 PROCEDURE — 99212 OFFICE O/P EST SF 10 MIN: CPT | Mod: ZP | Performed by: SURGERY

## 2018-06-29 NOTE — LETTER
[facility] [address]  [place] [address]  Phone: [phone]  Fax: [phone]      2018    Re: Britt Pichardo - 1940    Mrs. Pichardo continues to have symptoms consistent with left upper extremity TOS.  She has done physical therapy but the relief is minimal.  She is interested in seeing a neurologist, and I encouraged that. She is not interested in surgical intervention.  I really have nothing further to offer.  She can follow-up as needed.    MILADYS LAGUNA MD

## 2018-06-29 NOTE — MR AVS SNAPSHOT
After Visit Summary   6/29/2018    Britt Pichardo    MRN: [id number]           Patient Information     Date Of Birth          1940        Visit Information        Provider Department      6/29/2018 10:15 AM Rizwan Rosario MD [facility]  [facility]      Today's Diagnoses     Neurogenic thoracic outlet syndrome    -  1       Follow-ups after your visit        Who to contact     If you have questions or need follow up information about today's clinic visit or your schedule please contact [facility] directly at [phone].  Normal or non-critical lab and imaging results will be communicated to you by MyChart, letter or phone within 4 business days after the clinic has received the results. If you do not hear from us within 7 days, please contact the clinic through MyChart or phone. If you have a critical or abnormal lab result, we will notify you by phone as soon as possible.  Submit refill requests through "[employer] or call your pharmacy and they will forward the refill request to us. Please allow 3 business days for your refill to be completed.          Additional Information About Your Visit        Care EveryWhere ID     This is your Care EveryWhere ID. This could be used by other organizations to access your [place] medical records  [id number]        Your Vitals Were     Pulse Pulse Oximetry                74 96%           Blood Pressure from Last 3 Encounters:   06/29/18 108/64   04/13/18 95/58   01/09/18 116/60    Weight from Last 3 Encounters:   01/09/18 128 lb (58.1 kg)   12/18/17 123 lb (55.8 kg)   07/11/17 127 lb 12.8 oz (58 kg)              Today, you had the following     No orders found for display       Primary Care Provider Office Phone # Fax #    Vineet Mendoza [phone] [address] [facility] [address]        Equal Access to Services     MICHAEL HAMILTON AH:  Hadii aad ku hadronnieo Sojunieali, waaxda luqadaha, qaybta kaalmada tigist, osiel estradain hayaan vianeyrimma ahumada lafritzjose viktoria. So [facility] [phone].    ATENCIÓN: Si clare clements, tiene a glass disposición servicios gratuitos de asistencia lingüística. Llame al [phone].    We comply with applicable federal civil rights laws and [place] laws. We do not discriminate on the basis of race, color, national origin, age, disability, sex, sexual orientation, or gender identity.            Thank you!     Thank you for choosing [facility] [place]  for your care. Our goal is always to provide you with excellent care. Hearing back from our patients is one way we can continue to improve our services. Please take a few minutes to complete the written survey that you may receive in the mail after your visit with us. Thank you!             Your Updated Medication List - Protect others around you: Learn how to safely use, store and throw away your medicines at www.disposemymeds.org.          This list is accurate as of 6/29/18 10:58 AM.  Always use your most recent med list.                   Brand Name Dispense Instructions for use Diagnosis    aspirin 81 MG tablet      Take 1 tablet by mouth daily.        calcium + D 600-200 MG-UNIT Tabs   Generic drug:  calcium carbonate-vitamin D      Take 1 tablet by mouth every other day        Ferrous Gluconate 240 (27 Fe) MG Tabs      Take 1 tablet by mouth every other day        folic acid 1 MG tablet    FOLVITE     Take 1 mg by mouth daily.        lovastatin 20 MG tablet    MEVACOR    30 tablet    TAKE ONE TABLET BY MOUTH AT BEDTIME    Hyperlipidemia LDL goal <130       methotrexate 2.5 MG tablet CHEMO      Take 7 tablets by mouth once a week        oxybutynin 5 MG 24 hr tablet    DITROPAN-XL    90 tablet    TAKE ONE TABLET BY MOUTH ONE TIME DAILY    Urinary incontinence       PREMARIN cream   Generic drug:  conjugated estrogens     30 g    PLACE 0.5 G VAGINALLY ONCE A WEEK     Estrogen deficient vulvovaginitis       REMICADE IV      Inject 200 mg into the vein Patient takes this every 8 weeks        valACYclovir 1000 mg tablet    VALTREX    4 tablet    TAKE 2 TABLETS (2,000 MG) BY MOUTH 2 TIMES DAILY    Recurrent herpes simplex

## 2018-06-29 NOTE — NURSING NOTE
"Britt Pichardo is a 78 year old female who presents for:  Chief Complaint   Patient presents with     RECHECK     Follow up to TOS        Vitals:    Vitals:    06/29/18 1010   BP: 108/64   BP Location: Left arm   Patient Position: Chair   Cuff Size: Adult Regular   Pulse: 74   SpO2: 96%       BMI:  Estimated body mass index is 25.85 kg/(m^2) as calculated from the following:    Height as of 1/9/18: 4' 11\" (1.499 m).    Weight as of 1/9/18: 128 lb (58.1 kg).    Pain Score:  Data Unavailable        Chelsea Sow      "

## 2018-06-29 NOTE — PROGRESS NOTES
Mrs. Pichardo continues to have symptoms consistent with left upper extremity TOS.  She has done physical therapy but the relief is minimal.  She is interested in seeing a neurologist, and I encouraged that. She is not interested in surgical intervention.  I really have nothing further to offer.  She can follow-up as needed.

## 2018-07-09 ENCOUNTER — TRANSFERRED RECORDS (OUTPATIENT)
Dept: HEALTH INFORMATION MANAGEMENT | Facility: CLINIC | Age: 78
End: 2018-07-09

## 2018-07-09 PROBLEM — R29.898 WEAKNESS OF LEFT HAND: Status: RESOLVED | Noted: 2018-04-27 | Resolved: 2018-07-09

## 2018-07-09 LAB
ALT SERPL-CCNC: 18 IU/L (ref 5–35)
AST SERPL-CCNC: 29 U/L (ref 5–34)
CREAT SERPL-MCNC: 0.66 MG/DL (ref 0.5–1.3)
GFR SERPL CREATININE-BSD FRML MDRD: 91.8 ML/MIN/1.73M2

## 2018-07-09 NOTE — PROGRESS NOTES
Patient has failed to return to physical therapy since last visit on 5/21/18. Patient is being discharged from PT at this time. Refer to last physical therapy note for most recent subjective and objective information.

## 2018-09-10 ENCOUNTER — TRANSFERRED RECORDS (OUTPATIENT)
Dept: HEALTH INFORMATION MANAGEMENT | Facility: CLINIC | Age: 78
End: 2018-09-10

## 2018-11-01 ENCOUNTER — TRANSFERRED RECORDS (OUTPATIENT)
Dept: HEALTH INFORMATION MANAGEMENT | Facility: CLINIC | Age: 78
End: 2018-11-01

## 2018-11-02 DIAGNOSIS — E78.5 HYPERLIPIDEMIA LDL GOAL <130: ICD-10-CM

## 2018-11-02 RX ORDER — LOVASTATIN 20 MG
TABLET ORAL
Qty: 30 TABLET | Refills: 0 | Status: SHIPPED | OUTPATIENT
Start: 2018-11-02 | End: 2018-11-26

## 2018-11-02 NOTE — TELEPHONE ENCOUNTER
Routing refill request to provider for review/approval because:  Labs not current:  LDL.    PEYTON RangelN, RN  [employer] [facility]

## 2018-11-02 NOTE — TELEPHONE ENCOUNTER
"Requested Prescriptions   Pending Prescriptions Disp Refills     lovastatin (MEVACOR) 20 MG tablet  Last Written Prescription Date:  09/27/2018  Last Fill Quantity: 30,  # refills: 0   Last office visit: 1/9/2018 with prescribing provider:   YEIMY  Future Office Visit:     30 tablet 0     Sig: Take 1 tablet (20 mg) by mouth    Statins Protocol Failed    11/2/2018 12:51 PM       Failed - LDL on file in past 12 months    Recent Labs   Lab Test  12/28/16   1213   LDL  77            Passed - No abnormal creatine kinase in past 12 months    Recent Labs   Lab Test  05/01/12   0832   CKT  22*               Passed - Recent (12 mo) or future (30 days) visit within the authorizing provider's specialty    Patient had office visit in the last 12 months or has a visit in the next 30 days with authorizing provider or within the authorizing provider's specialty.  See \"Patient Info\" tab in inbasket, or \"Choose Columns\" in Meds & Orders section of the refill encounter.             Passed - Patient is age 18 or older       Passed - No active pregnancy on record       Passed - No positive pregnancy test in past 12 months          "

## 2018-11-02 NOTE — TELEPHONE ENCOUNTER
Reason for Call:  Medication or medication refill:    Do you use a [facility]?  Name of the pharmacy and phone number for the current request:  cub    Name of the medication requested:lovastatin (MEVACOR) 20 MG tablet    Other request: states her pharmacy says have sent 3 requests-wants 60-90 if possible    Can we leave a detailed message on this number? YES    Phone number patient can be reached at: Home number on file [phone] (home)    Best Time:     Call taken on 11/2/2018 at 12:49 PM by RYAN CASTANEDA

## 2018-11-05 ENCOUNTER — TRANSFERRED RECORDS (OUTPATIENT)
Dept: HEALTH INFORMATION MANAGEMENT | Facility: CLINIC | Age: 78
End: 2018-11-05

## 2018-11-06 ENCOUNTER — TRANSFERRED RECORDS (OUTPATIENT)
Dept: HEALTH INFORMATION MANAGEMENT | Facility: CLINIC | Age: 78
End: 2018-11-06

## 2018-11-06 ENCOUNTER — MEDICAL CORRESPONDENCE (OUTPATIENT)
Dept: HEALTH INFORMATION MANAGEMENT | Facility: CLINIC | Age: 78
End: 2018-11-06

## 2018-11-26 ENCOUNTER — OFFICE VISIT (OUTPATIENT)
Dept: FAMILY MEDICINE | Facility: CLINIC | Age: 78
End: 2018-11-26
Payer: MEDICARE

## 2018-11-26 VITALS
TEMPERATURE: 97.3 F | HEART RATE: 57 BPM | RESPIRATION RATE: 14 BRPM | DIASTOLIC BLOOD PRESSURE: 68 MMHG | BODY MASS INDEX: 24.74 KG/M2 | SYSTOLIC BLOOD PRESSURE: 118 MMHG | OXYGEN SATURATION: 94 % | WEIGHT: 122.5 LBS

## 2018-11-26 DIAGNOSIS — E78.5 HYPERLIPIDEMIA LDL GOAL <130: Primary | ICD-10-CM

## 2018-11-26 DIAGNOSIS — G12.21 ALS (AMYOTROPHIC LATERAL SCLEROSIS) (H): ICD-10-CM

## 2018-11-26 DIAGNOSIS — M05.79 RHEUMATOID ARTHRITIS INVOLVING MULTIPLE SITES WITH POSITIVE RHEUMATOID FACTOR (H): ICD-10-CM

## 2018-11-26 LAB
CHOLEST SERPL-MCNC: 147 MG/DL
HDLC SERPL-MCNC: 75 MG/DL
LDLC SERPL CALC-MCNC: 57 MG/DL
NONHDLC SERPL-MCNC: 72 MG/DL
TRIGL SERPL-MCNC: 77 MG/DL

## 2018-11-26 PROCEDURE — 36415 COLL VENOUS BLD VENIPUNCTURE: CPT | Performed by: FAMILY MEDICINE

## 2018-11-26 PROCEDURE — 80061 LIPID PANEL: CPT | Performed by: FAMILY MEDICINE

## 2018-11-26 PROCEDURE — 99214 OFFICE O/P EST MOD 30 MIN: CPT | Performed by: FAMILY MEDICINE

## 2018-11-26 RX ORDER — LOVASTATIN 20 MG
TABLET ORAL
Qty: 90 TABLET | Refills: 1 | Status: SHIPPED | OUTPATIENT
Start: 2018-11-26 | End: 2019-06-02

## 2018-11-26 RX ORDER — RILUZOLE 50 MG/1
50 TABLET, FILM COATED ORAL EVERY 12 HOURS
COMMUNITY
End: 2019-05-10

## 2018-11-26 NOTE — MR AVS SNAPSHOT
After Visit Summary   11/26/2018    Britt Pichardo    MRN: [id number]           Patient Information     Date Of Birth          1940        Visit Information        Provider Department      11/26/2018 8:45 AM Vineet Mendoza MD [facility]        Today's Diagnoses     Hyperlipidemia LDL goal <130    -  1    Rheumatoid arthritis involving multiple sites with positive rheumatoid factor (H)        ALS (amyotrophic lateral sclerosis) (H)          Care Instructions    The patient recently saw a rheumatology.  She had labs done in September and they want them it done again in January.  She had liver function tests and a CBC in the usual rheumatology tests done.    She was recently diagnosed by the neurologist at [facility] with ALS.  She has an appointment upcoming this Friday with the neurologist at the [facility].    We will check a lipid profile today.  She just had her liver tests checked in September with rheumatology.  That was normal.  I did not bother to check that again today.  Patient is due in January for her physical and will make that appointment.  Follow-up otherwise will be pending review of her evaluation at the [place].          Follow-ups after your visit        Follow-up notes from your care team     Return in about 2 months (around 1/26/2019) for Physical Exam.      Your next 10 appointments already scheduled     Nov 29, 2018  9:30 AM CST   PFT VISIT with  PFL NATALIE   [facility]    [address] [place] [id number] [phone]            Nov 29, 2018 10:15 AM CST   (Arrive by 10:00 AM)   New ALS/Motor [place]uron with Mason Beckham MD   [facility]    [address] [place] [id number] [phone]            Jan 21, 2019  8:45 AM CST   PHYSICAL with Vineet Grove  "MD Reji   [facility]    [address] [id number] [phone]              Who to contact     If you have questions or need follow up information about today's clinic visit or your schedule please contact [facility] directly at [phone].  Normal or non-critical lab and imaging results will be communicated to you by [employer], letter or phone within 4 business days after the clinic has received the results. If you do not hear from us within 7 days, please contact the clinic through [employer] or phone. If you have a critical or abnormal lab result, we will notify you by phone as soon as possible.  Submit refill requests through [employer] or call your pharmacy and they will forward the refill request to us. Please allow 3 business days for your refill to be completed.          Additional Information About Your Visit        [employer] Information     [employer] lets you send messages to your doctor, view your test results, renew your prescriptions, schedule appointments and more. To sign up, go to [email or web address] . Click on \"Log in\" on the left side of the screen, which will take you to the Welcome page. Then click on \"Sign up Now\" on the right side of the page.     You will be asked to enter the access code listed below, as well as some personal information. Please follow the directions to create your username and password.     Your access code is: [email or web address]  Expires: 2019  1:43 PM     Your access code will  in 90 days. If you need help or a new code, please call your [facility] or [phone].        Care EveryWhere ID     This is your Care EveryWhere ID. This could be used by other organizations to access your [place] medical records  [id number]        Your Vitals Were     Pulse Temperature Respirations Pulse Oximetry BMI (Body Mass Index)       57 " 97.3  F (36.3  C) (Tympanic) 14 94% 24.74 kg/m2        Blood Pressure from Last 3 Encounters:   11/26/18 118/68   06/29/18 108/64   04/13/18 95/58    Weight from Last 3 Encounters:   11/26/18 122 lb 8 oz (55.6 kg)   01/09/18 128 lb (58.1 kg)   12/18/17 123 lb (55.8 kg)              We Performed the Following     Lipid Profile          Today's Medication Changes          These changes are accurate as of 11/26/18 10:04 AM.  If you have any questions, ask your nurse or doctor.               These medicines have changed or have updated prescriptions.        Dose/Directions    PREMARIN cream   This may have changed:  See the new instructions.   Used for:  Estrogen deficient vulvovaginitis   Generic drug:  conjugated estrogens        PLACE 0.5 G VAGINALLY ONCE A WEEK   Quantity:  30 g   Refills:  3         Stop taking these medicines if you haven't already. Please contact your care team if you have questions.     valACYclovir 1000 mg tablet   Commonly known as:  VALTREX   Stopped by:  Vineet Mendoza MD                Where to get your medicines      These medications were sent to [id number] - [place], [place] - [address]  [address] [address]     Phone:  [phone]     lovastatin 20 MG tablet                Primary Care Provider Office Phone # Fax #    Vineet Mendoza [phone] [address] [facility] [address]        Equal Access to Services     [facility] AH: Hadii aad ku hadasho Soomaali, waaxda luqadaha, qaybta kaalmada adeegyada, waxay idiin hayriton aderimma liu . So [facility] [phone].    ATENCIÓN: Si habla español, tiene a glass disposición servicios gratuitos de asistencia lingüística. Llame al [phone].    We comply with applicable federal civil rights laws and [place] laws. We do not discriminate on the basis of race, color, national origin, age, disability, sex, sexual orientation, or gender identity.             Thank you!     Thank you for choosing [facility]  for your care. Our goal is always to provide you with excellent care. Hearing back from our patients is one way we can continue to improve our services. Please take a few minutes to complete the written survey that you may receive in the mail after your visit with us. Thank you!             Your Updated Medication List - Protect others around you: Learn how to safely use, store and throw away your medicines at www.disposemymeds.org.          This list is accurate as of 11/26/18 10:04 AM.  Always use your most recent med list.                   Brand Name Dispense Instructions for use Diagnosis    aspirin 81 MG tablet    ASA     Take 1 tablet by mouth daily.        calcium + D 600-200 MG-UNIT Tabs   Generic drug:  calcium carbonate-vitamin D      Take 1 tablet by mouth every other day        Ferrous Gluconate 240 (27 Fe) MG Tabs      Take 1 tablet by mouth every other day        folic acid 1 MG tablet    FOLVITE     Take 1 mg by mouth daily.        lovastatin 20 MG tablet    MEVACOR    90 tablet    TAKE ONE TABLET BY MOUTH AT BEDTIME    Hyperlipidemia LDL goal <130       methotrexate 2.5 MG tablet CHEMO      Take 6 tablets by mouth once a week        oxybutynin 5 MG 24 hr tablet    DITROPAN-XL    90 tablet    TAKE ONE TABLET BY MOUTH ONE TIME DAILY    Urinary incontinence       PREMARIN cream   Generic drug:  conjugated estrogens     30 g    PLACE 0.5 G VAGINALLY ONCE A WEEK    Estrogen deficient vulvovaginitis       REMICADE IV      Inject 200 mg into the vein Patient takes this every 8 weeks        riluzole 50 MG tablet    RILUTEK     Take 50 mg by mouth every 12 hours

## 2018-11-26 NOTE — PROGRESS NOTES
SUBJECTIVE:   Britt Pichardo is a 78 year old female who presents to clinic today for the following health issues:      Medication Followup of Lovastatin     Taking Medication as prescribed: yes    Side Effects:  None    Medication Helping Symptoms:  Yes    Would like to have more then 30 tablets at a time.       Hyperlipidemia Follow-Up      Rate your low fat/cholesterol diet?: good    Taking statin?  Yes, no muscle aches from statin    Other lipid medications/supplements?:  none      Problem list and histories reviewed & adjusted, as indicated.  Additional history: as documented    Patient Active Problem List   Diagnosis     Health Care Home     Family history of coronary artery disease     Hyperlipidemia LDL goal <130     Vitamin D deficiency     Estrogen deficient vulvovaginitis     Recurrent HSV (herpes simplex virus)     ACP (advance care planning)     Rheumatoid arthritis involving multiple sites with positive rheumatoid factor (H)     Cervicalgia     Cervical radiculopathy     ALS (amyotrophic lateral sclerosis) (H)     Past Surgical History:   Procedure Laterality Date     BACK SURGERY  2002,2004       Social History   Substance Use Topics     Smoking status: Former Smoker     Types: Cigarettes     Quit date: 12/28/1976     Smokeless tobacco: Never Used     Alcohol use Yes      Comment: occasional - special events/holidays only     Family History   Problem Relation Age of Onset     C.A.D. Mother 88     Cancer Father      leukemia     HEART DISEASE Brother      C.A.D. Brother      Cancer Other      esophagus           Reviewed and updated as needed this visit by clinical staff  Tobacco  Allergies  Meds  Med Hx  Surg Hx  Fam Hx  Soc Hx      Reviewed and updated as needed this visit by Provider         ROS:  Constitutional, HEENT, cardiovascular, pulmonary, gi and gu systems are negative, except as otherwise noted.    OBJECTIVE:                                                    /68 (BP  Location: Right arm, Patient Position: Sitting, Cuff Size: Adult Regular)  Pulse 57  Temp 97.3  F (36.3  C) (Tympanic)  Resp 14  Wt 122 lb 8 oz (55.6 kg)  SpO2 94%  BMI 24.74 kg/m2  Body mass index is 24.74 kg/(m^2).  GENERAL APPEARANCE: healthy, alert and no distress         ASSESSMENT/PLAN:                                                        ICD-10-CM    1. Hyperlipidemia LDL goal <130 E78.5    2. Rheumatoid arthritis involving multiple sites with positive rheumatoid factor (H) M05.79    3. ALS (amyotrophic lateral sclerosis) (H) G12.21      Return in about 2 months (around 1/26/2019) for Physical Exam.  There are no Patient Instructions on file for this visit.    Vineet Mendoza MD  [facility]

## 2018-11-26 NOTE — LETTER
November 29, 2018      Britt Pichardo  [address]  [place] [address]        Dear ,    We are writing to inform you of your test results.    These numbers look great and can be repeated in a year.  I hope your visit at the [facility] went well.    Resulted Orders   Lipid Profile   Result Value Ref Range    Cholesterol 147 <200 mg/dL    Triglycerides 77 <150 mg/dL      Comment:      Fasting specimen    HDL Cholesterol 75 >49 mg/dL    LDL Cholesterol Calculated 57 <100 mg/dL      Comment:      Desirable:       <100 mg/dl    Non HDL Cholesterol 72 <130 mg/dL       If you have any questions or concerns, please call the clinic at the number listed above.       Sincerely,        Vineet Mendoza MD

## 2018-11-26 NOTE — PATIENT INSTRUCTIONS
The patient recently saw a rheumatology.  She had labs done in September and they want them it done again in January.  She had liver function tests and a CBC in the usual rheumatology tests done.    She was recently diagnosed by the neurologist at [facility] with ALS.  She has an appointment upcoming this Friday with the neurologist at the [facility].    We will check a lipid profile today.  She just had her liver tests checked in September with rheumatology.  That was normal.  I did not bother to check that again today.  Patient is due in January for her physical and will make that appointment.  Follow-up otherwise will be pending review of her evaluation at the [facility].

## 2018-11-29 ENCOUNTER — OFFICE VISIT (OUTPATIENT)
Dept: NEUROLOGY | Facility: CLINIC | Age: 78
End: 2018-11-29
Payer: MEDICARE

## 2018-11-29 ENCOUNTER — THERAPY VISIT (OUTPATIENT)
Dept: OCCUPATIONAL THERAPY | Facility: CLINIC | Age: 78
End: 2018-11-29
Payer: MEDICARE

## 2018-11-29 VITALS
TEMPERATURE: 97.4 F | WEIGHT: 122 LBS | SYSTOLIC BLOOD PRESSURE: 125 MMHG | DIASTOLIC BLOOD PRESSURE: 62 MMHG | BODY MASS INDEX: 23.95 KG/M2 | HEIGHT: 60 IN | HEART RATE: 58 BPM | OXYGEN SATURATION: 95 %

## 2018-11-29 DIAGNOSIS — G61.82 MULTIFOCAL MOTOR NEUROPATHY (H): ICD-10-CM

## 2018-11-29 DIAGNOSIS — G12.21 ALS (AMYOTROPHIC LATERAL SCLEROSIS) (H): Primary | ICD-10-CM

## 2018-11-29 DIAGNOSIS — G12.21 ALS (AMYOTROPHIC LATERAL SCLEROSIS) (H): ICD-10-CM

## 2018-11-29 RX ORDER — INFLUENZA A VIRUS A/VICTORIA/4897/2022 IVR-238 (H1N1) ANTIGEN (FORMALDEHYDE INACTIVATED), INFLUENZA A VIRUS A/CALIFORNIA/122/2022 SAN-022 (H3N2) ANTIGEN (FORMALDEHYDE INACTIVATED), AND INFLUENZA B VIRUS B/MICHIGAN/01/2021 ANTIGEN (FORMALDEHYDE INACTIVATED) 60; 60; 60 UG/.5ML; UG/.5ML; UG/.5ML
INJECTION, SUSPENSION INTRAMUSCULAR
Refills: 0 | COMMUNITY
Start: 2018-11-09 | End: 2020-02-05

## 2018-11-29 ASSESSMENT — PAIN SCALES - GENERAL: PAINLEVEL: NO PAIN (0)

## 2018-11-29 NOTE — PROGRESS NOTES
OUTPATIENT OCCUPATIONAL THERAPY CLINIC NOTE  Brtit Pichardo  YOB: 1940  [id number]    Type of visit:  Evaluation            Date of service: 11/29/2018    Referring provider: Dr. Mason Beckham    Others present at visit:  Child(adriana), daughter    Medical diagnosis:   Amyotrophic lateral sclerosis (ALS), probable     Date of diagnosis: 11/29/18     Pertinent medical history:  Onset of distal UE weakness L > R, 2017    Additional Occupational Profile Information (patterns of daily living, interests, values and needs): Pt is a 78 y.o. Woman at time of dx with ALS living alone in her own home who manages all of her own IADL.    Cardio-respiratory status:  Forced vital capacity: 92 % of predicted     Height/Weight: 5' / 122#    Living environment:  House    Living environment barriers:  2 stairs to enter (0 railing present)    Steps to basement with railing  Current assistance/living environment:  Lives alone      Current mobility equipment:  None    Current ADL equipment:  None     Technology used: NT    Patient concerns/goals: L hand function is reduced some with things such as buttoning, pinching    Evaluation   Interview completed.   Pain assessment:  Pain denied    Range of motion:  R handed     Manual muscle testing: UE's grossly 5/5 Mk; L lateral and palmar pinch are weak ; R is mildly weak,  is fairly strong Mk    Cognition:  Appears WFL    ADL/IADL: INd with all, drives, meal prep, mows lawn,  Does own shoveling, hires for heavier snows    Fall Risk Screen:   Has the patient fallen 2 or more times in the last year? No      Has the patient fallen and had an injury in the past year? No       Timed Up and Go Score: NT, due to patient's limited time    Is the patient a fall risk? No     Impairments:  Muscle atrophy  Coordination     Treatment diagnosis:  Impaired activities of daily living    Assessment of Occupational Performance: 1-3 Performance Deficits  Identified Performance  "Deficits (ie: feeding, social skills): dressing, meal prep, home management  Clinical Decision Making (Complexity): Low complexity     Recommendations/Plan of care:  Patient would benefit from interventions to enhance safety and independence.  Rehab potential good for stated goals.  Occupational therapy intervention for  self care/home management.  1 session evaluation & treatment.    Goals:   Target date: today  Patient, family and/or caregiver will verbalize understanding of evaluation results and implications for functional performance.  Patient, family and/or caregiver will verbalize/demonstrate understanding of compensatory methods /equipment to enhance functional independence and safety.  Patient, family and/or caregiver will verbalize/demonstrate understanding of positioning techniques/equipment.      Educational assessment/barriers to learning:  Denial, does not want to believe she has ALS yet      Treatment provided this date:   Self care/home management, 10 minutes of training in:  -adaptations to compensate for weakness in her left hand affecting pinch weakness: custom thumb splint that could be made by hand therapy, button hook, built up foam, non-slip material, zipper pulls  -answered questions about appropriate activity level given ALS dx as pt with inquiry about if she can continue to \"go to [employer] for exercise\". Instructed in what is known with limited research about strengthening with grade 3 or greater for people in ALS and instructed to continue as her UE strength, other than L hand is above grade 3 and that she should monitor for signs of fatigue after exer that may affect her ADL/IADL to know if she is overdoing it.     Response to treatment/recommendations: receptive but declines any adaptations and registering with [facility]SA as hopes she does not have ALS    Goal attainment:  All goals met    Risks and benefits of evaluation/treatment have been explained.  Patient, family and/or caregiver are in " agreement with Plan of Care.     Timed Code Treatment Minutes: 10  Total Treatment Time (sum of timed and untimed services): 24    Signature: CHRISTIANE Mccullough/MARY ELLEN, MSCS   Date: 11/29/2018     Medicare G-code:  Self Care  Current Status , Goal ,  Discharge   1 session only, modifier the same for all G-codes.  CI: 1-19% impairment  Modifier determined by clinical judgment in conjunction with objective data and subjective report.    Certification:  Onset date: 11/29/18  Start of care date: 11/29/2018  Certification date from 11/29/2018 to 11/29/18    I CERTIFY THE NEED FOR THESE SERVICES FURNISHED UNDER        THIS PLAN OF TREATMENT AND WHILE UNDER MY CARE     (Physician attestation of this document indicates review and certification of the therapy plan).

## 2018-11-29 NOTE — MR AVS SNAPSHOT
After Visit Summary   11/29/2018    Britt Pichardo    MRN: [id number]           Patient Information     Date Of Birth          1940        Visit Information        Provider Department      11/29/2018 10:15 AM Jordan Murphy OT [facility] Occupational Therapy and Rehab        Today's Diagnoses     ALS (amyotrophic lateral sclerosis) (H)           Follow-ups after your visit        Your next 10 appointments already scheduled     Jan 21, 2019  8:45 AM CST   PHYSICAL with Vineet Mendoza MD   [facility] ([facility])    [address] [place] [address]   [phone]            Feb 28, 2019  9:30 AM CST   PFT VISIT with  PFL [facility] Pulmonary Function Testing ([facility])    [address] [place] [id number] [phone]            Feb 28, 2019 10:00 AM CST   (Arrive by 9:45 AM)   Return ALS/Motor Neuron with Maosn Beckham MD   [facility] Neurology ([facility])    [address] [place] [address] [phone]              Who to contact     Please call your clinic at [phone] to:    Ask questions about your health    Make or cancel appointments    Discuss your medicines    Learn about your test results    Speak to your doctor            Additional Information About Your Visit        MyChart Information     [employer] is an electronic gateway that provides easy, online access to your medical records. With tamyca, you can request a clinic appointment, read your test results, renew a prescription or communicate with your care team.     To sign up for [employer] visit the website at [email or web address]   You will be asked to enter the access code listed below, as well as some personal information. Please follow the directions to create your username and password.     Your access  code is: [email or web address]  Expires: 2019  1:43 PM     Your access code will  in 90 days. If you need help or a new code, please contact your [facility] or call [phone] for assistance.        Care EveryWhere ID     This is your Care EveryWhere ID. This could be used by other organizations to access your [place] medical records  [id number]         Blood Pressure from Last 3 Encounters:   18 125/62   18 118/68   18 108/64    Weight from Last 3 Encounters:   18 55.3 kg (122 lb)   18 55.6 kg (122 lb 8 oz)   18 58.1 kg (128 lb)              We Performed the Following     OCCUPATIONAL THERAPY REFERRAL          Today's Medication Changes          These changes are accurate as of 18 11:59 PM.  If you have any questions, ask your nurse or doctor.               These medicines have changed or have updated prescriptions.        Dose/Directions    PREMARIN 0.625 MG/GM vaginal cream   This may have changed:  See the new instructions.   Used for:  Estrogen deficient vulvovaginitis   Generic drug:  conjugated estrogens        PLACE 0.5 G VAGINALLY ONCE A WEEK   Quantity:  30 g   Refills:  3                Primary Care Provider Office Phone # Fax #    Vineet Mendoza [phone] [address] [facility] [address]        Equal Access to Services     [facility]: Hadii amy fitzgerald hadasho Sojunieali, waaxda luqadaha, qaybta kaalmada bhavikda, osiel liu . So [facility] [phone].    ATENCIÓN: Si habla español, tiene a glass disposición servicios gratuitos de asistencia lingüística. Llame al [phone].    We comply with applicable federal civil rights laws and [place] laws. We do not discriminate on the basis of race, color, national origin, age, disability, sex, sexual orientation, or gender identity.            Thank you!     Thank you for choosing [facility] OCCUPATIONAL THERAPY AND REHAB   for your care. Our goal is always to provide you with excellent care. Hearing back from our patients is one way we can continue to improve our services. Please take a few minutes to complete the written survey that you may receive in the mail after your visit with us. Thank you!             Your Updated Medication List - Protect others around you: Learn how to safely use, store and throw away your medicines at www.disposemymeds.org.          This list is accurate as of 11/29/18 11:59 PM.  Always use your most recent med list.                   Brand Name Dispense Instructions for use Diagnosis    aspirin 81 MG tablet    ASA     Take 1 tablet by mouth daily.        calcium + D 600-200 MG-UNIT Tabs   Generic drug:  calcium carbonate-vitamin D      Take 1 tablet by mouth every other day        Ferrous Gluconate 240 (27 Fe) MG Tabs      Take 1 tablet by mouth every other day        FLUZONE HIGH-DOSE 0.5 ML injection   Generic drug:  influenza Vac Split High-Dose      TO BE ADMINISTERED BY PHARMACIST FOR IMMUNIZATION        folic acid 1 MG tablet    FOLVITE     Take 1 mg by mouth daily.        lovastatin 20 MG tablet    MEVACOR    90 tablet    TAKE ONE TABLET BY MOUTH AT BEDTIME    Hyperlipidemia LDL goal <130       methotrexate 2.5 MG tablet      Take 6 tablets by mouth once a week        oxybutynin ER 5 MG 24 hr tablet    DITROPAN-XL    90 tablet    TAKE ONE TABLET BY MOUTH ONE TIME DAILY    Urinary incontinence       PREMARIN 0.625 MG/GM vaginal cream   Generic drug:  conjugated estrogens     30 g    PLACE 0.5 G VAGINALLY ONCE A WEEK    Estrogen deficient vulvovaginitis       REMICADE IV      Inject 200 mg into the vein Patient takes this every 8 weeks        riluzole 50 MG tablet    RILUTEK     Take 50 mg by mouth every 12 hours

## 2018-11-29 NOTE — LETTER
11/29/2018       RE: Britt Pichardo  [address]  [facility] [address]     Dear Colleague,    Thank you for referring your patient, Britt Pichardo, to the [facility] NEUROLOGY at [facility]. Please see a copy of my visit note below.    [facility] Certified [facility]  11/29/18      Referral: [facility]    Chief Complaint: left upper limb weakness; possible motor neuron disease    History of Present Illness:      Mrs. Pichardo is a 78 year old woman who presents for evaluation of progressive weakness. She developed left thumb and first digit weakness around 2016 when she noticed weakness in her  and difficulty with pinching. She reports it has gradually progressed, it seems to be worse in the cold and with more use. She never had numbness and tingling. She noticed left arm and hand fasciculations. For the past couple of months, she's noticed her left toes will drag a bit and her leg after 45 minutes; after resting, this will resolve. No problems getting out of chairs or walking up the stairs. No neck injury, pain or radiating tingling. She does feel her left hand locks up sometimes when holding objects but this has been for many years. Of note, 15 years ago she wore wrist braces that she wore overnight; she denies having carpal tunnel symptoms at the time. She is not sure why she wore them. She has been on Riluzole for a few weeks. She denies any side effects.    She denies numbness/tingling.  She has not experienced changes in sweating, syncope and has not noticed changes in bowel or bladder function. She denies joint pain, swelling, rashes, dry eyes, dry mouth,  frequent cramps. Speech and swallowing are normal. Appetite is good and weight is relatively stable. She denies breathing difficulties or shortness of breath while lying flat. Mood and affect are appropriate. She is independent, has no assistive devices and is  not falling. She denies sleep complaints.    Prior pertinent laboratory work-up:     PFT's reveals forced vital capacity 92%  FEV1 is 89% predicted for age, heigh, and sex.   MIP is -51cm of water with a MEP of 61     Prior pertinent radiology work-up:  MRI cervical spine w/o contrast- unremarkable  5/6 disc herniation otherwise unremarkable    Prior electrophysiologic work-up:  Reviewed- see EMR for full report  2018- fibrillations in right bicep, left EIP, FDI, bicep, delotid, left TA, gastroc, wide spread fasciculations   evidence of motor unit remodeling and chornic denervation in all msucles sampled.     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 Father passed from aplastic anemia. 6 brothers and 1 sister. One brother  in MVC, another of heart disease. Brother had benign brain tumor    Social History:    Retired since .  with 2 children, healthy. 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 Drinks EtOH once a week    Medical Allergies:  NKDA     Current Medications:      Current Outpatient Prescriptions   Medication     aspirin 81 MG tablet     calcium carbonate-vitamin D (CALCIUM + D) 600-200 MG-UNIT TABS     Ferrous Gluconate 240 (27 FE) MG TABS     folic acid (FOLVITE) 1 MG tablet     InFLIXimab (REMICADE IV)     lovastatin (MEVACOR) 20 MG tablet     methotrexate 2.5 MG tablet     oxybutynin (DITROPAN-XL) 5 MG 24 hr tablet     PREMARIN cream     riluzole (RILUTEK) 50 MG tablet     FLUZONE HIGH-DOSE 0.5 ML injection     [DISCONTINUED] oxybutynin (DITROPAN-XL) 5 MG 24 hr tablet     No current facility-administered medications for this visit.    Methotrexate    Review of Systems: A complete review of systems was obtained and was negative except for what was noted above.    Physical examination:    /62 (BP Location: Left arm)  Pulse  "58  Temp 97.4  F (36.3  C) (Oral)  Ht 1.511 m (4' 11.5\")  Wt 55.3 kg (122 lb)  SpO2 95%  BMI 24.23 kg/m2    General Appearance: NAD    Skin: There are no rashes or other skin lesions.    Musculoskeletal:  There is no scoliosis, lordosis, kyphosis, pes cavus, or hammertoes.    Neurologic examination:    Mental status:  Patient is alert, attentive, and oriented x 3.  Language is coherent and fluent without dysarthria or aphasia.  Memory, comprehension and ability to follow commands were intact.       Cranial nerves:  Optic discs were sharp.  Pupils were round and reacted to light.  Extraocular movements were full. There was no face, jaw, palate or tongue weakness or atrophy. Hearing was grossly intact.  Shoulder shrug was normal.       Motor exam: FDI , L>R, APB L>R, left medial forearm and bicep atrophy.  No action or percussion myotonia or paramyotonia.      Normal MIKAL   Right Left   Neck flexion 4+    Neck extension: 5    Shoulder abduction:  4 4   Elbow extension: 5 4   Elbow flexion:  4+ 4   Wrist flexion:  5 5   Wrist extension:  5 4-   Finger flexion 5 4-   Finger extension 4+ 4-   FDI 4 3   APB 4 3-   Hip flexion 5- 4   Hip extension 5 5   Knee flexion 5 5   Knee extension 5 5   Dorsiflexion 5 5   Plantar flexion 5 5   Can stand with cross armed X 3  Able to stand of her toes and heels     Complex motor skills revealed normal coordination.  Finger-nose- finger and heel to shin were intact.       Sensory exam revealed normal perception of vibration, proprioception, light touch, pin, and temperature proximally and distally in the arms and legs bilaterally. Romberg sign was absent.    Sensory exam revealed decreased vibratory perception in the toes bilaterally. Proprioception was intact. Pinprick and temperature were decreased to the ankles bilaterally.  Light touch was normal.  Romberg sign was absent.       Gait was normal.  He was able to walk on his heels, toes and tandem without any difficulty.     "   Deep tendon reflexes:   Right Left   Triceps 2 2 with spread   Biceps 2 2    Brachioradialis 2 2 with spread   Knee jerk 1 2   Ankle jerk 1 1   Plantar responses were flexor bilaterally.     Bilateral pectoralis hyperreflexia  Left Perez    Assessment:    Mrs. Pichardo is a 78 year old woman who presents for evaluation of progressive left upper extremity painless weakness since 2016. Physical exam shows UMN and LMN signs in the cervical segment. EMG shows widespread spontaneous activity and chronic denervation/motor unit remodeling in 2+ muscles in the cervical and lumbar segments. Thoracic paraspinals not sampled. 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 Although unlikely, a history of Infliximab use warrants CSF protein analysis and checking GM1 antibody to rule out immune mediated multifocal motor neuropathy that has been associated with this medication. We discussed the diagnosis, possible treatments, prognosis and ongoing research studies with the patient    Plan:  - Lumbar puncture to rule out an inflammatory process-check protein, glucose, Gram stain, culture, cell count, oligoclonal bands  - GM1 antibody, serum  - LFTs given concomitant Riluzole and Methotrexate use  - Continue Riluzole 50mg BID  - if the above workup is unrevealing, we will proceed with starting Radicava as the patient has had symptoms for less than 2 years of ALS symptom onset  - PT/OT assessment  - MIP -51 but FVC great, and no respiratory symptoms. Will observe this; no NIV for now.   - follow up in 3 months    I spent 60 min in face to face time with the patient. More than 50% of the time was spent in patient education and coordination of care.     Monique Nickerson DO  [facility] Neuromuscular Fellow [phone]    ATTENDING ADDENDUM: Patient seen at the [facility] with Fellow Dr Nickerson. Agree with her assessment and plan as above. TT spent  for patient care 45 minutes; more than half was counseling. 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 This is interpreted as combined UMN+LMN signs in one region, and EMG shows denervation at two regions. A note of caution is required before closing on this diagnosis due to infliximab use for arthritis- will try to rule out MMN with additional tests; see above for recommendations/plan.    Mason Beckham MD

## 2018-11-29 NOTE — PATIENT INSTRUCTIONS
Dr Beckham would like you to have a Lumbar Puncture. Please call [phone]    For non-urgent questions, concerns or scheduling issues call [facility] @ [phone] or the general neurology clinic line @ [phone]. We will make every attempt to return your call within 24 hours, during regular business hours.    Emergencies should be directed to [facility] @ [phone] (ask for the Neuro [id number]), the nearest Emergency Room, or 281.

## 2018-11-29 NOTE — MR AVS SNAPSHOT
After Visit Summary   11/29/2018    Britt Pichardo    MRN: [id number]           Patient Information     Date Of Birth          1940        Visit Information        Provider Department      11/29/2018 10:15 AM Mason Beckham MD [facility] Neurology        Today's Diagnoses     ALS (amyotrophic lateral sclerosis) (H)    -  1    Multifocal motor neuropathy (H)          Care Instructions    Dr Beckham would like you to have a Lumbar Puncture. Please call [phone]    For non-urgent questions, concerns or scheduling issues call [facility] @ [phone] or the general neurology clinic line @ [phone]. We will make every attempt to return your call within 24 hours, during regular business hours.    Emergencies should be directed to [facility] @ [phone] (ask for the Neuro [id number]), the nearest Emergency Room, or 611.          Follow-ups after your visit        Additional Services     OCCUPATIONAL THERAPY REFERRAL       If you have not heard from the scheduling office within 2 business days, please call [phone] for all locations, with the exception of [place], please call [phone] and [place], please call [phone].    Please be aware that coverage of these services is subject to the terms and limitations of your health insurance plan.  Call member services at your health plan with any benefit or coverage questions.            PHYSICAL THERAPY REFERRAL       If you have not heard from the scheduling office within 2 business days, please call [phone] for all locations, with the exception of [place], please call [phone] and [place], please call [phone].    Please be aware that coverage of these services is subject to the terms and limitations of your health insurance plan.  Call member services at your health plan with any benefit or coverage questions.            SPEECH THERAPY REFERRAL       If you have not heard from the  scheduling office within 2 business days, please call [phone] for all locations, with the exception of [place], please call [phone] and [facility] [place], please call [phone].    Please be aware that coverage of these services is subject to the terms and limitations of your health insurance plan.  Call member services at your health plan with any benefit or coverage questions.                  Follow-up notes from your care team     Return in about 3 months (around 2/28/2019).      Your next 10 appointments already scheduled     Nov 29, 2018 12:45 PM CST   LAB with  LAB   [facility] ([facility])    [address] [place] [address] [phone]           Please do not eat 10-12 hours before your appointment if you are coming in fasting for labs on lipids, cholesterol, or glucose (sugar). This does not apply to pregnant women. Water, hot tea and black coffee (with nothing added) are okay. Do not drink other fluids, diet soda or chew gum.            Jan 21, 2019  8:45 AM CST   PHYSICAL with Vineet Mendoza MD   [facility] ([facility])    [address] [place] [address]   [phone]            Feb 28, 2019  9:30 AM CST   PFT VISIT with  PFL BIBIANA   [facility] Pulmonary Function Testing ([facility])    [address] [place] [address] [phone]            Feb 28, 2019 10:00 AM CST   (Arrive by 9:45 AM)   Return ALS/Motor Neuron with Mason Beckham MD   [facility] Neurology ([facility])    [address] [place] [address] [phone]              Future tests that were ordered for you today     Open Future Orders        Priority Expected Expires Ordered    GM1 antibody panel Routine  11/30/2019 11/29/2018    XR Lumbar Puncture Spinal Tap Diag  "Routine 2018            Who to contact     Please call your clinic at [phone] to:    Ask questions about your health    Make or cancel appointments    Discuss your medicines    Learn about your test results    Speak to your doctor            Additional Information About Your Visit        MyChart Information     [employer] is an electronic gateway that provides easy, online access to your medical records. With [employer]), you can request a clinic appointment, read your test results, renew a prescription or communicate with your care team.     To sign up for [employer] visit the website at [email or web address]   You will be asked to enter the access code listed below, as well as some personal information. Please follow the directions to create your username and password.     Your access code is: [email or web address]  Expires: 2019  1:43 PM     Your access code will  in 90 days. If you need help or a new code, please contact your [facility] or call [phone] for assistance.        Care EveryWhere ID     This is your Care EveryWhere ID. This could be used by other organizations to access your [place] medical records  [id number]        Your Vitals Were     Pulse Temperature Height Pulse Oximetry BMI (Body Mass Index)       58 97.4  F (36.3  C) (Oral) 1.511 m (4' 11.5\") 95% 24.23 kg/m2        Blood Pressure from Last 3 Encounters:   18 125/62   18 118/68   18 108/64    Weight from Last 3 Encounters:   18 55.3 kg (122 lb)   18 55.6 kg (122 lb 8 oz)   18 58.1 kg (128 lb)                 Today's Medication Changes          These changes are accurate as of 18 12:25 PM.  If you have any questions, ask your nurse or doctor.               These medicines have changed or have updated prescriptions.        Dose/Directions    PREMARIN 0.625 MG/GM vaginal cream   This may have changed:  See the new instructions.   Used " for:  Estrogen deficient vulvovaginitis   Generic drug:  conjugated estrogens        PLACE 0.5 G VAGINALLY ONCE A WEEK   Quantity:  30 g   Refills:  3                Primary Care Provider Office Phone # Fax #    Vineet Mendoza [phone] [address]  [facility] [address]        Equal Access to Services     [facility]: Hadii aad ku hadasho Soomaali, waaxda luqadaha, qaybta kaalmada adeegyada, waxay estradain hayriton aderimma ahumada lafritzn . So [facility] [phone].    ATENCIÓN: Si habla español, tiene a glass disposición servicios gratuitos de asistencia lingüística. Llame al [phone].    We comply with applicable federal civil rights laws and [place] laws. We do not discriminate on the basis of race, color, national origin, age, disability, sex, sexual orientation, or gender identity.            Thank you!     Thank you for choosing [facility] NEUROLOGY  for your care. Our goal is always to provide you with excellent care. Hearing back from our patients is one way we can continue to improve our services. Please take a few minutes to complete the written survey that you may receive in the mail after your visit with us. Thank you!             Your Updated Medication List - Protect others around you: Learn how to safely use, store and throw away your medicines at www.disposemymeds.org.          This list is accurate as of 11/29/18 12:25 PM.  Always use your most recent med list.                   Brand Name Dispense Instructions for use Diagnosis    aspirin 81 MG tablet    ASA     Take 1 tablet by mouth daily.        calcium + D 600-200 MG-UNIT Tabs   Generic drug:  calcium carbonate-vitamin D      Take 1 tablet by mouth every other day        Ferrous Gluconate 240 (27 Fe) MG Tabs      Take 1 tablet by mouth every other day        FLUZONE HIGH-DOSE 0.5 ML injection   Generic drug:  influenza Vac Split High-Dose      TO BE ADMINISTERED BY PHARMACIST FOR IMMUNIZATION        folic acid 1  MG tablet    FOLVITE     Take 1 mg by mouth daily.        lovastatin 20 MG tablet    MEVACOR    90 tablet    TAKE ONE TABLET BY MOUTH AT BEDTIME    Hyperlipidemia LDL goal <130       methotrexate 2.5 MG tablet      Take 6 tablets by mouth once a week        oxybutynin ER 5 MG 24 hr tablet    DITROPAN-XL    90 tablet    TAKE ONE TABLET BY MOUTH ONE TIME DAILY    Urinary incontinence       PREMARIN 0.625 MG/GM vaginal cream   Generic drug:  conjugated estrogens     30 g    PLACE 0.5 G VAGINALLY ONCE A WEEK    Estrogen deficient vulvovaginitis       REMICADE IV      Inject 200 mg into the vein Patient takes this every 8 weeks        riluzole 50 MG tablet    RILUTEK     Take 50 mg by mouth every 12 hours

## 2018-11-30 RX ORDER — DIPHENHYDRAMINE HCL 25 MG
25 CAPSULE ORAL ONCE
Status: CANCELLED | OUTPATIENT
Start: 2018-12-10

## 2018-11-30 RX ORDER — EDARAVONE 30 MG/100ML
30 INJECTION, SOLUTION INTRAVENOUS
Status: CANCELLED | OUTPATIENT
Start: 2018-12-10

## 2018-11-30 RX ORDER — METHYLPREDNISOLONE SODIUM SUCCINATE 125 MG/2ML
125 INJECTION, POWDER, LYOPHILIZED, FOR SOLUTION INTRAMUSCULAR; INTRAVENOUS ONCE
Status: CANCELLED | OUTPATIENT
Start: 2018-12-10

## 2018-12-04 LAB
GM1 GANGL IGG SER IA-ACNC: 1 IV (ref 0–50)
GM1 GANGL IGM SER IA-ACNC: 3 IV (ref 0–50)

## 2018-12-05 DIAGNOSIS — N76.0 ESTROGEN DEFICIENT VULVOVAGINITIS: ICD-10-CM

## 2018-12-05 NOTE — TELEPHONE ENCOUNTER
"conjugated estrogens (PREMARIN) 0.625 MG/GM vaginal cream  Last Written Prescription Date:  12/28/17  Last Fill Quantity: 30g,  # refills: 3   Last office visit: 11/26/2018 with prescribing provider:  11/26/18   Future Office Visit:   Next 5 appointments (look out 90 days)     Jan 21, 2019  8:45 AM CST   [facility]YS[facility] with [facility]et [facility]   [facility]    [address] [place] [id number]   [phone]                   Requested Prescriptions   Pending Prescriptions Disp Refills     conjugated estrogens (PREMARIN) 0.625 MG/GM vaginal cream 30 g 3    Hormone Replacement Therapy Passed    12/5/2018  9:21 AM       Passed - Blood pressure under 140/90 in past 12 months    BP Readings from Last 3 Encounters:   11/29/18 125/62   11/26/18 118/68   06/29/18 108/64                Passed - Recent (12 mo) or future (30 days) visit within the authorizing provider's specialty    Patient had office visit in the last 12 months or has a visit in the next 30 days with authorizing provider or within the authorizing provider's specialty.  See \"Patient Info\" tab in inbasket, or \"Choose Columns\" in Meds & Orders section of the refill encounter.             Passed - Patient is 18 years of age or older       Passed - No active pregnancy on record       Passed - No positive pregnancy test on record in past 12 months          "

## 2018-12-05 NOTE — TELEPHONE ENCOUNTER
Reason for Call:  Medication or medication refill:    Do you use a [facility]?  Name of the pharmacy and phone number for the current request:  [facility]    Name of the medication requested: PREMARIN cream    Other request: Pt has refills but rx is required.  She doesn't us as often as prescribed.    Can we leave a detailed message on this number? YES    Phone number patient can be reached at: Home number on file [phone] (home)    Best Time: any    Call taken on 12/5/2018 at 9:20 AM by NITA PEREZ

## 2018-12-06 LAB
EXPTIME-PRE: 5.99 SEC
FEF2575-%PRED-PRE: 132 %
FEF2575-PRE: 1.84 L/SEC
FEF2575-PRED: 1.39 L/SEC
FEFMAX-%PRED-PRE: 122 %
FEFMAX-PRE: 5 L/SEC
FEFMAX-PRED: 4.07 L/SEC
FEV1-%PRED-PRE: 107 %
FEV1-PRE: 1.75 L
FEV1FEV6-PRE: 85 %
FEV1FEV6-PRED: 78 %
FEV1FVC-PRE: 89 %
FEV1FVC-PRED: 74 %
FIFMAX-PRE: 2.96 L/SEC
FVC-%PRED-PRE: 92 %
FVC-PRE: 1.96 L
FVC-PRED: 2.12 L
MEP-PRE: 61 CMH2O
MIP-PRE: -51 CMH2O

## 2018-12-06 NOTE — TELEPHONE ENCOUNTER
Medication is being filled for 1 time refill only due to:  Patient needs to be seen because prior to anymore refills due for annual exam..

## 2019-01-09 ENCOUNTER — TRANSFERRED RECORDS (OUTPATIENT)
Dept: HEALTH INFORMATION MANAGEMENT | Facility: CLINIC | Age: 79
End: 2019-01-09

## 2019-01-14 ENCOUNTER — HOSPITAL ENCOUNTER (OUTPATIENT)
Dept: MAMMOGRAPHY | Facility: CLINIC | Age: 79
Discharge: HOME | End: 2019-01-14
Attending: FAMILY MEDICINE | Admitting: FAMILY MEDICINE
Payer: MEDICARE

## 2019-01-14 DIAGNOSIS — Z12.31 VISIT FOR SCREENING MAMMOGRAM: ICD-10-CM

## 2019-01-14 PROCEDURE — 77063 BREAST TOMOSYNTHESIS BI: CPT

## 2019-01-21 ENCOUNTER — OFFICE VISIT (OUTPATIENT)
Dept: FAMILY MEDICINE | Facility: CLINIC | Age: 79
End: 2019-01-21
Payer: MEDICARE

## 2019-01-21 ENCOUNTER — TELEPHONE (OUTPATIENT)
Dept: FAMILY MEDICINE | Facility: CLINIC | Age: 79
End: 2019-01-21

## 2019-01-21 VITALS
DIASTOLIC BLOOD PRESSURE: 70 MMHG | TEMPERATURE: 97.1 F | WEIGHT: 122.5 LBS | SYSTOLIC BLOOD PRESSURE: 120 MMHG | OXYGEN SATURATION: 95 % | BODY MASS INDEX: 24.7 KG/M2 | HEART RATE: 67 BPM | HEIGHT: 59 IN

## 2019-01-21 DIAGNOSIS — G61.82 MULTIFOCAL MOTOR NEUROPATHY (H): ICD-10-CM

## 2019-01-21 DIAGNOSIS — Z82.49 FAMILY HISTORY OF CORONARY ARTERY DISEASE: ICD-10-CM

## 2019-01-21 DIAGNOSIS — E78.5 HYPERLIPIDEMIA LDL GOAL <130: ICD-10-CM

## 2019-01-21 DIAGNOSIS — M05.79 RHEUMATOID ARTHRITIS INVOLVING MULTIPLE SITES WITH POSITIVE RHEUMATOID FACTOR (H): ICD-10-CM

## 2019-01-21 DIAGNOSIS — G12.21 ALS (AMYOTROPHIC LATERAL SCLEROSIS) (H): ICD-10-CM

## 2019-01-21 DIAGNOSIS — B00.9 RECURRENT HSV (HERPES SIMPLEX VIRUS): ICD-10-CM

## 2019-01-21 DIAGNOSIS — N39.3 STRESS INCONTINENCE OF URINE: ICD-10-CM

## 2019-01-21 DIAGNOSIS — Z00.00 MEDICARE ANNUAL WELLNESS VISIT, SUBSEQUENT: Primary | ICD-10-CM

## 2019-01-21 PROCEDURE — G0439 PPPS, SUBSEQ VISIT: HCPCS | Performed by: FAMILY MEDICINE

## 2019-01-21 RX ORDER — OXYBUTYNIN CHLORIDE 5 MG/1
5 TABLET, EXTENDED RELEASE ORAL DAILY
Qty: 90 TABLET | Refills: 3 | Status: SHIPPED | OUTPATIENT
Start: 2019-01-21 | End: 2020-01-27

## 2019-01-21 ASSESSMENT — MIFFLIN-ST. JEOR: SCORE: 936.29

## 2019-01-21 NOTE — PATIENT INSTRUCTIONS

## 2019-01-21 NOTE — PROGRESS NOTES
"  SUBJECTIVE:   Britt Pichardo is a 78 year old female who presents for Preventive Visit.      Are you in the first 12 months of your Medicare Part B coverage?  No    Physical Health:    In general, how would you rate your overall physical health? good    Outside of work, how many days during the week do you exercise? 4-5 days/week    Outside of work, approximately how many minutes a day do you exercise?45-60 minutes  If you drink alcohol do you typically have >3 drinks per day or >7 drinks per week? Yes - AUDIT SCORE:     No flowsheet data found.    Do you usually eat at least 4 servings of fruit and vegetables a day, include whole grains & fiber and avoid regularly eating high fat or \"junk\" foods? Yes    Do you have any problems taking medications regularly?  No    Do you have any side effects from medications? not applicable    Needs assistance for the following daily activities: no assistance needed    Which of the following safety concerns are present in your home?  none identified     Hearing impairment: No    In the past 6 months, have you been bothered by leaking of urine? no    Mental Health:    In general, how would you rate your overall mental or emotional health? good  PHQ-2 Score:      Do you feel safe in your environment? Yes    Do you have a Health Care Directive? Yes: Advance Directive has been received and scanned.    Additional concerns to address?  No    Fall risk:  Fallen 2 or more times in the past year?: No  Any fall with injury in the past year?: No    Cognitive Screenin) Repeat 3 items (Leader, Season, Table)    2) Clock draw: NORMAL  3) 3 item recall: Recalls 2 objects   Results: NORMAL clock, 1-2 items recalled: COGNITIVE IMPAIRMENT LESS LIKELY    Mini-CogTM Copyright [employer] Briscoe. Licensed by the author for use in [facility]; reprinted with permission ([email or web address]@.[email or web address]). All rights reserved.      Do you have sleep apnea, excessive snoring or daytime drowsiness?: " no            Reviewed and updated as needed this visit by clinical staff  Tobacco  Allergies  Meds  Med Hx  Surg Hx  Fam Hx  Soc Hx        Reviewed and updated as needed this visit by Provider        Social History     Tobacco Use     Smoking status: Former Smoker     Types: Cigarettes     Last attempt to quit: 1976     Years since quittin.0     Smokeless tobacco: Never Used   Substance Use Topics     Alcohol use: Yes     Comment: occasional - special events/holidays only                           Current providers sharing in care for this patient include:   Patient Care Team:  Vineet Mendoza MD as PCP - General (Family Practice)  Vineet Mendoza MD as PCP - Assigned PCP    The following health maintenance items are reviewed in Epic and correct as of today:  Health Maintenance   Topic Date Due     ZOSTER IMMUNIZATION (1 of 2) 1989     PHQ-2 Q1 YR  2019     FALL RISK ASSESSMENT  2019     ADVANCE DIRECTIVE PLANNING Q5 YRS  2022     LIPID SCREEN Q5 YR FEMALE (SYSTEM ASSIGNED)  2023     DTAP/TDAP/TD IMMUNIZATION (3 - Td) 2028     DEXA SCAN SCREENING (SYSTEM ASSIGNED)  Completed     INFLUENZA VACCINE  Completed     PNEUMOVAX IMMUNIZATION 65+ LOW/MEDIUM RISK  Completed     IPV IMMUNIZATION  Aged Out     MENINGITIS IMMUNIZATION  Aged Out     Patient Active Problem List   Diagnosis     Health Care Home     Family history of coronary artery disease     Hyperlipidemia LDL goal <130     Vitamin D deficiency     Estrogen deficient vulvovaginitis     Recurrent HSV (herpes simplex virus)     ACP (advance care planning)     Rheumatoid arthritis involving multiple sites with positive rheumatoid factor (H)     Cervicalgia     Cervical radiculopathy     ALS (amyotrophic lateral sclerosis) (H)     Multifocal motor neuropathy (H)     Past Surgical History:   Procedure Laterality Date     BACK SURGERY  ,       Social History     Tobacco Use     Smoking  "status: Former Smoker     Types: Cigarettes     Last attempt to quit: 1976     Years since quittin.0     Smokeless tobacco: Never Used   Substance Use Topics     Alcohol use: Yes     Comment: occasional - special events/holidays only     Family History   Problem Relation Age of Onset     C.A.D. Mother 88     Leukemia Father      Heart Disease Brother      C.A.D. Brother      Cancer Other         esophagus             ROS:  Constitutional, HEENT, cardiovascular, pulmonary, gi and gu systems are negative, except as otherwise noted.    OBJECTIVE:   /70 (Cuff Size: Adult Regular)   Pulse 67   Temp 97.1  F (36.2  C) (Tympanic)   Ht 1.499 m (4' 11\")   Wt 55.6 kg (122 lb 8 oz)   SpO2 95%   BMI 24.74 kg/m   Estimated body mass index is 24.74 kg/m  as calculated from the following:    Height as of this encounter: 1.499 m (4' 11\").    Weight as of this encounter: 55.6 kg (122 lb 8 oz).  EXAM:   GENERAL APPEARANCE: healthy, alert and no distress  EYES: Eyes grossly normal to inspection, PERRL and conjunctivae and sclerae normal  HENT: ear canals and TM's normal, nose and mouth without ulcers or lesions, oropharynx clear and oral mucous membranes moist  NECK: no adenopathy, no asymmetry, masses, or scars and thyroid normal to palpation  RESP: lungs clear to auscultation - no rales, rhonchi or wheezes  CV: regular rate and rhythm, normal S1 S2, no S3 or S4, no murmur, click or rub, no peripheral edema and peripheral pulses strong  ABDOMEN: soft, nontender, no hepatosplenomegaly, no masses and bowel sounds normal  MS: no musculoskeletal defects are noted and gait is age appropriate without ataxia  SKIN: no suspicious lesions or rashes  NEURO: Normal strength and tone, sensory exam grossly normal, mentation intact and speech normal  PSYCH: mentation appears normal and affect normal/bright        ASSESSMENT / PLAN:       ICD-10-CM    1. Medicare annual wellness visit, subsequent Z00.00    2. Stress " "incontinence of urine N39.3 oxybutynin ER (DITROPAN-XL) 5 MG 24 hr tablet   3. ALS (amyotrophic lateral sclerosis) (H) G12.21    4. 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 Rheumatoid arthritis involving multiple sites with positive rheumatoid factor (H) M05.79    6. Hyperlipidemia LDL goal <130 E78.5    7. Family history of coronary artery disease Z82.49    8. Recurrent HSV (herpes simplex virus) B00.9        End of Life Planning:  Patient currently has an advanced directive: No.  I have verified the patient's ablity to prepare an advanced directive/make health care decisions.  Literature was provided to assist patient in preparing an advanced directive.    COUNSELING:  Reviewed preventive health counseling, as reflected in patient instructions       Regular exercise  Will await results of labs recently done at rheumatology.  Further plan will be pending review of that test.  BP Readings from Last 1 Encounters:   01/21/19 120/70     Estimated body mass index is 24.74 kg/m  as calculated from the following:    Height as of this encounter: 1.499 m (4' 11\").    Weight as of this encounter: 55.6 kg (122 lb 8 oz).    BP Screening:   Last 3 BP Readings:    BP Readings from Last 3 Encounters:   01/21/19 120/70   11/29/18 125/62   11/26/18 118/68       The following was recommended to the patient:  Re-screen BP within a year and recommended lifestyle modifications       reports that she quit smoking about 42 years ago. Her smoking use included cigarettes. she has never used smokeless tobacco.      Appropriate preventive services were discussed with this patient, including applicable screening as appropriate for cardiovascular disease, diabetes, osteopenia/osteoporosis, and glaucoma.  As appropriate for age/gender, discussed screening for colorectal cancer, prostate cancer, breast cancer, and cervical cancer. Checklist reviewing preventive services available has been given to the patient.    Reviewed patients plan of " care and provided an AVS. The Basic Care Plan (routine screening as documented in Health Maintenance) for Britt meets the Care Plan requirement. This Care Plan has been established and reviewed with the Patient.    Counseling Resources:  ATP IV Guidelines  Pooled Cohorts Equation Calculator  Breast Cancer Risk Calculator  FRAX Risk Assessment  ICSI Preventive Guidelines  Dietary Guidelines for Americans, 2010  USDA's MyPlate  ASA Prophylaxis  Lung CA Screening    Vineet Mendoza MD  [facility]

## 2019-01-21 NOTE — TELEPHONE ENCOUNTER
Reason for Call:      Detailed comments:    Placed     Phone Number Patient can be reached at:     Best Time:       Can we leave a detailed message on this number?       Call taken on 1/21/2019 at 10:50 AM by Liv Nunn

## 2019-01-28 ENCOUNTER — HOSPITAL ENCOUNTER (OUTPATIENT)
Dept: GENERAL RADIOLOGY | Facility: CLINIC | Age: 79
End: 2019-01-28
Attending: PSYCHIATRY & NEUROLOGY | Admitting: RADIOLOGY
Payer: MEDICARE

## 2019-01-28 ENCOUNTER — HOSPITAL ENCOUNTER (OUTPATIENT)
Facility: CLINIC | Age: 79
Discharge: HOME | End: 2019-01-28
Admitting: PHYSICIAN ASSISTANT
Payer: MEDICARE

## 2019-01-28 VITALS
OXYGEN SATURATION: 95 % | SYSTOLIC BLOOD PRESSURE: 136 MMHG | HEART RATE: 61 BPM | TEMPERATURE: 97.4 F | DIASTOLIC BLOOD PRESSURE: 65 MMHG | RESPIRATION RATE: 18 BRPM

## 2019-01-28 DIAGNOSIS — G12.21 ALS (AMYOTROPHIC LATERAL SCLEROSIS) (H): ICD-10-CM

## 2019-01-28 LAB
APPEARANCE CSF: CLEAR
COLOR CSF: COLORLESS
GLUCOSE CSF-MCNC: 51 MG/DL (ref 40–70)
GRAM STN SPEC: NORMAL
Lab: NORMAL
PROT CSF-MCNC: 34 MG/DL (ref 15–60)
RBC # CSF MANUAL: 343 /UL (ref 0–2)
SPECIMEN SOURCE: NORMAL
TUBE # CSF: 4 #
WBC # CSF MANUAL: 3 /UL (ref 0–5)

## 2019-01-28 PROCEDURE — 87015 SPECIMEN INFECT AGNT CONCNTJ: CPT | Performed by: PSYCHIATRY & NEUROLOGY

## 2019-01-28 PROCEDURE — 82945 GLUCOSE OTHER FLUID: CPT | Performed by: PSYCHIATRY & NEUROLOGY

## 2019-01-28 PROCEDURE — 83916 OLIGOCLONAL BANDS: CPT | Performed by: PSYCHIATRY & NEUROLOGY

## 2019-01-28 PROCEDURE — 87070 CULTURE OTHR SPECIMN AEROBIC: CPT | Performed by: PSYCHIATRY & NEUROLOGY

## 2019-01-28 PROCEDURE — 87205 SMEAR GRAM STAIN: CPT | Performed by: PSYCHIATRY & NEUROLOGY

## 2019-01-28 PROCEDURE — 82784 ASSAY IGA/IGD/IGG/IGM EACH: CPT | Performed by: PSYCHIATRY & NEUROLOGY

## 2019-01-28 PROCEDURE — 77003 FLUOROGUIDE FOR SPINE INJECT: CPT

## 2019-01-28 PROCEDURE — 82042 OTHER SOURCE ALBUMIN QUAN EA: CPT | Performed by: PSYCHIATRY & NEUROLOGY

## 2019-01-28 PROCEDURE — 40000863 ZZH STATISTIC RADIOLOGY XRAY, US, CT, MAR, NM

## 2019-01-28 PROCEDURE — 25000125 ZZHC RX 250: Performed by: PHYSICIAN ASSISTANT

## 2019-01-28 PROCEDURE — 82040 ASSAY OF SERUM ALBUMIN: CPT | Performed by: PSYCHIATRY & NEUROLOGY

## 2019-01-28 PROCEDURE — 89050 BODY FLUID CELL COUNT: CPT | Performed by: PSYCHIATRY & NEUROLOGY

## 2019-01-28 PROCEDURE — 84157 ASSAY OF PROTEIN OTHER: CPT | Performed by: PSYCHIATRY & NEUROLOGY

## 2019-01-28 RX ORDER — LIDOCAINE HYDROCHLORIDE 10 MG/ML
5 INJECTION, SOLUTION EPIDURAL; INFILTRATION; INTRACAUDAL; PERINEURAL ONCE
Status: COMPLETED | OUTPATIENT
Start: 2019-01-28 | End: 2019-01-28

## 2019-01-28 RX ORDER — DEXTROSE MONOHYDRATE 25 G/50ML
25-50 INJECTION, SOLUTION INTRAVENOUS
Status: DISCONTINUED | OUTPATIENT
Start: 2019-01-28 | End: 2019-01-28 | Stop reason: HOSPADM

## 2019-01-28 RX ORDER — NICOTINE POLACRILEX 4 MG
15-30 LOZENGE BUCCAL
Status: DISCONTINUED | OUTPATIENT
Start: 2019-01-28 | End: 2019-01-28 | Stop reason: HOSPADM

## 2019-01-28 RX ORDER — DEXTROSE MONOHYDRATE 25 G/50ML
25-50 INJECTION, SOLUTION INTRAVENOUS
Status: CANCELLED | OUTPATIENT
Start: 2019-01-28

## 2019-01-28 RX ORDER — NICOTINE POLACRILEX 4 MG
15-30 LOZENGE BUCCAL
Status: CANCELLED | OUTPATIENT
Start: 2019-01-28

## 2019-01-28 RX ADMIN — LIDOCAINE HYDROCHLORIDE 3.5 ML: 10 INJECTION, SOLUTION EPIDURAL; INFILTRATION; INTRACAUDAL; PERINEURAL at 10:48

## 2019-01-28 NOTE — PROGRESS NOTES
Care Suites Arrival  Patient arrived ambulatory to [facility] for Lumbar Puncture. Patient is alert, oriented, and pleasant. Denies pain. Patient denied skin issues. Procedure explained to patient and daughter. All questions & concerns addressed. D/C instructions reviewed with pt with verbal understanding received. Patient resting on cart, denies additional needs at this time, call light within reach. Up and ambulatory to bathroom. Returned to room, waiting for procedure.

## 2019-01-28 NOTE — IP AVS SNAPSHOT
MRN:[id number]                      After Visit Summary   1/28/2019    Britt Pichardo    MRN: [id number]           Visit Information        Department      1/28/2019  9:10 AM [facility]          Review of your medicines      UNREVIEWED medicines. Ask your doctor about these medicines       Dose / Directions   aspirin 81 MG tablet  Commonly known as:  ASA      Dose:  1 tablet  Take 1 tablet by mouth daily.  Refills:  0     calcium + D 600-200 MG-UNIT Tabs  Generic drug:  calcium carbonate-vitamin D      Dose:  1 tablet  Take 1 tablet by mouth every other day  Refills:  0     conjugated estrogens 0.625 MG/GM vaginal cream  Commonly known as:  PREMARIN  Used for:  Estrogen deficient vulvovaginitis      PLACE 0.5 G VAGINALLY EVERY OTHER WEEK  Quantity:  30 g  Refills:  0     Ferrous Gluconate 240 (27 Fe) MG Tabs      Dose:  1 tablet  Take 1 tablet by mouth every other day  Refills:  0     FLUZONE HIGH-DOSE 0.5 ML injection  Generic drug:  influenza Vac Split High-Dose      TO BE ADMINISTERED BY PHARMACIST FOR IMMUNIZATION  Refills:  0     folic acid 1 MG tablet  Commonly known as:  FOLVITE      Dose:  1 mg  Take 1 mg by mouth daily.  Refills:  0     lovastatin 20 MG tablet  Commonly known as:  MEVACOR  Used for:  Hyperlipidemia LDL goal <130      TAKE ONE TABLET BY MOUTH AT BEDTIME  Quantity:  90 tablet  Refills:  1     methotrexate 2.5 MG tablet      Dose:  6 tablet  Take 6 tablets by mouth once a week  Refills:  0     oxybutynin ER 5 MG 24 hr tablet  Commonly known as:  DITROPAN-XL  Used for:  Stress incontinence of urine      Dose:  5 mg  Take 1 tablet (5 mg) by mouth daily  Quantity:  90 tablet  Refills:  3     REMICADE IV      Dose:  200 mg  Inject 200 mg into the vein Patient takes this every 8 weeks  Refills:  0     riluzole 50 MG tablet  Commonly known as:  RILUTEK      Dose:  50 mg  Take 50 mg by mouth every 12 hours  Refills:  0              Protect others around you:  Learn how to safely use, store and throw away your medicines at www.disposemymeds.org.       Follow-ups after your visit       Your next 10 appointments already scheduled    Jan 28, 2019 10:00 AM CST  (Arrive by 9:15 AM)  XR LUMBAR PUNCTURE SPINAL TAP DIAGNOSTIC with SHXR4, SH NEURO RAD  [facility]iology ([facility]) [address] [address]  [phone]   How do I prepare for my exam? (Food and drink instructions) No Food and Drink Restrictions.  How do I prepare for my exam? (Other instructions) * If you take Coumadin (or any other blood thinners) you may need to stop taking them up to 5 days prior to the exam. Talk to your doctor before stopping. * If you take Plavix, Ticlid, Pletal or Persantine, please ask your doctor if you should stop these medicines. You may need extra tests on the morning of your scan. * If you take Xarelto (Rivaroxaban), you may need to stop taking it 24 hours before treatment. Talk to your doctor before stopping this medicine.  What should I wear: Wear comfortable clothes.  How long does the exam take: Injections take about 30 to 45 minutes. Most people spend up to 2 hours in the clinic or hospital.  What should I bring: Please bring any scans or X-rays taken at other hospitals, if similar tests were done. Also bring a list of your medicines, including vitamins, minerals and over-the-counter drugs. It is safest to leave personal items at home.  Do I need a :  Plan to have someone drive you home afterward.  What do I need to tell my doctor: Tell your doctor in advance: * If you are allergic to X-ray dye (contrast fluid). * If you may be pregnant.  What should I do after the exam: You may have slight cramping during this exam. The cramps should go away afterward. You may have some spotting after the exam.  What is this test: A spinal shot is done in or near the spine. You may receive steroid medicine (to reduce swelling) or contrast  fluid (dye that makes the area show up more clearly on X-rays). A nerve root shot is a shot into the nerve near the spine. It may reduce inflammation near the nerve root or spinal cord and reduce pain in the arm or leg.  Who should I call with questions: If you have any questions, please call the Imaging Department where you will have your exam. Directions, parking instructions, and other information are available on our website, [email or web address].   Feb 28, 2019  9:30 AM CST  PFT VISIT with  JI MCCARTY  [facility] Pulmonary Function Testing ([facility]) [address] [place] [address]  [phone]   Feb 28, 2019 10:00 AM CST  (Arrive by 9:45 AM)  Return ALS/Motor Neuron with Mason Beckham MD  [facility] Neurology ([facility]) [address] [place] [phone]  [phone]      Care Instructions       Further instructions from your care team       Lumbar Puncture Discharge Instructions     After you go home:      You may resume your normal diet    Continue to drink at least 8 ounces of fluid every 1-2 hours until bedtime tonight and continue to drink extra fluids for the next 2 days    Caffeinated beverages may help prevent or reduce spinal headaches    Care of Puncture Site:      If there is a bandaid - you may remove it tomorrow morning    You may shower tomorrow    No tub baths, whirlpools or swimming for 48 hours     Activity - to help prevent spinal headache or spinal fluid leakage:      Minimize your activity today. Flat bedrest for 24 hrs is strongly suggested as this will help to prevent a spinal headache.  You can be up to the bathroom and for meals.    Resume normal activities tomorrow.     Avoid strenuous activity for the next 2 days    Do not drive a vehicle until tomorrow morning    Medicines:      You may resume all medications    Resume your Warfarin/Coumadin at your regular dose today.  Follow up with your provider to have your INR rechecked    Resume your Platelet Inhibitors and Aspirin tomorrow at your regular dose    For minor pain, you may take Acetaminophen (Tylenol) or Ibuprofen (Advil)            Call the provider who ordered this procedure if:      Your headache becomes worse or is severe. (A minor headache is not unusual)    You have nausea or vomiting    The site is red, swollen, hot or tender    You have chills or a fever greater than 101 F (38 C)    Any questions or concerns    If you have questions call:        [facility] Radiology Dept @ [phone]    The provider who performed your procedure was __Dr. Alvarez_______________.    Additional Information About Your Visit       Care EveryWhere ID    This is your Care EveryWhere ID. This could be used by other organizations to access your [facility] medical records  [id number]       Your Vitals Were     Blood Pressure   140/69    Pulse   72    Temperature   97.4  F (36.3  C)    Respirations   18    Pulse Oximetry   96%          Primary Care Provider Office Phone # Fax #    Vineet Mendoza [phone]      Equal Access to Services    [facility]: Hadii amy fitzgerald hadasho Sosobia, waaxda luqadaha, qaybta kaalmada tigist, osiel liu . So [facility] [place] [facility] [phone].    ATENCIÓN: Si habla español, tiene a glass disposición servicios gratuitos de asistencia lingüística. Llame al [phone].    We comply with applicable federal civil rights laws and [place] laws. We do not discriminate on the basis of race, color, national origin, age, disability, sex, sexual orientation, or gender identity.           Thank you!    Thank you for choosing [facility] for your care. Our goal is always to provide you with excellent care. Hearing back from our patients is one way we can continue to improve our services. Please take a few minutes to complete the written survey that you may receive in the mail  after you visit with us. Thank you!            Medication List      ASK your doctor about these medications          Morning Afternoon Evening Bedtime As Needed    aspirin 81 MG tablet  Also known as:  ASA  INSTRUCTIONS:  Take 1 tablet by mouth daily.                     calcium + D 600-200 MG-UNIT Tabs  INSTRUCTIONS:  Take 1 tablet by mouth every other day  Generic drug:  calcium carbonate-vitamin D                     conjugated estrogens 0.625 MG/GM vaginal cream  Also known as:  PREMARIN  INSTRUCTIONS:  PLACE 0.5 G VAGINALLY EVERY OTHER WEEK  Doctor's comments:  Medication is being filled for 1 time refill only due to:  Patient needs to be seen because prior to anymore refills due for annual exam..                     Ferrous Gluconate 240 (27 Fe) MG Tabs  INSTRUCTIONS:  Take 1 tablet by mouth every other day                     FLUZONE HIGH-DOSE 0.5 ML injection  INSTRUCTIONS:  TO BE ADMINISTERED BY PHARMACIST FOR IMMUNIZATION  Generic drug:  influenza Vac Split High-Dose                     folic acid 1 MG tablet  Also known as:  FOLVITE  INSTRUCTIONS:  Take 1 mg by mouth daily.                     lovastatin 20 MG tablet  Also known as:  MEVACOR  INSTRUCTIONS:  TAKE ONE TABLET BY MOUTH AT BEDTIME                     methotrexate 2.5 MG tablet  INSTRUCTIONS:  Take 6 tablets by mouth once a week                     oxybutynin ER 5 MG 24 hr tablet  Also known as:  DITROPAN-XL  INSTRUCTIONS:  Take 1 tablet (5 mg) by mouth daily                     REMICADE IV  INSTRUCTIONS:  Inject 200 mg into the vein Patient takes this every 8 weeks                     riluzole 50 MG tablet  Also known as:  RILUTEK  INSTRUCTIONS:  Take 50 mg by mouth every 12 hours

## 2019-01-28 NOTE — PROGRESS NOTES
1136 pt back to room about 1130. VSS. Denies pain. Lumbar puncture site, mid low back has bandaids x2, both CDI. Pt rolled on side while supine. Given water and coffee and crackers. dgtr here with her.

## 2019-01-28 NOTE — PROGRESS NOTES
RADIOLOGY PROCEDURE NOTE  Patient name: Britt Pichardo  MRN: [id number]  : 1940    Pre-procedure diagnosis: ALS  Post-procedure diagnosis: Same    Procedure Date/Time: 2019  11:26 AM  Procedure: Lumbar puncture  Estimated blood loss: None  Specimen(s) collected with description: 7ml clear CSF  The patient tolerated the procedure well with no immediate complications.  Significant findings:none    See imaging dictation for procedural details.    Provider name: Oswaldo Stevens  Assistant(s):None

## 2019-01-28 NOTE — PROGRESS NOTES
Pt assisted up out of bed at the end of prescribed bedrest period. Pt dressed and ready for discharge. Instructions previously reviewed with pt and daughter who verbalize understanding. Wheelchair escorted pt to hospital door to daughter's car.

## 2019-01-28 NOTE — IP AVS SNAPSHOT
[place] [address] [place] [address]  Phone:  [phone]                                    After Visit Summary   1/28/2019    Britt Pichardo    MRN: [id number]           After Visit Summary Signature Page    I have received my discharge instructions, and my questions have been answered. I have discussed any challenges I see with this plan with the nurse or doctor.    ..........................................................................................................................................  Patient/Patient Representative Signature      ..........................................................................................................................................  Patient Representative Print Name and Relationship to Patient    ..................................................               ................................................  Date                                   Time    ..........................................................................................................................................  Reviewed by Signature/Title    ...................................................              ..............................................  Date                                               Time          22EPIC Rev 08/18

## 2019-01-30 LAB
ALB CSF/SERPL: 4.3 RATIO (ref 0–9)
ALBUMIN CSF-MCNC: 15 MG/DL (ref 0–35)
ALBUMIN SERPL-MCNC: 3470 MG/DL (ref 3500–5200)
IGG CSF-MCNC: 1.8 MG/DL (ref 0–6)
IGG SERPL-MCNC: 1070 MG/DL (ref 768–1632)
IGG SYNTH RATE SER+CSF CALC-MRATE: <0 MG/D
IGG/ALB CLEAR SER+CSF-RTO: 0.39 RATIO (ref 0.28–0.66)
IGG/ALB CSF: 0.12 RATIO (ref 0.09–0.25)
OLIGOCLONAL BANDS CSF ELPH-IMP: ABNORMAL
OLIGOCLONAL BANDS CSF ELPH-IMP: NEGATIVE
OLIGOCLONAL BANDS CSF IEF: 0 BANDS (ref 0–1)

## 2019-02-02 LAB
BACTERIA SPEC CULT: NO GROWTH
Lab: NORMAL
SPECIMEN SOURCE: NORMAL

## 2019-02-27 DIAGNOSIS — G12.21 ALS (AMYOTROPHIC LATERAL SCLEROSIS) (H): Primary | ICD-10-CM

## 2019-02-28 ENCOUNTER — RESULTS ONLY (OUTPATIENT)
Dept: LAB | Age: 79
End: 2019-02-28

## 2019-02-28 ENCOUNTER — PATIENT OUTREACH (OUTPATIENT)
Dept: CARE COORDINATION | Facility: CLINIC | Age: 79
End: 2019-02-28

## 2019-02-28 ENCOUNTER — OFFICE VISIT (OUTPATIENT)
Dept: NEUROLOGY | Facility: CLINIC | Age: 79
End: 2019-02-28
Payer: MEDICARE

## 2019-02-28 VITALS
TEMPERATURE: 98 F | SYSTOLIC BLOOD PRESSURE: 130 MMHG | WEIGHT: 124 LBS | HEIGHT: 59 IN | OXYGEN SATURATION: 96 % | BODY MASS INDEX: 25 KG/M2 | DIASTOLIC BLOOD PRESSURE: 63 MMHG | RESPIRATION RATE: 15 BRPM | HEART RATE: 62 BPM

## 2019-02-28 DIAGNOSIS — G12.21 ALS (AMYOTROPHIC LATERAL SCLEROSIS) (H): ICD-10-CM

## 2019-02-28 DIAGNOSIS — G12.21 ALS (AMYOTROPHIC LATERAL SCLEROSIS) (H): Primary | ICD-10-CM

## 2019-02-28 LAB
ALT SERPL W P-5'-P-CCNC: 26 U/L (ref 0–50)
AST SERPL W P-5'-P-CCNC: 35 U/L (ref 0–45)
CK SERPL-CCNC: 58 U/L (ref 30–225)
CREAT SERPL-MCNC: 0.61 MG/DL (ref 0.52–1.04)
GFR SERPL CREATININE-BSD FRML MDRD: 86 ML/MIN/{1.73_M2}
URATE SERPL-MCNC: 3.3 MG/DL (ref 2.6–6)

## 2019-02-28 ASSESSMENT — PAIN SCALES - GENERAL: PAINLEVEL: NO PAIN (0)

## 2019-02-28 ASSESSMENT — MIFFLIN-ST. JEOR: SCORE: 938.09

## 2019-02-28 NOTE — PROGRESS NOTES
ALS Social Work Assessment  Collaborated with: Britt and her dtr Mario who is an ICU nurse, Dr Beckham and members of the ALS interdisciplinary team  Support System: dtrs mario and Harper Saleem's 2 children ages 12,14.   Pt is . Her spouse  in  from cancer. He had hospice at home for the last few days of his life  Living Situation: Alone in a house. All on 1 level except laundry in basement  Functional Capacity: she is independent. She drives and does her own ADLs  Primary Caregiver: dtrs  Employment status: retired  Financial stability/insurance: stable with savings. No insurance issues  Agencies involved: Registering with [employer] today  Cultural/ethnic affiliation: Moravian and has steven based coping  Mental Health/CD/Cognitive concerns: none identified  Coping style/adjustment to ALS: accepting and resigned. She is hoping that when she cannot care for herself that she will not live long. She indicates that she may need to go into a care facility.  Legal concerns: none. She has a health care directive and Will completed  Advanced Care Planning: Requested copy of her health care directive  Goals/Strengths: Good family support. Is still able to be independent  Resource Needs: None now. Provided info re [facility] [employer] free home delivered meals if she has difficulty with meal prep, discussed [employer] mobility and [employer] transportation as options if she is unable to drive or her family to transport her, Provided info re [facility] if she needs to move into a facility  Education Provided: Advance care planning, community resources  Intervention/Assessment: Gathered info today. She has good family support. Coping ok. No issues identified.  Plan: Will continue to follow in ALS clinic. She is aware she can contact me between clinic visits as needed.     Britt Matute, ALBA, [facility]    [facility]  [phone]

## 2019-02-28 NOTE — PROGRESS NOTES
[facility] ALS Certified Center Excellence        History of Present Illness:    Mrs. Pichardo is a 78 year old woman with progressive left upper extremity painless weakness since May 2017. Physical exam shows UMN and LMN signs in the cervical segment. EMG shows widespread spontaneous activity and chronic denervation/motor unit remodeling in 2+ muscles in the cervical and lumbar segments. Thoracic paraspinals not sampled. 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     Interim History:   Mrs. Pichardo was last seen in clinic 11/29/18. She reports that her left hand seems weaker and that she has more myalgias walking up and down the stairs. She has muscle cramps but they have improved with good hydration. Speech and swallowing are normal. Appetite is good and weight is relatively stable. She denies breathing difficulties or shortness of breath while lying flat. Mood and affect are appropriate. She is independent, has no assistive devices and is not falling. She denies sleep complaints.    Prior pertinent laboratory work-up:  PFT's reveals forced vital capacity 92%  FEV1 is 89% predicted for age, heigh, and sex.   MIP is -51cm of water with a MEP of 61     11/29/2018   CSF unremarkable- protein 32, 0 oligoclonal bands  GM1 antibody negative    Prior pertinent radiology work-up:  MRI cervical spine w/o contrast- unremarkable  5/6 disc herniation otherwise unremarkable    Prior electrophysiologic work-up:  Reviewed- see EMR for full report  11/2/2018- fibrillations in right bicep, left EIP, FDI, bicep, delotid, left TA, gastroc, wide spread fasciculations   evidence of motor unit remodeling and chornic denervation in all msucles sampled.     Past Medical History: Rheumatoid arthritis, hyperlipidemia  Past Surgical History: 2002 L2-5 fusion for radiating shani  Family history:  Mother had dementia. Father passed from aplastic anemia. 6 brothers and 1 sister. One  "brother  in MVC, another of heart disease. Brother had benign brain tumor    Social History:    Retired since .  with 2 children, healthy. 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 Drinks EtOH once a week    Medical Allergies:  NKDA     Current Medications:      Current Outpatient Medications   Medication     aspirin 81 MG tablet     calcium carbonate-vitamin D (CALCIUM + D) 600-200 MG-UNIT TABS     conjugated estrogens (PREMARIN) 0.625 MG/GM vaginal cream     Ferrous Gluconate 240 (27 FE) MG TABS     FLUZONE HIGH-DOSE 0.5 ML injection     folic acid (FOLVITE) 1 MG tablet     InFLIXimab (REMICADE IV)     lovastatin (MEVACOR) 20 MG tablet     methotrexate 2.5 MG tablet     oxybutynin ER (DITROPAN-XL) 5 MG 24 hr tablet     riluzole (RILUTEK) 50 MG tablet     No current facility-administered medications for this visit.    Methotrexate    Review of Systems: A complete review of systems was obtained and was negative except for what was noted above.    Physical examination:    /63   Pulse 62   Temp 98  F (36.7  C) (Oral)   Resp 15   Ht 1.499 m (4' 11\")   Wt 56.2 kg (124 lb)   SpO2 96%   BMI 25.04 kg/m      PFT Latest Ref Rng & Units 2019   FVC L 1.86   FEV1 L 1.62   FVC% % 87   FEV1% % 99   MIP -58  MEP 70    General Appearance: NAD    Skin: There are no rashes or other skin lesions.    Musculoskeletal:  There is no scoliosis, lordosis, kyphosis, pes cavus, or hammertoes.    Neurologic examination:    Mental status:  Patient is alert, attentive, and oriented x 3.  Language is coherent and fluent without dysarthria or aphasia.  Memory, comprehension and ability to follow commands were intact.       Cranial nerves:  Optic discs were sharp.  Pupils were round and reacted to light.  Extraocular movements were full. There was no face, jaw, palate or tongue weakness or atrophy. Hearing was grossly intact.  Shoulder shrug was normal.       Motor exam: FDI , L>R, " APB L>R, left medial forearm and bicep atrophy.  No action or percussion myotonia or paramyotonia.      Normal MIKAL   Right Left   Neck flexion 4+    Neck extension: 5    Shoulder abduction:  4 4   Elbow extension: 5 4   Elbow flexion:  4+ 4   Wrist flexion:  4+ 4   Wrist extension:  5 4-   Finger flexion 5 4-   Finger extension 4 4-   FDI 4 3   APB 4 3-   Hip flexion 4+ 4   Hip extension 5 5   Knee flexion 5 4+   Knee extension 5 5   Dorsiflexion 5 5   Plantar flexion 5 5   Can stand with cross armed X 3  Able to stand of her toes and heels     Complex motor skills revealed normal coordination.  Finger-nose- finger and heel to shin were intact.       Sensory exam revealed normal perception of vibration, proprioception, light touch proximally and distally in the arms and legs bilaterally. Romberg sign was absent.    Gait was normal.  He was able to walk on his heels, toes and tandem without any difficulty.       Deep tendon reflexes:   Right Left   Triceps 2 2 with spread   Biceps 2 2    Brachioradialis 2 2 with spread   Knee jerk 1 2   Ankle jerk 1 1   Plantar responses were flexor bilaterally.      Assessment:    Mrs. 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 Workup for ALS mimics including MMN was negative. She reports some progression of her left hand weakness which is evident on physical examination today. She has no other complaints otherwise. MIP -59 but FVC is normal and there are no respiratory symptoms. We will continue to monitor her respiratory function for now. She is agreeable to initiating the process of Radicava treatment and enrolling in the GTAC study.     Plan:  - Repeat LFTs today given concomitant Riluzole and Methotrexate use  - Continue Riluzole 50mg BID  - Start process getting approval for Radicava treatment  - Visit with Research Coordinator, consider enrollment in GTAC study today  - No new therapy needs  - follow  up in 3 months    Monique Nickerson DO  [facility] Neuromuscular Fellow [id number]    ATTENDING ADDENDUM: Patient seen and examined with Fellow Dr Nickerson at the [facility] Certified [facility]uron [facility] Excellence today. I agree with her assessment and plan as above. TT spent for patient care 15 minutes; more than half was counseling. Mason Beckham MD

## 2019-02-28 NOTE — NURSING NOTE
Chief Complaint   Patient presents with     Als     [facility] RETURN ALS/MOTOR NEURON       Ronnell Low, EMT

## 2019-02-28 NOTE — LETTER
2/28/2019       RE: Britt Pichardo  [address]  [place] [address]     Dear Colleague,    Thank you for referring your patient, Britt Pichardo, to the [facility] NEUROLOGY at [facility]. Please see a copy of my visit note below.    [facility] ALS Certified Center Excellence    History of Present Illness:    Mrs. Pichardo is a 78 year old woman with progressive left upper extremity painless weakness since May 2017. Physical exam shows UMN and LMN signs in the cervical segment. EMG shows widespread spontaneous activity and chronic denervation/motor unit remodeling in 2+ muscles in the cervical and lumbar segments. Thoracic paraspinals not sampled. 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     Interim History:   Mrs. Pichardo was last seen in clinic 11/29/18. She reports that her left hand seems weaker and that she has more myalgias walking up and down the stairs. She has muscle cramps but they have improved with good hydration. Speech and swallowing are normal. Appetite is good and weight is relatively stable. She denies breathing difficulties or shortness of breath while lying flat. Mood and affect are appropriate. She is independent, has no assistive devices and is not falling. She denies sleep complaints.    Prior pertinent laboratory work-up:  PFT's reveals forced vital capacity 92%  FEV1 is 89% predicted for age, heigh, and sex.   MIP is -51cm of water with a MEP of 61     11/29/2018   CSF unremarkable- protein 32, 0 oligoclonal bands  GM1 antibody negative    Prior pertinent radiology work-up:  MRI cervical spine w/o contrast- unremarkable  5/6 disc herniation otherwise unremarkable    Prior electrophysiologic work-up:  Reviewed- see EMR for full report  11/2/2018- fibrillations in right bicep, left EIP, FDI, bicep, delotid, left TA, gastroc, wide spread fasciculations   evidence of motor unit  "remodeling and chornic denervation in all msucles sampled.     Past Medical History: Rheumatoid arthritis, hyperlipidemia  Past Surgical History:  L2-5 fusion for radiating shani  Family history:  Mother had dementia. Father passed from aplastic anemia. 6 brothers and 1 sister. One brother  in MVC, another of heart disease. Brother had benign brain tumor    Social History:    Retired since .  with 2 children, healthy. 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 Drinks EtOH once a week    Medical Allergies:  NKDA     Current Medications:      Current Outpatient Medications   Medication     aspirin 81 MG tablet     calcium carbonate-vitamin D (CALCIUM + D) 600-200 MG-UNIT TABS     conjugated estrogens (PREMARIN) 0.625 MG/GM vaginal cream     Ferrous Gluconate 240 (27 FE) MG TABS     FLUZONE HIGH-DOSE 0.5 ML injection     folic acid (FOLVITE) 1 MG tablet     InFLIXimab (REMICADE IV)     lovastatin (MEVACOR) 20 MG tablet     methotrexate 2.5 MG tablet     oxybutynin ER (DITROPAN-XL) 5 MG 24 hr tablet     riluzole (RILUTEK) 50 MG tablet     No current facility-administered medications for this visit.    Methotrexate    Review of Systems: A complete review of systems was obtained and was negative except for what was noted above.    Physical examination:    /63   Pulse 62   Temp 98  F (36.7  C) (Oral)   Resp 15   Ht 1.499 m (4' 11\")   Wt 56.2 kg (124 lb)   SpO2 96%   BMI 25.04 kg/m       PFT Latest Ref Rng & Units 2019   FVC L 1.86   FEV1 L 1.62   FVC% % 87   FEV1% % 99   MIP -58  MEP 70    General Appearance: NAD    Skin: There are no rashes or other skin lesions.    Musculoskeletal:  There is no scoliosis, lordosis, kyphosis, pes cavus, or hammertoes.    Neurologic examination:    Mental status:  Patient is alert, attentive, and oriented x 3.  Language is coherent and fluent without dysarthria or aphasia.  Memory, comprehension and ability to follow " commands were intact.       Cranial nerves:  Optic discs were sharp.  Pupils were round and reacted to light.  Extraocular movements were full. There was no face, jaw, palate or tongue weakness or atrophy. Hearing was grossly intact.  Shoulder shrug was normal.       Motor exam: FDI , L>R, APB L>R, left medial forearm and bicep atrophy.  No action or percussion myotonia or paramyotonia.      Normal MIKAL   Right Left   Neck flexion 4+    Neck extension: 5    Shoulder abduction:  4 4   Elbow extension: 5 4   Elbow flexion:  4+ 4   Wrist flexion:  4+ 4   Wrist extension:  5 4-   Finger flexion 5 4-   Finger extension 4 4-   FDI 4 3   APB 4 3-   Hip flexion 4+ 4   Hip extension 5 5   Knee flexion 5 4+   Knee extension 5 5   Dorsiflexion 5 5   Plantar flexion 5 5   Can stand with cross armed X 3  Able to stand of her toes and heels     Complex motor skills revealed normal coordination.  Finger-nose- finger and heel to shin were intact.       Sensory exam revealed normal perception of vibration, proprioception, light touch proximally and distally in the arms and legs bilaterally. Romberg sign was absent.    Gait was normal.  He was able to walk on his heels, toes and tandem without any difficulty.       Deep tendon reflexes:   Right Left   Triceps 2 2 with spread   Biceps 2 2    Brachioradialis 2 2 with spread   Knee jerk 1 2   Ankle jerk 1 1   Plantar responses were flexor bilaterally.      Assessment:    Mrs. 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 Workup for ALS mimics including MMN was negative. She reports some progression of her left hand weakness which is evident on physical examination today. She has no other complaints otherwise. MIP -59 but FVC is normal and there are no respiratory symptoms. We will continue to monitor her respiratory function for now. She is agreeable to initiating the process of Radicava treatment and enrolling in  the GTAC study.     Plan:  - Repeat LFTs today given concomitant Riluzole and Methotrexate use  - Continue Riluzole 50mg BID  - Start process getting approval for Radicava treatment  - Visit with Research Coordinator, consider enrollment in GTAC study today  - No new therapy needs  - follow up in 3 months    Monique Nickerson DO  [facility] Neuromuscular Fellow [id number]    ATTENDING ADDENDUM: Patient seen and examined with Fellow Dr Nickerson at the [facility]uron [facility] today. I agree with her assessment and plan as above. TT spent for patient care 15 minutes; more than half was counseling.   Mason Beckham MD

## 2019-03-06 LAB
EXPTIME-PRE: 3.68 SEC
FEF2575-%PRED-PRE: 139 %
FEF2575-PRE: 1.94 L/SEC
FEF2575-PRED: 1.39 L/SEC
FEFMAX-%PRED-PRE: 98 %
FEFMAX-PRE: 4.01 L/SEC
FEFMAX-PRED: 4.05 L/SEC
FEV1-%PRED-PRE: 99 %
FEV1-PRE: 1.62 L
FEV1FEV6-PRE: 87 %
FEV1FEV6-PRED: 78 %
FEV1FVC-PRE: 87 %
FEV1FVC-PRED: 78 %
FIFMAX-PRE: 1.82 L/SEC
FVC-%PRED-PRE: 87 %
FVC-PRE: 1.86 L
FVC-PRED: 2.12 L
MEP-PRE: 70 CMH2O
MIP-PRE: -58 CMH2O

## 2019-03-19 ENCOUNTER — TRANSFERRED RECORDS (OUTPATIENT)
Dept: HEALTH INFORMATION MANAGEMENT | Facility: CLINIC | Age: 79
End: 2019-03-19

## 2019-05-10 DIAGNOSIS — G12.21 ALS (AMYOTROPHIC LATERAL SCLEROSIS) (H): Primary | ICD-10-CM

## 2019-05-10 RX ORDER — RILUZOLE 50 MG/1
50 TABLET, FILM COATED ORAL EVERY 12 HOURS
Qty: 60 TABLET | Refills: 3 | Status: SHIPPED | OUTPATIENT
Start: 2019-05-10 | End: 2019-09-03

## 2019-05-21 ENCOUNTER — TRANSFERRED RECORDS (OUTPATIENT)
Dept: HEALTH INFORMATION MANAGEMENT | Facility: CLINIC | Age: 79
End: 2019-05-21

## 2019-05-29 DIAGNOSIS — G12.21 ALS (AMYOTROPHIC LATERAL SCLEROSIS) (H): Primary | ICD-10-CM

## 2019-05-30 ENCOUNTER — OFFICE VISIT (OUTPATIENT)
Dept: NEUROLOGY | Facility: CLINIC | Age: 79
End: 2019-05-30
Payer: MEDICARE

## 2019-05-30 ENCOUNTER — THERAPY VISIT (OUTPATIENT)
Dept: OCCUPATIONAL THERAPY | Facility: CLINIC | Age: 79
End: 2019-05-30
Payer: MEDICARE

## 2019-05-30 VITALS
BODY MASS INDEX: 25.21 KG/M2 | WEIGHT: 124.8 LBS | OXYGEN SATURATION: 94 % | SYSTOLIC BLOOD PRESSURE: 125 MMHG | HEART RATE: 61 BPM | DIASTOLIC BLOOD PRESSURE: 82 MMHG

## 2019-05-30 DIAGNOSIS — Z78.9 DECREASED ACTIVITIES OF DAILY LIVING (ADL): ICD-10-CM

## 2019-05-30 DIAGNOSIS — G12.21 ALS (AMYOTROPHIC LATERAL SCLEROSIS) (H): Primary | ICD-10-CM

## 2019-05-30 DIAGNOSIS — G12.21 ALS (AMYOTROPHIC LATERAL SCLEROSIS) (H): ICD-10-CM

## 2019-05-30 DIAGNOSIS — Z78.9 IMPAIRED INSTRUMENTAL ACTIVITIES OF DAILY LIVING (IADL): ICD-10-CM

## 2019-05-30 ASSESSMENT — REVISED AMYOTROPHIC LATERAL SCLEROSIS FUNCTIONAL RATING SCALE (ALSFRS-R)
TURNING_IN_BED_AND_ADJUSTING_BED_CLOTHES: 4
RESPIRATORY_SUBTOTAL_SCORE: 12
HANDWRITING: 3
SALIVATION: 3
SWALLOWING: 4
RESPIRATORY_INSUFFICIENCY: 4
SPEECH: DETECTABLE SPEECH DISTURBANCES
SIX_ITEM_SUBTOTAL: 20
WALKING: 3
BULBAR_SUBTOTAL: 10
CLIMBING_STAIRS: NEEDS ASSISTANCE
ORTHOPENA: 4
HANDWRITING: SLOW OR SLOPPY: ALL WORDS ARE LEGIBLE
SALIVATION: SLIGHT BUT DEFINITE EXCESS OF SALIVA IN MOUTH; MAY HAVE NIGHTTIME DROOLING
FINE_MOTOR_SUBTOTAL_SCORE: 9
DRESSING_AND_HYGEINE: 3
DYSPNEA: 4
SPEECH: 3
SWALLOWING: NORMAL EATING HABITS
CUTTING_FOOD_AND_HANDLING_UTENSILES: 3
GROSS_MOTOR_SUBTOTAL_SCORE: 8
CLIMBING_STAIRS: 1
DRESSING_AND_HYGEINE: INDEPENDENT AND COMPLETE SELF-CARE WITH EFFORT OR DECREASED EFFICIENCY
WALKING: EARLY AMBULATION DIFFICULTIES
TURNING_IN_BED_AND_ADJUSTING_BED_CLOTHES: NORMAL
ALSFRS_TOTAL_SCORE: 39

## 2019-05-30 ASSESSMENT — PAIN SCALES - GENERAL: PAINLEVEL: NO PAIN (0)

## 2019-05-30 NOTE — LETTER
"2019       RE: Britt Pichardo  [address]  [place] [address]     Dear Colleague,    Thank you for referring your patient, Britt Pichardo, to the [facility] NEUROLOGY at [facility]. Please see a copy of my visit note below.  DEPARTMENT OF NEUROLOGY  ALS CERTIFIED CENTER OF EXCELLENCE FOLLOW UP    Patient Name:  Britt Pichardo  MRN:  [id number]    :  1940  Date of Clinic Visit:  May 30, 2019  Primary Care Provider:  Vineet Mendoza        SUMMARY: Mrs. Pichardo is a 78 year old woman with progressive left upper extremity painless weakness since May 2017. Physical exam shows UMN and LMN signs in the cervical segment. EMG shows widespread spontaneous activity and chronic denervation/motor unit remodeling in 2+ muscles in the cervical and lumbar segments. Thoracic paraspinals not sampled. 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     Please see notes from encounter on 18 for more information about this patient's initial presentation to this clinic.     INTERVAL HISTORY: Ms. Pichardo was last seen in ALS clinic on 19. Since that time she states that she feels that her left hand weakness has progressed. She reports that she has difficulty with fine motor movements that necessitate a \"pincher\" movement. She also reports increasing cramps in her bilateral lower extremities in the back of her legs. These come on about once every couple of weeks.     She complains of her need to clear her throat over the interval.     Vital signs:      BP: 125/82 Pulse: 61     SpO2: 94 %       Weight: 56.6 kg (124 lb 12.8 oz)  Estimated body mass index is 25.21 kg/m  as calculated from the following:    Height as of 19: 1.499 m (4' 11\").    Weight as of this encounter: 56.6 kg (124 lb 12.8 oz).    Examination:  General: NAD, sitting in chair  Resp: breathing comfortably on room air, no respiratory distress  Skin: " warm and dry  Extremities: no edema    Neuro:  Mental Status: Fully alert, attentive and oriented. Speech fluent without errors.   Cranial Nerves: Visual fields intact. PERRL. EOMI with normal smooth pursuit. Facial movements symmetric. Obicularis oculi/oris strength 5/5. Hearing intact to conversation. No dysarthria. Shoulder shrug strong bilaterally.   Motor: Normal tone throughout. No pronator drift. FDI L>R, APB L>R atrophy. Fasciculations noted in left hand      Right Left   Neck flexion 4+     Neck extension: 5     Shoulder abduction:       4+ 4   Elbow extension: 5 4   Elbow flexion:             5 4-   Wrist flexion:             4+ 4-   Wrist extension:        5 4-   Finger flexion 5 4-   Finger extension 4 3   FDI 4 3   APB 4 1   Hip flexion 4+ 4   Hip extension 5 5   Knee flexion 5 5   Knee extension 5 5   Dorsiflexion 5 5   Plantar flexion 5 5       Reflexes: No clonus at the ankles. +Jaw jerk     Patella Ankle Babinski Biceps Brachiorad Perez   Right 1+ 1+ Neg 2+ with spread 2+ with spread Neg   Left 1+ 1+ Neg 3+ 3+ Pos     Sensory: Intact/symmetric to light touch throughout upper and lower extremities.   Coordination: FNF intact without ataxia or dysmetria.   Station/Gait: Normal casual gait with good arm swing.      IINVESTIGATIONS:  All available and relevant labs, imaging, and other procedures were reviewed with the patient at this visit.     Prior pertinent laboratory work-up:     PFT's 5/30/19:  FVC 96% predicted  FEV1 is 106% predicted for age, heigh, and sex.   MIP is -55cm of water with a MEP of 62      11/29/2018   CSF unremarkable- protein 32, 0 oligoclonal bands  GM1 antibody negative     Prior pertinent radiology work-up:  MRI cervical spine w/o contrast- unremarkable  5/6 disc herniation otherwise unremarkable     Prior electrophysiologic work-up:  Reviewed- see EMR for full report  11/2/2018- fibrillations in right bicep, left EIP, FDI, bicep, delotid, left TA, gastroc, wide spread  fasciculations   evidence of motor unit remodeling and chornic denervation in all msucles sampled.       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 Pichardo returns to ALS clinic for follow up. Presented with progressive, painless weakness of the left upper extremity. Workup has included MRI cervical spine which was negative, LP which was unremarkable, negative GM1 Ab, EMG showing widespread fasciculations and evidence of motor unit remodeling and chronic denervation. She is returns for three month follow up and is doing well and appears to be progressing slowly. She is currently on riluzole. She decided not to pursue radicava therapy. She is currently enrolled in the GTAC study. AST/ALT were checked recently at rheumatology appointment and were normal so these will not be repeated today. She met with OT today regarding her left hand weakness. Will plan to continue current therapy and follow up in 6 months time in ALS clinic.  -Continue riluzole 50mg BID  -Will meet with research coordinator today, currently in GTAC  -OT evaluation today    RETURN TO CLINIC: 6 months    Patient care discussed with attending neurologist, Dr. Beckham, who agrees with my assessment and plan.    Darion Long MD  Neurology PGY3      ATTENDING ADDENDUM: Patient seen and examined with resident Dr Long at the [facility] today. I agree with his assessment and plan as above. TT spent for patient care 15 minutes; more than half was counseling.Mason Beckham MD

## 2019-05-30 NOTE — PROGRESS NOTES
OUTPATIENT OCCUPATIONAL THERAPY CLINIC NOTE  Britt Pichardo  YOB: 1940  [id number]    Type of visit:  Evaluation            Date of service: 5/30/19    Referring provider: Dr. Mason Beckham    Others present at visit:  Child(adriana), kristal-Courtney    Medical diagnosis:   Amyotrophic lateral sclerosis (ALS), probable     Date of diagnosis: 11/29/18     Pertinent medical history:  Onset of distal UE weakness L > R, 2017    Additional Occupational Profile Information (patterns of daily living, interests, values and needs): Pt is a 78 y.o. Woman at time of dx with ALS living alone in her own home who manages all of her own IADL.    Cardio-respiratory status:  Forced vital capacity:  96% of predicted     Height/Weight: 5' / 124#    Living environment:  House    Living environment barriers:  2 stairs to enter (0 railing present)    Steps to basement with railing  Current assistance/living environment:  Lives alone      Current mobility equipment:  None    Current ADL equipment:  None     Technology used: NT    Patient concerns/goals: L hand function is reduced some with things such as cutting food and writing. She is having some leg cramps and thumb/hand cramps, new L bicep weakness with exercise at [employer] and also noting leg fatigue with walking distance and with IADL tasks at home.    Evaluation   Interview completed.   Pain assessment:  Pain denied    Range of motion:  R handed ; UE AROM is full    Manual muscle testing: UE's: R bicep 5/5 and L 4-/5; L lateral and palmar pinch are weak ; R is mildly weak and L is quite weak with weakness involving L thumb and long finger flexors of index and middle.,  is fairly strong R and weaker on L    Cognition:  Appears WFL    ADL/IADL: INd with all, drives, meal prep, mows lawn,  Does own shoveling, hires for heavier snows    Fall Risk Screen:   Has the patient fallen 2 or more times in the last year? No      Has the patient fallen and had an injury  in the past year? No       Timed Up and Go Score: NT, due to patient's limited time    Is the patient a fall risk? No     Impairments:  Muscle atrophy  Coordination     Treatment diagnosis:  Impaired activities of daily living and IADL    Assessment of Occupational Performance: 1-3 Performance Deficits  Identified Performance Deficits (ie: feeding, social skills): eating, writing for home management, yard care, laundry  Clinical Decision Making (Complexity): Low complexity     Recommendations/Plan of care:  Patient would benefit from interventions to enhance safety and independence.  Rehab potential good for stated goals.  Occupational therapy intervention for  self care/home management and therapeutic procedure  1 session evaluation & treatment.    Goals:   Target date: today  Patient, family and/or caregiver will verbalize understanding of evaluation results and implications for functional performance.  Patient, family and/or caregiver will verbalize/demonstrate understanding of compensatory methods /equipment to enhance functional independence and safety.  Patient, family and/or caregiver will verbalize/demonstrate understanding of positioning techniques/equipment.      Educational assessment/barriers to learning:  none      Treatment provided this date:   Self care/home management, 8 minutes of training in:  -adaptations to compensate for weakness in her left hand affecting pinch weakness:   -custom thumb splint no longer appropriate given L long finger flexor weakness even when thumb is stabilized by therapist to simulate splint.  - built up foam that can be used for fork to aid holding in L hand to cut food  -Pen Again-shown online resources as option for progression of R pinch weakness to aid hand writing     Instructed in what is known with limited research about strengthening with grade 3 or greater for people in ALS and instructed to continue as her UE strength, other than L hand is above grade 3 and that  she should monitor for signs of fatigue after exer that may affect her ADL/IADL to know if she is overdoing it.  -training in energy management and methods to rest LE's when fatigued and to save 1/3 more energy by resting with LE's and in reclined position to optimize her fatigue management. Pt agrees to trial this  As she notes she is getting fatigue now with walking only 1/2 mile for exer (has reduced from 6 additional blocks she used to walk) and also with lawn mowing and other IADL she has done.    Therapeutic procedure: 5 min of training in    -answered questions about appropriate activity/exercise level given ALS dx as pt with inquiry about  difficulty with lifting weights on L side for bicep curls at  [employer].   -demonstrated exercises for stretching of thumbs as pt notes new cramps she gets of thumbs  adducting/flexing into her palms. Trained in how to do these in response to cramps and also proactively  to help prevent cramps. Training  of motor neuron loss and why cramps and fasciculations occur    Response to treatment/recommendations: receptive but declines any adaptations and registering with [facility] as hopes she does not have ALS    Goal attainment:  All goals met    Risks and benefits of evaluation/treatment have been explained.  Patient, family and/or caregiver are in agreement with Plan of Care.  *ALS FRS-R (ALS Functional Rating Scale-Revised) completed today. Please see separate note.     Timed Code Treatment Minutes: 13  Total Treatment Time (sum of timed and untimed services): 28    Signature: VEE [place], [facility]   Date: 5/30/19       Certification:  Onset date: 5/30/19  Start of care date: 5/30/19  Certification date from 5/30/19 to 5/30/19    I CERTIFY THE NEED FOR THESE SERVICES FURNISHED UNDER        THIS PLAN OF TREATMENT AND WHILE UNDER MY CARE     (Physician attestation of this document indicates review and certification of the therapy plan).

## 2019-05-30 NOTE — PROGRESS NOTES
"  DEPARTMENT OF NEUROLOGY  [facility] FOLLOW UP    Patient Name:  Britt Pichardo  MRN:  [id number]    :  1940  Date of Clinic Visit:  May 30, 2019  Primary Care Provider:  Vineet Mendoza        SUMMARY: Mrs. Pichardo is a 78 year old woman with progressive left upper extremity painless weakness since May 2017. Physical exam shows UMN and LMN signs in the cervical segment. EMG shows widespread spontaneous activity and chronic denervation/motor unit remodeling in 2+ muscles in the cervical and lumbar segments. Thoracic paraspinals not sampled. 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     Please see notes from encounter on 18 for more information about this patient's initial presentation to this clinic.     INTERVAL HISTORY: Ms. Pichardo was last seen in ALS clinic on 19. Since that time she states that she feels that her left hand weakness has progressed. She reports that she has difficulty with fine motor movements that necessitate a \"pincher\" movement. She also reports increasing cramps in her bilateral lower extremities in the back of her legs. These come on about once every couple of weeks.     She complains of her need to clear her throat over the interval.     Vital signs:      BP: 125/82 Pulse: 61     SpO2: 94 %       Weight: 56.6 kg (124 lb 12.8 oz)  Estimated body mass index is 25.21 kg/m  as calculated from the following:    Height as of 19: 1.499 m (4' 11\").    Weight as of this encounter: 56.6 kg (124 lb 12.8 oz).    Examination:  General: NAD, sitting in chair  Resp: breathing comfortably on room air, no respiratory distress  Skin: warm and dry  Extremities: no edema    Neuro:  Mental Status: Fully alert, attentive and oriented. Speech fluent without errors.   Cranial Nerves: Visual fields intact. PERRL. EOMI with normal smooth pursuit. Facial movements symmetric. Obicularis oculi/oris strength 5/5. Hearing " intact to conversation. No dysarthria. Shoulder shrug strong bilaterally.   Motor: Normal tone throughout. No pronator drift. FDI L>R, APB L>R atrophy. Fasciculations noted in left hand      Right Left   Neck flexion 4+     Neck extension: 5     Shoulder abduction:       4+ 4   Elbow extension: 5 4   Elbow flexion:             5 4-   Wrist flexion:             4+ 4-   Wrist extension:        5 4-   Finger flexion 5 4-   Finger extension 4 3   FDI 4 3   APB 4 1   Hip flexion 4+ 4   Hip extension 5 5   Knee flexion 5 5   Knee extension 5 5   Dorsiflexion 5 5   Plantar flexion 5 5       Reflexes: No clonus at the ankles. +Jaw jerk     Patella Ankle Babinski Biceps Brachiorad Perez   Right 1+ 1+ Neg 2+ with spread 2+ with spread Neg   Left 1+ 1+ Neg 3+ 3+ Pos     Sensory: Intact/symmetric to light touch throughout upper and lower extremities.   Coordination: FNF intact without ataxia or dysmetria.   Station/Gait: Normal casual gait with good arm swing.      IINVESTIGATIONS:  All available and relevant labs, imaging, and other procedures were reviewed with the patient at this visit.     Prior pertinent laboratory work-up:     PFT's 5/30/19:  FVC 96% predicted  FEV1 is 106% predicted for age, heigh, and sex.   MIP is -55cm of water with a MEP of 62      11/29/2018   CSF unremarkable- protein 32, 0 oligoclonal bands  GM1 antibody negative     Prior pertinent radiology work-up:  MRI cervical spine w/o contrast- unremarkable  5/6 disc herniation otherwise unremarkable     Prior electrophysiologic work-up:  Reviewed- see EMR for full report  11/2/2018- fibrillations in right bicep, left EIP, FDI, bicep, delotid, left TA, gastroc, wide spread fasciculations   evidence of motor unit remodeling and chornic denervation in all msucles sampled.       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 Pichardo returns to ALS clinic for follow up. Presented with  progressive, painless weakness of the left upper extremity. Workup has included MRI cervical spine which was negative, LP which was unremarkable, negative GM1 Ab, EMG showing widespread fasciculations and evidence of motor unit remodeling and chronic denervation. She is returns for three month follow up and is doing well and appears to be progressing slowly. She is currently on riluzole. She decided not to pursue radicava therapy. She is currently enrolled in the GT study. AST/ALT were checked recently at rheumatology appointment and were normal so these will not be repeated today. She met with OT today regarding her left hand weakness. Will plan to continue current therapy and follow up in 6 months time in ALS clinic.  -Continue riluzole 50mg BID  -Will meet with research coordinator today, currently in [facility]  -OT evaluation today    RETURN TO CLINIC: 6 months    Patient care discussed with attending neurologist, Dr. Beckham, who agrees with my assessment and plan.    Darion Long MD  Neurology PGY3      ATTENDING ADDENDUM: Patient seen and examined with resident Dr Long at the [facility] today. I agree with his assessment and plan as above. TT spent for patient care 15 minutes; more than half was counseling.Mason Beckham MD

## 2019-06-04 LAB
EXPTIME-PRE: 3.31 SEC
FEF2575-%PRED-PRE: 140 %
FEF2575-PRE: 1.95 L/SEC
FEF2575-PRED: 1.39 L/SEC
FEFMAX-%PRED-PRE: 123 %
FEFMAX-PRE: 5.01 L/SEC
FEFMAX-PRED: 4.05 L/SEC
FEV1-%PRED-PRE: 106 %
FEV1-PRE: 1.74 L
FEV1FEV6-PRE: 85 %
FEV1FEV6-PRED: 78 %
FEV1FVC-PRE: 85 %
FEV1FVC-PRED: 78 %
FIFMAX-PRE: 2.4 L/SEC
FVC-%PRED-PRE: 96 %
FVC-PRE: 2.05 L
FVC-PRED: 2.12 L
MEP-PRE: 62 CMH2O
MIP-PRE: -55 CMH2O

## 2019-07-16 ENCOUNTER — TRANSFERRED RECORDS (OUTPATIENT)
Dept: HEALTH INFORMATION MANAGEMENT | Facility: CLINIC | Age: 79
End: 2019-07-16

## 2019-09-03 DIAGNOSIS — G12.21 ALS (AMYOTROPHIC LATERAL SCLEROSIS) (H): ICD-10-CM

## 2019-09-03 RX ORDER — RILUZOLE 50 MG/1
TABLET, FILM COATED ORAL
Qty: 60 TABLET | Refills: 2 | Status: SHIPPED | OUTPATIENT
Start: 2019-09-03 | End: 2019-12-05

## 2019-09-03 NOTE — TELEPHONE ENCOUNTER
Rx Authorization:    Requested Medication/ Dose Riluzole oral tablet 50mg    Date last refill ordered: 5/10/2019    Quantity ordered:  60    # refills: 3    Date of last clinic visit with ordering provider: 5/30/2019    Date of next clinic visit with ordering provider: 10/31/19    All pertinent protocol data (lab date/result):     Include pertinent information from patients message:

## 2019-09-17 ENCOUNTER — TRANSFERRED RECORDS (OUTPATIENT)
Dept: HEALTH INFORMATION MANAGEMENT | Facility: CLINIC | Age: 79
End: 2019-09-17

## 2019-10-30 DIAGNOSIS — G12.21 ALS (AMYOTROPHIC LATERAL SCLEROSIS) (H): Primary | ICD-10-CM

## 2019-10-31 ENCOUNTER — OFFICE VISIT (OUTPATIENT)
Dept: NEUROLOGY | Facility: CLINIC | Age: 79
End: 2019-10-31
Payer: MEDICARE

## 2019-10-31 VITALS
BODY MASS INDEX: 24.8 KG/M2 | HEIGHT: 59 IN | OXYGEN SATURATION: 94 % | HEART RATE: 68 BPM | DIASTOLIC BLOOD PRESSURE: 70 MMHG | SYSTOLIC BLOOD PRESSURE: 152 MMHG | WEIGHT: 123 LBS

## 2019-10-31 DIAGNOSIS — G12.21 ALS (AMYOTROPHIC LATERAL SCLEROSIS) (H): ICD-10-CM

## 2019-10-31 DIAGNOSIS — G12.21 ALS (AMYOTROPHIC LATERAL SCLEROSIS) (H): Primary | ICD-10-CM

## 2019-10-31 ASSESSMENT — ENCOUNTER SYMPTOMS
HEMOPTYSIS: 0
POSTURAL DYSPNEA: 0
SNORES LOUDLY: 0
ARTHRALGIAS: 0
STIFFNESS: 0
TROUBLE SWALLOWING: 0
SINUS CONGESTION: 0
NECK PAIN: 0
DYSPNEA ON EXERTION: 0
COUGH: 1
SORE THROAT: 0
HOARSE VOICE: 1
SINUS PAIN: 0
SHORTNESS OF BREATH: 0
POOR WOUND HEALING: 0
WHEEZING: 0
PANIC: 0
DECREASED CONCENTRATION: 0
NAIL CHANGES: 0
MUSCLE CRAMPS: 1
MUSCLE WEAKNESS: 1
COUGH DISTURBING SLEEP: 0
NERVOUS/ANXIOUS: 0
SKIN CHANGES: 0
NECK MASS: 0
MYALGIAS: 1
INSOMNIA: 0
JOINT SWELLING: 0
BACK PAIN: 0
DEPRESSION: 0
TASTE DISTURBANCE: 0
SPUTUM PRODUCTION: 0
SMELL DISTURBANCE: 0

## 2019-10-31 ASSESSMENT — PAIN SCALES - GENERAL: PAINLEVEL: NO PAIN (0)

## 2019-10-31 ASSESSMENT — MIFFLIN-ST. JEOR: SCORE: 933.55

## 2019-10-31 NOTE — PROGRESS NOTES
Service Date: 10/31/2019      Vineet Mendoza MD   [facility]    [address] [place] [address]      RE: Britt Pichardo   MRN: [id number]   : 1940      Dear Dr. Mendoza:      I had the pleasure to see Ms. Pichardo in followup at the [facility] today.  She is an 79-year-old woman with limb onset ALS with slow progression historically that began with left upper extremity painless distal weakness in 2017.  Physical exam showed upper and lower motor neuron signs in the cervical segment and EMG showed widespread denervation and reinnervation in the cervical and lumbar segments.  Thoracic paraspinals were not sampled.  She meets [place] criteria for clinically probable lab supported ALS. Compared to the last visit in 2019, she tells me she has a little more weakness in the left hand and atrophy.  She is right-handed, so this thankfully has not affected a lot her activities of daily living.  She had noticed subtle weakness in the right arm, but not severe and she is able to do all tasks with the right hand without major limitations.  She has occasional muscle cramps that do not occur daily. Regarding the legs, she has noticed only mild  difficulty getting up from very low seated chairs or from the floor.  Otherwise, she does not have problems with chairs of regular height, difficulties with stairs, tripping, or falling, and does not feel the need to use any assistive device.  She does not have difficulties turning in bed.  She uses the hand rails to get up on the stairs, but that is something she has been doing for many years and preceded the onset of ALS.      In terms of bulbar symptoms, she denies any dysarthria, dysphagia, difficulty chewing, pseudobulbar affect, head drop, ptosis or diplopia.  She has occasional drooling only at night and very rarely during the day and she may  sometimes have to use a handkerchief but she does not feel the drooling is bothersome enough that it requires treatment.  She denies dyspnea on exertion, orthopnea, nonrefreshing sleep or morning headaches.  She is taking riluzole 50 mg b.i.d., tolerating well.  She was not interested in Radicava.      MEDICATIONS:  Reviewed and are as per Epic record.      PHYSICAL EXAMINATION:   VITAL SIGNS:  Blood pressure 152/70.  Pulse 68 and regular.  O2 sat 94% on room air.  Weight 55.8 kilos.  Height is 149.9 and she endorsed no pain.      Pulmonary function tests show a minor decline from her last visit with FVC1 88% or 89% predicted, FEV1 100% predicted and MIP of -53 cm water; 6 months ago, FVC was 96% predicted.     NEUROLOGIC:  She has no ptosis or ophthalmoparesis.  She has minimal weakness of orbicularis oculi, no weakness of orbicularis oris or cheek puff.  Uvula and palate elevate normally.  There is no dysphonia or dysarthria.  Tongue does not show definite atrophy or fasciculations.  Lateral tongue movements are done fast.  Genioglossus strength is normal.  Pterygoids are normal.  Neck flexion is 4/5, extension is full.  Her strength in the upper limbs is as follows on MRC scale (right/left):  Deltoids 4/3 to 4-, biceps 4+/3, triceps 5/4, wrist extensors 5/4-, finger extensors 3/3, FDI 4-/1, APB 4-/1, hand  5/3 to 4-.  Leg strength is 4/5 for bilateral hip flexion, 4+ for bilateral foot dorsiflexion, but 5/5 for knee extension and flexion.  There was striking atrophy in the left more than right intrinsic hand muscles and some fasciculations there.  Reflexes were 2+ at the right upper extremity and 3+ at the left. There was a left positive Prasad and pectoralis reflex, but negative on the right.  Knee and ankle reflexes were normal.  There was no spasticity or rigidity in upper and lower extremities.      In summary, Britt has slowly progressive limb onset ALS.  We discussed a number of practical issues during  a 15-minute encounter, of which more than half was counseling.  She will continue riluzole.  She had liver function tests checked a month ago that were satisfactory.  They will not be repeated today.  She will follow up for the [facility] genetic study that she is participating on.  We did not have an occupational therapist in Clinic today, and we will place an outpatient referral.  She otherwise does not have any new therapy needs.  I gave her updates on the most recent research in ALS.  Her sialorrhea is minimal and does not require intervention at present.  Follow up in 6 months, sooner if needed.       Sincerely,      MD VENITA Kelly MD             D: 10/31/2019   T: 10/31/2019   MT: AKA      Name:     PHYLLIS DOYLE   MRN:      [id number]        Account:      [id number]   :      1940           Service Date: 10/31/2019      Document: [id number]

## 2019-10-31 NOTE — LETTER
10/31/2019       RE: Britt Pichardo  [address]  [facility] [address]     Dear Colleague,    Thank you for referring your patient, Britt Pichardo, to the [facility] NEUROLOGY at [facility]. Please see a copy of my visit note below.    Service Date: 10/31/2019      Vineet Mendoza MD   [facility]    [address] [place] [address]      RE: Britt Pichardo   MRN: [id number]   : 1940      Dear Dr. Mendoza:      I had the pleasure to see Ms. Pichardo in followup at the [facility] today.  She is an 79-year-old woman with limb onset ALS with slow progression historically that began with left upper extremity painless distal weakness in 2017.  Physical exam showed upper and lower motor neuron signs in the cervical segment and EMG showed widespread denervation and reinnervation in the cervical and lumbar segments.  Thoracic paraspinals were not sampled.  She meets [place] criteria for clinically probable lab supported ALS. Compared to the last visit in 2019, she tells me she has a little more weakness in the left hand and atrophy.  She is right-handed, so this thankfully has not affected a lot her activities of daily living.  She had noticed subtle weakness in the right arm, but not severe and she is able to do all tasks with the right hand without major limitations.  She has occasional muscle cramps that do not occur daily. Regarding the legs, she has noticed only mild  difficulty getting up from very low seated chairs or from the floor.  Otherwise, she does not have problems with chairs of regular height, difficulties with stairs, tripping, or falling, and does not feel the need to use any assistive device.  She does not have difficulties turning in bed.  She uses the hand rails to get up on the stairs, but that is something she has been doing  for many years and preceded the onset of ALS.      In terms of bulbar symptoms, she denies any dysarthria, dysphagia, difficulty chewing, pseudobulbar affect, head drop, ptosis or diplopia.  She has occasional drooling only at night and very rarely during the day and she may sometimes have to use a handkerchief but she does not feel the drooling is bothersome enough that it requires treatment.  She denies dyspnea on exertion, orthopnea, nonrefreshing sleep or morning headaches.  She is taking riluzole 50 mg b.i.d., tolerating well.  She was not interested in Radicava.      MEDICATIONS:  Reviewed and are as per Epic record.      PHYSICAL EXAMINATION:   VITAL SIGNS:  Blood pressure 152/70.  Pulse 68 and regular.  O2 sat 94% on room air.  Weight 55.8 kilos.  Height is 149.9 and she endorsed no pain.      Pulmonary function tests show a minor decline from her last visit with FVC1 88% or 89% predicted, FEV1 100% predicted and MIP of -53 cm water; 6 months ago, FVC was 96% predicted.     NEUROLOGIC:  She has no ptosis or ophthalmoparesis.  She has minimal weakness of orbicularis oculi, no weakness of orbicularis oris or cheek puff.  Uvula and palate elevate normally.  There is no dysphonia or dysarthria.  Tongue does not show definite atrophy or fasciculations.  Lateral tongue movements are done fast.  Genioglossus strength is normal.  Pterygoids are normal.  Neck flexion is 4/5, extension is full.  Her strength in the upper limbs is as follows on MRC scale (right/left):  Deltoids 4/3 to 4-, biceps 4+/3, triceps 5/4, wrist extensors 5/4-, finger extensors 3/3, FDI 4-/1, APB 4-/1, hand  5/3 to 4-.  Leg strength is 4/5 for bilateral hip flexion, 4+ for bilateral foot dorsiflexion, but 5/5 for knee extension and flexion.  There was striking atrophy in the left more than right intrinsic hand muscles and some fasciculations there.  Reflexes were 2+ at the right upper extremity and 3+ at the left. There was a left positive  Prasad and pectoralis reflex, but negative on the right.  Knee and ankle reflexes were normal.  There was no spasticity or rigidity in upper and lower extremities.      In summary, Phyllis has slowly progressive limb onset ALS.  We discussed a number of practical issues during a 15-minute encounter, of which more than half was counseling.  She will continue riluzole.  She had liver function tests checked a month ago that were satisfactory.  They will not be repeated today.  She will follow up for the [facility] genetic study that she is participating on.  We did not have an occupational therapist in Clinic today, and we will place an outpatient referral.  She otherwise does not have any new therapy needs.  I gave her updates on the most recent research in ALS.  Her sialorrhea is minimal and does not require intervention at present.  Follow up in 6 months, sooner if needed.       Sincerely,      Mason Beckham MD      D: 10/31/2019   T: 10/31/2019   MT: AKA      Name:     PHYLLIS DOYLE   MRN:      [id number]        Account:      [id number]   :      1940           Service Date: 10/31/2019      Document: [id number]

## 2019-11-04 LAB
EXPTIME-PRE: 3.89 SEC
FEF2575-%PRED-PRE: 133 %
FEF2575-PRE: 1.85 L/SEC
FEF2575-PRED: 1.39 L/SEC
FEFMAX-%PRED-PRE: 120 %
FEFMAX-PRE: 4.88 L/SEC
FEFMAX-PRED: 4.05 L/SEC
FEV1-%PRED-PRE: 100 %
FEV1-PRE: 1.64 L
FEV1FEV6-PRE: 87 %
FEV1FEV6-PRED: 78 %
FEV1FVC-PRE: 87 %
FEV1FVC-PRED: 78 %
FIFMAX-PRE: 2.56 L/SEC
FVC-%PRED-PRE: 89 %
FVC-PRE: 1.88 L
FVC-PRED: 2.12 L
MEP-PRE: 60 CMH2O
MIP-PRE: -53 CMH2O

## 2019-11-19 ENCOUNTER — TRANSFERRED RECORDS (OUTPATIENT)
Dept: HEALTH INFORMATION MANAGEMENT | Facility: CLINIC | Age: 79
End: 2019-11-19

## 2019-11-25 DIAGNOSIS — E78.5 HYPERLIPIDEMIA LDL GOAL <130: ICD-10-CM

## 2019-11-25 RX ORDER — LOVASTATIN 20 MG
TABLET ORAL
Qty: 90 TABLET | Refills: 0 | Status: SHIPPED | OUTPATIENT
Start: 2019-11-25 | End: 2020-02-05

## 2019-11-25 NOTE — TELEPHONE ENCOUNTER
"Requested Prescriptions   Pending Prescriptions Disp Refills     lovastatin (MEVACOR) 20 MG tablet [Pharmacy Med Name: Lovastatin Oral Tablet 20 MG]  Last Written Prescription Date:  6/3/2019  Last Fill Quantity: 90 tablet,  # refills: 1   Last office visit: 1/21/2019 with prescribing provider:  Reji   Future Office Visit:     90 tablet 0     Sig: TAKE ONE TABLET BY MOUTH AT BEDTIME       [facility] Protocol Passed - 11/25/2019  9:46 AM        Passed - LDL on file in past 12 months     Recent Labs   Lab Test 11/26/18  0924   LDL 57             Passed - No abnormal creatine kinase in past 12 months     Recent Labs   Lab Test 02/28/19  1228   CKT 58                Passed - Recent (12 mo) or future (30 days) visit within the authorizing provider's specialty     Patient has had an office visit with the authorizing provider or a provider within the authorizing providers department within the previous 12 mos or has a future within next 30 days. See \"Patient Info\" tab in inbasket, or \"Choose Columns\" in Meds & Orders section of the refill encounter.              Passed - Medication is active on med list        Passed - Patient is age 18 or older        Passed - No active pregnancy on record        Passed - No positive pregnancy test in past 12 months           "

## 2019-12-05 DIAGNOSIS — G12.21 ALS (AMYOTROPHIC LATERAL SCLEROSIS) (H): ICD-10-CM

## 2019-12-05 RX ORDER — RILUZOLE 50 MG/1
TABLET, FILM COATED ORAL
Qty: 60 TABLET | Refills: 2 | Status: SHIPPED | OUTPATIENT
Start: 2019-12-05 | End: 2020-03-09

## 2019-12-05 NOTE — TELEPHONE ENCOUNTER
Rx Authorization:    Requested Medication/ Dose riluzole (RILUTEK) 50 MG tablet    Date last refill ordered: 9/3/19    Quantity ordered: 60    # refills: 2    Date of last clinic visit with ordering provider: 5/30/19    Date of next clinic visit with ordering provider: 4/23/20    All pertinent protocol data (lab date/result):     Include pertinent information from patients message:

## 2019-12-11 ENCOUNTER — TRANSFERRED RECORDS (OUTPATIENT)
Dept: HEALTH INFORMATION MANAGEMENT | Facility: CLINIC | Age: 79
End: 2019-12-11

## 2019-12-12 ENCOUNTER — TRANSFERRED RECORDS (OUTPATIENT)
Dept: HEALTH INFORMATION MANAGEMENT | Facility: CLINIC | Age: 79
End: 2019-12-12

## 2019-12-16 ENCOUNTER — TELEPHONE (OUTPATIENT)
Dept: FAMILY MEDICINE | Facility: CLINIC | Age: 79
End: 2019-12-16

## 2019-12-16 NOTE — TELEPHONE ENCOUNTER
Our goal is to have forms completed within 72 hours, however some forms may require a visit or additional information.    What clinic location was the form placed at [facility]     Who is the form from?   Where did the form come from? Mailed to clinic   The form was placed in the inbox of Vineet Mendoza MD      Please fax to [phone]  Phone number: [phone]    Additional comments: medicare blue medication safety review form    Call take on 12/16/2019 at 5:04 PM by Louisa Armstrong

## 2020-01-14 ENCOUNTER — TRANSFERRED RECORDS (OUTPATIENT)
Dept: HEALTH INFORMATION MANAGEMENT | Facility: CLINIC | Age: 80
End: 2020-01-14

## 2020-01-20 ENCOUNTER — HOSPITAL ENCOUNTER (OUTPATIENT)
Dept: MAMMOGRAPHY | Facility: CLINIC | Age: 80
Discharge: HOME | End: 2020-01-20
Attending: FAMILY MEDICINE | Admitting: FAMILY MEDICINE
Payer: MEDICARE

## 2020-01-20 DIAGNOSIS — Z12.31 VISIT FOR SCREENING MAMMOGRAM: ICD-10-CM

## 2020-01-20 PROCEDURE — 77067 SCR MAMMO BI INCL CAD: CPT

## 2020-02-03 NOTE — PROGRESS NOTES
"SUBJECTIVE:   Britt Pichardo is a 80 year old female who presents for Preventive Visit.  {PVP to remind patient that this is not necessarily a physical exam; physical exam may or may not be done:771360::\"click delete button to remove this line now\"}  {PVP to inform patient that additional E&M charge may apply, if additional problems addressed:628033::\"click delete button to remove this line now\"}  Are you in the first 12 months of your Medicare coverage?  { :121730::\"No\"}    Healthy Habits:     In general, how would you rate your overall health?  Good    Frequency of exercise:  2-3 days/week    Duration of exercise:  30-45 minutes    Do you usually eat at least 4 servings of fruit and vegetables a day, include whole grains    & fiber and avoid regularly eating high fat or \"junk\" foods?  Yes    Taking medications regularly:  Yes    Medication side effects:  Not applicable and None    Ability to successfully perform activities of daily living:  No assistance needed    Home Safety:  No safety concerns identified    Hearing Impairment:  No hearing concerns    In the past 6 months, have you been bothered by leaking of urine?  No    In general, how would you rate your overall mental or emotional health?  Good      PHQ-2 Total Score: 0    Additional concerns today:  No    Do you feel safe in your environment? { :628712}    Have you ever done Advance Care Planning? (For example, a Health Directive, POLST, or a discussion with a medical provider or your loved ones about your wishes): { :301829}    {Hearing Test Done (Optional):947995}  Fall risk  { :722605}  {If any of the above assessments are answered yes, consider ordering appropriate referrals (Optional):863892::\"click delete button to remove this line now\"}  Cognitive Screening { :558670}    {Do you have sleep apnea, excessive snoring or daytime drowsiness? (Optional):761105}    Reviewed and updated as needed this visit by clinical staff         Reviewed and updated as " needed this visit by Provider        Social History     Tobacco Use     Smoking status: Former Smoker     Types: Cigarettes     Last attempt to quit: 1976     Years since quittin.1     Smokeless tobacco: Never Used   Substance Use Topics     Alcohol use: Yes     Comment: occasional - special events/holidays only     {Rooming Staff- Complete this question if Prescreen response is not shown below for today's visit. If you drink alcohol do you typically have >3 drinks per day or >7 drinks per week? (Optional):112767}    Alcohol Use 2019   Prescreen: >3 drinks/day or >7 drinks/week? { AUDIT responses all:TXT,79744}   {add AUDIT responses (Optional) (A score of 7 for adult men is an indication of hazardous drinking; a score of 8 or more is an indication of an alcohol use disorder.  A score of 7 or more for adult women is an indication of hazardous drinking or an alchohol use disorder):034429}    {Outside tests to abstract? :674489}    {additional problems to add (Optional):637758}    Current providers sharing in care for this patient include: {Rooming staff:  Please update Care Team in Rooming Activity, refresh this note and then delete this statement}  Patient Care Team:  Vineet Mendoza MD as PCP - General (Family Practice)  Vineet Mendoza MD as Assigned PCP  Britt Matute LICSW as  ( - Clinical)  Mason Beckham MD as MD (Neurology)    The following health maintenance items are reviewed in Epic and correct as of today:  Health Maintenance   Topic Date Due     ZOSTER IMMUNIZATION (1 of 2) 1989     PHQ-2  2020     FALL RISK ASSESSMENT  2020     MEDICARE ANNUAL WELLNESS VISIT  2020     ADVANCE CARE PLANNING  2022     DTAP/TDAP/TD IMMUNIZATION (3 - Td) 2028     DEXA  Completed     INFLUENZA VACCINE  Completed     PNEUMOCOCCAL IMMUNIZATION 65+ LOW/MEDIUM RISK  Completed     IPV IMMUNIZATION  Aged Out      "MENINGITIS IMMUNIZATION  Aged Out     {Chronicprobdata (optional):901845}  {Decision Support (Optional):546605}    Review of Systems  {ROS COMP (Optional):904448}    OBJECTIVE:   There were no vitals taken for this visit. Estimated body mass index is 24.84 kg/m  as calculated from the following:    Height as of 10/31/19: 1.499 m (4' 11\").    Weight as of 10/31/19: 55.8 kg (123 lb).  Physical Exam  {Exam (Optional) :981755}    {Diagnostic Test Results (Optional):706335::\"Diagnostic Test Results:\",\"Labs reviewed in Epic\"}    ASSESSMENT / PLAN:   {Dia Picklist:430186}    COUNSELING:  {Medicare Counselin}    Estimated body mass index is 24.84 kg/m  as calculated from the following:    Height as of 10/31/19: 1.499 m (4' 11\").    Weight as of 10/31/19: 55.8 kg (123 lb).    {Weight Management Plan (ACO) Complete if BMI is abnormal-  Ages 18-64  BMI >24.9.  Age 65+ with BMI <23 or >30 (Optional):669777}     reports that she quit smoking about 43 years ago. Her smoking use included cigarettes. She has never used smokeless tobacco.  {Tobacco Cessation -- Complete if patient is a smoker (Optional):872706}    Appropriate preventive services were discussed with this patient, including applicable screening as appropriate for cardiovascular disease, diabetes, osteopenia/osteoporosis, and glaucoma.  As appropriate for age/gender, discussed screening for colorectal cancer, prostate cancer, breast cancer, and cervical cancer. Checklist reviewing preventive services available has been given to the patient.    Reviewed patients plan of care and provided an AVS. The {CarePlan:413044} for Britt meets the Care Plan requirement. This Care Plan has been established and reviewed with the {PATIENT, FAMILY MEMBER, CAREGIVER:934992}.    Counseling Resources:  ATP IV Guidelines  Pooled Cohorts Equation Calculator  Breast Cancer Risk Calculator  FRAX Risk Assessment  ICSI Preventive Guidelines  Dietary Guidelines for Americans, " 2010  USDA's MyPlate  ASA Prophylaxis  Lung CA Screening    Vineet Mendoza MD  [facility]    Identified Health Risks:

## 2020-02-05 ENCOUNTER — OFFICE VISIT (OUTPATIENT)
Dept: FAMILY MEDICINE | Facility: CLINIC | Age: 80
End: 2020-02-05
Payer: MEDICARE

## 2020-02-05 VITALS
HEIGHT: 59 IN | RESPIRATION RATE: 14 BRPM | DIASTOLIC BLOOD PRESSURE: 66 MMHG | HEART RATE: 62 BPM | TEMPERATURE: 97.9 F | WEIGHT: 123.5 LBS | OXYGEN SATURATION: 96 % | SYSTOLIC BLOOD PRESSURE: 128 MMHG | BODY MASS INDEX: 24.9 KG/M2

## 2020-02-05 DIAGNOSIS — Z00.00 ROUTINE MEDICAL EXAM: Primary | ICD-10-CM

## 2020-02-05 DIAGNOSIS — G12.21 ALS (AMYOTROPHIC LATERAL SCLEROSIS) (H): ICD-10-CM

## 2020-02-05 DIAGNOSIS — B00.9 RECURRENT HSV (HERPES SIMPLEX VIRUS): ICD-10-CM

## 2020-02-05 DIAGNOSIS — N39.3 STRESS INCONTINENCE OF URINE: ICD-10-CM

## 2020-02-05 DIAGNOSIS — Z82.49 FAMILY HISTORY OF CORONARY ARTERY DISEASE: ICD-10-CM

## 2020-02-05 DIAGNOSIS — N76.0 ESTROGEN DEFICIENT VULVOVAGINITIS: ICD-10-CM

## 2020-02-05 DIAGNOSIS — E78.5 HYPERLIPIDEMIA LDL GOAL <130: ICD-10-CM

## 2020-02-05 DIAGNOSIS — M05.79 RHEUMATOID ARTHRITIS INVOLVING MULTIPLE SITES WITH POSITIVE RHEUMATOID FACTOR (H): ICD-10-CM

## 2020-02-05 DIAGNOSIS — E55.9 VITAMIN D DEFICIENCY: ICD-10-CM

## 2020-02-05 LAB
ALBUMIN UR-MCNC: NEGATIVE MG/DL
ANION GAP SERPL CALCULATED.3IONS-SCNC: 9 MMOL/L (ref 3–14)
APPEARANCE UR: CLEAR
BILIRUB UR QL STRIP: NEGATIVE
BUN SERPL-MCNC: 13 MG/DL (ref 7–30)
CALCIUM SERPL-MCNC: 9.4 MG/DL (ref 8.5–10.1)
CHLORIDE SERPL-SCNC: 102 MMOL/L (ref 94–109)
CHOLEST SERPL-MCNC: 159 MG/DL
CO2 SERPL-SCNC: 27 MMOL/L (ref 20–32)
COLOR UR AUTO: YELLOW
CREAT SERPL-MCNC: 0.7 MG/DL (ref 0.52–1.04)
GFR SERPL CREATININE-BSD FRML MDRD: 81 ML/MIN/{1.73_M2}
GLUCOSE SERPL-MCNC: 93 MG/DL (ref 70–99)
GLUCOSE UR STRIP-MCNC: NEGATIVE MG/DL
HDLC SERPL-MCNC: 73 MG/DL
HGB UR QL STRIP: NEGATIVE
KETONES UR STRIP-MCNC: NEGATIVE MG/DL
LDLC SERPL CALC-MCNC: 67 MG/DL
LEUKOCYTE ESTERASE UR QL STRIP: ABNORMAL
NITRATE UR QL: NEGATIVE
NON-SQ EPI CELLS #/AREA URNS LPF: ABNORMAL /LPF
NONHDLC SERPL-MCNC: 86 MG/DL
PH UR STRIP: 7 PH (ref 5–7)
POTASSIUM SERPL-SCNC: 3.9 MMOL/L (ref 3.4–5.3)
RBC #/AREA URNS AUTO: ABNORMAL /HPF
SODIUM SERPL-SCNC: 138 MMOL/L (ref 133–144)
SOURCE: ABNORMAL
SP GR UR STRIP: 1.02 (ref 1–1.03)
TRIGL SERPL-MCNC: 93 MG/DL
UROBILINOGEN UR STRIP-ACNC: 0.2 EU/DL (ref 0.2–1)
WBC #/AREA URNS AUTO: ABNORMAL /HPF

## 2020-02-05 PROCEDURE — 81001 URINALYSIS AUTO W/SCOPE: CPT | Performed by: FAMILY MEDICINE

## 2020-02-05 PROCEDURE — 80048 BASIC METABOLIC PNL TOTAL CA: CPT | Performed by: FAMILY MEDICINE

## 2020-02-05 PROCEDURE — 80061 LIPID PANEL: CPT | Performed by: FAMILY MEDICINE

## 2020-02-05 PROCEDURE — G0439 PPPS, SUBSEQ VISIT: HCPCS | Performed by: FAMILY MEDICINE

## 2020-02-05 PROCEDURE — 82306 VITAMIN D 25 HYDROXY: CPT | Performed by: FAMILY MEDICINE

## 2020-02-05 PROCEDURE — 36415 COLL VENOUS BLD VENIPUNCTURE: CPT | Performed by: FAMILY MEDICINE

## 2020-02-05 RX ORDER — LOVASTATIN 20 MG
TABLET ORAL
Qty: 90 TABLET | Refills: 3 | Status: SHIPPED | OUTPATIENT
Start: 2020-02-05 | End: 2021-02-25

## 2020-02-05 ASSESSMENT — ACTIVITIES OF DAILY LIVING (ADL): CURRENT_FUNCTION: NO ASSISTANCE NEEDED

## 2020-02-05 ASSESSMENT — MIFFLIN-ST. JEOR: SCORE: 934.78

## 2020-02-05 NOTE — LETTER
February 6, 2020      Britt Pichardo  [address]  [place] [address]        Dear ,    We are writing to inform you of your test results.    Everything here looks pretty good with the exception of your urine.  We should probably get you back in 1 to 2 weeks to get another urinalysis done in the office.    Resulted Orders   UA with Microscopic   Result Value Ref Range    Color Urine Yellow     Appearance Urine Clear     Glucose Urine Negative NEG^Negative mg/dL    Bilirubin Urine Negative NEG^Negative    Ketones Urine Negative NEG^Negative mg/dL    Specific Gravity Urine 1.025 1.003 - 1.035    pH Urine 7.0 5.0 - 7.0 pH    Protein Albumin Urine Negative NEG^Negative mg/dL    Urobilinogen Urine 0.2 0.2 - 1.0 EU/dL    Nitrite Urine Negative NEG^Negative    Blood Urine Negative NEG^Negative    Leukocyte Esterase Urine Small (A) NEG^Negative    Source Midstream Urine     WBC Urine 5-10 (A) OTO5^0 - 5 /HPF    RBC Urine 2-5 (A) OTO2^O - 2 /HPF    Squamous Epithelial /LPF Urine Many (A) FEW^Few /LPF   Lipid Profile   Result Value Ref Range    Cholesterol 159 <200 mg/dL    Triglycerides 93 <150 mg/dL      Comment:      Fasting specimen    HDL Cholesterol 73 >49 mg/dL    LDL Cholesterol Calculated 67 <100 mg/dL      Comment:      Desirable:       <100 mg/dl    Non HDL Cholesterol 86 <130 mg/dL   Basic metabolic panel   Result Value Ref Range    Sodium 138 133 - 144 mmol/L    Potassium 3.9 3.4 - 5.3 mmol/L    Chloride 102 94 - 109 mmol/L    Carbon Dioxide 27 20 - 32 mmol/L    Anion Gap 9 3 - 14 mmol/L    Glucose 93 70 - 99 mg/dL      Comment:      Fasting specimen    Urea Nitrogen 13 7 - 30 mg/dL    Creatinine 0.70 0.52 - 1.04 mg/dL    GFR Estimate 81 >60 mL/min/[1.73_m2]      Comment:      Non  GFR Calc  Starting 12/18/2018, serum creatinine based estimated GFR (eGFR) will be   calculated using the Chronic Kidney Disease Epidemiology Collaboration   (CKD-EPI) equation.      GFR Estimate  If Black >90 >60 mL/min/[1.73_m2]      Comment:       GFR Calc  Starting 12/18/2018, serum creatinine based estimated GFR (eGFR) will be   calculated using the Chronic Kidney Disease Epidemiology Collaboration   (CKD-EPI) equation.      Calcium 9.4 8.5 - 10.1 mg/dL   Vitamin D Deficiency   Result Value Ref Range    Vitamin D Deficiency screening 38 20 - 75 ug/L      Comment:      Season, race, dietary intake, and treatment affect the concentration of   25-hydroxy-Vitamin D. Values may decrease during winter months and increase   during summer months. Values 20-29 ug/L may indicate Vitamin D insufficiency   and values <20 ug/L may indicate Vitamin D deficiency.  Vitamin D determination is routinely performed by an immunoassay specific for   25 hydroxyvitamin D3.  If an individual is on vitamin D2 (ergocalciferol)   supplementation, please specify 25 OH vitamin D2 and D3 level determination by   LCMSMS test [id number].         If you have any questions or concerns, please call the clinic at the number listed above.       Sincerely,        Vineet Mendoza MD

## 2020-02-05 NOTE — PROGRESS NOTES
"SUBJECTIVE:   Britt Pichardo is a 80 year old female who presents for Preventive Visit.      Are you in the first 12 months of your Medicare coverage?  No    Healthy Habits:     In general, how would you rate your overall health?  Good    Frequency of exercise:  2-3 days/week    Duration of exercise:  30-45 minutes    Do you usually eat at least 4 servings of fruit and vegetables a day, include whole grains    & fiber and avoid regularly eating high fat or \"junk\" foods?  Yes    Taking medications regularly:  Yes    Medication side effects:  Not applicable and None    Ability to successfully perform activities of daily living:  No assistance needed    Home Safety:  No safety concerns identified    Hearing Impairment:  No hearing concerns    In the past 6 months, have you been bothered by leaking of urine?  No    In general, how would you rate your overall mental or emotional health?  Good      PHQ-2 Total Score: 0    Additional concerns today:  No    Do you feel safe in your environment? Yes    Have you ever done Advance Care Planning? (For example, a Health Directive, POLST, or a discussion with a medical provider or your loved ones about your wishes): Yes, advance care planning is on file.      Fall risk  Fallen 2 or more times in the past year?: (P) No  Any fall with injury in the past year?: (P) No  click delete button to remove this line now  Cognitive Screening   1) Repeat 3 items (Leader, Season, Table)    2) Clock draw: NORMAL  3) 3 item recall: Recalls 3 objects  Results: 3 items recalled: COGNITIVE IMPAIRMENT LESS LIKELY    Mini-CogTM Copyright [employer]. Licensed by the author for use in [facility]; reprinted with permission (cristobal@.[facility]). All rights reserved.      Do you have sleep apnea, excessive snoring or daytime drowsiness?: no    Reviewed and updated as needed this visit by clinical staff  Tobacco  Allergies  Meds  Med Hx  Surg Hx  Fam Hx  Soc Hx        Reviewed and updated as " needed this visit by Provider        Social History     Tobacco Use     Smoking status: Former Smoker     Types: Cigarettes     Last attempt to quit: 1976     Years since quittin.1     Smokeless tobacco: Never Used   Substance Use Topics     Alcohol use: Yes     Comment: occasional - special events/holidays only     If you drink alcohol do you typically have >3 drinks per day or >7 drinks per week? No    Alcohol Use 2020   Prescreen: >3 drinks/day or >7 drinks/week? No   Prescreen: >3 drinks/day or >7 drinks/week? -               Current providers sharing in care for this patient include:   Patient Care Team:  Vineet Mendoza MD as PCP - General (Family Practice)  Vineet Mendoza MD as Assigned PCP  Britt Matute LICSW as  ( - Clinical)  Mason Beckham MD as MD (Neurology)    The following health maintenance items are reviewed in Epic and correct as of today:  Health Maintenance   Topic Date Due     ZOSTER IMMUNIZATION (1 of 2) 1989     PHQ-2  2020     FALL RISK ASSESSMENT  2020     MEDICARE ANNUAL WELLNESS VISIT  2020     ADVANCE CARE PLANNING  2022     DTAP/TDAP/TD IMMUNIZATION (3 - Td) 2028     DEXA  Completed     INFLUENZA VACCINE  Completed     PNEUMOCOCCAL IMMUNIZATION 65+ LOW/MEDIUM RISK  Completed     IPV IMMUNIZATION  Aged Out     MENINGITIS IMMUNIZATION  Aged Out     Patient Active Problem List   Diagnosis     Health Care Home     Family history of coronary artery disease     Hyperlipidemia LDL goal <130     Vitamin D deficiency     Estrogen deficient vulvovaginitis     Recurrent HSV (herpes simplex virus)     ACP (advance care planning)     Rheumatoid arthritis involving multiple sites with positive rheumatoid factor (H)     Cervicalgia     Cervical radiculopathy     ALS (amyotrophic lateral sclerosis) (H)     Multifocal motor neuropathy (H)     Past Surgical History:   Procedure Laterality Date  "    BACK SURGERY  ,       Social History     Tobacco Use     Smoking status: Former Smoker     Types: Cigarettes     Last attempt to quit: 1976     Years since quittin.1     Smokeless tobacco: Never Used   Substance Use Topics     Alcohol use: Yes     Comment: occasional - special events/holidays only     Family History   Problem Relation Age of Onset     C.A.D. Mother 88     Leukemia Father      Heart Disease Brother      C.A.D. Brother      Cancer Other         esophagus             Review of Systems  Constitutional, HEENT, cardiovascular, pulmonary, gi and gu systems are negative, except as otherwise noted.  She is having some hand weakness and left leg weakness due to her ALS.  OBJECTIVE:   /66 (Patient Position: Sitting, Cuff Size: Adult Regular)   Pulse 62   Temp 97.9  F (36.6  C) (Tympanic)   Resp 14   Ht 1.505 m (4' 11.25\")   Wt 56 kg (123 lb 8 oz)   SpO2 96%   Breastfeeding No   BMI 24.73 kg/m   Estimated body mass index is 24.73 kg/m  as calculated from the following:    Height as of this encounter: 1.505 m (4' 11.25\").    Weight as of this encounter: 56 kg (123 lb 8 oz).  Physical Exam  GENERAL APPEARANCE: healthy, alert and no distress  EYES: Eyes grossly normal to inspection, PERRL and conjunctivae and sclerae normal  HENT: ear canals and TM's normal, nose and mouth without ulcers or lesions, oropharynx clear and oral mucous membranes moist  NECK: no adenopathy, no asymmetry, masses, or scars and thyroid normal to palpation  RESP: lungs clear to auscultation - no rales, rhonchi or wheezes  CV: regular rate and rhythm, normal S1 S2, no S3 or S4, no murmur, click or rub, no peripheral edema and peripheral pulses strong  ABDOMEN: soft, nontender, no hepatosplenomegaly, no masses and bowel sounds normal  MS: no musculoskeletal defects are noted and gait is age appropriate without ataxia  SKIN: no suspicious lesions or rashes  NEURO: Normal strength and tone, sensory exam " "grossly normal, mentation intact and speech normal  PSYCH: mentation appears normal and affect normal/bright        ASSESSMENT / PLAN:       ICD-10-CM    1. Routine medical exam Z00.00    2. Hyperlipidemia LDL goal <130 E78.5 Lipid Profile     lovastatin (MEVACOR) 20 MG tablet   3. ALS (amyotrophic lateral sclerosis) (H) G12.21    4. Rheumatoid arthritis involving multiple sites with positive rheumatoid factor (H) M05.79 Basic metabolic panel   5. Stress incontinence of urine N39.3 UA with Microscopic   6. Recurrent HSV (herpes simplex virus) B00.9    7. Estrogen deficient vulvovaginitis N76.0    8. Family history of coronary artery disease Z82.49    9. Vitamin D deficiency E55.9 Vitamin D Deficiency       COUNSELING:  Patient recently had her CBC and liver tests done through rheumatology.  She had one liver test that was very slightly elevated.    Estimated body mass index is 24.73 kg/m  as calculated from the following:    Height as of this encounter: 1.505 m (4' 11.25\").    Weight as of this encounter: 56 kg (123 lb 8 oz).         reports that she quit smoking about 43 years ago. Her smoking use included cigarettes. She has never used smokeless tobacco.      Appropriate preventive services were discussed with this patient, including applicable screening as appropriate for cardiovascular disease, diabetes, osteopenia/osteoporosis, and glaucoma.  As appropriate for age/gender, discussed screening for colorectal cancer, prostate cancer, breast cancer, and cervical cancer. Checklist reviewing preventive services available has been given to the patient.    Reviewed patients plan of care and provided an AVS. The Basic Care Plan (routine screening as documented in Health Maintenance) for Britt meets the Care Plan requirement. This Care Plan has been established and reviewed with the Patient.    Counseling Resources:  ATP IV Guidelines  Pooled Cohorts Equation Calculator  Breast Cancer Risk Calculator  FRAX Risk " Assessment  ICSI Preventive Guidelines  Dietary Guidelines for Americans, 2010  USDA's MyPlate  ASA Prophylaxis  Lung CA Screening    Vineet Mendoza MD  [facility]    Identified Health Risks:

## 2020-02-06 LAB — DEPRECATED CALCIDIOL+CALCIFEROL SERPL-MC: 38 UG/L (ref 20–75)

## 2020-02-13 DIAGNOSIS — N39.3 STRESS INCONTINENCE OF URINE: ICD-10-CM

## 2020-02-13 LAB
ALBUMIN UR-MCNC: NEGATIVE MG/DL
APPEARANCE UR: CLEAR
BILIRUB UR QL STRIP: NEGATIVE
COLOR UR AUTO: YELLOW
GLUCOSE UR STRIP-MCNC: NEGATIVE MG/DL
HGB UR QL STRIP: ABNORMAL
KETONES UR STRIP-MCNC: NEGATIVE MG/DL
LEUKOCYTE ESTERASE UR QL STRIP: NEGATIVE
NITRATE UR QL: NEGATIVE
NON-SQ EPI CELLS #/AREA URNS LPF: ABNORMAL /LPF
PH UR STRIP: 6.5 PH (ref 5–7)
RBC #/AREA URNS AUTO: ABNORMAL /HPF
SOURCE: ABNORMAL
SP GR UR STRIP: 1.02 (ref 1–1.03)
UROBILINOGEN UR STRIP-ACNC: 0.2 EU/DL (ref 0.2–1)
WBC #/AREA URNS AUTO: ABNORMAL /HPF

## 2020-02-13 PROCEDURE — 81001 URINALYSIS AUTO W/SCOPE: CPT | Performed by: FAMILY MEDICINE

## 2020-03-09 DIAGNOSIS — G12.21 ALS (AMYOTROPHIC LATERAL SCLEROSIS) (H): ICD-10-CM

## 2020-03-09 RX ORDER — RILUZOLE 50 MG/1
TABLET, FILM COATED ORAL
Qty: 60 TABLET | Refills: 3 | Status: SHIPPED | OUTPATIENT
Start: 2020-03-09 | End: 2020-07-09

## 2020-03-09 NOTE — TELEPHONE ENCOUNTER
Rx Authorization:    Requested Medication/ Dose: Riluzole 50mg    Date last refill ordered: 12/5/19    Quantity ordered: 60tabs    # refills: 2    Date of last clinic visit with ordering provider: 10/31/19    Date of next clinic visit with ordering provider: 4/23/20    All pertinent protocol data (lab date/result):     Include pertinent information from patients message:

## 2020-04-08 ENCOUNTER — TRANSFERRED RECORDS (OUTPATIENT)
Dept: HEALTH INFORMATION MANAGEMENT | Facility: CLINIC | Age: 80
End: 2020-04-08

## 2020-04-08 LAB
ALT SERPL-CCNC: 20 IU/L (ref 5–35)
AST SERPL-CCNC: 30 U/L (ref 5–34)
CREAT SERPL-MCNC: 0.58 MG/DL (ref 0.5–1.3)
GFR SERPL CREATININE-BSD FRML MDRD: 106 ML/MIN/1.73M2

## 2020-04-23 ENCOUNTER — VIRTUAL VISIT (OUTPATIENT)
Dept: NEUROLOGY | Facility: CLINIC | Age: 80
End: 2020-04-23
Payer: MEDICARE

## 2020-04-23 DIAGNOSIS — G12.21 ALS (AMYOTROPHIC LATERAL SCLEROSIS) (H): ICD-10-CM

## 2020-04-23 DIAGNOSIS — G62.9 NEUROPATHY: Primary | ICD-10-CM

## 2020-04-23 ASSESSMENT — PAIN SCALES - GENERAL: PAINLEVEL: NO PAIN (0)

## 2020-04-23 NOTE — PROGRESS NOTES
[facility]   April 23, 2020  Virtual Visit (in light of precautionary measures and public health reasons related to COVID-19)    Chief Complaint: ALS follow up    History of Present Illness:    80 year old female with h/o limb Onset ALS w/  Slow progression historically that began w/  LUE  Painless distal weakness in 5/2017. Has met EL Escorial criteria for clinically probable ALS.  Last visit on 10/31/2019    Changes Since last Visit:   L arm and hand have gotten notably a lot weaker. Difficulty picking up things up with her L hand especially heavy things, dropping things, no issues raising her arm over her head but if she were doing her hair, combing with the R hand and holding a piece of her hair with her left, the left hand would start  Shaking. JOSH maybe feels a tiny bit weaker. No change in the legs. Her voice has felt a little hoarse in the past few weeks-month but otherwise no other significant changes since last  Visit.    Fatigue - after significant exertion but not limiting or changed. Predominantly in legs with long walks but this has been happening for years. She walks every day for 45minutes, after which her legs get sore but doesn't feel out of breath or winded. Does not have to stop for breaks during walks or with ADLs. No assistive devices for walking.  Difficulty Climb stairs - yes, slower, especially L leg. Has to hold onto the hand rails.  Standing from seated position -  No issue w/ regular chair. Lower chairs/ground has some difficulty.  Falls: - one since October. Occurred a few weeks ago, unclear what happened but she was blowing leaves the next moment she was on the ground.  Not previously dehydrated or lightheaded, was a little shaky after that but this quickly resolved and had no other related issues since.  Issues turning in bed? No     Muscle cramps - mild, maybe once a week in hands and legs, typically L more than R  side, during the morning but feels like she can keep hydrated and  It won't happen.    SOB @ rest -  no  FUENTES - no   Orthopnea - no   Pillows  Needed - 1 for head   Non-refeshing sleep - no  Morning headaches - no     Dysarthria - no slurring of speech. Has felt hoarse for a while now, daughter has noted this as well.  Infrequently upon starting talking it will feel like she has mild slurring of her speech but this goes away quickly after continuing to talk.  Dysphagia -  no  Mastication - no issues.  Salivation -at night, does not wake her up but in the morning she will notice some dried saliva down her face.  Only other time that this is an issue is when she is singing in [place] she will have very small amount of drool that she will need a tissue but this is very mild and not significantly inconveniencing.  Pseudobulbar affect - no  Head Drop -  no  Ptosis - no  Diplopia - no    ALS-FRS:  1. Speech -detectable speech disturbance  2. Salivation -slight but definite excess of saliva in mouth; may have nighttime drooling   3. Swallowing - Normal Eating  Habits.  4.  Handwriting -slow or sloppy; all words are legible  5.  Cutting food -somewhat slow and clumsy, but no help needed  6.  Dressing and hygiene -independent and complete self-care with effort or decreased efficiency  7.  Turning in bed -normal  8.  Walking -normal   9.  Climbing stairs-mild unsteadiness or fatigue  10.  Dyspnea-none  11 orthopnea-none  12.  Respiratory insufficiency-none    ALSFRS: 34  ALSFRS-R: 42    Past Medical History:   Past Medical History:   Diagnosis Date     ALS (amyotrophic lateral sclerosis) (H) 2018     Hypercholesteremia      Hyperlipidemia with target LDL less than 130      Recurrent HSV (herpes simplex virus)      Rheumatoid arthritis(714.0)     see Dr. Crawford's note 12/12       Past Surgical History:  Past Surgical History:   Procedure Laterality Date     BACK SURGERY  2002,2004       Family history:    Family History    Problem Relation Age of Onset     MARIZOL Mother 88     Leukemia Father      Heart Disease Brother      CAVERY Brother      Cancer Other         esophagus       Social History:    Social History     Socioeconomic History     Marital status:      Spouse name: Not on file     Number of children: Not on file     Years of education: Not on file     Highest education level: Not on file   Occupational History     Not on file   Social Needs     Financial resource strain: Not on file     Food insecurity     Worry: Not on file     Inability: Not on file     Transportation needs     Medical: Not on file     Non-medical: Not on file   Tobacco Use     Smoking status: Former Smoker     Types: Cigarettes     Last attempt to quit: 1976     Years since quittin.3     Smokeless tobacco: Never Used   Substance and Sexual Activity     Alcohol use: Yes     Comment: occasional - special events/holidays only     Drug use: No     Sexual activity: Never   Lifestyle     Physical activity     Days per week: Not on file     Minutes per session: Not on file     Stress: Not on file   Relationships     Social connections     Talks on phone: Not on file     Gets together: Not on file     Attends [employer] service: Not on file     Active member of club or organization: Not on file     Attends meetings of clubs or organizations: Not on file     Relationship status: Not on file     Intimate partner violence     Fear of current or ex partner: Not on file     Emotionally abused: Not on file     Physically abused: Not on file     Forced sexual activity: Not on file   Other Topics Concern     Parent/sibling w/ CABG, MI or angioplasty before 65F 55M? Yes     Comment: brother had bypass in his 50's   Social History Narrative     Not on file     Medical Allergies:    No Known Allergies     Current Medications:     Current Outpatient Medications:      aspirin 81 MG tablet, Take 1 tablet by mouth daily., Disp: , Rfl:      calcium  carbonate-vitamin D (CALCIUM + D) 600-200 MG-UNIT TABS, Take 1 tablet by mouth every other day , Disp: , Rfl:      conjugated estrogens (PREMARIN) 0.625 MG/GM vaginal cream, PLACE 0.5 G VAGINALLY EVERY OTHER WEEK, Disp: 30 g, Rfl: 0     Ferrous Gluconate 240 (27 FE) MG TABS, Take 1 tablet by mouth every other day , Disp: , Rfl:      folic acid (FOLVITE) 1 MG tablet, Take 1 mg by mouth daily., Disp: , Rfl:      InFLIXimab (REMICADE IV), Inject 200 mg into the vein Patient takes this every 8 weeks, Disp: , Rfl:      lovastatin (MEVACOR) 20 MG tablet, TAKE ONE TABLET BY MOUTH AT BEDTIME, Disp: 90 tablet, Rfl: 3     methotrexate 2.5 MG tablet, Take 6 tablets by mouth once a week , Disp: , Rfl:      oxybutynin ER (DITROPAN-XL) 5 MG 24 hr tablet, Take 1 tablet (5 mg) by mouth once daily, Disp: 90 tablet, Rfl: 3     riluzole (RILUTEK) 50 MG tablet, TAKE ONE TABLET BY MOUTH EVERY TWELVE HOURS , Disp: 60 tablet, Rfl: 3     Review of Systems: A complete review of systems was obtained and was negative except for what was noted above.    Physical examination:    There were no vitals taken for this visit.  General Appearance: NAD  HEENT: AT/NC  CHEST: Unlabored breathing, able to talk in complete sentences without getting short of breath.  Skin: There are no apparent rashes or other skin lesions.    Neurologic examination:    Mental status:  Patient is alert, attentive, and oriented.  Language is coherent and fluent without aphasia.  Provides coherent history.  Comprehension intact.       Cranial nerves: Limited by camera but no significant EOM palsies or facial asymmetry is appreciated.  Hearing intact to conversation.  Voice does appear mildly hoarse but not consistent spastic or consistent with dysarthria that is seen in motor neuron disorders.     Assessment:      79yo w/ Limb-Onset ALS, starting w/ LUE painless distal weakness around 5/2017. Overall, some progression of weakness in the left hand that has been notable to her  but has not significantly limited her daily life besides having to be more mindful with utensil usage and carry most things with her R hand. Otherwise, continues to have no significant respiratory or bulbar symptoms; the hoarseness described and appreciated via video call does not appear to consistent with dysarthria or manifestation of motor neuron disease, but more so other non-neurologic problem (e.g. dryness, cold), although this assessment is limited by quality of sound and video visit. That being said, patient has no other associated symptoms that would make this definitive for progression of ALS and therefore can continue to be watched at this point.     Plan:      - Will reach out and ask occupational therapy to discuss utensil usage and techniques to optimize hand-finger function   - Follow up as previously planned in ~6mo or sooner if sxs change or progress.     Patient seen and examined with attending neurologist, Dr. Beckham, who agrees with above.     Alvin Martinez MD  Neurology Resident    ATTENDING ADDENDUM: I was present via live interactive audiovisual equipment for the entire visit and directly supervised Dr Martinez who conducted the video interview and examination of the patient. I agree with his impression and recommendations as above. TT spent for patient care 19  minutes (video start time 10:09 am, video end time: 10:28 am); more than half was counseling. Mason Beckham MD

## 2020-04-23 NOTE — PROGRESS NOTES
"Britt Pichardo is a 80 year old female who is being evaluated via a billable video visit.      The patient has been notified of following:     \"This video visit will be conducted via a call between you and your physician/provider. We have found that certain health care needs can be provided without the need for an in-person physical exam.  This service lets us provide the care you need with a video conversation.  If a prescription is necessary we can send it directly to your pharmacy.  If lab work is needed we can place an order for that and you can then stop by our lab to have the test done at a later time.    Video visits are billed at different rates depending on your insurance coverage.  Please reach out to your insurance provider with any questions.    If during the course of the call the physician/provider feels a video visit is not appropriate, you will not be charged for this service.\"    Patient has given verbal consent for Video visit? YES    How would you like to obtain your AVS? Mail a copy    Patient would like the video invitation sent by: [email or web address]    Will anyone else be joining your video visit? No        Video-Visit Details    Type of service:  Video Visit    Video Start Time: 10.09 am  Video End Time: 10.28 am    Originating Location (pt. Location): Home    Distant Location (provider location):  [facility] NEUROLOGY     Mode of Communication:  Video Conference via Hortencia Ramos EMT         "

## 2020-05-01 ENCOUNTER — TELEPHONE (OUTPATIENT)
Dept: OCCUPATIONAL THERAPY | Facility: CLINIC | Age: 80
End: 2020-05-01

## 2020-05-01 NOTE — TELEPHONE ENCOUNTER
Spoke with patient via phone to assess needs as requested per Dr. Beckham from his telehealth visit with patient on 4/23/20 as this therapist not able to be in ALS clinic and pt not able to be in clinic due to Covid 19 crisis.   Pt notes increased weakness on her L hand but feels she is managing. Notes button of some mens shirts she has with small buttons being hard and cutting food being hard. Otherwise she notes she is managing with the weakness and her R hand is still functioning pretty well for her. Educated pt about rocker knife to allow one handed cutting with R hand and also built up foam to lace on utensils to better aid grasp with L hand when stabalizing food for cutting and she agrees to both items being requested from [employer]. She declines jar openers or other equipment at this time and relies on a neighbor to assist as needed.  Assessed bathroom needs due to recent notation of fall and she feels she is doing fine standing in her walk-in shower and with toilet transfers and denies need for a shower chair or toileting DME at this time.    Will see in ALS clinic as able in 5-6 months at her follow-up appt.    Jordan Murphy, OTR/L, MSCS  Occupational Therapy  [facility]  [address], [address]  [place] [address]  Voice mail:[phone]  Fax: [phone]

## 2020-06-09 ENCOUNTER — TRANSFERRED RECORDS (OUTPATIENT)
Dept: HEALTH INFORMATION MANAGEMENT | Facility: CLINIC | Age: 80
End: 2020-06-09

## 2020-06-09 LAB
ALT SERPL-CCNC: 21 IU/L (ref 5–35)
AST SERPL-CCNC: 32 U/L (ref 5–34)
CREAT SERPL-MCNC: 0.87 MG/DL (ref 0.5–1.3)
GFR SERPL CREATININE-BSD FRML MDRD: 66.4 ML/MIN/1.73M2

## 2020-07-09 DIAGNOSIS — G12.21 ALS (AMYOTROPHIC LATERAL SCLEROSIS) (H): ICD-10-CM

## 2020-07-09 RX ORDER — RILUZOLE 50 MG/1
TABLET, FILM COATED ORAL
Qty: 60 TABLET | Refills: 5 | Status: SHIPPED | OUTPATIENT
Start: 2020-07-09 | End: 2021-01-12

## 2020-07-09 NOTE — TELEPHONE ENCOUNTER
Rx Authorization:    Requested Medication/ Dose Riluzole 50    Date last refill ordered: 3/9/20    Quantity ordered: 60    # refills: 3    Date of last clinic visit with ordering provider: 4/23/20    Date of next clinic visit with ordering provider: 9/24/20    All pertinent protocol data (lab date/result):     Include pertinent information from patients message:

## 2020-08-12 ENCOUNTER — TRANSFERRED RECORDS (OUTPATIENT)
Dept: HEALTH INFORMATION MANAGEMENT | Facility: CLINIC | Age: 80
End: 2020-08-12

## 2020-08-12 LAB
ALT SERPL-CCNC: 20 IU/L (ref 5–35)
AST SERPL-CCNC: 29 U/L (ref 5–34)
CREAT SERPL-MCNC: 0.66 MG/DL (ref 0.5–1.3)

## 2020-09-22 DIAGNOSIS — G12.21 ALS (AMYOTROPHIC LATERAL SCLEROSIS) (H): Primary | ICD-10-CM

## 2020-09-24 ENCOUNTER — THERAPY VISIT (OUTPATIENT)
Dept: OCCUPATIONAL THERAPY | Facility: CLINIC | Age: 80
End: 2020-09-24
Payer: MEDICARE

## 2020-09-24 ENCOUNTER — OFFICE VISIT (OUTPATIENT)
Dept: NEUROLOGY | Facility: CLINIC | Age: 80
End: 2020-09-24
Payer: MEDICARE

## 2020-09-24 ENCOUNTER — THERAPY VISIT (OUTPATIENT)
Dept: PHYSICAL THERAPY | Facility: CLINIC | Age: 80
End: 2020-09-24
Payer: MEDICARE

## 2020-09-24 VITALS
OXYGEN SATURATION: 93 % | SYSTOLIC BLOOD PRESSURE: 114 MMHG | HEART RATE: 68 BPM | DIASTOLIC BLOOD PRESSURE: 65 MMHG | WEIGHT: 122 LBS | BODY MASS INDEX: 24.43 KG/M2

## 2020-09-24 DIAGNOSIS — G12.21 ALS (AMYOTROPHIC LATERAL SCLEROSIS) (H): Primary | ICD-10-CM

## 2020-09-24 DIAGNOSIS — Z78.9 IMPAIRED MOBILITY AND ADLS: ICD-10-CM

## 2020-09-24 DIAGNOSIS — Z74.09 IMPAIRED MOBILITY AND ADLS: ICD-10-CM

## 2020-09-24 DIAGNOSIS — G12.21 ALS (AMYOTROPHIC LATERAL SCLEROSIS) (H): ICD-10-CM

## 2020-09-24 DIAGNOSIS — G45.9 TIA (TRANSIENT ISCHEMIC ATTACK): Primary | ICD-10-CM

## 2020-09-24 DIAGNOSIS — Z78.9 IMPAIRED INSTRUMENTAL ACTIVITIES OF DAILY LIVING (IADL): ICD-10-CM

## 2020-09-24 ASSESSMENT — REVISED AMYOTROPHIC LATERAL SCLEROSIS FUNCTIONAL RATING SCALE (ALSFRS-R)
DYSPNEA: 4
HANDWRITING: 3
SALIVATION: 3
ORTHOPENA: 4
SWALLOWING: 4
FINE_MOTOR_SUBTOTAL_SCORE: 9
SPEECH: 3
SALIVATION: SLIGHT BUT DEFINITE EXCESS OF SALIVA IN MOUTH; MAY HAVE NIGHTTIME DROOLING
DRESSING_AND_HYGEINE: INTERMITTENT ASSISTANCE OR SUBSTITUTE METHODS
WALKING: 3
ALSFRS_TOTAL_SCORE: 39
CUTTING_FOOD_AND_HANDLING_UTENSILES: 4
CLIMBING_STAIRS: 1
GROSS_MOTOR_SUBTOTAL_SCORE: 8
WALKING: EARLY AMBULATION DIFFICULTIES
HANDWRITING: SLOW OR SLOPPY: ALL WORDS ARE LEGIBLE
RESPIRATORY_INSUFFICIENCY: 4
BULBAR_SUBTOTAL: 10
SPEECH: DETECTABLE SPEECH DISTURBANCES
DRESSING_AND_HYGEINE: 2
RESPIRATORY_SUBTOTAL_SCORE: 12
SIX_ITEM_SUBTOTAL: 19
TURNING_IN_BED_AND_ADJUSTING_BED_CLOTHES: NORMAL
CLIMBING_STAIRS: NEEDS ASSISTANCE
TURNING_IN_BED_AND_ADJUSTING_BED_CLOTHES: 4
SWALLOWING: NORMAL EATING HABITS

## 2020-09-24 ASSESSMENT — PAIN SCALES - GENERAL: PAINLEVEL: NO PAIN (0)

## 2020-09-24 NOTE — PROGRESS NOTES
"    OUTPATIENT OCCUPATIONAL THERAPY CLINIC NOTE  Britt Pichardo  YOB: 1940  [id number]    Type of visit:  Evaluation            Date of service: 9/24/20    Referring provider: Dr. Mason Beckham    Others present at visit:  Child(adriana), kristal-Courtney    Medical diagnosis:   Amyotrophic lateral sclerosis (ALS), probable     Date of diagnosis: 11/29/18     Pertinent medical history:  Onset of distal UE weakness L > R, 2017    Additional Occupational Profile Information (patterns of daily living, interests, values and needs): Pt is a 78 y.o. Woman at time of dx with ALS living alone in her own home who manages all of her own IADL.    Cardio-respiratory status:  Forced vital capacity: 88 % of predicted     Height/Weight: 4' 11\" / 122#    Living environment:  House  Walk-in shower in basement no bars, textured floor  Regular height toilets    Living environment barriers:  2 stairs to enter (0 railing present)    Steps to basement with railing    Current assistance/living environment:  Lives alone      Current mobility equipment:  None    Current ADL equipment:  Shower chair     Technology used: NT    Patient concerns/goals: Slow on stairs and fatigue in legs.Notes L hand is weaker, harder to reach and place dishes away in overhead cabinet and has to use step stool now    Evaluation   Interview completed.   Pain assessment:  Pain denied    Range of motion:  R handed ; UE AROM is limited to approximately 110 degrees Mk shoulder flexion. PROM is near full with end range tightness L > R    Manual muscle testing: UE's:  L , lateral and palmar pinch are very weak ; R is moderately weak and L long finger flexors of index and middle are weak    Cognition:  Appears WFL    ADL:   Feeding self:  Ind  Grooming: Ind  UE Dressing:  Ind  LE Dressing:  Ind, notes shoetying is getting harder  Showering/bathing: Stands to shower  Toileting/transfer:  Denies problems    IADL:   Home management:  Does all, harder " to reach higher cabinets to put dishware away  Driving:  Reports Ind, mows her lawn, hires for snow care, walking slower than past  Occupation: retired  Emergency Call system:NT      Fall Risk Screen:   Has the patient fallen 2 or more times in the last year? No      Has the patient fallen and had an injury in the past year? No       Timed Up and Go Score: defer to PT eval today    Is the patient a fall risk? No     Impairments:  Muscle atrophy  Coordination   Fatigue   ROM    Treatment diagnosis:  Impaired mobility activities of daily living and IADL    Assessment of Occupational Performance: 1-3 Performance Deficits  Identified Performance Deficits (ie: feeding, social skills): writing, home management, dressing   Clinical Decision Making (Complexity): Low complexity     Recommendations/Plan of care:  Patient would benefit from interventions to enhance safety and independence.  Rehab potential good for stated goals.  Occupational therapy intervention for  self care/home management and therapeutic procedure  1 session evaluation & treatment.    Goals:   Target date: today  Patient, family and/or caregiver will verbalize understanding of evaluation results and implications for functional performance.  Patient, family and/or caregiver will verbalize/demonstrate understanding of compensatory methods /equipment to enhance functional independence and safety.  Patient, family and/or caregiver will verbalize/demonstrate understanding of positioning techniques/equipment.  Pt will demonstrate understanding of UE HEP for stretches to reduce risk of contracture and optimize UE ROM for ADL      Educational assessment/barriers to learning:  none      Treatment provided this date:   Self care/home management, 8 minutes of training in:  -elastic laces and will request from [email or web address] program for pt  - purpose of Bed rail as pt needed mod assistance to rise from supine on mat in exam room today due to trunkal weakness  -purpose of  "Wheeled cart to aid transport of items in kitchen that she cannot carry due to hand weakness  -purpose of moving items she uses frequently to lower position in kitchen for ease of access due to difficulty with reach    Therapeutic procedure: 8 minutes of training in:  Supine Shoulder stretches x2 for flexion and ext rotation and demonstrated for patient and she completed today  Hand stretches x3 including composite wrist and finger/thumb ext, finger abd and finger flex, demonstrated for pt and then she completed holding each for 30 \"      Response to treatment/recommendations: receptive     Goal attainment:  All goals met    Risks and benefits of evaluation/treatment have been explained.  Patient, family and/or caregiver are in agreement with Plan of Care.  *ALS FRS-R (ALS Functional Rating Scale-Revised) completed today. Please see separate note.     Timed Code Treatment Minutes: 16  Total Treatment Time (sum of timed and untimed services): 22    Signature: VEE [facility]   Date: 9/24/20       Certification:  Onset date:  9/24/20  Start of care date: 9/24/20  Certification date from  9/24/20 to  9/24/20    I CERTIFY THE NEED FOR THESE SERVICES FURNISHED UNDER        THIS PLAN OF TREATMENT AND WHILE UNDER MY CARE     (Physician attestation of this document indicates review and certification of the therapy plan).            "

## 2020-09-24 NOTE — LETTER
9/24/2020       RE: Britt Pichardo  [address]  [facility] [address]     Dear Colleague,    Thank you for referring your patient, Britt Pichardo, to the [facility] NEUROLOGY at [facility]. Please see a copy of my visit note below.    HPI:  80 year old female with h/o limb Onset ALS w/  Slow progression historically that began w/  LUE  Painless distal weakness in 5/2017. Has met EL Escorial criteria for clinically probable ALS.  Last visit on 4/23/2020 (video)      Interval history:  Overall, Ms. Pichardo is doing quite well.  She has noticed increased trouble picking up smaller things in L hand and feels her left arm strength is worse. Legs are getting subjectively a bit weaker, but only when she has been using them a lot. They improve to normal with rest. She has not had any falls since spring when she tripped on a leaf blower cord.     Of note, she had an episode of expressive aphasia on 5/12/20 lasting 4 days. It started in the afternoon fairly suddenly. Her daughter who is a nurse found out the next day and did a brief neuro exam, but everything was normal except her word finding/fluency and some mild slurring/mumbling. Britt describes that she couldn't think of words even though she knew what she wanted to say. She felt it was related to being tired, but it didn't get better after resting or sleeping. It has totally resolved. No abnormal behavior, confusion, etc. associated with the event. No headache. They didn't go to the hospital due to lack of other symptoms and because a day had already passed by the time her daughter found out.     Endorses some cramping in her feet/legs in the morning but it goes away with walking and drinking water. She does get a sore throat during the night, and drinking water helps. Gets up nightly to use the bathroom and take meds, sleep is otherwise okay.     No weight loss. Taking riluzole without side effects. She is living  independently and enjoys walking. Sometimes she can go several miles without issue.        ALS functional scale  Item 1: SPEECH  4 Normal speech process    Item 2: SALIVATION  2 Moderately excessive saliva; may have minimal drooling (during the day)     Item 3: SWALLOWING  4 Normal eating habits    Item 4: HANDWRITING  3 Slow or sloppy: all words are legible    Item 5a: CUTTING FOOD AND HANDLING UTENSILS  3 Somewhat slow and clumsy, but no help needed    Item 5b: CUTTING FOOD AND HANDLING UTENSILS  3 Clumsy, but able to perform all manipulations independently    Item 6: DRESSING AND HYGIENE  4 Normal function    Item 7: TURNING IN BED AND ADJUSTING BED CLOTHES  4 Normal function    Item 8: WALKING  4 Normal    Item 9: CLIMBING STAIRS  3 Slow    Item 10: DYSPNEA  4 None    Item 11: ORTHOPNEA  4 None    Item 12: RESPIRATORY INSUFFICIENCY  4 None      Current Outpatient Medications   Medication     aspirin 81 MG tablet     calcium carbonate-vitamin D (CALCIUM + D) 600-200 MG-UNIT TABS     conjugated estrogens (PREMARIN) 0.625 MG/GM vaginal cream     Ferrous Gluconate 240 (27 FE) MG TABS     folic acid (FOLVITE) 1 MG tablet     InFLIXimab (REMICADE IV)     lovastatin (MEVACOR) 20 MG tablet     methotrexate 2.5 MG tablet     oxybutynin ER (DITROPAN-XL) 5 MG 24 hr tablet     riluzole (RILUTEK) 50 MG tablet       Past Medical History:   Diagnosis Date     ALS (amyotrophic lateral sclerosis) (H) 2018     Hypercholesteremia      Hyperlipidemia with target LDL less than 130      Recurrent HSV (herpes simplex virus)      Rheumatoid arthritis(714.0)     see Dr. Crawford's note 12/12       Past Surgical History:   Procedure Laterality Date     BACK SURGERY  2002,2004       Family History   Problem Relation Age of Onset     C.A.D. Mother 88     Leukemia Father      Heart Disease Brother      C.A.D. Brother      Cancer Other         esophagus       Social History     Tobacco Use     Smoking status: Former Smoker     Types:  Cigarettes     Last attempt to quit: 1976     Years since quittin.7     Smokeless tobacco: Never Used   Substance Use Topics     Alcohol use: Yes     Comment: occasional - special events/holidays only           Physical exam  /65   Pulse 68   Wt 55.3 kg (122 lb)   SpO2 93%   BMI 24.43 kg/m     General: NAD, pleasant and cooperative  Mental status: alert, attentive, verbal fluency and comprehension intact.   CN: PERRL, EOMI w/o nystagmus, facial movements full and symmetric with possible very mild weakness of pursed lips and cheek puffing, facial sensation symmetric to light touch, tongue midline w/o fasciculations, slowing of lateral movement, shoulder shrug intact.     Motor: Severe atrophy of L arm, especially forearm and hand. Subtle tremor in fingers. Early contraction of L hand  Strength:     R/L  Deltoids   4+/4-  Biceps   5/3  Triceps  5/4  Wrist flex  5/3  Wrist ext  5/3  Finger flex  4/1  Finger extension 4/1  Finger spread   4/0  APB    4/0  Hip flex   5-/5-  Knee flex  5/5  Knee extension 5/5  Plantar   5/5  Dorsi   5/5  Hallicus  5/5  Eversion  5/5  Inversion  5/5  Neck flex  4-    Sensation: Vibration 10/8 base of great toe, 22/22 base of thumb. Light touch intact and symmetric throughout. Neg [place]  Reflexes: 2+ L>R biceps and brachioradialis.   Coordination: FNF and heel-shin intact  Gait: Some stiffness and hesitancy of casual gait. 1 step turn.       Pertinent investigations:  Labs 20  AST 29  ALT 20  Cr 0.66    PFTs 20 (10/31/19)  FVC 88% (89%)  FEV1 92% (100%)  FEV1/FVC 80% (87%)  MEP 48 (60)  MIP -49 (-53)       Assessment:    Ms. Pichardo is an 79yo woman w progressive weakness, primarily in L arm, with EMG and symptoms most c/w very slowly progressive ALS. She is doing well, and declined additional medication for drooling as it is not too bothersome at this time. Encouraged her to continue aerobic exercise like walking ~4 days/week. Her PFTs show mildly  attenuated MIP values and very good FEV1/FVC without significant changes from previous measurement. While MIP value of -50 technically qualifies for initiation of NIV, we will defer it given her very slow progression and lack of respiratory symptoms, although it would be reasonable to start at any point if she is interested.    The event of apparent expressive aphasia is very concerning for a small stroke (duration too long to be a TIA). We will order a basic stroke workup to determine next steps. Hospitalization is not indicated as the episode was several months ago.      Plan:    1. MRI/MRA to evaluate for stroke or vascular disease  2. Continue riluzole  3. Continue ASA and lovastatin  4. EKG today  5. Echocardiogram  6. PT and OT evaluations for ALS today in Clinic      RTC in 6 months    This patient was seen and staffed with Dr. Beckham.    Dilcia Bowser MD  Neurology PGY-3    ATTENDING ADDENDUM: Patient seen and examined with resident Dr Bowser today. I agree with her assessment and plan as above. TT spent for patient care 25 minutes; more than half was counseling. Mason Beckham MD        Again, thank you for allowing me to participate in the care of your patient.      Sincerely,    Mason Beckham MD

## 2020-09-24 NOTE — LETTER
9/24/2020       RE: Britt Pichardo  [address]  [facility] [address]     Dear Colleague,    Thank you for referring your patient, Britt Pichardo, to the [facility] NEUROLOGY at [facility]. Please see a copy of my visit note below.    HPI:  80 year old female with h/o limb Onset ALS w/  Slow progression historically that began w/  LUE  Painless distal weakness in 5/2017. Has met EL Escorial criteria for clinically probable ALS.  Last visit on 4/23/2020 (video)    Interval history:  Overall, Ms. Pichardo is doing quite well.  She has noticed increased trouble picking up smaller things in L hand and feels her left arm strength is worse. Legs are getting subjectively a bit weaker, but only when she has been using them a lot. They improve to normal with rest. She has not had any falls since spring when she tripped on a leaf blower cord.     Of note, she had an episode of expressive aphasia on 5/12/20 lasting 4 days. It started in the afternoon fairly suddenly. Her daughter who is a nurse found out the next day and did a brief neuro exam, but everything was normal except her word finding/fluency and some mild slurring/mumbling. Britt describes that she couldn't think of words even though she knew what she wanted to say. She felt it was related to being tired, but it didn't get better after resting or sleeping. It has totally resolved. No abnormal behavior, confusion, etc. associated with the event. No headache. They didn't go to the hospital due to lack of other symptoms and because a day had already passed by the time her daughter found out.     Endorses some cramping in her feet/legs in the morning but it goes away with walking and drinking water. She does get a sore throat during the night, and drinking water helps. Gets up nightly to use the bathroom and take meds, sleep is otherwise okay.     No weight loss. Taking riluzole without side effects. She is living  independently and enjoys walking. Sometimes she can go several miles without issue.        ALS functional scale  Item 1: SPEECH  4 Normal speech process    Item 2: SALIVATION  2 Moderately excessive saliva; may have minimal drooling (during the day)     Item 3: SWALLOWING  4 Normal eating habits    Item 4: HANDWRITING  3 Slow or sloppy: all words are legible    Item 5a: CUTTING FOOD AND HANDLING UTENSILS  3 Somewhat slow and clumsy, but no help needed    Item 5b: CUTTING FOOD AND HANDLING UTENSILS  3 Clumsy, but able to perform all manipulations independently    Item 6: DRESSING AND HYGIENE  4 Normal function    Item 7: TURNING IN BED AND ADJUSTING BED CLOTHES  4 Normal function    Item 8: WALKING  4 Normal    Item 9: CLIMBING STAIRS  3 Slow    Item 10: DYSPNEA  4 None    Item 11: ORTHOPNEA  4 None    Item 12: RESPIRATORY INSUFFICIENCY  4 None      Current Outpatient Medications   Medication     aspirin 81 MG tablet     calcium carbonate-vitamin D (CALCIUM + D) 600-200 MG-UNIT TABS     conjugated estrogens (PREMARIN) 0.625 MG/GM vaginal cream     Ferrous Gluconate 240 (27 FE) MG TABS     folic acid (FOLVITE) 1 MG tablet     InFLIXimab (REMICADE IV)     lovastatin (MEVACOR) 20 MG tablet     methotrexate 2.5 MG tablet     oxybutynin ER (DITROPAN-XL) 5 MG 24 hr tablet     riluzole (RILUTEK) 50 MG tablet       Past Medical History:   Diagnosis Date     ALS (amyotrophic lateral sclerosis) (H) 2018     Hypercholesteremia      Hyperlipidemia with target LDL less than 130      Recurrent HSV (herpes simplex virus)      Rheumatoid arthritis(714.0)     see Dr. Crawford's note 12/12       Past Surgical History:   Procedure Laterality Date     BACK SURGERY  2002,2004       Family History   Problem Relation Age of Onset     C.A.D. Mother 88     Leukemia Father      Heart Disease Brother      C.A.D. Brother      Cancer Other         esophagus       Social History     Tobacco Use     Smoking status: Former Smoker     Types:  Cigarettes     Last attempt to quit: 1976     Years since quittin.7     Smokeless tobacco: Never Used   Substance Use Topics     Alcohol use: Yes     Comment: occasional - special events/holidays only       Physical exam  /65   Pulse 68   Wt 55.3 kg (122 lb)   SpO2 93%   BMI 24.43 kg/m     General: NAD, pleasant and cooperative  Mental status: alert, attentive, verbal fluency and comprehension intact.   CN: PERRL, EOMI w/o nystagmus, facial movements full and symmetric with possible very mild weakness of pursed lips and cheek puffing, facial sensation symmetric to light touch, tongue midline w/o fasciculations, slowing of lateral movement, shoulder shrug intact.     Motor: Severe atrophy of L arm, especially forearm and hand. Subtle tremor in fingers. Early contraction of L hand  Strength:     R/L  Deltoids   4+/4-  Biceps   5/3  Triceps  5/4  Wrist flex  5/3  Wrist ext  5/3  Finger flex  4/1  Finger extension 4/1  Finger spread   4/0  APB    4/0  Hip flex   5-/5-  Knee flex  5/5  Knee extension 5/5  Plantar   5/5  Dorsi   5/5  Hallicus  5/5  Eversion  5/5  Inversion  5/5  Neck flex  4-    Sensation: Vibration 10/8 base of great toe, 22/22 base of thumb. Light touch intact and symmetric throughout. Neg [place]  Reflexes: 2+ L>R biceps and brachioradialis.   Coordination: FNF and heel-shin intact  Gait: Some stiffness and hesitancy of casual gait. 1 step turn.     Pertinent investigations:  Labs 20  AST 29  ALT 20  Cr 0.66    PFTs 20 (10/31/19)  FVC 88% (89%)  FEV1 92% (100%)  FEV1/FVC 80% (87%)  MEP 48 (60)  MIP -49 (-53)     Assessment:  Ms. Pichardo is an 81yo woman w progressive weakness, primarily in L arm, with EMG and symptoms most c/w very slowly progressive ALS. She is doing well, and declined additional medication for drooling as it is not too bothersome at this time. Encouraged her to continue aerobic exercise like walking ~4 days/week. Her PFTs show mildly attenuated MIP  values and very good FEV1/FVC without significant changes from previous measurement. While MIP value of -50 technically qualifies for initiation of NIV, we will defer it given her very slow progression and lack of respiratory symptoms, although it would be reasonable to start at any point if she is interested.    The event of apparent expressive aphasia is very concerning for a small stroke (duration too long to be a TIA). We will order a basic stroke workup to determine next steps. Hospitalization is not indicated as the episode was several months ago.    Plan:  1. MRI/MRA to evaluate for stroke or vascular disease  2. Continue riluzole  3. Continue ASA and lovastatin  4. EKG today  5. Echocardiogram  6. PT and OT evaluations for ALS today in Clinic    RTC in 6 months    This patient was seen and staffed with Dr. Beckham.    Dilcia Bowser MD  Neurology PGY-3    ATTENDING ADDENDUM: Patient seen and examined with resident Dr Bowser today. I agree with her assessment and plan as above. TT spent for patient care 25 minutes; more than half was counseling. Mason Beckham MD    Again, thank you for allowing me to participate in the care of your patient.  Sincerely,    Mason Beckham MD

## 2020-09-24 NOTE — PROGRESS NOTES
HPI:  80 year old female with h/o limb Onset ALS w/  Slow progression historically that began w/  LUE  Painless distal weakness in 5/2017. Has met EL Escorial criteria for clinically probable ALS.  Last visit on 4/23/2020 (video)      Interval history:  Overall, Ms. Pichardo is doing quite well.  She has noticed increased trouble picking up smaller things in L hand and feels her left arm strength is worse. Legs are getting subjectively a bit weaker, but only when she has been using them a lot. They improve to normal with rest. She has not had any falls since spring when she tripped on a leaf [employer] cord.     Of note, she had an episode of expressive aphasia on 5/12/20 lasting 4 days. It started in the afternoon fairly suddenly. Her daughter who is a nurse found out the next day and did a brief neuro exam, but everything was normal except her word finding/fluency and some mild slurring/mumbling. Britt describes that she couldn't think of words even though she knew what she wanted to say. She felt it was related to being tired, but it didn't get better after resting or sleeping. It has totally resolved. No abnormal behavior, confusion, etc. associated with the event. No headache. They didn't go to the hospital due to lack of other symptoms and because a day had already passed by the time her daughter found out.     Endorses some cramping in her feet/legs in the morning but it goes away with walking and drinking water. She does get a sore throat during the night, and drinking water helps. Gets up nightly to use the bathroom and take meds, sleep is otherwise okay.     No weight loss. Taking riluzole without side effects. She is living independently and enjoys walking. Sometimes she can go several miles without issue.        ALS functional scale  Item 1: SPEECH  4 Normal speech process    Item 2: SALIVATION  2 Moderately excessive saliva; may have minimal drooling (during the day)     Item 3: SWALLOWING  4 Normal eating  habits    Item 4: HANDWRITING  3 Slow or sloppy: all words are legible    Item 5a: CUTTING FOOD AND HANDLING UTENSILS  3 Somewhat slow and clumsy, but no help needed    Item 5b: CUTTING FOOD AND HANDLING UTENSILS  3 Clumsy, but able to perform all manipulations independently    Item 6: DRESSING AND HYGIENE  4 Normal function    Item 7: TURNING IN BED AND ADJUSTING BED CLOTHES  4 Normal function    Item 8: WALKING  4 Normal    Item 9: CLIMBING STAIRS  3 Slow    Item 10: DYSPNEA  4 None    Item 11: ORTHOPNEA  4 None    Item 12: RESPIRATORY INSUFFICIENCY  4 None      Current Outpatient Medications   Medication     aspirin 81 MG tablet     calcium carbonate-vitamin D (CALCIUM + D) 600-200 MG-UNIT TABS     conjugated estrogens (PREMARIN) 0.625 MG/GM vaginal cream     Ferrous Gluconate 240 (27 FE) MG TABS     folic acid (FOLVITE) 1 MG tablet     InFLIXimab (REMICADE IV)     lovastatin (MEVACOR) 20 MG tablet     methotrexate 2.5 MG tablet     oxybutynin ER (DITROPAN-XL) 5 MG 24 hr tablet     riluzole (RILUTEK) 50 MG tablet       Past Medical History:   Diagnosis Date     ALS (amyotrophic lateral sclerosis) (H) 2018     Hypercholesteremia      Hyperlipidemia with target LDL less than 130      Recurrent HSV (herpes simplex virus)      Rheumatoid arthritis(714.0)     see Dr. Crawford's note        Past Surgical History:   Procedure Laterality Date     BACK SURGERY  ,       Family History   Problem Relation Age of Onset     C.A.D. Mother 88     Leukemia Father      Heart Disease Brother      C.A.D. Brother      Cancer Other         esophagus       Social History     Tobacco Use     Smoking status: Former Smoker     Types: Cigarettes     Last attempt to quit: 1976     Years since quittin.7     Smokeless tobacco: Never Used   Substance Use Topics     Alcohol use: Yes     Comment: occasional - special events/holidays only           Physical exam  /65   Pulse 68   Wt 55.3 kg (122 lb)   SpO2 93%    BMI 24.43 kg/m     General: NAD, pleasant and cooperative  Mental status: alert, attentive, verbal fluency and comprehension intact.   CN: PERRL, EOMI w/o nystagmus, facial movements full and symmetric with possible very mild weakness of pursed lips and cheek puffing, facial sensation symmetric to light touch, tongue midline w/o fasciculations, slowing of lateral movement, shoulder shrug intact.     Motor: Severe atrophy of L arm, especially forearm and hand. Subtle tremor in fingers. Early contraction of L hand  Strength:     R/L  Deltoids   4+/4-  Biceps   5/3  Triceps  5/4  Wrist flex  5/3  Wrist ext  5/3  Finger flex  4/1  Finger extension 4/1  Finger spread   4/0  APB    4/0  Hip flex   5-/5-  Knee flex  5/5  Knee extension 5/5  Plantar   5/5  Dorsi   5/5  Hallicus  5/5  Eversion  5/5  Inversion  5/5  Neck flex  4-    Sensation: Vibration 10/8 base of great toe, 22/22 base of thumb. Light touch intact and symmetric throughout. Neg [place]  Reflexes: 2+ L>R biceps and brachioradialis.   Coordination: FNF and heel-shin intact  Gait: Some stiffness and hesitancy of casual gait. 1 step turn.       Pertinent investigations:  Labs 8/12/20  AST 29  ALT 20  Cr 0.66    PFTs 9/24/20 (10/31/19)  FVC 88% (89%)  FEV1 92% (100%)  FEV1/FVC 80% (87%)  MEP 48 (60)  MIP -49 (-53)       Assessment:    Ms. Pichardo is an 79yo woman w progressive weakness, primarily in L arm, with EMG and symptoms most c/w very slowly progressive ALS. She is doing well, and declined additional medication for drooling as it is not too bothersome at this time. Encouraged her to continue aerobic exercise like walking ~4 days/week. Her PFTs show mildly attenuated MIP values and very good FEV1/FVC without significant changes from previous measurement. While MIP value of -50 technically qualifies for initiation of NIV, we will defer it given her very slow progression and lack of respiratory symptoms, although it would be reasonable to start at any point  if she is interested.    The event of apparent expressive aphasia is very concerning for a small stroke (duration too long to be a TIA). We will order a basic stroke workup to determine next steps. Hospitalization is not indicated as the episode was several months ago.      Plan:    1. MRI/MRA to evaluate for stroke or vascular disease  2. Continue riluzole  3. Continue ASA and lovastatin  4. EKG today  5. Echocardiogram  6. PT and OT evaluations for ALS today in Clinic      RTC in 6 months    This patient was seen and staffed with Dr. Beckham.    Dilcia Bowser MD  Neurology PGY-3    ATTENDING ADDENDUM: Patient seen and examined with resident Dr Bowser today. I agree with her assessment and plan as above. TT spent for patient care 25 minutes; more than half was counseling. Mason Beckham MD

## 2020-09-24 NOTE — PROGRESS NOTES
"    OUTPATIENT PHYSICAL THERAPY CLINIC NOTE  Britt Pichardo  YOB: 1940  [id number]    Type of visit:         Evaluation     Date of service: 9/24/2020    Referring provider: Mason Beckham MD     present: No    Others present at visit:  Child(adriana), daughter- Courtney    Medical diagnosis:   Amyotrophic lateral sclerosis (ALS)    Date of diagnosis: 11/29/2018    Pertinent history of current problem (include personal factors and/or comorbidities that impact the plan of care): limb onset weakness; mostly distal UE weakness L > R beginning in 2017    Cardio-respiratory status:  Forced vital capacity: 88 % of predicted     Height/Weight: 4'11\" / 122    Living environment:  House    Living environment barriers:  2 stairs to enter (0 railing present)  A flight of stairs to basement (1 railing present)     Current assistance/living environment:  Lives alone      Current mobility equipment:  None     Current ADL equipment:  See OT note    Technology used: NT    Patient concerns/goals: Slower moving with activities- especially stairs. Difficulty with performance of exercise at Curves due to LUE weakness with resistive bands    Evaluation   Interview completed.   Pain assessment:  Pain denied     Range of motion: Mild tightness in B gastrocs; B DF limited to near neutral     Manual muscle testing:  B hip flexion 4/5, B knee flexion/extension 5/5, B ankle DF 4/5   Gait:  Patient ambulates without device or bracing independently; mild reduction in duy and limited arm swing. Patient demonstrates appropriate B heel strike and push off. Limited B knee/hip flexion for swing phase. Mild increase in pelvic sway   Cognition:  Intact    Fall Risk Screen:   Has the patient fallen 2 or more times in the last year? No      Has the patient fallen and had an injury in the past year? No       Timed Up and Go Score: N/A    Is the patient a fall risk? No     Impairments:  Fatigue  Muscle atrophy  Range of " motion     Treatment diagnosis:  Impaired mobility  Impaired activities of daily living    Clinical Presentation: Evolving/Changing  Clinical Presentation Rationale: personal factors, body systems involved, progressive nature of ALS  Clinical Decision Making (Complexity): Moderate complexity     Recommendations/Plan of care:  1 session evaluation & treatment.     Goals:   Target date: 9/24/2020  Patient, family and/or caregiver will verbalize understanding of evaluation results and implications for functional performance.  Patient, family and/or caregiver will verbalize/demonstrate understanding of home program.  Patient, family and/or caregiver will verbalize energy management techniques appropriate for status and setting.    Educational assessment/barriers to learning:   No barriers noted     Treatment provided this date:   ADL/ Self care management: 10 minutes  - Instructed patient in appropriate stretching program, especially focusing on gastrocs for maintenance of mobility and functional independence. PT performed B gastroc stretch x 60 seconds B for improved alignment. Demonstrated standing gastroc stretch with UE on wall; patient return demonstrated x 30 seconds B.  - Educated patient on importance of energy conservation including pacing of activities and monitoring of fatigue levels to reduce risk of overuse strength deficits. Encouraged patient to try Curves exercise without resistive bands    Response to treatment/recommendations: Patient verbalizes understanding/return demonstrates    Goal attainment:  All goals met     Risks and benefits of evaluation/treatment have been explained.  Patient, family and/or caregiver are in agreement with Plan of Care.     Timed Code Treatment Minutes: 10  Total Treatment Time (sum of timed and untimed services): 18    Signature: Digna Low PT   Date: 9/24/2020    Certification:  Onset date: 9/24/2020  Start of care date: 9/24/2020  Certification date from  9/24/2020 to 9/24/2020    I CERTIFY THE NEED FOR THESE SERVICES FURNISHED UNDER        THIS PLAN OF TREATMENT AND WHILE UNDER MY CARE     (Physician attestation of this document indicates review and certification of the therapy plan).

## 2020-09-25 LAB
EXPTIME-PRE: 6.38 SEC
FEF2575-%PRED-PRE: 101 %
FEF2575-PRE: 1.38 L/SEC
FEF2575-PRED: 1.36 L/SEC
FEFMAX-%PRED-PRE: 113 %
FEFMAX-PRE: 4.48 L/SEC
FEFMAX-PRED: 3.96 L/SEC
FEV1-%PRED-PRE: 92 %
FEV1-PRE: 1.48 L
FEV1FEV6-PRE: 80 %
FEV1FEV6-PRED: 77 %
FEV1FVC-PRE: 80 %
FEV1FVC-PRED: 78 %
FIFMAX-PRE: 2.81 L/SEC
FVC-%PRED-PRE: 88 %
FVC-PRE: 1.84 L
FVC-PRED: 2.09 L
MEP-PRE: 48 CMH2O
MIP-PRE: -49 CMH2O

## 2020-09-29 DIAGNOSIS — G45.9 TIA (TRANSIENT ISCHEMIC ATTACK): ICD-10-CM

## 2020-09-29 LAB — INTERPRETATION ECG - MUSE: NORMAL

## 2020-10-22 ENCOUNTER — HOSPITAL ENCOUNTER (OUTPATIENT)
Dept: MRI IMAGING | Facility: CLINIC | Age: 80
Discharge: HOME | End: 2020-10-22
Attending: PSYCHIATRY & NEUROLOGY | Admitting: PSYCHIATRY & NEUROLOGY
Payer: MEDICARE

## 2020-10-22 DIAGNOSIS — G45.9 TIA (TRANSIENT ISCHEMIC ATTACK): ICD-10-CM

## 2020-10-22 LAB
CREAT BLD-MCNC: 0.8 MG/DL (ref 0.52–1.04)
GFR SERPL CREATININE-BSD FRML MDRD: 69 ML/MIN/{1.73_M2}

## 2020-10-22 PROCEDURE — 70553 MRI BRAIN STEM W/O & W/DYE: CPT

## 2020-10-22 PROCEDURE — 82565 ASSAY OF CREATININE: CPT

## 2020-10-22 PROCEDURE — 255N000002 HC RX 255 OP 636: Performed by: PSYCHIATRY & NEUROLOGY

## 2020-10-22 PROCEDURE — A9585 GADOBUTROL INJECTION: HCPCS | Performed by: PSYCHIATRY & NEUROLOGY

## 2020-10-22 RX ORDER — GADOBUTROL 604.72 MG/ML
6 INJECTION INTRAVENOUS ONCE
Status: COMPLETED | OUTPATIENT
Start: 2020-10-22 | End: 2020-10-22

## 2020-10-22 RX ADMIN — GADOBUTROL 6 ML: 604.72 INJECTION INTRAVENOUS at 12:31

## 2020-10-23 ENCOUNTER — HOSPITAL ENCOUNTER (OUTPATIENT)
Dept: CARDIOLOGY | Facility: CLINIC | Age: 80
Discharge: HOME | End: 2020-10-23
Attending: PSYCHIATRY & NEUROLOGY | Admitting: PSYCHIATRY & NEUROLOGY
Payer: MEDICARE

## 2020-10-23 DIAGNOSIS — G45.9 TIA (TRANSIENT ISCHEMIC ATTACK): ICD-10-CM

## 2020-10-23 PROCEDURE — 93306 TTE W/DOPPLER COMPLETE: CPT

## 2020-10-23 PROCEDURE — 93306 TTE W/DOPPLER COMPLETE: CPT | Mod: 26 | Performed by: INTERNAL MEDICINE

## 2020-11-06 ENCOUNTER — TELEPHONE (OUTPATIENT)
Dept: NEUROLOGY | Facility: CLINIC | Age: 80
End: 2020-11-06

## 2020-11-06 NOTE — TELEPHONE ENCOUNTER
Discussed test results with patient. Echo and EKG normal. Brain MRI showed a left sided subdural collection. 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 Patient denies headaches, recent speech or mental status changes, etc. I explained to her that this finding has nothing to do with ALS, in my opinion it's unlikely to explain the transient event of speech impairment she had in 4/2020, and I do not think it requires immediate action.   I would consider repeating the brain MRI in 6-12 months to make sure this does not grow. I do not think it is a subdural hematoma because I do not see any signs of old blood on gradient echo or SWI sequences.   Patient verbalized understanding and agreement with above plan GM

## 2020-12-08 ENCOUNTER — TRANSFERRED RECORDS (OUTPATIENT)
Dept: HEALTH INFORMATION MANAGEMENT | Facility: CLINIC | Age: 80
End: 2020-12-08

## 2020-12-08 LAB
ALT SERPL-CCNC: 25 IU/L (ref 5–35)
AST SERPL-CCNC: 44 U/L (ref 5–34)
CREAT SERPL-MCNC: 0.64 MG/DL (ref 0.5–1.3)

## 2021-01-12 DIAGNOSIS — G12.21 ALS (AMYOTROPHIC LATERAL SCLEROSIS) (H): ICD-10-CM

## 2021-01-12 RX ORDER — RILUZOLE 50 MG/1
TABLET, FILM COATED ORAL
Qty: 60 TABLET | Refills: 2 | Status: SHIPPED | OUTPATIENT
Start: 2021-01-12 | End: 2021-04-13

## 2021-02-09 ENCOUNTER — IMMUNIZATION (OUTPATIENT)
Dept: NURSING | Facility: CLINIC | Age: 81
End: 2021-02-09
Payer: MEDICARE

## 2021-02-09 PROCEDURE — 91300 PR COVID VAC PFIZER DIL RECON 30 MCG/0.3 ML IM: CPT

## 2021-02-09 PROCEDURE — 0001A PR COVID VAC PFIZER DIL RECON 30 MCG/0.3 ML IM: CPT

## 2021-02-14 ENCOUNTER — HEALTH MAINTENANCE LETTER (OUTPATIENT)
Age: 81
End: 2021-02-14

## 2021-02-24 DIAGNOSIS — E78.5 HYPERLIPIDEMIA LDL GOAL <130: ICD-10-CM

## 2021-02-24 NOTE — LETTER
[facility]  [address] [address]  [phone]            Britt Pichardo  [address] [facility] [address]        February 25, 2021    Dear Britt,    While refilling your lovastatin (MEVACOR) 20 MG tablet prescription today, we noticed that you are due for an appointment with your provider.  We will refill your prescription for 90 days, but a follow-up appointment must be made before any additional refills can be approved.     Taking care of your health is important to us and we look forward to seeing you in the near future.  Please call us at [phone] or [phone] (or use [employer]) to schedule an appointment.     Please disregard this notice if you have already made an appointment.    Sincerely,        [facility]

## 2021-02-25 RX ORDER — LOVASTATIN 20 MG
TABLET ORAL
Qty: 90 TABLET | Refills: 0 | Status: SHIPPED | OUTPATIENT
Start: 2021-02-25 | End: 2021-05-24

## 2021-02-25 NOTE — TELEPHONE ENCOUNTER
Lovastatin   Routing refill request to provider for review/approval because:  Labs not current:  LDL  Patient needs to be seen because it has been more than 1 year since last office visit.    LDL Cholesterol Calculated   Date Value Ref Range Status   02/05/2020 67 <100 mg/dL Final     Comment:     Desirable:       <100 mg/dl

## 2021-03-02 ENCOUNTER — IMMUNIZATION (OUTPATIENT)
Dept: NURSING | Facility: CLINIC | Age: 81
End: 2021-03-02
Attending: INTERNAL MEDICINE
Payer: MEDICARE

## 2021-03-02 PROCEDURE — 0002A PR COVID VAC PFIZER DIL RECON 30 MCG/0.3 ML IM: CPT

## 2021-03-02 PROCEDURE — 91300 PR COVID VAC PFIZER DIL RECON 30 MCG/0.3 ML IM: CPT

## 2021-03-08 ENCOUNTER — HOSPITAL ENCOUNTER (OUTPATIENT)
Dept: MAMMOGRAPHY | Facility: CLINIC | Age: 81
Discharge: HOME | End: 2021-03-08
Attending: FAMILY MEDICINE | Admitting: FAMILY MEDICINE
Payer: MEDICARE

## 2021-03-08 DIAGNOSIS — Z12.31 VISIT FOR SCREENING MAMMOGRAM: ICD-10-CM

## 2021-03-08 PROCEDURE — 77063 BREAST TOMOSYNTHESIS BI: CPT

## 2021-03-23 DIAGNOSIS — G12.21 ALS (AMYOTROPHIC LATERAL SCLEROSIS) (H): Primary | ICD-10-CM

## 2021-03-25 ENCOUNTER — OFFICE VISIT (OUTPATIENT)
Dept: NEUROLOGY | Facility: CLINIC | Age: 81
End: 2021-03-25
Payer: MEDICARE

## 2021-03-25 VITALS
DIASTOLIC BLOOD PRESSURE: 75 MMHG | BODY MASS INDEX: 24.83 KG/M2 | HEART RATE: 53 BPM | SYSTOLIC BLOOD PRESSURE: 148 MMHG | WEIGHT: 124 LBS | OXYGEN SATURATION: 97 %

## 2021-03-25 DIAGNOSIS — S06.5XAA SUBDURAL HEMATOMA (H): Primary | ICD-10-CM

## 2021-03-25 DIAGNOSIS — G12.21 ALS (AMYOTROPHIC LATERAL SCLEROSIS) (H): ICD-10-CM

## 2021-03-25 PROCEDURE — 94375 RESPIRATORY FLOW VOLUME LOOP: CPT | Performed by: INTERNAL MEDICINE

## 2021-03-25 PROCEDURE — 99214 OFFICE O/P EST MOD 30 MIN: CPT | Performed by: PSYCHIATRY & NEUROLOGY

## 2021-03-25 ASSESSMENT — PAIN SCALES - GENERAL: PAINLEVEL: NO PAIN (0)

## 2021-03-25 NOTE — PROGRESS NOTES
Service Date: 2021      Vineet Mendoza MD   [facility]   [address] [place] [address]      RE: Britt Pichardo   MRN: [id number]   : 1940      Dear Dr. Mendoza:      I had the pleasure to see Britt Pichardo in follow up at the [facility]. She was escorted by her daughter. Britt has limb onset ALS with very slow progression that historically began with left upper extremity painless distal weakness in 2017.  She meets [place] criteria for clinically probable ALS.  Her last visit was 2020, in person.  Compared to the last time we saw her, she noticed worsening of her left hand weakness- the left hand has essentially become useless.  She does note mild weakness of the right hand, but this is not dramatically different than her last visit.  It takes her a little longer to cut her food and sometimes it needs to be cut by somebody else.  She has no problems brushing her teeth.  She can hold a pen and write, but her handwriting is not always that legible. She gets some cramps in the upper and the lower extremities in the morning that she can overcome by drinking plenty of water the night before.  Her daughter tells me that her voice is a little more hoarse lately.  She is usually intelligible but sometimes has to repeat herself.  She does not have any trouble swallowing or chewing food.  She noticed drooling at night but not so much during the day unless she wears a mask.  Regarding leg weakness, she has not observed any difficulty getting up from chairs and there is no tripping or falling, but if she goes on a long walk, her legs feel tired and she tends to lean a little bit towards the left side.    She denies dyspnea on exertion or orthopnea.    She is on riluzole 50 mg b.i.d., tolerating it well so far.  AST and ALT were last checked in 2020 and were within normal limits.  She also had an  episode of expressive aphasia in 05/2020 lasting 4 days.  Her daughter provided an updated history and I learnt today that she had a head injury few days prior to this event. She fell and she may have lost consciousness for minutes. She had a brain MRI in 10/2020 that showed mild dural thickening and enhancement along the left cerebral convexity that could be resolving subdural hemorrhage or inflammatory process.  She does have a history of rheumatoid arthritis.  She has not had any further episodes of loss of consciousness, seizures, headaches or other concerning CNS signs. She is on 81 mg of aspirin daily.       Medications are reviewed and are as per Epic record.      Her pulmonary function tests are stable from last visit.  FVC is 84%, FEV1 93% and MIP is -50.  This is essentially identical to 09/2020.     BP (!) 148/75   Pulse 53   Wt 56.2 kg (124 lb)   SpO2 97%   BMI 24.83 kg/m       NEURO EXAM: She is awake, alert and oriented x 3.  She has no ptosis or ophthalmoparesis.  She has no weakness of orbicularis oculi or oris.  Cheek puff is minimally weak.  Tongue probably shows mild fasciculations, but no striking atrophy, and lateral tongue movements are done very fast.  I did not appreciate any clear dysarthria or dysphonia, although her voice may be hoarse.  Neck flexion and extension strength are full.  Jaw strength is full.  Strength is 5/5 for right deltoid, 3 to 4- left, biceps right 5, left 4-, triceps right 5, left 4.  Wrist extension right 5-, left 3.  Finger extensors right 4, left 3. APB 4/0, FDI 4/0.  Hip flexion, knee extension, knee flexion, foot dorsiflexion are within broad normal limits. Reflexes were absent at the jaw and preserved 2+ but not particularly brisk in bilateral upper extremities.  She does have a Prasad sign on the left but not on the right.      In summary, Britt has limb onset ALS with fortunately very slow progression over time.  She is not interested in Radicava.  She will  continue riluzole.  I am not rechecking AST, ALT now because those have been checked multiple times in the last year and they were always normal.  Chance of liver toxicity at this stage is very low.  She will see our speech, physical and occupational therapists today.  Her voice hoarseness may not necessarily be related to ALS.  I did not appreciate any spastic quality to her voice or speech.  In addition, I did not appreciate any lower extremity weakness.  She may need equipment or splints to allow for more hand function in the left. She does not have any respiratory needs at present and there is no dysphagia.     Based on the history provided by the daughter, I am wondering whether the episode of expressive aphasia that occurred in 2020 was due to a TBI. The MRI findings may be consistent with a mild resolving subdural hematoma and this is plausible based on history. Inflammatory dural pathology due to RA is in the differential but probably unlikely. We will follow this. I think it is safe for the patient to continue taking aspirin and I am not going to re-order an MRI now but may consider this later in  just to ascertain that the area of dural enhancement is stable or resolving.     She will return to clinic for followup in 6 months or sooner if needed due to her very slow progression.  TT spent on this encounter 35 minutes including 20 face to face with patient, 10 in post-visit note dictation, editing, and orders, and 5 in pre-visit chart review.         Sincerely,         VENITA THOMAS MD             D: 2021   T: 2021   MT: juana      Name:     PHYLLIS DOYLE   MRN:      [id number]        Account:      [id number]   :      1940           Service Date: 2021      Document: [id number]

## 2021-03-25 NOTE — LETTER
3/25/2021       RE: Britt Pichardo  [address] [facility] [address]     Dear Colleague,    Thank you for referring your patient, Britt iPchardo, to the [facility] [place] [facility]. Please see a copy of my visit note below.    Service Date: 2021      Vineet Mendoza MD   [facility]   [address] [place] [address]      RE: Britt Pichardo   MRN: [id number]   : 1940      Dear Dr. Mendoza:      I had the pleasure to see Britt Pichardo in follow up at the [facility]. She was escorted by her daughter. Britt has limb onset ALS with very slow progression that historically began with left upper extremity painless distal weakness in 2017.  She meets [place] criteria for clinically probable ALS.  Her last visit was 2020, in person.  Compared to the last time we saw her, she noticed worsening of her left hand weakness- the left hand has essentially become useless.  She does note mild weakness of the right hand, but this is not dramatically different than her last visit.  It takes her a little longer to cut her food and sometimes it needs to be cut by somebody else.  She has no problems brushing her teeth.  She can hold a pen and write, but her handwriting is not always that legible. She gets some cramps in the upper and the lower extremities in the morning that she can overcome by drinking plenty of water the night before.  Her daughter tells me that her voice is a little more hoarse lately.  She is usually intelligible but sometimes has to repeat herself.  She does not have any trouble swallowing or chewing food.  She noticed drooling at night but not so much during the day unless she wears a mask.  Regarding leg weakness, she has not observed any difficulty getting up from chairs and there is no tripping or  falling, but if she goes on a long walk, her legs feel tired and she tends to lean a little bit towards the left side.    She denies dyspnea on exertion or orthopnea.    She is on riluzole 50 mg b.i.d., tolerating it well so far.  AST and ALT were last checked in 12/2020 and were within normal limits.  She also had an episode of expressive aphasia in 05/2020 lasting 4 days.  Her daughter provided an updated history and I learnt today that she had a head injury few days prior to this event. She fell and she may have lost consciousness for minutes. She had a brain MRI in 10/2020 that showed mild dural thickening and enhancement along the left cerebral convexity that could be resolving subdural hemorrhage or inflammatory process.  She does have a history of rheumatoid arthritis.  She has not had any further episodes of loss of consciousness, seizures, headaches or other concerning CNS signs. She is on 81 mg of aspirin daily.       Medications are reviewed and are as per Epic record.      Her pulmonary function tests are stable from last visit.  FVC is 84%, FEV1 93% and MIP is -50.  This is essentially identical to 09/2020.     BP (!) 148/75   Pulse 53   Wt 56.2 kg (124 lb)   SpO2 97%   BMI 24.83 kg/m       NEURO EXAM: She is awake, alert and oriented x 3.  She has no ptosis or ophthalmoparesis.  She has no weakness of orbicularis oculi or oris.  Cheek puff is minimally weak.  Tongue probably shows mild fasciculations, but no striking atrophy, and lateral tongue movements are done very fast.  I did not appreciate any clear dysarthria or dysphonia, although her voice may be hoarse.  Neck flexion and extension strength are full.  Jaw strength is full.  Strength is 5/5 for right deltoid, 3 to 4- left, biceps right 5, left 4-, triceps right 5, left 4.  Wrist extension right 5-, left 3.  Finger extensors right 4, left 3. APB 4/0, FDI 4/0.  Hip flexion, knee extension, knee flexion, foot dorsiflexion are within broad  normal limits. Reflexes were absent at the jaw and preserved 2+ but not particularly brisk in bilateral upper extremities.  She does have a Prasad sign on the left but not on the right.      In summary, Phyllis has limb onset ALS with fortunately very slow progression over time.  She is not interested in Radicava.  She will continue riluzole.  I am not rechecking AST, ALT now because those have been checked multiple times in the last year and they were always normal.  Chance of liver toxicity at this stage is very low.  She will see our speech, physical and occupational therapists today.  Her voice hoarseness may not necessarily be related to ALS.  I did not appreciate any spastic quality to her voice or speech.  In addition, I did not appreciate any lower extremity weakness.  She may need equipment or splints to allow for more hand function in the left. She does not have any respiratory needs at present and there is no dysphagia.     Based on the history provided by the daughter, I am wondering whether the episode of expressive aphasia that occurred in 5/2020 was due to a TBI. The MRI findings may be consistent with a mild resolving subdural hematoma and this is plausible based on history. Inflammatory dural pathology due to RA is in the differential but probably unlikely. We will follow this. I think it is safe for the patient to continue taking aspirin and I am not going to re-order an MRI now but may consider this later in 2021 just to ascertain that the area of dural enhancement is stable or resolving.     She will return to clinic for followup in 6 months or sooner if needed due to her very slow progression.  TT spent on this encounter 35 minutes including 20 face to face with patient, 10 in post-visit note dictation, editing, and orders, and 5 in pre-visit chart review.     Sincerely,      VENITA THOMAS MD       D: 03/25/2021   T: 03/25/2021   MT: juana      Name:     PHYLLIS DOYLE   MRN:       [id number]        Account:      [id number]   :      1940           Service Date: 2021      Document: [id number]

## 2021-03-25 NOTE — NURSING NOTE
Chief Complaint   Patient presents with     RECHECK     [facility] Return ALS/Motor Neuron     Norbert Robertson

## 2021-03-26 LAB
EXPTIME-PRE: 5.02 SEC
FEF2575-%PRED-PRE: 119 %
FEF2575-PRE: 1.6 L/SEC
FEF2575-PRED: 1.34 L/SEC
FEFMAX-%PRED-PRE: 118 %
FEFMAX-PRE: 4.59 L/SEC
FEFMAX-PRED: 3.86 L/SEC
FEV1-%PRED-PRE: 93 %
FEV1-PRE: 1.48 L
FEV1FEV6-PRE: 84 %
FEV1FEV6-PRED: 77 %
FEV1FVC-PRE: 84 %
FEV1FVC-PRED: 77 %
FIFMAX-PRE: 2.79 L/SEC
FVC-%PRED-PRE: 84 %
FVC-PRE: 1.75 L
FVC-PRED: 2.07 L
MEP-PRE: 50 CMH2O
MIP-PRE: -50 CMH2O

## 2021-03-31 ENCOUNTER — ANCILLARY PROCEDURE (OUTPATIENT)
Dept: BONE DENSITY | Facility: CLINIC | Age: 81
End: 2021-03-31
Payer: MEDICARE

## 2021-03-31 ENCOUNTER — ANCILLARY PROCEDURE (OUTPATIENT)
Dept: BONE DENSITY | Facility: CLINIC | Age: 81
End: 2021-03-31
Attending: FAMILY MEDICINE
Payer: MEDICARE

## 2021-03-31 ENCOUNTER — OFFICE VISIT (OUTPATIENT)
Dept: INTERNAL MEDICINE | Facility: CLINIC | Age: 81
End: 2021-03-31
Payer: MEDICARE

## 2021-03-31 ENCOUNTER — ANCILLARY PROCEDURE (OUTPATIENT)
Dept: GENERAL RADIOLOGY | Facility: CLINIC | Age: 81
End: 2021-03-31
Attending: FAMILY MEDICINE
Payer: MEDICARE

## 2021-03-31 VITALS
HEART RATE: 58 BPM | TEMPERATURE: 97.7 F | OXYGEN SATURATION: 97 % | WEIGHT: 125.2 LBS | SYSTOLIC BLOOD PRESSURE: 106 MMHG | BODY MASS INDEX: 25.24 KG/M2 | HEIGHT: 59 IN | DIASTOLIC BLOOD PRESSURE: 78 MMHG

## 2021-03-31 DIAGNOSIS — M79.89 SWELLING OF LEFT FOOT: ICD-10-CM

## 2021-03-31 DIAGNOSIS — M21.612 BUNION OF GREAT TOE OF LEFT FOOT: ICD-10-CM

## 2021-03-31 DIAGNOSIS — Z78.0 POSTMENOPAUSAL STATUS: Primary | ICD-10-CM

## 2021-03-31 DIAGNOSIS — E55.9 VITAMIN D DEFICIENCY: ICD-10-CM

## 2021-03-31 DIAGNOSIS — M81.0 OSTEOPOROSIS: ICD-10-CM

## 2021-03-31 DIAGNOSIS — E78.5 HYPERLIPIDEMIA LDL GOAL <130: ICD-10-CM

## 2021-03-31 DIAGNOSIS — Z00.00 ROUTINE MEDICAL EXAM: Primary | ICD-10-CM

## 2021-03-31 DIAGNOSIS — G12.21 ALS (AMYOTROPHIC LATERAL SCLEROSIS) (H): ICD-10-CM

## 2021-03-31 DIAGNOSIS — M05.79 RHEUMATOID ARTHRITIS INVOLVING MULTIPLE SITES WITH POSITIVE RHEUMATOID FACTOR (H): ICD-10-CM

## 2021-03-31 DIAGNOSIS — Z78.0 POSTMENOPAUSAL STATUS: ICD-10-CM

## 2021-03-31 DIAGNOSIS — M21.611 BUNION, RIGHT: ICD-10-CM

## 2021-03-31 DIAGNOSIS — R82.90 NONSPECIFIC FINDING ON EXAMINATION OF URINE: ICD-10-CM

## 2021-03-31 DIAGNOSIS — Z82.49 FAMILY HISTORY OF CORONARY ARTERY DISEASE: ICD-10-CM

## 2021-03-31 LAB
ALBUMIN SERPL-MCNC: 3.8 G/DL (ref 3.4–5)
ALBUMIN UR-MCNC: NEGATIVE MG/DL
ALP SERPL-CCNC: 83 U/L (ref 40–150)
ALT SERPL W P-5'-P-CCNC: 23 U/L (ref 0–50)
ANION GAP SERPL CALCULATED.3IONS-SCNC: <1 MMOL/L (ref 3–14)
APPEARANCE UR: CLEAR
AST SERPL W P-5'-P-CCNC: 30 U/L (ref 0–45)
BACTERIA #/AREA URNS HPF: ABNORMAL /HPF
BASOPHILS # BLD AUTO: 0 10E9/L (ref 0–0.2)
BASOPHILS NFR BLD AUTO: 0.3 %
BILIRUB SERPL-MCNC: 0.6 MG/DL (ref 0.2–1.3)
BILIRUB UR QL STRIP: NEGATIVE
BUN SERPL-MCNC: 18 MG/DL (ref 7–30)
CALCIUM SERPL-MCNC: 9 MG/DL (ref 8.5–10.1)
CHLORIDE SERPL-SCNC: 106 MMOL/L (ref 94–109)
CHOLEST SERPL-MCNC: 162 MG/DL
CO2 SERPL-SCNC: 30 MMOL/L (ref 20–32)
COLOR UR AUTO: YELLOW
CREAT SERPL-MCNC: 0.71 MG/DL (ref 0.52–1.04)
DEPRECATED CALCIDIOL+CALCIFEROL SERPL-MC: 41 UG/L (ref 20–75)
DIFFERENTIAL METHOD BLD: ABNORMAL
EOSINOPHIL # BLD AUTO: 0.3 10E9/L (ref 0–0.7)
EOSINOPHIL NFR BLD AUTO: 2.7 %
ERYTHROCYTE [DISTWIDTH] IN BLOOD BY AUTOMATED COUNT: 13.2 % (ref 10–15)
GFR SERPL CREATININE-BSD FRML MDRD: 79 ML/MIN/{1.73_M2}
GLUCOSE SERPL-MCNC: 96 MG/DL (ref 70–99)
GLUCOSE UR STRIP-MCNC: NEGATIVE MG/DL
HCT VFR BLD AUTO: 41.9 % (ref 35–47)
HDLC SERPL-MCNC: 70 MG/DL
HGB BLD-MCNC: 13.3 G/DL (ref 11.7–15.7)
HGB UR QL STRIP: NEGATIVE
KETONES UR STRIP-MCNC: NEGATIVE MG/DL
LDLC SERPL CALC-MCNC: 69 MG/DL
LEUKOCYTE ESTERASE UR QL STRIP: ABNORMAL
LYMPHOCYTES # BLD AUTO: 3.6 10E9/L (ref 0.8–5.3)
LYMPHOCYTES NFR BLD AUTO: 37.5 %
MCH RBC QN AUTO: 32.8 PG (ref 26.5–33)
MCHC RBC AUTO-ENTMCNC: 31.7 G/DL (ref 31.5–36.5)
MCV RBC AUTO: 103 FL (ref 78–100)
MONOCYTES # BLD AUTO: 0.8 10E9/L (ref 0–1.3)
MONOCYTES NFR BLD AUTO: 8.2 %
NEUTROPHILS # BLD AUTO: 5 10E9/L (ref 1.6–8.3)
NEUTROPHILS NFR BLD AUTO: 51.3 %
NITRATE UR QL: NEGATIVE
NONHDLC SERPL-MCNC: 92 MG/DL
PH UR STRIP: 6.5 PH (ref 5–7)
PLATELET # BLD AUTO: 247 10E9/L (ref 150–450)
POTASSIUM SERPL-SCNC: 4.4 MMOL/L (ref 3.4–5.3)
PROT SERPL-MCNC: 7.8 G/DL (ref 6.8–8.8)
RBC # BLD AUTO: 4.06 10E12/L (ref 3.8–5.2)
RBC #/AREA URNS AUTO: ABNORMAL /HPF
SODIUM SERPL-SCNC: 136 MMOL/L (ref 133–144)
SOURCE: ABNORMAL
SP GR UR STRIP: 1.02 (ref 1–1.03)
TRIGL SERPL-MCNC: 115 MG/DL
UROBILINOGEN UR STRIP-ACNC: 0.2 EU/DL (ref 0.2–1)
WBC # BLD AUTO: 9.7 10E9/L (ref 4–11)
WBC #/AREA URNS AUTO: ABNORMAL /HPF

## 2021-03-31 PROCEDURE — 80061 LIPID PANEL: CPT | Performed by: FAMILY MEDICINE

## 2021-03-31 PROCEDURE — 87086 URINE CULTURE/COLONY COUNT: CPT | Performed by: FAMILY MEDICINE

## 2021-03-31 PROCEDURE — 77080 DXA BONE DENSITY AXIAL: CPT | Performed by: INTERNAL MEDICINE

## 2021-03-31 PROCEDURE — 99214 OFFICE O/P EST MOD 30 MIN: CPT | Mod: 25 | Performed by: FAMILY MEDICINE

## 2021-03-31 PROCEDURE — 36415 COLL VENOUS BLD VENIPUNCTURE: CPT | Performed by: FAMILY MEDICINE

## 2021-03-31 PROCEDURE — 81001 URINALYSIS AUTO W/SCOPE: CPT | Performed by: FAMILY MEDICINE

## 2021-03-31 PROCEDURE — 73630 X-RAY EXAM OF FOOT: CPT | Mod: LT | Performed by: RADIOLOGY

## 2021-03-31 PROCEDURE — 77081 DXA BONE DENSITY APPENDICULR: CPT | Performed by: INTERNAL MEDICINE

## 2021-03-31 PROCEDURE — 82306 VITAMIN D 25 HYDROXY: CPT | Performed by: FAMILY MEDICINE

## 2021-03-31 PROCEDURE — 85025 COMPLETE CBC W/AUTO DIFF WBC: CPT | Performed by: FAMILY MEDICINE

## 2021-03-31 PROCEDURE — G0439 PPPS, SUBSEQ VISIT: HCPCS | Performed by: FAMILY MEDICINE

## 2021-03-31 PROCEDURE — 80053 COMPREHEN METABOLIC PANEL: CPT | Performed by: FAMILY MEDICINE

## 2021-03-31 ASSESSMENT — ACTIVITIES OF DAILY LIVING (ADL): CURRENT_FUNCTION: HOUSEWORK REQUIRES ASSISTANCE

## 2021-03-31 ASSESSMENT — MIFFLIN-ST. JEOR: SCORE: 937.49

## 2021-03-31 NOTE — PATIENT INSTRUCTIONS
We will obtain x-ray of the left foot.  Labs otherwise as noted.  She is currently using a toe separator on both first and second toes on both feet.  She is able to get around without any difficulty with that.  Assuming that that continues we would do that as symptomatic treatment for her bunions.  She is following every 6 months at the [facility] for her ALS.  Currently not having rapid progression.

## 2021-03-31 NOTE — PROGRESS NOTES
"SUBJECTIVE:   Britt Pichardo is a 81 year old female who presents for Preventive Visit.       Patient has been advised of split billing requirements and indicates understanding: Yes   Are you in the first 12 months of your Medicare coverage?  No    Healthy Habits:     In general, how would you rate your overall health?  Fair    Frequency of exercise:  2-3 days/week    Duration of exercise:  30-45 minutes    Do you usually eat at least 4 servings of fruit and vegetables a day, include whole grains    & fiber and avoid regularly eating high fat or \"junk\" foods?  Yes    Taking medications regularly:  Yes    Medication side effects:  None    Ability to successfully perform activities of daily living:  Housework requires assistance    Home Safety:  No safety concerns identified    Hearing Impairment:  No hearing concerns    In the past 6 months, have you been bothered by leaking of urine?  No    In general, how would you rate your overall mental or emotional health?  Good      PHQ-2 Total Score: 1    Additional concerns today:  Yes (left foot started swelling on Monday)    Do you feel safe in your environment? Yes    Have you ever done Advance Care Planning? (For example, a Health Directive, POLST, or a discussion with a medical provider or your loved ones about your wishes): Yes, advance care planning is on file.       Fall risk  Fallen 2 or more times in the past year?: No  Any fall with injury in the past year?: No    Cognitive Screening   1) Repeat 3 items (Leader, Season, Table)    2) Clock draw: NORMAL  3) 3 item recall: Recalls 1 object   Results: NORMAL clock, 1-2 items recalled: COGNITIVE IMPAIRMENT LESS LIKELY    Mini-CogTM Copyright [employer]iscoe. Licensed by the author for use in [facility]; reprinted with permission (cristobal@.[facility]). All rights reserved.      Do you have sleep apnea, excessive snoring or daytime drowsiness?: no    Reviewed and updated as needed this visit by clinical staff  Tobacco  " Allergies  Meds              Reviewed and updated as needed this visit by Provider                Social History     Tobacco Use     Smoking status: Former Smoker     Types: Cigarettes     Quit date: 1976     Years since quittin.2     Smokeless tobacco: Never Used   Substance Use Topics     Alcohol use: Yes     Comment: occasional - special events/holidays only         Alcohol Use 3/31/2021   Prescreen: >3 drinks/day or >7 drinks/week? No   Prescreen: >3 drinks/day or >7 drinks/week? -           Musculoskeletal problem/pain      Duration: 3 days    Description  Location: Left forefoot    Intensity:  mild    Accompanying signs and symptoms: swelling    History  Previous similar problem: no   Previous evaluation:  none    Precipitating or alleviating factors:  Trauma or overuse: no   Aggravating factors include: none  Therapies tried and outcome: rest/inactivity, swelling may be improves a little tiny bit overnight.  Hyperlipidemia Follow-Up      Are you regularly taking any medication or supplement to lower your cholesterol?   Yes- Lovastatin    Are you having muscle aches or other side effects that you think could be caused by your cholesterol lowering medication?  No      Current providers sharing in care for this patient include:   Patient Care Team:  Vineet Mendoza MD as PCP - General (Family Practice)  Vineet Mendoza MD as Assigned PCP  Britt Matute LICSW as  ( - Clinical)  Masno Beckham MD as MD (Neurology)  Mason Beckham MD as Assigned Neuroscience Provider    The following health maintenance items are reviewed in Epic and correct as of today:  Health Maintenance Due   Topic Date Due     ZOSTER IMMUNIZATION (1 of 2) Never done     FALL RISK ASSESSMENT  2021     Patient Active Problem List   Diagnosis     Health Care Home     Family history of coronary artery disease     Hyperlipidemia LDL goal <130     Vitamin  "D deficiency     Estrogen deficient vulvovaginitis     Recurrent HSV (herpes simplex virus)     ACP (advance care planning)     Rheumatoid arthritis involving multiple sites with positive rheumatoid factor (H)     Cervicalgia     Cervical radiculopathy     ALS (amyotrophic lateral sclerosis) (H)     Multifocal motor neuropathy (H)     Bunion of great toe of left foot     Bunion, right     Routine medical exam     Past Surgical History:   Procedure Laterality Date     BACK SURGERY  ,    lumbar fusions       Social History     Tobacco Use     Smoking status: Former Smoker     Types: Cigarettes     Quit date: 1976     Years since quittin.2     Smokeless tobacco: Never Used   Substance Use Topics     Alcohol use: Yes     Comment: occasional - special events/holidays only     Family History   Problem Relation Age of Onset     C.A.D. Mother 88     Leukemia Father      Heart Disease Brother      C.A.D. Brother      Cancer Other         esophagus         Mammogram Screening: Mammogram Screening - Patient over age 75, has elected to continue with screening.      Pertinent mammograms are reviewed under the imaging tab.    Review of Systems  Constitutional, HEENT, cardiovascular, pulmonary, gi and gu systems are negative, except as otherwise noted.    OBJECTIVE:   /78   Pulse 58   Temp 97.7  F (36.5  C) (Oral)   Ht 1.505 m (4' 11.25\")   Wt 56.8 kg (125 lb 3.2 oz)   SpO2 97%   BMI 25.07 kg/m   Estimated body mass index is 25.07 kg/m  as calculated from the following:    Height as of this encounter: 1.505 m (4' 11.25\").    Weight as of this encounter: 56.8 kg (125 lb 3.2 oz).  Physical Exam  GENERAL APPEARANCE: healthy, alert and no distress  EYES: Eyes grossly normal to inspection, PERRL and conjunctivae and sclerae normal  HENT: ear canals and TM's normal, nose and mouth without ulcers or lesions, oropharynx clear and oral mucous membranes moist  NECK: no adenopathy, no asymmetry, masses, or " "scars and thyroid normal to palpation  RESP: lungs clear to auscultation - no rales, rhonchi or wheezes  CV: regular rate and rhythm, normal S1 S2, no S3 or S4, no murmur, click or rub, no peripheral edema and peripheral pulses strong  ABDOMEN: soft, nontender, no hepatosplenomegaly, no masses and bowel sounds normal  MS: gait is age appropriate without ataxia, there is mild swelling over the forefoot on the left foot.  Mild tenderness to palpation over the third fourth and fifth metatarsal heads.  SKIN: no suspicious lesions or rashes  NEURO: Normal strength and tone, sensory exam grossly normal, mentation intact and speech normal  PSYCH: mentation appears normal and affect normal/bright        ASSESSMENT / PLAN:       ICD-10-CM    1. Routine medical exam  Z00.00    2. Swelling of left foot  M79.89 XR Foot Left G/E 3 Views   3. Vitamin D deficiency  E55.9 Vitamin D Deficiency   4. Rheumatoid arthritis involving multiple sites with positive rheumatoid factor (H)  M05.79 UA with Microscopic     CBC with platelets differential     Comprehensive metabolic panel   5. Hyperlipidemia LDL goal <130  E78.5 Lipid Profile   6. ALS (amyotrophic lateral sclerosis) (H)  G12.21    7. Bunion of great toe of left foot  M21.612    8. Bunion, right  M21.611    9. Family history of coronary artery disease  Z82.49        Patient has been advised of split billing requirements and indicates understanding: Yes  COUNSELING:  Reviewed preventive health counseling, as reflected in patient instructions       Regular exercise       Healthy diet/nutrition       Osteoporosis prevention/bone health       DEXA scan is pending.    Estimated body mass index is 25.07 kg/m  as calculated from the following:    Height as of this encounter: 1.505 m (4' 11.25\").    Weight as of this encounter: 56.8 kg (125 lb 3.2 oz).        She reports that she quit smoking about 44 years ago. Her smoking use included cigarettes. She has never used smokeless " tobacco.      Appropriate preventive services were discussed with this patient, including applicable screening as appropriate for cardiovascular disease, diabetes, osteopenia/osteoporosis, and glaucoma.  As appropriate for age/gender, discussed screening for colorectal cancer, prostate cancer, breast cancer, and cervical cancer. Checklist reviewing preventive services available has been given to the patient.    Reviewed patients plan of care and provided an AVS. The Basic Care Plan (routine screening as documented in Health Maintenance) for Britt meets the Care Plan requirement. This Care Plan has been established and reviewed with the Patient.  We did discuss doing daily core strengthening exercises.  We will obtain x-ray of the left foot.  Labs otherwise as noted.  She is currently using a toe separator on both first and second toes on both feet.  She is able to get around without any difficulty with that.  Assuming that that continues we would do that as symptomatic treatment for her bunions.  She is following every 6 months at the [facility] for her ALS.  Currently not having rapid progression.  Counseling Resources:  ATP IV Guidelines  Pooled Cohorts Equation Calculator  Breast Cancer Risk Calculator  Breast Cancer: Medication to Reduce Risk  FRAX Risk Assessment  ICSI Preventive Guidelines  Dietary Guidelines for Americans, 2010  [employer]'s MyPlate  ASA Prophylaxis  Lung CA Screening    Vineet Mendoza MD  [facility]    Identified Health Risks:

## 2021-04-01 LAB
BACTERIA SPEC CULT: NORMAL
Lab: NORMAL
SPECIMEN SOURCE: NORMAL

## 2021-04-02 NOTE — RESULT ENCOUNTER NOTE
Dear Britt,    Your tests were all normal. A copy of your tests are available in My Chart.    Glad to see you at your appointment.  If you have any questions feel free to call.      Sincerely,      LUDIN Tinajero.

## 2021-04-12 DIAGNOSIS — G12.21 ALS (AMYOTROPHIC LATERAL SCLEROSIS) (H): ICD-10-CM

## 2021-04-13 ENCOUNTER — TRANSFERRED RECORDS (OUTPATIENT)
Dept: HEALTH INFORMATION MANAGEMENT | Facility: CLINIC | Age: 81
End: 2021-04-13

## 2021-04-13 LAB
ALT SERPL-CCNC: 16 IU/L (ref 5–35)
AST SERPL-CCNC: 27 U/L (ref 5–34)
CREAT SERPL-MCNC: 0.73 MG/DL (ref 0.5–1.3)
GFR SERPL CREATININE-BSD FRML MDRD: 81.1 ML/MIN/1.73M2

## 2021-04-13 RX ORDER — RILUZOLE 50 MG/1
TABLET, FILM COATED ORAL
Qty: 60 TABLET | Refills: 2 | Status: SHIPPED | OUTPATIENT
Start: 2021-04-13 | End: 2021-07-09

## 2021-05-23 DIAGNOSIS — E78.5 HYPERLIPIDEMIA LDL GOAL <130: ICD-10-CM

## 2021-05-24 RX ORDER — LOVASTATIN 20 MG
TABLET ORAL
Qty: 90 TABLET | Refills: 2 | Status: SHIPPED | OUTPATIENT
Start: 2021-05-24 | End: 2022-02-21

## 2021-07-08 DIAGNOSIS — G12.21 ALS (AMYOTROPHIC LATERAL SCLEROSIS) (H): ICD-10-CM

## 2021-07-09 RX ORDER — RILUZOLE 50 MG/1
TABLET, FILM COATED ORAL
Qty: 60 TABLET | Refills: 2 | Status: SHIPPED | OUTPATIENT
Start: 2021-07-09 | End: 2021-10-15

## 2021-09-19 ENCOUNTER — HEALTH MAINTENANCE LETTER (OUTPATIENT)
Age: 81
End: 2021-09-19

## 2021-09-21 DIAGNOSIS — G12.21 ALS (AMYOTROPHIC LATERAL SCLEROSIS) (H): Primary | ICD-10-CM

## 2021-09-23 ENCOUNTER — THERAPY VISIT (OUTPATIENT)
Dept: SPEECH THERAPY | Facility: CLINIC | Age: 81
End: 2021-09-23
Payer: MEDICARE

## 2021-09-23 ENCOUNTER — THERAPY VISIT (OUTPATIENT)
Dept: OCCUPATIONAL THERAPY | Facility: CLINIC | Age: 81
End: 2021-09-23
Payer: MEDICARE

## 2021-09-23 ENCOUNTER — THERAPY VISIT (OUTPATIENT)
Dept: PHYSICAL THERAPY | Facility: CLINIC | Age: 81
End: 2021-09-23
Payer: MEDICARE

## 2021-09-23 ENCOUNTER — OFFICE VISIT (OUTPATIENT)
Dept: NEUROLOGY | Facility: CLINIC | Age: 81
End: 2021-09-23
Payer: MEDICARE

## 2021-09-23 VITALS
DIASTOLIC BLOOD PRESSURE: 76 MMHG | RESPIRATION RATE: 16 BRPM | HEART RATE: 59 BPM | SYSTOLIC BLOOD PRESSURE: 135 MMHG | OXYGEN SATURATION: 95 %

## 2021-09-23 DIAGNOSIS — Z74.09 IMPAIRED MOBILITY AND ADLS: ICD-10-CM

## 2021-09-23 DIAGNOSIS — G12.21 ALS (AMYOTROPHIC LATERAL SCLEROSIS) (H): ICD-10-CM

## 2021-09-23 DIAGNOSIS — Z78.9 IMPAIRED MOBILITY AND ADLS: ICD-10-CM

## 2021-09-23 DIAGNOSIS — G12.21 ALS (AMYOTROPHIC LATERAL SCLEROSIS) (H): Primary | ICD-10-CM

## 2021-09-23 DIAGNOSIS — Z78.9 IMPAIRED INSTRUMENTAL ACTIVITIES OF DAILY LIVING (IADL): ICD-10-CM

## 2021-09-23 DIAGNOSIS — R47.1 DYSARTHRIA: ICD-10-CM

## 2021-09-23 PROBLEM — G61.82 MULTIFOCAL MOTOR NEUROPATHY (H): Status: RESOLVED | Noted: 2019-01-21 | Resolved: 2021-09-23

## 2021-09-23 PROBLEM — Z71.89 ACP (ADVANCE CARE PLANNING): Chronic | Status: RESOLVED | Noted: 2017-03-22 | Resolved: 2021-09-23

## 2021-09-23 PROBLEM — Z00.00 ROUTINE MEDICAL EXAM: Status: RESOLVED | Noted: 2021-03-31 | Resolved: 2021-09-23

## 2021-09-23 PROBLEM — M54.12 CERVICAL RADICULOPATHY: Status: RESOLVED | Noted: 2017-08-08 | Resolved: 2021-09-23

## 2021-09-23 PROBLEM — M21.612 BUNION OF GREAT TOE OF LEFT FOOT: Status: RESOLVED | Noted: 2021-03-31 | Resolved: 2021-09-23

## 2021-09-23 PROBLEM — M05.79 RHEUMATOID ARTHRITIS INVOLVING MULTIPLE SITES WITH POSITIVE RHEUMATOID FACTOR (H): Status: RESOLVED | Noted: 2017-05-01 | Resolved: 2021-09-23

## 2021-09-23 PROBLEM — M21.611 BUNION, RIGHT: Status: RESOLVED | Noted: 2021-03-31 | Resolved: 2021-09-23

## 2021-09-23 PROBLEM — M54.2 CERVICALGIA: Status: RESOLVED | Noted: 2017-08-08 | Resolved: 2021-09-23

## 2021-09-23 LAB
EXPTIME-PRE: 5.58 SEC
FEF2575-%PRED-PRE: 108 %
FEF2575-PRE: 1.45 L/SEC
FEF2575-PRED: 1.34 L/SEC
FEFMAX-%PRED-PRE: 114 %
FEFMAX-PRE: 4.41 L/SEC
FEFMAX-PRED: 3.86 L/SEC
FEV1-%PRED-PRE: 93 %
FEV1-PRE: 1.47 L
FEV1FEV6-PRE: 84 %
FEV1FEV6-PRED: 77 %
FEV1FVC-PRE: 84 %
FEV1FVC-PRED: 77 %
FIFMAX-PRE: 2.09 L/SEC
FVC-%PRED-PRE: 85 %
FVC-PRE: 1.77 L
FVC-PRED: 2.07 L
MEP-PRE: 47 CMH2O
MIP-PRE: -46 CMH2O

## 2021-09-23 PROCEDURE — 94375 RESPIRATORY FLOW VOLUME LOOP: CPT | Performed by: INTERNAL MEDICINE

## 2021-09-23 PROCEDURE — 97535 SELF CARE MNGMENT TRAINING: CPT | Mod: GO | Performed by: OCCUPATIONAL THERAPIST

## 2021-09-23 PROCEDURE — 97165 OT EVAL LOW COMPLEX 30 MIN: CPT | Mod: GO | Performed by: OCCUPATIONAL THERAPIST

## 2021-09-23 PROCEDURE — 92522 EVALUATE SPEECH PRODUCTION: CPT | Mod: GN | Performed by: SPEECH-LANGUAGE PATHOLOGIST

## 2021-09-23 PROCEDURE — 97162 PT EVAL MOD COMPLEX 30 MIN: CPT | Mod: GP | Performed by: PHYSICAL THERAPIST

## 2021-09-23 PROCEDURE — 99214 OFFICE O/P EST MOD 30 MIN: CPT | Mod: GC | Performed by: PSYCHIATRY & NEUROLOGY

## 2021-09-23 PROCEDURE — 97110 THERAPEUTIC EXERCISES: CPT | Mod: GP | Performed by: PHYSICAL THERAPIST

## 2021-09-23 ASSESSMENT — REVISED AMYOTROPHIC LATERAL SCLEROSIS FUNCTIONAL RATING SCALE (ALSFRS-R)
DRESSING_AND_HYGEINE: 2
ALSFRS_TOTAL_SCORE: 37
HANDWRITING: SLOW OR SLOPPY: ALL WORDS ARE LEGIBLE
TURNING_IN_BED_AND_ADJUSTING_BED_CLOTHES: 4
TURNING_IN_BED_AND_ADJUSTING_BED_CLOTHES: NORMAL
SALIVATION: SLIGHT BUT DEFINITE EXCESS OF SALIVA IN MOUTH; MAY HAVE NIGHTTIME DROOLING
RESPIRATORY_INSUFFICIENCY: 4
BULBAR_SUBTOTAL: 10
DYSPNEA: 4
CLIMBING_STAIRS: NEEDS ASSISTANCE
HANDWRITING: 3
FINE_MOTOR_SUBTOTAL_SCORE: 7
WALKING: EARLY AMBULATION DIFFICULTIES
DRESSING_AND_HYGEINE: INTERMITTENT ASSISTANCE OR SUBSTITUTE METHODS
SALIVATION: 3
SWALLOWING: 4
SIX_ITEM_SUBTOTAL: 19
SPEECH: DETECTABLE SPEECH DISTURBANCES
RESPIRATORY_SUBTOTAL_SCORE: 12
SWALLOWING: NORMAL EATING HABITS
SPEECH: 3
WALKING: 3
CLIMBING_STAIRS: 1
CUTTING_FOOD_AND_HANDLING_UTENSILES: 2
ORTHOPENA: 4
GROSS_MOTOR_SUBTOTAL_SCORE: 8

## 2021-09-23 ASSESSMENT — PAIN SCALES - GENERAL: PAINLEVEL: NO PAIN (0)

## 2021-09-23 NOTE — PROGRESS NOTES
Outpatient Speech Language Pathology Neurology Clinic Evaluation  Britt Pichardo YOB: 1940 [id number]    Visit Date: 9/23/2021    Age: 81 year old    Medical Diagnosis: Amyotrophic lateral sclerosis (ALS)    Date of Diagnosis: 11/29/18    Referring MD: Dr Beckham     present: No    PMH: Refer to Medical Chart    Fall Risk:Refer to physician report.    Others at Clinic Visit: daughter Courtney    Living Situation: Alone    Patient Concerns/Goals: Increased speech deficits    Observations: patient seen at MD request for baseline speech and swallow eval and evaluation of declined vocal strength    Current Mode of Nutrition: Oral diet of regular solids and thin liquids, denies avoiding any consistency of food    Weight: 125lbs on 3/31/21 (not weighed today but this is consistent)    Cardio-Respiratory Status: Forced vital capacity: 85%    Functional Rating Scale (ALS-FRS): 37/48      Oral Motor/Swallowing  Oral Motor Function:  Generally intact    Volitional Abilities:  Cough- Functional  Throat Clear- Functional  Swallow- Present    Feeding Assistance: No assistance needed    Swallow Function:   Thin Liquid-  within normal limits in all domains    Dysphagia Diagnosis: Swallow within functional limits      Speech Intelligibility/Functional Communication   Methods of communication: Verbal    Dysarthria: Mild    Speech:   Deficits in phonation- Dysphonia and Hoarse quality   Grandfather Passage reading suggests speaking rate of 143 words per minute (<125wpm suggests need for AAC)    Speech Intelligibility/Communication:  Communicates functionally    Augmentative and Alternative Communication (AAC):Instructed in option of voice banking      Clinical Impression/Plan of Care  Treatment Diagnosis: Dysarthia     Recommendations:  Oral diet.  Continue compensatory speech strategies.  Voice Banking.    Plan of Care:  Evaluation only.    Goals: Based on today's evaluation session patient and/or  caregiver will have understanding of current communication and/or swallowing status and recommendations for management.    Educational Assessment:  Learners- Patient  Barriers to Learning- No barriers.    Education provided/response:   Speech  Swallowing  AAC  Verbalized understanding    Treatment provided this date:   Communication intervention, 6 minutes   Educated in possible changes in speech with progression of disease. Educated in voice banking and introduced concept of AAC.    Response to recommendations/treatment: verbalized understanding    Goal attainment: All goals met    Risks and benefits of evaluation/treatment have been explained.  Patient, family and/or caregiver are in agreement with Plan of Care.    Timed Code Treatment Minutes: 0 minutes of timed codes    Total Treatment Time (sum of timed and untimed services): 13 minutes    Certification:  Onset date: 9/23/21  Start of care date: 9/23/2021  Certification date from 9/23/2021 to 9/23/21    I CERTIFY THE NEED FOR THESE SERVICES FURNISHED UNDER        THIS PLAN OF TREATMENT AND WHILE UNDER MY CARE     (Physician attestation of this document indicates review and certification of the therapy plan).

## 2021-09-23 NOTE — PROGRESS NOTES
"    OUTPATIENT PHYSICAL THERAPY CLINIC NOTE  Britt Pichardo  YOB: 1940  [id number]    Type of visit:         Evaluation     Date of service: 9/23/2021    Referring provider: Mason Beckham MD     present: No    Others present at visit:  Child(adriana)-daughter, Courtney    Medical diagnosis:   Amyotrophic lateral sclerosis (ALS)    Date of diagnosis: 11/29/2018    Pertinent history of current problem (include personal factors and/or comorbidities that impact the plan of care):  limb onset weakness; mostly distal UE weakness L > R beginning in 2017    Cardio-respiratory status:  Forced vital capacity: 85 % of predicted     Height/Weight: 4'11\" / 125lbs    Living environment:  House    Living environment barriers:  2 stairs to enter (0 railing present)  A flight of stairs to basement (1 railing present)     Current assistance/living environment:  Lives alone      Current mobility equipment:  None     Current ADL equipment:  See OT note    Technology used: Phone    Patient concerns/goals: No longer doing Curves or formal exercise-daily activities and occasional walks around the block.  Patient still able to perform household activities including yard work, but is having more difficulty getting up off the ground.  No falls reported    Evaluation   Interview completed.   Pain assessment:  Pain denied     Range of motion: Bilateral dorsiflexion limited near neutral     Manual muscle testing: Bilateral hip flexion 3+/5, bilateral knee flexion/extension 5/5, bilateral ankle dorsiflexion 4/5   Gait: Patient ambulates without a device independently-no significant gait deviations demonstrated   Cognition: Intact    Fall Risk Screen:   Has the patient fallen 2 or more times in the last year? No      Has the patient fallen and had an injury in the past year? No       Timed Up and Go Score: N/A    Is the patient a fall risk? No     Impairments:  Fatigue  Muscle atrophy  Range of motion     Treatment " diagnosis:  Impaired mobility  Impaired activities of daily living    Clinical Presentation: Evolving/Changing  Clinical Presentation Rationale: Personal factors, body systems involved, progressive nature of ALS  Clinical Decision Making (Complexity): Moderate complexity     Recommendations/Plan of care:  1 session evaluation & treatment.     Goals:   Target date: 9/23/2021  Patient, family and/or caregiver will verbalize understanding of evaluation results and implications for functional performance.  Patient, family and/or caregiver will verbalize/demonstrate understanding of compensatory methods /equipment to enhance functional independence and safety.  Patient, family and/or caregiver will verbalize/demonstrate understanding of home program.    Educational assessment/barriers to learning:   No barriers noted     Treatment provided this date:   Therapeutic exercise-15 minutes  -PT performed passive gastroc stretch x30 seconds bilaterally.  Encourage patient to perform daily gastroc stretch to maintain standing alignment and balance reactions on uneven surfaces.  -Collaborated with patient on various equipment including use of garden stool due to patient reporting difficulty getting up off the ground.  Patient reports not eating garden stool at this time, however open to sitting on guard in bed for increased ease of getting off of ground.  Reviewed fall risk reduction strategies, especially due to upcoming winter.  Recommended [employer] or use of walking pole due to delayed balance reactions associated with ALS    Response to treatment/recommendations: Patient verbalized understanding and agreement    Goal attainment:  All goals met     Risks and benefits of evaluation/treatment have been explained.  Patient, family and/or caregiver are in agreement with Plan of Care.     Timed Code Treatment Minutes: 15  Total Treatment Time (sum of timed and untimed services): 25    Signature: Digna Low, PT   Date:  9/23/2021    Certification:  Onset date: 9/23/2021  Start of care date: 9/23/2021  Certification date from 9/23/2021 to 9/23/2021    I CERTIFY THE NEED FOR THESE SERVICES FURNISHED UNDER        THIS PLAN OF TREATMENT AND WHILE UNDER MY CARE     (Physician attestation of this document indicates review and certification of the therapy plan).

## 2021-09-23 NOTE — LETTER
2021       RE: Britt Pichardo  [address]  [facility] [address]     Dear Colleague,    Thank you for referring your patient, Britt Pichardo, to the [facility] [place] [facility]. Please see a copy of my visit note below.    Service Date: 2021     RE: Britt Pichardo   MRN: [id number]   : 1940     SUMMARY:  Britt Pichardo is 80 yo female presented to follow up at the [facility]. Britt has limb onset ALS with very slow progression that historically began with left upper extremity painless distal weakness in 2017.  She meets [place] criteria for clinically probable ALS.  Her last visit was 3/25/2021, in person.      INTERVAL:  She feels that left hand and arm are getting weaker. She can't wash her head with left hand amnymore. She is still able to raise left hand but but if keep it up for a long time it will start shaking.  Unable to do fine tasks wih left hand anymore. The weakest one are the thumb and index finger finger. Denies weakness in right arm and hand. Has some weakness in legs, which increased a bit compared to 6 months ago. If she stays on her feet for long time she has to take rest. Denies diffiuclty with walking but she cant walk as far as she used to. Denies difficulty getting up from chairs and there is no tripping or falling    Dysphagia: Denies difficulty with swallowing and chewing food.    Drooling: Sometimes, mostly at night she has problem with drooling. It started to be a problem during the day, specialy when she wears  Mask. But isd mild and she need sonly one tissue per day to clean up her face.    Dysarthria: Speech is not as clear as it used to be. Per daughter sounds more hoarse. Sometimes feel like something is stuck in her throat. Its worse in the morning.    Respiratory: She denies dyspnea on exertion or  orthopnea.      Medication: She is on riluzole 50 mg b.i.d., tolerating it well so far.     EXAM:  /76   Pulse 59   Resp 16   SpO2 95%    She is awake, alert.  No ptosis or ophthalmoparesis.  She has no weakness of orbicularis oculi or oris.  Cheek puff is minimally weak.  No tongue fasciculation, No dysarthria or dysphonia.  Neck flexion 4/5 and extension 5/5.  Jaw strength is full.  Strength is 5/5 for right deltoid, 3 to 4- left, biceps right 5, left 4-, triceps right 5, left 4. Wrist flexion 4/5 Wrist extension right 5-, left 3.  Finger extensors right 4, left 3. APB 4/0, FDI 4/0.  Hip flexion, foot dorsiflexion are 4+/5.  knee extension, knee flexion 5/5. Reflexes 2+  in bilateral upper and lower extremities.        A&P:    Fortunately, Zoies disease continues having slow progression. Has no major changes except from subjective feeling of weakness progression in left hand.  On exam has neck flexion weakness which was not present before. We discussed that despite lack of respiratory symptoms, we would like her to her have PFT done today. She will see our speech, physical and occupational therapists today.      - Cont Riluzole with current dose. AST, ALT, creatinine normal on 4/13/2021, no need to repeat them  - PFT, PT/OT and speech evaluation today  - RTC in 6 months    Zaida Parham MD  Neurology Resident PGY3    ATTENDING ADDENDUM: Patient seen and examined with resident Dr Parham at the [facility]. Agree with her assessment and plan. Billing MDM level 4 (moderate) based on 1) Problems assessed: One disease with progression/exacerbation/side effects of treatment (ALS) and 2) Risk: prescription drug management (on riluzole, labs to monitor for hepatotoxicity reviewed ) and 3) Amount/Complexity: Review of the results of three unique tests today (see above) Mason Beckham MD, FAAN          Again, thank you for allowing me to  participate in the care of your patient.      Sincerely,    Mason Beckham MD

## 2021-09-23 NOTE — PROGRESS NOTES
"    OUTPATIENT OCCUPATIONAL THERAPY CLINIC NOTE  Britt Pichardo  YOB: 1940  [id number]    Type of visit:  Evaluation            Date of service: 9/23/21    Referring provider: Dr. Mason Beckham    Others present at visit:  Child(adriana), daughters-Courtney    Medical diagnosis:   Amyotrophic lateral sclerosis (ALS), probable     Date of diagnosis: 11/29/18     Pertinent medical history:  Onset of distal UE weakness L > R, 2017    Additional Occupational Profile Information (patterns of daily living, interests, values and needs): Pt is an 81 y.o. woman who is a mother dx with limb onset ALS living alone in her own home who manages all of her own IADL with support of her daughters.    Cardio-respiratory status:  Forced vital capacity: 85 % of predicted     Height/Weight: 4' 11\" / 125#    Living environment:  House  Tub/shower on main-doesn't like to use  Walk-in shower in basement no bars, textured floor  Regular height toilets    Living environment barriers:  2 stairs to enter (0 railing present)    Steps to basement with railing laundry in basement and shower    Current assistance/living environment:  Lives alone      Current mobility equipment:  None    Current ADL equipment:  Shower chair-not using     Technology used: NT    Patient concerns/goals: Slow on stairs and fatigue in legs but still does to shower and laundry.Notes L hand is much weaker now    Evaluation   Interview completed.   Pain assessment:  Pain denied    Range of motion:  R handed ; UE AROM is limited to approximately 110 degrees Mk shoulder flexion. PROM is near full with end range tightness L > R    Manual muscle testing: UE's:  L , lateral and palmar pinch are very weak ; R is moderately weak and L long finger flexors of index and middle are weak    Cognition:  Appears WFL    ADL:   Feeding self:  Ind, cutting difficult, may just  food to bite off, not using rocker knife  Grooming: Ind  UE Dressing:  Ind  LE " Dressing:  Ind, button hook used  Showering/bathing: Stands to shower  Toileting/transfer:  Denies problems    IADL:   Home management:  Does all, harder to reach higher cabinets to put dishware away, pt does own shopping,   Driving:  Reports Ind, mows her lawn with help of dtr, hires for snow care, walking slower than past, pt gardens with assist of dtr  Occupation: retired  Emergency Call system:flip phone in pocket at all times      Fall Risk Screen:   Has the patient fallen 2 or more times in the last year? No      Has the patient fallen and had an injury in the past year? No       Timed Up and Go Score: defer to PT eval today    Is the patient a fall risk? No     Impairments:  Muscle atrophy  Coordination   Fatigue   ROM   balance    Treatment diagnosis:  Impaired mobility activities of daily living and IADL    Assessment of Occupational Performance: 1-3 Performance Deficits  Identified Performance Deficits (ie: feeding, social skills): writing, home management, dressing, laundry, meal prep  Clinical Decision Making (Complexity): Low complexity     Recommendations/Plan of care:  Patient would benefit from interventions to enhance safety and independence.  Rehab potential good for stated goals.  Occupational therapy intervention for  self care/home management and therapeutic procedure  1 session evaluation & treatment.    Goals:   Target date: today  Patient, family and/or caregiver will verbalize/demonstrate understanding of compensatory methods /equipment to enhance functional independence and safety.  Patient, family and/or caregiver will verbalize/demonstrate understanding of at least 2 fall prevention strategies  Pt will demonstrate understanding of UE HEP for stretches to reduce risk of contracture and optimize UE ROM for ADL      Educational assessment/barriers to learning:  none      Treatment provided this date:   Self care/home management, 10 minutes of training in:  Button extender as option for  "fastening her pants as with L > R hand weakness has to lie on back to fasten button on pants  -requested these from [facility]  Purpose of using a bag on the steps to carry laundry and other items to always keep one hand free for holding the hand rail on the steps for fall prevention with LE weakness and fatigue that can contribute to LOB  Purpose of considering using tub/shower on main level instead of doing the shower and flight of stairs on lower level x 2 to get to walk-in shower. Pt prefers not to use tub on main level at this time. Did educate pt to consider use of her shower chair and that other DME available at [facility] for use with the tub that she could use where she wouldn't have to step into the tub should she need this in the future    Therapeutic procedure: 3 minutes of training in:  Hand stretches x1 including composite wrist and finger/thumb ext,  demonstrated for pt and then she completed holding each for 30 \"      Response to treatment/recommendations: receptive     Goal attainment:  All goals met    Risks and benefits of evaluation/treatment have been explained.  Patient, family and/or caregiver are in agreement with Plan of Care.  *ALS FRS-R (ALS Functional Rating Scale-Revised) completed today. Please see separate note.     Timed Code Treatment Minutes: 13  Total Treatment Time (sum of timed and untimed services): 23 min    Signature: VEE Mccullough, MSCS   Date: 9/23/21       Certification:  Onset date:  9/23/21  Start of care date: 9/23/21  Certification date from  9/23/21 to 9/23/21    I CERTIFY THE NEED FOR THESE SERVICES FURNISHED UNDER        THIS PLAN OF TREATMENT AND WHILE UNDER MY CARE     (Physician attestation of this document indicates review and certification of the therapy plan).            "

## 2021-09-23 NOTE — PATIENT INSTRUCTIONS
Please call Sandi @ [phone] for questions or concerns during regular business hours. For a more efficient way to communicate, use [facility] and address the message to your physician. Remember, MyChart is only read during business hours. Do not leave urgent messages on voicemail or [employer]. If situation is urgent, contact the Neurology Clinic @ [phone] and ask to speak to a Triage Nurse or Call 911 or visit an Emergency Department.    Please call your pharmacy if you need a medication refill. They will send us an electronic message.

## 2021-09-23 NOTE — NURSING NOTE
Chief Complaint   Patient presents with     RECHECK     UMP RETURN ALS/MOTOR NEURON      Pipo Wang

## 2021-09-23 NOTE — PROGRESS NOTES
Service Date: 2021     RE: Britt Pichardo   MRN: [id number]   : 1940     SUMMARY:  Britt Pichardo is 80 yo female presented to follow up at the [facility]. Britt has limb onset ALS with very slow progression that historically began with left upper extremity painless distal weakness in 2017.  She meets [place] criteria for clinically probable ALS.  Her last visit was 3/25/2021, in person.      INTERVAL:  She feels that left hand and arm are getting weaker. She can't wash her head with left hand amnymore. She is still able to raise left hand but but if keep it up for a long time it will start shaking.  Unable to do fine tasks wih left hand anymore. The weakest one are the thumb and index finger finger. Denies weakness in right arm and hand. Has some weakness in legs, which increased a bit compared to 6 months ago. If she stays on her feet for long time she has to take rest. Denies diffiuclty with walking but she cant walk as far as she used to. Denies difficulty getting up from chairs and there is no tripping or falling    Dysphagia: Denies difficulty with swallowing and chewing food.    Drooling: Sometimes, mostly at night she has problem with drooling. It started to be a problem during the day, specialy when she wears  Mask. But isd mild and she need sonly one tissue per day to clean up her face.    Dysarthria: Speech is not as clear as it used to be. Per daughter sounds more hoarse. Sometimes feel like something is stuck in her throat. Its worse in the morning.    Respiratory: She denies dyspnea on exertion or orthopnea.      Medication: She is on riluzole 50 mg b.i.d., tolerating it well so far.     EXAM:  /76   Pulse 59   Resp 16   SpO2 95%    She is awake, alert.  No ptosis or ophthalmoparesis.  She has no weakness of orbicularis oculi or oris.  Cheek puff is minimally weak.  No tongue fasciculation, No dysarthria  or dysphonia.  Neck flexion 4/5 and extension 5/5.  Jaw strength is full.  Strength is 5/5 for right deltoid, 3 to 4- left, biceps right 5, left 4-, triceps right 5, left 4. Wrist flexion 4/5 Wrist extension right 5-, left 3.  Finger extensors right 4, left 3. APB 4/0, FDI 4/0.  Hip flexion, foot dorsiflexion are 4+/5.  knee extension, knee flexion 5/5. Reflexes 2+  in bilateral upper and lower extremities.        A&P:    Fortunately, Britt's disease continues having slow progression. Has no major changes except from subjective feeling of weakness progression in left hand.  On exam has neck flexion weakness which was not present before. We discussed that despite lack of respiratory symptoms, we would like her to her have PFT done today. She will see our speech, physical and occupational therapists today.      - Cont Riluzole with current dose. AST, ALT, creatinine normal on 4/13/2021, no need to repeat them  - PFT, PT/OT and speech evaluation today  - RTC in 6 months    Zaida Parham MD  Neurology Resident PGY3    ATTENDING ADDENDUM: Patient seen and examined with resident Dr Parham at the [facility]. Agree with her assessment and plan. Billing MDM level 4 (moderate) based on 1) Problems assessed: One disease with progression/exacerbation/side effects of treatment (ALS) and 2) Risk: prescription drug management (on riluzole, labs to monitor for hepatotoxicity reviewed ) and 3) Amount/Complexity: Review of the results of three unique tests today (see above) Mason Beckham MD, FAAN

## 2021-09-24 ENCOUNTER — OFFICE VISIT (OUTPATIENT)
Dept: INTERNAL MEDICINE | Facility: CLINIC | Age: 81
End: 2021-09-24
Payer: MEDICARE

## 2021-09-24 VITALS
OXYGEN SATURATION: 95 % | TEMPERATURE: 98.3 F | WEIGHT: 119.6 LBS | RESPIRATION RATE: 16 BRPM | BODY MASS INDEX: 23.95 KG/M2 | HEART RATE: 62 BPM | SYSTOLIC BLOOD PRESSURE: 130 MMHG | DIASTOLIC BLOOD PRESSURE: 80 MMHG

## 2021-09-24 DIAGNOSIS — G12.21 ALS (AMYOTROPHIC LATERAL SCLEROSIS) (H): Primary | ICD-10-CM

## 2021-09-24 DIAGNOSIS — Z76.89 ENCOUNTER TO ESTABLISH CARE: ICD-10-CM

## 2021-09-24 DIAGNOSIS — M05.79 RHEUMATOID ARTHRITIS INVOLVING MULTIPLE SITES WITH POSITIVE RHEUMATOID FACTOR (H): ICD-10-CM

## 2021-09-24 DIAGNOSIS — E78.00 PURE HYPERCHOLESTEROLEMIA: ICD-10-CM

## 2021-09-24 PROCEDURE — 99214 OFFICE O/P EST MOD 30 MIN: CPT | Performed by: INTERNAL MEDICINE

## 2021-09-24 NOTE — PROGRESS NOTES
ASSESSMENT/PLAN                                                       (G12.21) ALS (amyotrophic lateral sclerosis) (H)  (primary encounter diagnosis)  Comment: minimally symptomatic; followed by neurology.    (M05.79) Rheumatoid arthritis involving multiple sites with positive rheumatoid factor (H)  Comment: well-controlled on current regimen; followed by rheumatology.    (E78.00) Pure hypercholesterolemia  Comment: well-controlled on current regimen.      (Z76.89) Encounter to establish care  Comment: PMH, PSH, FH, SH, medications, allergies, immunizations, and preventative health measures reviewed and updated as appropriate.    Digna Krause MD   [address] [address]  T: [phone], F: [phone]    SUBJECTIVE                                                      Britt Pichardo is a very pleasant 81 year old female who presents to establish care:    PMH, PSH, FH, SH, medications, allergies, immunizations, and preventative health measures reviewed and updated as appropriate.    Past Medical History:   Diagnosis Date     ALS (amyotrophic lateral sclerosis) (H)      Pure hypercholesterolemia      RA (rheumatoid arthritis) (H)      Past Surgical History:   Procedure Laterality Date     LUMBAR FUSION  ,     Family History   Problem Relation Age of Onset     Heart Disease Mother         details unknown     Melanoma Sister      Diabetes No family hx of      Cerebrovascular Disease No family hx of      Coronary Artery Disease Early Onset No family hx of      Myocardial Infarction No family hx of      Breast Cancer No family hx of      Ovarian Cancer No family hx of      Colon Cancer No family hx of      Social History     Occupational History     Occupation: Retired - cook at [employer], payroll with [employer]   Tobacco Use     Smoking status: Former Smoker     Years: 15.00     Types: Cigarettes     Quit date: 1967     Years since quittin.7     Smokeless  tobacco: Never Used     Tobacco comment: social smoking only   Substance and Sexual Activity     Alcohol use: Yes     Comment: couple drinks/week     Drug use: No     Sexual activity: Not Currently   Social History Narrative    .    Lives at home alone - daughter helps.    Two adult daughters.    Two grandchildren.    Walks and stays active during the day.      No Known Allergies     Current Outpatient Medications   Medication Sig     aspirin 81 MG tablet Take 1 tablet by mouth daily.     calcium carbonate-vitamin D (CALCIUM + D) 600-200 MG-UNIT TABS Take 1 tablet by mouth every other day      Ferrous Gluconate 240 (27 FE) MG TABS Take 1 tablet by mouth every other day      folic acid (FOLVITE) 1 MG tablet Take 1 mg by mouth daily.     InFLIXimab (REMICADE IV) Inject 200 mg into the vein Patient takes this every 8 weeks     lovastatin (MEVACOR) 20 MG tablet TAKE ONE TABLET BY MOUTH AT BEDTIME      methotrexate 2.5 MG tablet Take 4 tablets by mouth once a week      oxybutynin ER (DITROPAN-XL) 5 MG 24 hr tablet Take 1 tablet (5 mg) by mouth once daily     riluzole (RILUTEK) 50 MG tablet TAKE ONE TABLET BY MOUTH EVERY TWELVE HOURS      Immunization History   Administered Date(s) Administered     COVID-19,PF,Pfizer 02/09/2021, 03/02/2021, 09/01/2021     Flu, Unspecified 10/10/2013, 10/14/2015     HepB, Unspecified 10/06/1999, 11/10/1999, 04/19/2000     Influenza (High Dose) 3 valent vaccine 10/12/2017, 11/09/2018, 10/22/2019, 09/28/2020     Influenza (IIV3) PF 10/10/2013, 11/01/2014     Influenza Vaccine IM > 6 months Valent IIV4 (Alfuria,Fluzone) 11/09/2016     Pneumo Conj 13-V (2010&after) 12/15/2014     Pneumococcal 23 valent 03/05/2007     TD (ADULT, 7+) 03/05/2007, 01/09/2018     PREVENTATIVE HEALTH                                                      BMI: within normal limits   Blood pressure: within normal limits   Breast CA screening: screening no longer indicated  Cervical CA screening: screening no  longer indicated  Colon CA screening: screening no longer indicated  Lung CA screening: patient does not meet screening criteria  Dexa: up to date   Screening cholesterol: n/a - already being treated for this condition  Screening diabetes: up to date   STD testing: not sexually active  Alcohol misuse screening: alcohol use reviewed - no intervention indicated at this time  Immunizations: reviewed; Shingrix series DUE - not covered by Medicare (may obtain in pharmacy if desired)     OBJECTIVE                                                      /80   Pulse 62   Temp 98.3  F (36.8  C) (Tympanic)   Resp 16   Wt 54.3 kg (119 lb 9.6 oz)   SpO2 95%   BMI 23.95 kg/m    Constitutional: well-appearing  Psych: normal judgment and insight; normal mood and affect; recent and remote memory intact    ---  (Note was completed, in part, with [employer] voice-recognition software. Documentation was reviewed, but some grammatical, spelling, and word errors may remain.)

## 2021-10-13 DIAGNOSIS — G12.21 ALS (AMYOTROPHIC LATERAL SCLEROSIS) (H): ICD-10-CM

## 2021-10-15 RX ORDER — RILUZOLE 50 MG/1
TABLET, FILM COATED ORAL
Qty: 60 TABLET | Refills: 2 | Status: SHIPPED | OUTPATIENT
Start: 2021-10-15 | End: 2022-01-10

## 2021-11-18 ENCOUNTER — LAB (OUTPATIENT)
Dept: URGENT CARE | Facility: URGENT CARE | Age: 81
End: 2021-11-18
Attending: EMERGENCY MEDICINE
Payer: MEDICARE

## 2021-11-18 ENCOUNTER — E-VISIT (OUTPATIENT)
Dept: URGENT CARE | Facility: CLINIC | Age: 81
End: 2021-11-18
Payer: MEDICARE

## 2021-11-18 DIAGNOSIS — Z20.822 SUSPECTED COVID-19 VIRUS INFECTION: Primary | ICD-10-CM

## 2021-11-18 DIAGNOSIS — Z20.822 SUSPECTED COVID-19 VIRUS INFECTION: ICD-10-CM

## 2021-11-18 PROCEDURE — U0005 INFEC AGEN DETEC AMPLI PROBE: HCPCS

## 2021-11-18 PROCEDURE — U0003 INFECTIOUS AGENT DETECTION BY NUCLEIC ACID (DNA OR RNA); SEVERE ACUTE RESPIRATORY SYNDROME CORONAVIRUS 2 (SARS-COV-2) (CORONAVIRUS DISEASE [COVID-19]), AMPLIFIED PROBE TECHNIQUE, MAKING USE OF HIGH THROUGHPUT TECHNOLOGIES AS DESCRIBED BY CMS-2020-01-R: HCPCS

## 2021-11-18 PROCEDURE — 99421 OL DIG E/M SVC 5-10 MIN: CPT | Performed by: EMERGENCY MEDICINE

## 2021-11-18 NOTE — PATIENT INSTRUCTIONS
Dear Britt Pichardo,    Your symptoms show that you may have coronavirus (COVID-19). This illness can cause fever, cough and trouble breathing. Many people get a mild case and get better on their own. Some people can get very sick.    Will I be tested for COVID-19?  We would like to test you for Covid-19 virus. I have placed orders for this test.     To schedule: go to your [email or web address] home page and scroll down to the section that says  You have an appointment that needs to be scheduled  and click the large green button that says  Schedule Now  and follow the steps to find the next available openings.    If you are unable to complete these [email or web address] scheduling steps, please call [phone] to schedule your testing.     Return to work/school/ guidance:  Please let your workplace manager and staffing office know when your quarantine ends     We can t give you an exact date as it depends on the above. You can calculate this on your own or work with your manager/staffing office to calculate this. (For example if you were exposed on 10/4, you would have to quarantine for 14 full days. That would be through 10/18. You could return on 10/19.)      If you receive a positive COVID-19 test result, follow the guidance of the those who are giving you the results. Usually the return to work is 10 (or in some cases 20 days from symptom onset.) If you work at [employer] [facility], you must also be cleared by Employee Occupational Health and Safety to return to work.        If you receive a negative COVID-19 test result and did not have a high risk exposure to someone with a known positive COVID-19 test, you can return to work once you're free of fever for 24 hours without fever-reducing medication and your symptoms are improving or resolved.      If you receive a negative COVID-19 test and If you had a high risk exposure to someone who has tested positive for COVID-19 then you can return to work 14 days after your last contact  with the positive individual    Note: If you have ongoing exposure to the covid positive person, this quarantine period may be more than 14 days. (For example, if you are continued to be exposed to your child who tested positive and cannot isolate from them, then the quarantine of 7-14 days can't start until your child is no longer contagious. This is typically 10 days from onset of the child's symptoms. So the total duration may be 17-24 days in this case.)    Sign up for [employer].   We know it's scary to hear that you might have COVID-19. We want to track your symptoms to make sure you're okay over the next 2 weeks. Please look for an email from [employer]--this is a free, online program that we'll use to keep in touch. To sign up, follow the link in the email you will receive. Learn more at [email or web address]    How can I take care of myself?    Get lots of rest. Drink extra fluids (unless a doctor has told you not to)    Take Tylenol (acetaminophen) or ibuprofen for fever or pain. If you have liver or kidney problems, ask your family doctor if it's okay to take Tylenol o ibuprofen    If you have other health problems (like cancer, heart failure, an organ transplant or severe kidney disease): Call your specialty clinic if you don't feel better in the next 2 days.    Know when to call 911. Emergency warning signs include:  o Trouble breathing or shortness of breath  o Pain or pressure in the chest that doesn't go away  o Feeling confused like you haven't felt before, or not being able to wake up  o Bluish-colored lips or face    Where can I get more information?  [employer] (now [employer] [place] - About COVID-19:   [email or web address]    CDC - What to Do If You're Sick:   www.cdc.gov/coronavirus/2019-ncov/about/steps-when-sick.html

## 2021-11-19 LAB — SARS-COV-2 RNA RESP QL NAA+PROBE: POSITIVE

## 2021-12-14 ENCOUNTER — TRANSFERRED RECORDS (OUTPATIENT)
Dept: HEALTH INFORMATION MANAGEMENT | Facility: CLINIC | Age: 81
End: 2021-12-14
Payer: MEDICARE

## 2021-12-14 LAB
ALT SERPL-CCNC: 16 LU/L (ref 5–35)
AST SERPL-CCNC: 31 U/L (ref 5–34)

## 2022-01-10 DIAGNOSIS — G12.21 ALS (AMYOTROPHIC LATERAL SCLEROSIS) (H): ICD-10-CM

## 2022-01-10 DIAGNOSIS — N39.3 STRESS INCONTINENCE OF URINE: ICD-10-CM

## 2022-01-10 RX ORDER — RILUZOLE 50 MG/1
TABLET, FILM COATED ORAL
Qty: 60 TABLET | Refills: 0 | Status: SHIPPED | OUTPATIENT
Start: 2022-01-10 | End: 2022-02-14

## 2022-01-11 RX ORDER — OXYBUTYNIN CHLORIDE 5 MG/1
TABLET, EXTENDED RELEASE ORAL
Qty: 90 TABLET | Refills: 0 | Status: SHIPPED | OUTPATIENT
Start: 2022-01-11 | End: 2022-04-12

## 2022-02-14 DIAGNOSIS — G12.21 ALS (AMYOTROPHIC LATERAL SCLEROSIS) (H): ICD-10-CM

## 2022-02-14 RX ORDER — RILUZOLE 50 MG/1
TABLET, FILM COATED ORAL
Qty: 60 TABLET | Refills: 0 | Status: SHIPPED | OUTPATIENT
Start: 2022-02-14 | End: 2022-03-18

## 2022-02-21 DIAGNOSIS — E78.5 HYPERLIPIDEMIA LDL GOAL <130: ICD-10-CM

## 2022-02-21 RX ORDER — LOVASTATIN 20 MG
20 TABLET ORAL AT BEDTIME
Qty: 90 TABLET | Refills: 1 | Status: SHIPPED | OUTPATIENT
Start: 2022-02-21 | End: 2022-08-19

## 2022-02-21 NOTE — TELEPHONE ENCOUNTER
Pt called requesting refill for lovastatin and appt for yearly exam.     Prescription approved per [facility] Refill Protocol and future yearly exam was scheduled 06/01/2022. Pt request order for bone scan.    Please advice on Dexa orders. Pt would need a call back at re; Dexa order.  [phone]

## 2022-02-23 NOTE — TELEPHONE ENCOUNTER
Pt called back and informed. Sent pt both dexa scan that was done on 3/31/21 so pt can give reports to her rheumatologist. Pt will come fasting for physical in June 2022.

## 2022-03-07 DIAGNOSIS — G12.21 ALS (AMYOTROPHIC LATERAL SCLEROSIS) (H): Primary | ICD-10-CM

## 2022-03-10 ENCOUNTER — THERAPY VISIT (OUTPATIENT)
Dept: PHYSICAL THERAPY | Facility: CLINIC | Age: 82
End: 2022-03-10
Payer: MEDICARE

## 2022-03-10 ENCOUNTER — OFFICE VISIT (OUTPATIENT)
Dept: NEUROLOGY | Facility: CLINIC | Age: 82
End: 2022-03-10
Payer: MEDICARE

## 2022-03-10 ENCOUNTER — THERAPY VISIT (OUTPATIENT)
Dept: OCCUPATIONAL THERAPY | Facility: CLINIC | Age: 82
End: 2022-03-10
Payer: MEDICARE

## 2022-03-10 VITALS
DIASTOLIC BLOOD PRESSURE: 81 MMHG | BODY MASS INDEX: 24.23 KG/M2 | OXYGEN SATURATION: 96 % | WEIGHT: 121 LBS | SYSTOLIC BLOOD PRESSURE: 144 MMHG | HEART RATE: 70 BPM

## 2022-03-10 DIAGNOSIS — G12.21 ALS (AMYOTROPHIC LATERAL SCLEROSIS) (H): Primary | ICD-10-CM

## 2022-03-10 DIAGNOSIS — Z74.09 IMPAIRED MOBILITY AND ADLS: ICD-10-CM

## 2022-03-10 DIAGNOSIS — Z78.9 IMPAIRED MOBILITY AND ADLS: ICD-10-CM

## 2022-03-10 DIAGNOSIS — G12.21 ALS (AMYOTROPHIC LATERAL SCLEROSIS) (H): ICD-10-CM

## 2022-03-10 DIAGNOSIS — Z78.9 IMPAIRED INSTRUMENTAL ACTIVITIES OF DAILY LIVING (IADL): ICD-10-CM

## 2022-03-10 PROCEDURE — 94375 RESPIRATORY FLOW VOLUME LOOP: CPT | Performed by: INTERNAL MEDICINE

## 2022-03-10 PROCEDURE — 97116 GAIT TRAINING THERAPY: CPT | Mod: GP | Performed by: PHYSICAL THERAPIST

## 2022-03-10 PROCEDURE — 97535 SELF CARE MNGMENT TRAINING: CPT | Mod: GO | Performed by: OCCUPATIONAL THERAPIST

## 2022-03-10 PROCEDURE — 99214 OFFICE O/P EST MOD 30 MIN: CPT | Performed by: PSYCHIATRY & NEUROLOGY

## 2022-03-10 PROCEDURE — 97162 PT EVAL MOD COMPLEX 30 MIN: CPT | Mod: GP | Performed by: PHYSICAL THERAPIST

## 2022-03-10 PROCEDURE — 97165 OT EVAL LOW COMPLEX 30 MIN: CPT | Mod: GO | Performed by: OCCUPATIONAL THERAPIST

## 2022-03-10 ASSESSMENT — REVISED AMYOTROPHIC LATERAL SCLEROSIS FUNCTIONAL RATING SCALE (ALSFRS-R)
TURNING_IN_BED_AND_ADJUSTING_BED_CLOTHES: 4
DRESSING_AND_HYGEINE: 2
CLIMBING_STAIRS: NEEDS ASSISTANCE
SALIVATION: 3
RESPIRATORY_SUBTOTAL_SCORE: 12
SPEECH: 4
CLIMBING_STAIRS: 1
SWALLOWING: 4
SIX_ITEM_SUBTOTAL: 20
ALSFRS_TOTAL_SCORE: 38
DYSPNEA: 4
SALIVATION: SLIGHT BUT DEFINITE EXCESS OF SALIVA IN MOUTH; MAY HAVE NIGHTTIME DROOLING
FINE_MOTOR_SUBTOTAL_SCORE: 7
HANDWRITING: SLOW OR SLOPPY: ALL WORDS ARE LEGIBLE
WALKING: 3
GROSS_MOTOR_SUBTOTAL_SCORE: 8
CUTTING_FOOD_AND_HANDLING_UTENSILES: 2
HANDWRITING: 3
ORTHOPENA: 4
DRESSING_AND_HYGEINE: INTERMITTENT ASSISTANCE OR SUBSTITUTE METHODS
SPEECH: NORMAL SPEECH PROCESSES
RESPIRATORY_INSUFFICIENCY: 4
SWALLOWING: NORMAL EATING HABITS
TURNING_IN_BED_AND_ADJUSTING_BED_CLOTHES: NORMAL
WALKING: EARLY AMBULATION DIFFICULTIES
BULBAR_SUBTOTAL: 11

## 2022-03-10 NOTE — PROGRESS NOTES
"    OUTPATIENT PHYSICAL THERAPY CLINIC NOTE  Britt Pichardo  YOB: 1940  [id number]    Type of visit:         Evaluation     Date of service: 3/10/2022    Referring provider: Mason Beckham MD     present: No    Others present at visit:  Child(adriana)-daughter, Courtney    Medical diagnosis:   Amyotrophic lateral sclerosis (ALS)    Date of diagnosis: 11/29/2018    Pertinent history of current problem (include personal factors and/or comorbidities that impact the plan of care):  limb onset weakness; mostly distal UE weakness L > R beginning in 2017    Cardio-respiratory status:  Forced vital capacity: 82 % of predicted     Height/Weight: 4'11\" / 121lbs    Living environment:  House    Living environment barriers:  2 stairs to enter (0 railing present)  A flight of stairs to basement (1 railing present)     Current assistance/living environment:  Lives alone  Patient's daughter assist patient with outdoor house chores, fine motor tasks multiple times a week      Current mobility equipment:  None  *Recommending straight point cane, however patient not wanting at this time     Current ADL equipment:  See OT note    Technology used: Phone    Patient concerns/goals: 1 fall at [place]- stepping up 1 step with hands full leading to anterior LOB.  Some difficulty with walking longer distances, but able to rest when needed    Evaluation   Interview completed.   Pain assessment:  Pain denied     Range of motion: Bilateral dorsiflexion limited near neutral     Manual muscle testing: Bilateral hip flexion 3+/5 (cramping with hip flexion and knee extension) B knee flexion/extension 5/5, bilateral ankle dorsiflexion 4/5   Gait: Patient ambulates without a device independently-mild reduction in duy and reduced arm swing.  No instability noted   Cognition: Intact   5 times sit to stand: 18 seconds without UE support   FRS: 38    Fall Risk Screen:   Has the patient fallen 2 or more times in the last " year? No      Has the patient fallen and had an injury in the past year? No       Timed Up and Go Score: N/A    Is the patient a fall risk? No     Impairments:  Fatigue  Muscle atrophy  Range of motion     Treatment diagnosis:  Impaired mobility  Impaired activities of daily living    Clinical Presentation: Evolving/Changing  Clinical Presentation Rationale: Personal factors, body systems involved, progressive nature of ALS  Clinical Decision Making (Complexity): Moderate complexity     Recommendations/Plan of care:  1 session evaluation & treatment.     Goals:   Target date: 3/10/2022  Patient, family and/or caregiver will verbalize understanding of evaluation results and implications for functional performance.  Patient, family and/or caregiver will verbalize/demonstrate understanding of compensatory methods /equipment to enhance functional independence and safety.  Patient, family and/or caregiver will verbalize/demonstrate understanding of home program.    Educational assessment/barriers to learning:   No barriers noted     Treatment provided this date:   Gait training-15 minutes  -Educated patient on energy conservation techniques including pacing of activities, frequent rest breaks, use of assistive device and monitoring signs of fatigue (increased muscle soreness, weakness, cramps, fasciculations) for safe functional mobility and performance of daily tasks  -Educated patient on equipment recommendation including straight point cane for balance stability and energy conservation.  PT demonstrated two-point gait pattern with straight point cane.  Patient not interested in cane at this time, but aware of recommendation for future use    Response to treatment/recommendations: Patient verbalized understanding and agreement    Goal attainment:  All goals met     Risks and benefits of evaluation/treatment have been explained.  Patient, family and/or caregiver are in agreement with Plan of Care.     Timed Code  Treatment Minutes: 15  Total Treatment Time (sum of timed and untimed services): 25    Signature: Digna Low, PT   Date: 3/10/2022    Certification:  Onset date: 3/10/2022  Start of care date: 3/10/2022  Certification date from 3/10/2022 to 3/10/2022    I CERTIFY THE NEED FOR THESE SERVICES FURNISHED UNDER        THIS PLAN OF TREATMENT AND WHILE UNDER MY CARE     (Physician attestation of this document indicates review and certification of the therapy plan).

## 2022-03-10 NOTE — PROGRESS NOTES
"[facility]    OUTPATIENT OCCUPATIONAL THERAPY CLINIC NOTE  Britt iPchardo  YOB: 1940  [id number]    Type of visit:  Evaluation            Date of service: 3/10/22    Referring provider: Dr. Mason Beckham    Others present at visit:  Child(adriana), daughters-Courtney    Medical diagnosis:   Amyotrophic lateral sclerosis (ALS), probable     Date of diagnosis: 11/29/18     Pertinent medical history:  Onset of distal UE weakness L > R, 2017    Additional Occupational Profile Information (patterns of daily living, interests, values and needs): Pt is an 82 y.o. woman who is a mother dx with limb onset ALS living alone in her own home who manages all of her own IADL with support of her daughters.    Cardio-respiratory status:  Forced vital capacity: 82 % of predicted     Height/Weight: 4' 11\" / 121#    Living environment:  House  Tub/shower on main-doesn't like to use  Walk-in shower in basement no bars, textured floor  Regular height toilets    Living environment barriers:  2 stairs to enter (0 railing present)    Steps to basement with railing laundry in basement and shower    Current assistance/living environment:  Lives alone    Dtr coming over 5 days per week to assist    Current mobility equipment:  None  Cane recommended by PT 3/10/22    Current ADL equipment:  Shower chair-not using   Button extender   Button hook    Technology used: NT    Patient concerns/goals: .Notes L hand is much weaker now limiting some ADL    Evaluation   Interview completed.   Pain assessment:  Pain denied    Range of motion:  R handed ; UE AROM is limited to approximately 130 degrees R shoulder flexion and 90 degrees L shoulder flexion. Elbow to hand WFL on R, L is limited by reduced opposition of hand and end range  is reduced  Manual muscle testing: UE's:  L , lateral and palmar pinch are very weak ; R is " moderately weak and L long finger flexors of index and middle are weak    Cognition:  Appears WFL    ADL:   Feeding self:  Ind, cutting difficult, may just  food to bite off, not using rocker knife  Grooming: Ind, R-one handed  UE Dressing:  Ind,   LE Dressing:  Ind, button hook used, lays on bed to button pants  Showering/bathing: Stands to shower  Toileting/transfer:  Denies problems    IADL:   Home management:  Does all, harder to reach higher cabinets to put dishware away, pt does own shopping, does own laundry and Using shoot for laundry to get items to basement and Ind cleaning  Driving:  Reports Ind, mows her lawn with help of dtr, hires for snow care, walking slower than past, pt gardens with assist of dtr  Occupation: retired  Emergency Call system:flip phone in pocket at all times      Fall Risk Screen:   Has the patient fallen 2 or more times in the last year? No      Has the patient fallen and had an injury in the past year? No       Timed Up and Go Score: defer to PT eval today    Is the patient a fall risk? No     Impairments:  Muscle atrophy  Coordination   Fatigue   ROM   balance    Treatment diagnosis:  Impaired mobility activities of daily living and IADL    Assessment of Occupational Performance: 1-3 Performance Deficits  Identified Performance Deficits (ie: feeding, social skills):  home management, dressing, meal prep  Clinical Decision Making (Complexity): Low complexity     Recommendations/Plan of care:  Patient would benefit from interventions to enhance safety and independence.  Rehab potential good for stated goals.  Occupational therapy intervention for  self care/home management   1 session evaluation & treatment.    Goals:   Target date: today  Patient, family and/or caregiver will verbalize/demonstrate understanding of compensatory methods /equipment to enhance functional independence and safety.    Educational assessment/barriers to learning:  none      Treatment provided this  date:   Self care/home management, 10 minutes of training in:  -Clip different nail preston, requested from [facility] as pt can no longer use her nail scissors with L hand weakness and has only been able to file  -purpose of Can opener for pull tabs-pt prefers to use screw drivers and needle nose plier, declines from [facility]  -purpose of bowls with handle for microwave use one-handed, [employer] Mugs  -purpose of wearing rubber gloves with dishes and dressing to aid prehension/grasp such as to pull on socks due to significant weakness in L UE      Response to treatment/recommendations: receptive     Goal attainment:  All goals met    Risks and benefits of evaluation/treatment have been explained.  Patient, family and/or caregiver are in agreement with Plan of Care.     Timed Code Treatment Minutes: 10  Total Treatment Time (sum of timed and untimed services): 20 min    Signature: VEE Mccullough, MSCS   Date: 3/10/22       Certification:  Onset date:  3/10/22  Start of care date:  3/10/22  Certification date from   3/10/22 to  3/10/22    I CERTIFY THE NEED FOR THESE SERVICES FURNISHED UNDER        THIS PLAN OF TREATMENT AND WHILE UNDER MY CARE     (Physician attestation of this document indicates review and certification of the therapy plan).

## 2022-03-10 NOTE — LETTER
3/10/2022       RE: Britt Pichardo  [address] [facility] [address]     Dear Colleague,    Thank you for referring your patient, Britt Pichardo, to the [facility] [place] at [facility]. Please see a copy of my visit note below.    Service Date: 03/10/2022    Digna Krause MD  [facility]  [address] [place] [address]    RE:  Britt Pichardo  MRN:  [id number]  :  1940    Dear Dr. Krause:    I had the pleasure to see Ms. Pichardo in followup at the [facility] today.  Britt has slowly progressive limb-onset ALS with first symptoms of painless left upper extremity distal weakness in 2017.  Her last visit in clinic in 2021.  She has a slow progression.  She tells me that her left hand weakness is worse and the left hand has become almost useless, especially for fine movements.  She can still abduct her left shoulder, although it is limited.  She notes a mild degree of weakness of the right hand, but it is not affecting her ability to perform ADLs there, i.e., with the right hand she can brush teeth, comb hair and use a pen without significant problems.  She is noting some leg weakness in the last 6 months.  She has difficulty getting up from low-seated chairs or from the ground, she has to push with her arms at all times.  She has had 3 falls in total in the last 6 months.  She is a little slower with her walking overall, but she does not trip on her feet.  She denies major difficulties turning in bed.  She denies dysphagia, dysarthria, difficulty chewing, head drop, pseudobulbar affect or other bulbar symptoms.  She may have mild drooling at times, but it is not bothersome.      She is on riluzole 50 mg b.i.d., tolerating it well.  AST and ALT were last checked in 2021 and were normal.  She denies dyspnea on exertion or  orthopnea.      Medications were reviewed and are as per Epic record.      Pulmonary function tests today showed a forced vital capacity of 82%, FEV1 90% and MIP -55 cm water.  There is remarkable stability of her PFTs over the past 3 years with no significant decline.    BP (!) 144/81   Pulse 70   Wt 54.9 kg (121 lb)   SpO2 96%   BMI 24.23 kg/m      PHYSICAL EXAMINATION:  On exam, she is awake, alert and oriented x3.  She can provide a coherent history.  She has no ptosis or ophthalmoparesis.  There is no clear weakness of orbicularis oculi, oris, uvula, jaw, palate or tongue.  I did not see any tongue fasciculations or striking atrophy, and lateral tongue movements were done fairly fast.  No dysphonia or dysarthria.  Her neck flexion is mildly weak at 4/5, extension is full.  Her strength in the limbs is as follows on MRC scale (right over left):  Deltoids 4/3, biceps 5/3, triceps 4+/2 to 3-, wrist extensors 5/2 or less.  Finger extension is 3 to 4- right, 3 for EIP, and less than antigravity for extensor EDC to digits 4 and 5 on the left. FDI right 4-, left 0.  APB right 4-, left 0.  Hand  right 5, left less than 3.  Hip flexion is bilaterally 4.  Knee extension, knee flexion, foot dorsiflexion are bilaterally 5.    In summary, Britt has limb-onset ALS with very slow progression.  We discussed a number of practical issues.  She will see PT and OT today.  She will continue riluzole 50 mg b.i.d.  I am not rechecking liver function tests today.  She was reporting urgency of urination and incontinence of bowel and urine.  This may have to do with postmenopausal estrogen deficiency.  I do not think there is a clear relationship to ALS, and I would like her primary care doctor to address this.  Because of her slow progression, she will return to clinic for followup in 6 months or sooner if needed.    Total time spent on this encounter today:  30 minutes including 15 face-to-face, 10 post-visit note dictation and  orders, 5 in pre-visit chart review.    Sincerely,    Mason Beckham MD        D: 03/10/2022   T: 03/10/2022   MT: jose    Name:     PHYLLIS DOYLE  MRN:      [id number]        Account:      [id number]   :      1940           Service Date: 03/10/2022     Document: [id number]

## 2022-03-10 NOTE — PATIENT INSTRUCTIONS
6 month follow up   Primary care doctor to address urgency of urination and incontinence of urine or bowel. This may not be related to ALS  Will have PT and OT see you today   Continue taking riluzole, recent (12/2021) blood tests looked ok        Please call Sandi @ [phone] for questions or concerns during regular business hours. For a more efficient way to communicate, use [employer] and address the message to your physician. Remember, [employer] is only read during business hours. Do not leave urgent messages on voicemail or [employer]. If situation is urgent, contact the Neurology Clinic @ [phone] and ask to speak to a Triage Nurse or Call 911 or visit an Emergency Department.    Please call your pharmacy if you need a medication refill. They will send us an electronic message.

## 2022-03-10 NOTE — PROGRESS NOTES
Service Date: 03/10/2022    iDgna Krause MD  [facility]  [address] [address]    RE:  Britt Pichardo  MRN:  [id number]  :  1940    Dear Dr. Krause:    I had the pleasure to see Ms. Pichardo in followup at the [facility] today.  Britt has slowly progressive limb-onset ALS with first symptoms of painless left upper extremity distal weakness in 2017.  Her last visit in clinic in 2021.  She has a slow progression.  She tells me that her left hand weakness is worse and the left hand has become almost useless, especially for fine movements.  She can still abduct her left shoulder, although it is limited.  She notes a mild degree of weakness of the right hand, but it is not affecting her ability to perform ADLs there, i.e., with the right hand she can brush teeth, comb hair and use a pen without significant problems.  She is noting some leg weakness in the last 6 months.  She has difficulty getting up from low-seated chairs or from the ground, she has to push with her arms at all times.  She has had 3 falls in total in the last 6 months.  She is a little slower with her walking overall, but she does not trip on her feet.  She denies major difficulties turning in bed.  She denies dysphagia, dysarthria, difficulty chewing, head drop, pseudobulbar affect or other bulbar symptoms.  She may have mild drooling at times, but it is not bothersome.      She is on riluzole 50 mg b.i.d., tolerating it well.  AST and ALT were last checked in 2021 and were normal.  She denies dyspnea on exertion or orthopnea.      Medications were reviewed and are as per Epic record.      Pulmonary function tests today showed a forced vital capacity of 82%, FEV1 90% and MIP -55 cm water.  There is remarkable stability of her PFTs over the past 3 years with no significant decline.    BP (!) 144/81   Pulse 70   Wt 54.9 kg (121 lb)    SpO2 96%   BMI 24.23 kg/m      PHYSICAL EXAMINATION:  On exam, she is awake, alert and oriented x3.  She can provide a coherent history.  She has no ptosis or ophthalmoparesis.  There is no clear weakness of orbicularis oculi, oris, uvula, jaw, palate or tongue.  I did not see any tongue fasciculations or striking atrophy, and lateral tongue movements were done fairly fast.  No dysphonia or dysarthria.  Her neck flexion is mildly weak at 4/5, extension is full.  Her strength in the limbs is as follows on MRC scale (right over left):  Deltoids 4/3, biceps 5/3, triceps 4+/2 to 3-, wrist extensors 5/2 or less.  Finger extension is 3 to 4- right, 3 for EIP, and less than antigravity for extensor EDC to digits 4 and 5 on the left. FDI right 4-, left 0.  APB right 4-, left 0.  Hand  right 5, left less than 3.  Hip flexion is bilaterally 4.  Knee extension, knee flexion, foot dorsiflexion are bilaterally 5.    In summary, Phyllis has limb-onset ALS with very slow progression.  We discussed a number of practical issues.  She will see PT and OT today.  She will continue riluzole 50 mg b.i.d.  I am not rechecking liver function tests today.  She was reporting urgency of urination and incontinence of bowel and urine.  This may have to do with postmenopausal estrogen deficiency.  I do not think there is a clear relationship to ALS, and I would like her primary care doctor to address this.  Because of her slow progression, she will return to clinic for followup in 6 months or sooner if needed.    Total time spent on this encounter today:  30 minutes including 15 face-to-face, 10 post-visit note dictation and orders, 5 in pre-visit chart review.    Sincerely,    Mason Beckham MD        D: 03/10/2022   T: 03/10/2022   MT: jose    Name:     PHYLLIS DOYLE  MRN:      [id number]        Account:      [id number]   :      1940           Service Date: 03/10/2022       Document: [id number]

## 2022-03-11 LAB
EXPTIME-PRE: 5.42 SEC
FEF2575-%PRED-PRE: 109 %
FEF2575-PRE: 1.44 L/SEC
FEF2575-PRED: 1.31 L/SEC
FEFMAX-%PRED-PRE: 118 %
FEFMAX-PRE: 4.47 L/SEC
FEFMAX-PRED: 3.76 L/SEC
FEV1-%PRED-PRE: 90 %
FEV1-PRE: 1.42 L
FEV1FEV6-PRE: 84 %
FEV1FEV6-PRED: 77 %
FEV1FVC-PRE: 84 %
FEV1FVC-PRED: 77 %
FIFMAX-PRE: 2.14 L/SEC
FVC-%PRED-PRE: 82 %
FVC-PRE: 1.7 L
FVC-PRED: 2.05 L
MEP-PRE: 48 CMH2O
MIP-PRE: -55 CMH2O

## 2022-03-16 DIAGNOSIS — G12.21 ALS (AMYOTROPHIC LATERAL SCLEROSIS) (H): ICD-10-CM

## 2022-03-18 RX ORDER — RILUZOLE 50 MG/1
TABLET, FILM COATED ORAL
Qty: 60 TABLET | Refills: 2 | Status: SHIPPED | OUTPATIENT
Start: 2022-03-18 | End: 2022-06-17

## 2022-03-29 ENCOUNTER — TRANSFERRED RECORDS (OUTPATIENT)
Dept: HEALTH INFORMATION MANAGEMENT | Facility: CLINIC | Age: 82
End: 2022-03-29
Payer: MEDICARE

## 2022-03-29 LAB
ALT SERPL-CCNC: 18 IU/L (ref 5–35)
AST SERPL-CCNC: 34 U/L (ref 5–34)
CREATININE (EXTERNAL): 0.59 MG/DL (ref 0.5–1.3)
GFR ESTIMATED (EXTERNAL): 103.5 ML/MIN/1.73M2

## 2022-04-11 DIAGNOSIS — N39.3 STRESS INCONTINENCE OF URINE: ICD-10-CM

## 2022-04-12 RX ORDER — OXYBUTYNIN CHLORIDE 5 MG/1
TABLET, EXTENDED RELEASE ORAL
Qty: 90 TABLET | Refills: 0 | Status: SHIPPED | OUTPATIENT
Start: 2022-04-12 | End: 2022-07-12

## 2022-05-01 ENCOUNTER — HEALTH MAINTENANCE LETTER (OUTPATIENT)
Age: 82
End: 2022-05-01

## 2022-06-01 ENCOUNTER — OFFICE VISIT (OUTPATIENT)
Dept: INTERNAL MEDICINE | Facility: CLINIC | Age: 82
End: 2022-06-01
Payer: MEDICARE

## 2022-06-01 VITALS
OXYGEN SATURATION: 96 % | BODY MASS INDEX: 24.33 KG/M2 | HEIGHT: 59 IN | WEIGHT: 120.7 LBS | DIASTOLIC BLOOD PRESSURE: 76 MMHG | HEART RATE: 69 BPM | TEMPERATURE: 97.3 F | RESPIRATION RATE: 15 BRPM | SYSTOLIC BLOOD PRESSURE: 138 MMHG

## 2022-06-01 DIAGNOSIS — Z00.00 MEDICARE ANNUAL WELLNESS VISIT, SUBSEQUENT: Primary | ICD-10-CM

## 2022-06-01 DIAGNOSIS — S49.92XA INJURY OF LEFT UPPER EXTREMITY, INITIAL ENCOUNTER: ICD-10-CM

## 2022-06-01 DIAGNOSIS — Z13.1 SCREENING FOR DIABETES MELLITUS: ICD-10-CM

## 2022-06-01 DIAGNOSIS — M05.79 RHEUMATOID ARTHRITIS INVOLVING MULTIPLE SITES WITH POSITIVE RHEUMATOID FACTOR (H): ICD-10-CM

## 2022-06-01 DIAGNOSIS — G12.21 ALS (AMYOTROPHIC LATERAL SCLEROSIS) (H): ICD-10-CM

## 2022-06-01 DIAGNOSIS — E55.9 VITAMIN D DEFICIENCY: ICD-10-CM

## 2022-06-01 DIAGNOSIS — R73.01 IMPAIRED FASTING GLUCOSE: ICD-10-CM

## 2022-06-01 DIAGNOSIS — E78.00 PURE HYPERCHOLESTEROLEMIA: ICD-10-CM

## 2022-06-01 LAB
ALBUMIN SERPL-MCNC: 3.8 G/DL (ref 3.4–5)
ALP SERPL-CCNC: 90 U/L (ref 40–150)
ALT SERPL W P-5'-P-CCNC: 26 U/L (ref 0–50)
ANION GAP SERPL CALCULATED.3IONS-SCNC: 3 MMOL/L (ref 3–14)
AST SERPL W P-5'-P-CCNC: 31 U/L (ref 0–45)
BILIRUB SERPL-MCNC: 0.7 MG/DL (ref 0.2–1.3)
BUN SERPL-MCNC: 14 MG/DL (ref 7–30)
CALCIUM SERPL-MCNC: 9.3 MG/DL (ref 8.5–10.1)
CHLORIDE BLD-SCNC: 103 MMOL/L (ref 94–109)
CHOLEST SERPL-MCNC: 150 MG/DL
CO2 SERPL-SCNC: 29 MMOL/L (ref 20–32)
CREAT SERPL-MCNC: 0.6 MG/DL (ref 0.52–1.04)
DEPRECATED CALCIDIOL+CALCIFEROL SERPL-MC: 40 UG/L (ref 20–75)
FASTING STATUS PATIENT QL REPORTED: NO
GFR SERPL CREATININE-BSD FRML MDRD: 89 ML/MIN/1.73M2
GLUCOSE BLD-MCNC: 98 MG/DL (ref 70–99)
HBA1C MFR BLD: 4.8 % (ref 0–5.6)
HDLC SERPL-MCNC: 74 MG/DL
LDLC SERPL CALC-MCNC: 62 MG/DL
NONHDLC SERPL-MCNC: 76 MG/DL
POTASSIUM BLD-SCNC: 3.8 MMOL/L (ref 3.4–5.3)
PROT SERPL-MCNC: 8 G/DL (ref 6.8–8.8)
SODIUM SERPL-SCNC: 135 MMOL/L (ref 133–144)
TRIGL SERPL-MCNC: 72 MG/DL

## 2022-06-01 PROCEDURE — 80061 LIPID PANEL: CPT | Performed by: INTERNAL MEDICINE

## 2022-06-01 PROCEDURE — 36415 COLL VENOUS BLD VENIPUNCTURE: CPT | Performed by: INTERNAL MEDICINE

## 2022-06-01 PROCEDURE — 80053 COMPREHEN METABOLIC PANEL: CPT | Performed by: INTERNAL MEDICINE

## 2022-06-01 PROCEDURE — 82306 VITAMIN D 25 HYDROXY: CPT | Performed by: INTERNAL MEDICINE

## 2022-06-01 PROCEDURE — G0439 PPPS, SUBSEQ VISIT: HCPCS | Performed by: INTERNAL MEDICINE

## 2022-06-01 PROCEDURE — 83036 HEMOGLOBIN GLYCOSYLATED A1C: CPT | Performed by: INTERNAL MEDICINE

## 2022-06-01 NOTE — PROGRESS NOTES
ASSESSMENT/PLAN                                                       (Z00.00) Medicare annual wellness visit, subsequent  (primary encounter diagnosis)  Comment: PMH, PSH, FH, SH, medications, allergies, immunizations, and preventative health measures reviewed and updated as appropriate.  Plan: see below for plans.      (E78.00) Pure hypercholesterolemia  (Z13.1) Screening for diabetes mellitus  (E55.9) Vitamin D deficiency  (R73.01) Impaired fasting glucose  Plan: non-fasting labs today.     (S49.92XA) Injury of left upper extremity, initial encounter  Plan: left forearm and wrist xrays today.     (G12.21) ALS (amyotrophic lateral sclerosis) (H)  Comment: stable on Rilutek; followed by neurology.    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     Appropriate preventive services were discussed with this patient, including applicable screening as appropriate for cardiovascular disease, diabetes, osteopenia/osteoporosis, and glaucoma.  As appropriate for age/gender, discussed screening for colorectal cancer, prostate cancer, breast cancer, and cervical cancer. Checklist reviewing preventive services available has been given to the patient.    Reviewed patients plan of care. The Basic Care Plan (routine screening as documented in Health Maintenance) for Britt Pichardo meets the Care Plan requirement. This Care Plan has been established and reviewed with the Patient.    Digna Krause MD   [address] [address]  T: [phone], F: [phone]    SUBJECTIVE                                                      Britt Pichardo is a very pleasant 82 year old female who presents for her subsequent AWV:    Patient fell this past Sunday while walking at [facility] fell onto left hand/wrist. Bruising, swelling, and pain since. Able to move fingers without  difficulty. No numbness or tingling.    Current providers (other than myself): Bhavani (neurology), Marguerite (rheumatology)    PMH, PSH, FH, SH, medications, allergies, immunizations, preventative health, and health risk assessment reviewed and updated as appropriate.    Past Medical History:   Diagnosis Date     ALS (amyotrophic lateral sclerosis) (H)      Pure hypercholesterolemia      RA (rheumatoid arthritis) (H)      Past Surgical History:   Procedure Laterality Date     LUMBAR FUSION  ,     Family History   Problem Relation Age of Onset     Heart Disease Mother         details unknown     Melanoma Sister      Diabetes No family hx of      Cerebrovascular Disease No family hx of      Coronary Artery Disease Early Onset No family hx of      Myocardial Infarction No family hx of      Breast Cancer No family hx of      Ovarian Cancer No family hx of      Colon Cancer No family hx of      Social History     Occupational History     Occupation: Retired - cook at [employer], payroll with [employer]   Tobacco Use     Smoking status: Former Smoker     Years: 15.00     Types: Cigarettes     Quit date: 1967     Years since quittin.4     Smokeless tobacco: Never Used     Tobacco comment: social smoking only   Substance and Sexual Activity     Alcohol use: Yes     Comment: couple drinks/week     Drug use: No     Sexual activity: Not Currently   Social History Narrative    .    Lives at home alone - daughter helps.    Two adult daughters.    Two grandchildren.    Walks and stays active during the day.      No Known Allergies     Current Outpatient Medications   Medication Sig     aspirin 81 MG tablet Take 1 tablet by mouth daily.     calcium carbonate-vitamin D 600-200 MG-UNIT TABS Take 1 tablet by mouth every other day      Ferrous Gluconate 240 (27 FE) MG TABS Take 1 tablet by mouth every other day      folic acid (FOLVITE) 1 MG tablet Take 1 mg by mouth daily.     InFLIXimab (REMICADE IV) Inject 200 mg  into the vein Patient takes this every 8 weeks     lovastatin (MEVACOR) 20 MG tablet Take 1 tablet (20 mg) by mouth At Bedtime     methotrexate 2.5 MG tablet Take 4 tablets by mouth once a week      oxybutynin ER (DITROPAN-XL) 5 MG 24 hr tablet Take 1 tablet (5 mg) by mouth once daily     riluzole (RILUTEK) 50 MG tablet TAKE ONE TABLET BY MOUTH EVERY 12 HOURS     Immunization History   Administered Date(s) Administered     COVID-19,PF,Pfizer (12+ Yrs) 02/09/2021, 03/02/2021, 09/01/2021     COVID-19,PF,Pfizer 12+ Yrs (2022 and After) 05/20/2022     Flu, Unspecified 10/10/2013, 10/14/2015     HepB, Unspecified 10/06/1999, 11/10/1999, 04/19/2000     Influenza (High Dose) 3 valent vaccine 10/12/2017, 11/09/2018, 10/22/2019, 09/28/2020     Influenza (IIV3) PF 10/10/2013, 11/01/2014     Influenza Vaccine IM > 6 months Valent IIV4 (Alfuria,Fluzone) 11/09/2016     Influenza, Quad, High Dose, Pf, 65yr+ (Fluzone HD) 11/04/2021     Pneumo Conj 13-V (2010&after) 12/15/2014     Pneumococcal 23 valent 03/05/2007     TD (ADULT, 7+) 03/05/2007, 01/09/2018     PREVENTATIVE HEALTH                                                      BMI: within normal limits   Blood pressure: within normal limits   Breast CA screening: screening no longer indicated  Colon CA screening: screening no longer indicated  Lung CA screening: patient does not meet screening criteria  Dexa: up to date   Screening cholesterol: n/a - already being treated for this condition  Screening diabetes: DUE  Alcohol misuse screening: alcohol use reviewed - no intervention indicated at this time  Immunizations: reviewed; Shingrix series DUE - not covered by Medicare (may obtain in pharmacy if desired)     HEALTH RISK ASSESSMENT                                                      In general, how would you rate your overall physical health? very good  Outside of work, how many days during the week do you exercise? 6-7 days/week  Outside of work, approximately how many  "minutes a day do you exercise? less than 15 minutes    If you drink alcohol do you typically have >3 drinks per day or >7 drinks per week? No  Do you usually eat at least 4 servings of fruit and vegetables a day, include whole grains & fiber and avoid regularly eating high fat or \"junk\" foods? Yes     Do you have any problems taking medications regularly? No  Do you have any side effects from medications? No    Assistance with daily activities: No    Safety concerns: No    Fall risk assessment: completed today (see ambulatory assessments)    Hearing concerns: No    In the past 6 months, have you been bothered by leaking of urine: Yes    In general, how would you rate your overall mental or emotional health: very good    PHQ-2/PHQ-9 assessment: completed today (see ambulatory assessments)    Additional concerns today: No    OBJECTIVE                                                      /76   Pulse 69   Temp 97.3  F (36.3  C) (Temporal)   Resp 15   Ht 1.499 m (4' 11\")   Wt 54.7 kg (120 lb 11.2 oz)   SpO2 96%   BMI 24.38 kg/m    Constitutional: well-appearing  Head, Ears, and Eyes: normocephalic; normal external auditory canal and pinna; tympanic membranes visualized and normal; normal lids and conjunctivae  Neck: supple, symmetric, no thyromegaly or lymphadenopathy  Respiratory: normal respiratory effort; clear to auscultation bilaterally  Cardiovascular: regular rate and rhythm; no edema  Psych: normal judgment and insight; normal mood and affect; recent and remote memory intact    Left wrist and forearm: no obvious deformity; large ecchymosis and swelling along posterior medial wrist and distal forearm    Cognitive impairment noted: No  ---  (Note was completed, in part, with [employer] voice-recognition software. Documentation was reviewed, but some grammatical, spelling, and word errors may remain.)  "

## 2022-06-16 DIAGNOSIS — G12.21 ALS (AMYOTROPHIC LATERAL SCLEROSIS) (H): ICD-10-CM

## 2022-06-17 RX ORDER — RILUZOLE 50 MG/1
TABLET, FILM COATED ORAL
Qty: 60 TABLET | Refills: 0 | Status: SHIPPED | OUTPATIENT
Start: 2022-06-17 | End: 2022-07-20

## 2022-07-08 DIAGNOSIS — N39.3 STRESS INCONTINENCE OF URINE: ICD-10-CM

## 2022-07-12 RX ORDER — OXYBUTYNIN CHLORIDE 5 MG/1
TABLET, EXTENDED RELEASE ORAL
Qty: 90 TABLET | Refills: 0 | Status: SHIPPED | OUTPATIENT
Start: 2022-07-12 | End: 2022-10-11

## 2022-07-19 ENCOUNTER — TRANSFERRED RECORDS (OUTPATIENT)
Dept: HEALTH INFORMATION MANAGEMENT | Facility: CLINIC | Age: 82
End: 2022-07-19

## 2022-07-19 DIAGNOSIS — G12.21 ALS (AMYOTROPHIC LATERAL SCLEROSIS) (H): ICD-10-CM

## 2022-07-19 LAB
ALT SERPL-CCNC: 18 IU/L (ref 5–35)
AST SERPL-CCNC: 31 U/L (ref 5–34)
CREATININE (EXTERNAL): 0.64 MG/DL (ref 0.5–1.3)

## 2022-07-20 RX ORDER — RILUZOLE 50 MG/1
TABLET, FILM COATED ORAL
Qty: 60 TABLET | Refills: 2 | Status: SHIPPED | OUTPATIENT
Start: 2022-07-20 | End: 2022-10-18

## 2022-08-17 DIAGNOSIS — E78.5 HYPERLIPIDEMIA LDL GOAL <130: ICD-10-CM

## 2022-08-19 RX ORDER — LOVASTATIN 20 MG
20 TABLET ORAL AT BEDTIME
Qty: 90 TABLET | Refills: 2 | Status: SHIPPED | OUTPATIENT
Start: 2022-08-19 | End: 2023-05-18

## 2022-09-06 DIAGNOSIS — G12.21 ALS (AMYOTROPHIC LATERAL SCLEROSIS) (H): Primary | ICD-10-CM

## 2022-09-08 ENCOUNTER — THERAPY VISIT (OUTPATIENT)
Dept: OCCUPATIONAL THERAPY | Facility: CLINIC | Age: 82
End: 2022-09-08
Payer: MEDICARE

## 2022-09-08 ENCOUNTER — OFFICE VISIT (OUTPATIENT)
Dept: NEUROLOGY | Facility: CLINIC | Age: 82
End: 2022-09-08
Payer: MEDICARE

## 2022-09-08 ENCOUNTER — THERAPY VISIT (OUTPATIENT)
Dept: PHYSICAL THERAPY | Facility: CLINIC | Age: 82
End: 2022-09-08
Payer: MEDICARE

## 2022-09-08 VITALS
SYSTOLIC BLOOD PRESSURE: 122 MMHG | DIASTOLIC BLOOD PRESSURE: 73 MMHG | HEART RATE: 66 BPM | OXYGEN SATURATION: 93 % | WEIGHT: 117.2 LBS | BODY MASS INDEX: 23.67 KG/M2

## 2022-09-08 DIAGNOSIS — G12.21 ALS (AMYOTROPHIC LATERAL SCLEROSIS) (H): Primary | ICD-10-CM

## 2022-09-08 DIAGNOSIS — Z78.9 IMPAIRED INSTRUMENTAL ACTIVITIES OF DAILY LIVING (IADL): ICD-10-CM

## 2022-09-08 DIAGNOSIS — Z74.09 IMPAIRED MOBILITY AND ADLS: ICD-10-CM

## 2022-09-08 DIAGNOSIS — Z78.9 IMPAIRED MOBILITY AND ADLS: ICD-10-CM

## 2022-09-08 LAB
EXPTIME-PRE: 2.61 SEC
FEF2575-%PRED-PRE: 145 %
FEF2575-PRE: 1.91 L/SEC
FEF2575-PRED: 1.31 L/SEC
FEFMAX-%PRED-PRE: 113 %
FEFMAX-PRE: 4.28 L/SEC
FEFMAX-PRED: 3.76 L/SEC
FEV1-%PRED-PRE: 90 %
FEV1-PRE: 1.42 L
FEV1FEV6-PRE: 90 %
FEV1FEV6-PRED: 77 %
FEV1FVC-PRE: 90 %
FEV1FVC-PRED: 77 %
FIFMAX-PRE: 1.48 L/SEC
FVC-%PRED-PRE: 77 %
FVC-PRE: 1.58 L
FVC-PRED: 2.05 L
MEP-PRE: 35 CMH2O
MIP-PRE: -38 CMH2O

## 2022-09-08 PROCEDURE — 97535 SELF CARE MNGMENT TRAINING: CPT | Mod: GO | Performed by: OCCUPATIONAL THERAPIST

## 2022-09-08 PROCEDURE — 94375 RESPIRATORY FLOW VOLUME LOOP: CPT | Performed by: INTERNAL MEDICINE

## 2022-09-08 PROCEDURE — 97162 PT EVAL MOD COMPLEX 30 MIN: CPT | Mod: GP | Performed by: PHYSICAL THERAPIST

## 2022-09-08 PROCEDURE — 99214 OFFICE O/P EST MOD 30 MIN: CPT | Performed by: PSYCHIATRY & NEUROLOGY

## 2022-09-08 PROCEDURE — 97535 SELF CARE MNGMENT TRAINING: CPT | Mod: GP | Performed by: PHYSICAL THERAPIST

## 2022-09-08 PROCEDURE — 97165 OT EVAL LOW COMPLEX 30 MIN: CPT | Mod: GO | Performed by: OCCUPATIONAL THERAPIST

## 2022-09-08 ASSESSMENT — REVISED AMYOTROPHIC LATERAL SCLEROSIS FUNCTIONAL RATING SCALE (ALSFRS-R)
HANDWRITING: 3
DRESSING_AND_HYGEINE: 2
HANDWRITING: SLOW OR SLOPPY: ALL WORDS ARE LEGIBLE
FINE_MOTOR_SUBTOTAL_SCORE: 7
SIX_ITEM_SUBTOTAL: 18
SALIVATION: SLIGHT BUT DEFINITE EXCESS OF SALIVA IN MOUTH; MAY HAVE NIGHTTIME DROOLING
WALKING: 2
SPEECH: 3
DYSPNEA: 4
CUTTING_FOOD_AND_HANDLING_UTENSILES: 2
SALIVATION: 3
ALSFRS_TOTAL_SCORE: 38
BULBAR_SUBTOTAL: 10
ORTHOPENA: 4
TURNING_IN_BED_AND_ADJUSTING_BED_CLOTHES: NORMAL
GROSS_MOTOR_SUBTOTAL_SCORE: 9
CLIMBING_STAIRS: 3
TURNING_IN_BED_AND_ADJUSTING_BED_CLOTHES: 4
SWALLOWING: 4
SWALLOWING: NORMAL EATING HABITS
CLIMBING_STAIRS: SLOW
WALKING: WALKS WITH ASSISTANCE
SPEECH: DETECTABLE SPEECH DISTURBANCES
RESPIRATORY_SUBTOTAL_SCORE: 12
RESPIRATORY_INSUFFICIENCY: 4
DRESSING_AND_HYGEINE: INTERMITTENT ASSISTANCE OR SUBSTITUTE METHODS

## 2022-09-08 ASSESSMENT — PAIN SCALES - GENERAL: PAINLEVEL: NO PAIN (0)

## 2022-09-08 NOTE — PROGRESS NOTES
"[facility], formerly [facility]    OUTPATIENT OCCUPATIONAL THERAPY CLINIC NOTE  Britt Pichardo  YOB: 1940  [id number]    Type of visit:  Evaluation            Date of service: 9/8/22    Referring provider: Dr. Mason Beckham    Others present at visit:  Child(adriana), daughters-Courtney    Medical diagnosis:   Amyotrophic lateral sclerosis (ALS), probable     Date of diagnosis: 11/29/18     Pertinent medical history:  Onset of distal UE weakness L > R, 2017    Additional Occupational Profile Information (patterns of daily living, interests, values and needs): Pt is an 82 y.o. woman who is a mother dx with limb onset ALS living alone in her own home who manages all of her own IADL with support of her daughters.    Cardio-respiratory status:  Forced vital capacity: 77 % of predicted     Height/Weight: 4' 11\" / 117#    Living environment:  House  Tub/shower on main-doesn't like to use  Walk-in shower in basement no bars, textured floor  Regular height toilets    Living environment barriers:  2 stairs to enter (0 railing present)    Steps to basement with railing laundry in basement and shower    Current assistance/living environment:  Lives alone    Dtr coming over 5 days per week to assist-mainly outdoors    Current mobility equipment:  Cane recommended by PT 3/10/22    Current ADL equipment:  Shower chair-not using   Button extender   Button hook   plier and needle nose plier to aid opening items    Technology used: NT    Patient concerns/goals: .Notes L hand is much weaker now limiting most 2 handed tasks. Does use 4ww when walks at the mall for exer mainly    Evaluation   Interview completed   Pain assessment:  Pain denied    Range of motion:  R handed ; UE AROM is full R shoulder flexion and 80 degrees L shoulder flexion. Elbow to hand WFL on R and L; L is limited by reduced opposition of hand and end range  is " reduced as well as finger ext  Manual muscle testing: UE's:  L  very weak and lateral and palmar pinch are impaired ; R  fairly weak with grasp, lateral and palmar pinch and L long finger flexors of index and middle are weak  Shoulder at least 3/5    Cognition:  Appears WFL    ADL:   Feeding self:  Ind, cutting difficult, may just  food to bite off, not using rocker knife; at restaurant has family assist to cut food  Grooming: Ind, R-one handed, AE  UE Dressing:  Ind,   LE Dressing:  Ind, button hook used, lays on bed to button pants  Showering/bathing: Stands to shower, stairs to basement shower  Toileting/transfer:  Denies problems    IADL:   Home management:  Does all, harder to reach higher cabinets to put dishware away, pt does own shopping, does own laundry and Using shoot for laundry to get items to basement and Ind cleaning  Driving:  Reports Ind, mows her lawn with help of dtr, hires for snow care, walking slower than past, pt gardens with assist of dtr  Occupation: retired  Emergency Call system:flip phone in pocket at all times      Fall Risk Screen:   Has the patient fallen 2 or more times in the last year? No      Has the patient fallen and had an injury in the past year? No       Timed Up and Go Score: defer to PT eval today    Is the patient a fall risk? No     Impairments:  Muscle atrophy  Coordination   Fatigue   ROM   balance    Treatment diagnosis:  Impaired mobility activities of daily living and IADL    Assessment of Occupational Performance: 1-3  Identified Performance Deficits (ie: feeding, social skills):  Home management, bathing, dressing  Clinical Decision Making (Complexity):Low complexity     Recommendations/Plan of care:  Patient would benefit from interventions to enhance safety and independence.  Rehab potential good for stated goals.  Occupational therapy intervention for  self care/home management   1 session evaluation & treatment.    Goals:   Target date:  today  Patient, family and/or caregiver will verbalize/demonstrate understanding of compensatory methods /equipment to enhance functional independence and safety.    Educational assessment/barriers to learning:  none      Treatment provided this date:   Self care/home management, 10 minutes of training in:  Bed rail with use of images that pt can use to aid supine to/from sit-pt agrees to this therapist requesting from ALS  Use of extended tub bench (loan from [facility]) to prevent falls and save energy with completing steps to the basement to use her walk-in shower; declines need at this time      Response to treatment/recommendations: receptive     Goal attainment:  All goals met    Risks and benefits of evaluation/treatment have been explained.  Patient, family and/or caregiver are in agreement with Plan of Care.     Timed Code Treatment Minutes: 10  Total Treatment Time (sum of timed and untimed services): 20 min    Signature: CHRISTIANE Mccullough/L, MSCS   Date: 9/8/22         [facility]                                                                                   OUTPATIENT OCCUPATIONAL THERAPY    PLAN OF TREATMENT FOR OUTPATIENT REHABILITATION   Patient's Last Name, First Name, Britt Valentine YOB: 1940   Provider's Name   [facility]   Medical Record No.  [id number]     Onset Date:  9/8/22 Start of Care Date:  9/8/22     Medical Diagnosis:   ALS      OT Treatment Diagnosis:   Impaired ADLs/functional mobility Plan of Treatment  Frequency/Duration: / 1 x tx/eval    Certification date from  9/8/22   To     9/8/22     See note for plan of treatment details and functional goals     Jordan Murphy OT                         I CERTIFY THE NEED FOR THESE SERVICES FURNISHED UNDER        THIS PLAN OF TREATMENT AND WHILE UNDER MY CARE     (Physician attestation of this document indicates review and certification of the therapy plan).               Referring Provider:  Dr. Mason Beckham    Initial Assessment  See Epic Evaluation-

## 2022-09-08 NOTE — PROGRESS NOTES
"    OUTPATIENT PHYSICAL THERAPY CLINIC NOTE  Britt Pichardo  YOB: 1940  [id number]    Type of visit:         Evaluation     Date of service: 9/8/2022    Referring provider: Mason Beckham MD     present: No    Others present at visit:  Child(adriana)-daughter, Courtney    Medical diagnosis:   Amyotrophic lateral sclerosis (ALS)    Date of diagnosis: 11/29/2018    Pertinent history of current problem (include personal factors and/or comorbidities that impact the plan of care):  limb onset weakness; mostly distal UE weakness L > R beginning in 2017    Cardio-respiratory status:  Forced vital capacity: 77 % of predicted     Height/Weight: 4'11\" / 117lbs    Living environment:  House    Living environment barriers:  2 stairs to enter (0 railing present)  A flight of stairs to basement (1 railing present)     Current assistance/living environment:  Lives alone  Patient's daughter assist patient with outdoor house chores, fine motor tasks multiple times a week      Current mobility equipment:  4WW- rarely uses     Current ADL equipment:  See OT note    Technology used: Phone    Patient concerns/goals: 2 falls- at cemetary (tripped on grass/cement) and in bathroom, some muscle fatigue (mostly thighs) with walking.  Patient's daughter reports patient is walking less distance and overall    Evaluation   Interview completed.   Pain assessment:  Pain denied     Range of motion: Bilateral dorsiflexion limited near neutral     Manual muscle testing: Bilateral hip flexion 3+/5 (cramping with hip flexion and knee extension) B knee flexion/extension 5/5, bilateral ankle dorsiflexion 4/5   Gait: Patient ambulates without a device independently-mild reduction in duy and reduced arm swing.  No instability noted   Cognition: Intact   5 times sit to stand: 18 seconds without UE support    Fall Risk Screen:   Has the patient fallen 2 or more times in the last year? Yes      Has the patient fallen and had an " injury in the past year? No       Timed Up and Go Score: 5 times sit to stand test greater than 12 seconds indicates increased risk for falls    Is the patient a fall risk? Yes     Impairments:  Fatigue  Muscle atrophy  Range of motion     Treatment diagnosis:  Impaired mobility  Impaired activities of daily living    Clinical Presentation: Evolving/Changing  Clinical Presentation Rationale: Personal factors, body systems involved, progressive nature of ALS  Clinical Decision Making (Complexity): Moderate complexity     Recommendations/Plan of care:  1 session evaluation & treatment.     Goals:   Target date: 9/8/2022  Patient, family and/or caregiver will verbalize understanding of evaluation results and implications for functional performance.  Patient, family and/or caregiver will verbalize/demonstrate understanding of compensatory methods /equipment to enhance functional independence and safety.  Patient, family and/or caregiver will verbalize/demonstrate understanding of home program.    Educational assessment/barriers to learning:   No barriers noted     Treatment provided this date:   ADL/self-care management-10 minutes  -Educated patient on energy conservation techniques including pacing of activities, frequent rest breaks, use of assistive device and monitoring signs of fatigue (increased muscle soreness, weakness, cramps, fasciculations) for safe functional mobility and performance of daily tasks  -Encourage patient to continue to use 4 wheeled walker for long distance ambulation and on uneven surfaces to decrease fall risk.  Educated patient on probable falls related to uneven surfaces and single limb stance.  Patient also reports having room that she uses occasionally in garage as a steady aid.  -Collaborated with patient and daughter regarding recommendations to place handrails on steps to enter/exit home to improve safety and decrease fall risk.  Encouraged either handrail or use of grab bar on door  frame that is sturdy  -Encouraged ongoing aerobic/walking program, however monitor red flags including cramping or weakness.  Encourage patient to use four-wheel walker for energy conservation to decrease back pain reported    Response to treatment/recommendations: Patient verbalized understanding and agreement    Goal attainment:  All goals met     Risks and benefits of evaluation/treatment have been explained.  Patient, family and/or caregiver are in agreement with Plan of Care.     Timed Code Treatment Minutes: 10  Total Treatment Time (sum of timed and untimed services): 20    Signature: Digna Low, PT   Date: 9/8/2022    Certification:  Onset date: 9/8/2022  Start of care date: 9/8/2022  Certification date from 9/8/2022 to 9/8/2022    I CERTIFY THE NEED FOR THESE SERVICES FURNISHED UNDER        THIS PLAN OF TREATMENT AND WHILE UNDER MY CARE     (Physician attestation of this document indicates review and certification of the therapy plan).

## 2022-09-08 NOTE — LETTER
2022       RE: Britt Pichardo  [address] [facility] [address]     Dear Colleague,    Thank you for referring your patient, Britt Pichardo, to the [facility] [place] [facility]. Please see a copy of my visit note below.    Service Date: 2022    Digna Krause MD   [facility]  [address] [place] [address]    RE:      Britt Pichardo  MRN:  [id number]  :   1940    Dear Dr. Krause:    I had the pleasure to see Britt Pichardo in followup at the [facility].  She has slowly progressive limb-onset ALS with first symptoms of painless left upper extremity distal weakness in 2017.  Last clinic visit was 2022.  Since then, she notes significant progression of her left arm weakness.  She cannot raise it overhead.  She cannot grasp or do any fine hand movements.  Essentially, the left hand function is completely gone.  She still, however, has fair strength with the right arm and she is right-hand dominant.  She can brush her teeth and comb her hair with very little limitation and she can bring food in her mouth without problems or cut the food.  Her handwriting is getting a little slow and she has some weakness on the right hand but is not very limiting.  She has problems mostly with tasks that require bimanual function such as opening windows or doors, etc.  She can go up the stairs of her house holding on the handrail with the right with reasonable stability.  That said, she has had a couple of falls and she feels a bit imbalanced sometimes. The change in the leg strength is minor compared to the last visit.      She still denies dysphagia, or dysarthria.  She has sialorrhea only at night but not during the day.  She does not have difficulty chewing, head drop or pseudobulbar affect.  She denies dyspnea on exertion or  orthopnea, non-refreshing sleep or morning headaches. J Luis voice is a little hoarse or sometimes difficult to understand.      She is on riluzole 50 mg b.i.d., tolerating it well.  AST and ALT were checked 07/19/2022 and are normal.  No other new issues reported.    Pulmonary function tests showed a forced vital capacity of 77%, FEV1 of 77% and MIP -38.  This is a moderate decline from the previous visit but she remains asymptomatic from a respiratory standpoint.    /73 (BP Location: Right arm, Patient Position: Sitting, Cuff Size: Adult Regular)   Pulse 66   Wt 53.2 kg (117 lb 3.2 oz)   SpO2 93%   BMI 23.67 kg/m      PHYSICAL EXAMINATION:      Neck flexion strength is 4/5, mildly weak.  Extension is full.  Strength of orbicularis oculi, oris, uvula, jaw, palate or tongue is actually normal.  Tongue shows equivocal-questionable fasciculations and no atrophy but normal lateral tongue movements.  She is slightly hoarse but she has no spastic dysarthria.  This is not a typical voice for ALS.  The right arm strength is 4- for shoulder abduction but elbow flexion and extension remain well preserved, close to 5, and so are wrist extension and hand .  In the legs, her strength is still actually 5/5 for hip flexion, hip abduction, knee extension, knee flexion, foot dorsiflexion and she can get up from a chair with arms crossed on the chest slowly.    In summary, Britt has shown expected slow progression of her limb-onset ALS.  We discussed a number of practical issues.  She will continue riluzole 50 mg b.i.d.  LFTs were within normal limits in 7/2022; they will not be rechecked today.  She will be seen by our occupational and physical therapists regarding her progressive weakness, mostly OT, and will try to find some solutions.  She does not have any bulbar needs.  She has a small decline of her pulmonary function tests but she remains asymptomatic from a respiratory standpoint and I am not going to prescribe  her BiPAP right now.  Given her slow progression, we will see her in followup in 6 months.      Billing MDM level 4 (moderate) based on 1) Problems addressed: One chronic disorder with progression, exacerbation, or side effects of treatment (ALS) and 2) Risk: prescription drug management- see above.     Sincerely,    Maosn Beckham MD        D: 2022   T: 2022   MT: rahat    Name:     PHYLLIS DOYLE  MRN:      [id number]        Account:      [id number]   :      1940           Service Date: 2022       Document: [id number]

## 2022-09-08 NOTE — PROGRESS NOTES
Service Date: 2022    Digna Krause MD   [facility]  [address] [place] [address]    RE:      Britt Pichardo  MRN:  [id number]  :   1940    Dear Dr. Krause:    I had the pleasure to see Britt Pichardo in followup at the [facility].  She has slowly progressive limb-onset ALS with first symptoms of painless left upper extremity distal weakness in 2017.  Last clinic visit was 2022.  Since then, she notes significant progression of her left arm weakness.  She cannot raise it overhead.  She cannot grasp or do any fine hand movements.  Essentially, the left hand function is completely gone.  She still, however, has fair strength with the right arm and she is right-hand dominant.  She can brush her teeth and comb her hair with very little limitation and she can bring food in her mouth without problems or cut the food.  Her handwriting is getting a little slow and she has some weakness on the right hand but is not very limiting.  She has problems mostly with tasks that require bimanual function such as opening windows or doors, etc.  She can go up the stairs of her house holding on the handrail with the right with reasonable stability.  That said, she has had a couple of falls and she feels a bit imbalanced sometimes. The change in the leg strength is minor compared to the last visit.      She still denies dysphagia, or dysarthria.  She has sialorrhea only at night but not during the day.  She does not have difficulty chewing, head drop or pseudobulbar affect.  She denies dyspnea on exertion or orthopnea, non-refreshing sleep or morning headaches. J Luis voice is a little hoarse or sometimes difficult to understand.      She is on riluzole 50 mg b.i.d., tolerating it well.  AST and ALT were checked 2022 and are normal.  No other new issues reported.    Pulmonary function tests showed a forced vital  capacity of 77%, FEV1 of 77% and MIP -38.  This is a moderate decline from the previous visit but she remains asymptomatic from a respiratory standpoint.    /73 (BP Location: Right arm, Patient Position: Sitting, Cuff Size: Adult Regular)   Pulse 66   Wt 53.2 kg (117 lb 3.2 oz)   SpO2 93%   BMI 23.67 kg/m      PHYSICAL EXAMINATION:      Neck flexion strength is 4/5, mildly weak.  Extension is full.  Strength of orbicularis oculi, oris, uvula, jaw, palate or tongue is actually normal.  Tongue shows equivocal-questionable fasciculations and no atrophy but normal lateral tongue movements.  She is slightly hoarse but she has no spastic dysarthria.  This is not a typical voice for ALS.  The right arm strength is 4- for shoulder abduction but elbow flexion and extension remain well preserved, close to 5, and so are wrist extension and hand .  In the legs, her strength is still actually 5/5 for hip flexion, hip abduction, knee extension, knee flexion, foot dorsiflexion and she can get up from a chair with arms crossed on the chest slowly.    In summary, Britt has shown expected slow progression of her limb-onset ALS.  We discussed a number of practical issues.  She will continue riluzole 50 mg b.i.d.  LFTs were within normal limits in 7/2022; they will not be rechecked today.  She will be seen by our occupational and physical therapists regarding her progressive weakness, mostly OT, and will try to find some solutions.  She does not have any bulbar needs.  She has a small decline of her pulmonary function tests but she remains asymptomatic from a respiratory standpoint and I am not going to prescribe her BiPAP right now.  Given her slow progression, we will see her in followup in 6 months.      Billing MDM level 4 (moderate) based on 1) Problems addressed: One chronic disorder with progression, exacerbation, or side effects of treatment (ALS) and 2) Risk: prescription drug management- see above.      Sincerely,    Mason Beckham MD        D: 2022   T: 2022   MT: rahat    Name:     PHYLLIS DOYLE  MRN:      [id number]        Account:      [id number]   :      1940           Service Date: 2022       Document: [id number]

## 2022-09-12 NOTE — PATIENT INSTRUCTIONS
Please call [facility] @ [phone] for questions or concerns during regular business hours. For a more efficient way to communicate, use Ininal and address the message to your physician. Remember, MyChart is only read during business hours. Do not leave urgent messages on voicemail or VOZt. If situation is urgent, contact the Neurology Clinic @ [phone] and ask to speak to a Triage Nurse or Call 911 or visit an Emergency Department.    Please call your pharmacy if you need a medication refill. They will send us an electronic message.

## 2022-10-08 DIAGNOSIS — N39.3 STRESS INCONTINENCE OF URINE: ICD-10-CM

## 2022-10-11 RX ORDER — OXYBUTYNIN CHLORIDE 5 MG/1
TABLET, EXTENDED RELEASE ORAL
Qty: 90 TABLET | Refills: 0 | Status: SHIPPED | OUTPATIENT
Start: 2022-10-11 | End: 2023-01-10

## 2022-10-12 ENCOUNTER — OFFICE VISIT (OUTPATIENT)
Dept: INTERNAL MEDICINE | Facility: CLINIC | Age: 82
End: 2022-10-12
Payer: MEDICARE

## 2022-10-12 VITALS
BODY MASS INDEX: 23.99 KG/M2 | DIASTOLIC BLOOD PRESSURE: 78 MMHG | TEMPERATURE: 98 F | WEIGHT: 119 LBS | SYSTOLIC BLOOD PRESSURE: 141 MMHG | OXYGEN SATURATION: 97 % | HEART RATE: 66 BPM | HEIGHT: 59 IN

## 2022-10-12 DIAGNOSIS — Z79.620 INFLIXIMAB (REMICADE) LONG-TERM USE: ICD-10-CM

## 2022-10-12 DIAGNOSIS — Z23 NEED FOR PROPHYLACTIC VACCINATION AND INOCULATION AGAINST INFLUENZA: ICD-10-CM

## 2022-10-12 DIAGNOSIS — G12.21 ALS (AMYOTROPHIC LATERAL SCLEROSIS) (H): ICD-10-CM

## 2022-10-12 DIAGNOSIS — H57.12 DISCOMFORT OF LEFT EYE: Primary | ICD-10-CM

## 2022-10-12 PROCEDURE — 99213 OFFICE O/P EST LOW 20 MIN: CPT | Mod: 25 | Performed by: INTERNAL MEDICINE

## 2022-10-12 PROCEDURE — G0008 ADMIN INFLUENZA VIRUS VAC: HCPCS | Performed by: INTERNAL MEDICINE

## 2022-10-12 PROCEDURE — 90662 IIV NO PRSV INCREASED AG IM: CPT | Performed by: INTERNAL MEDICINE

## 2022-10-12 NOTE — PROGRESS NOTES
"  ASSESSMENT/PLAN                                                      (H57.12) Discomfort of left eye  (primary encounter diagnosis)  (G12.21) ALS (amyotrophic lateral sclerosis) (H)  (Z79.620) Infliximab (Remicade) long-term use  Comment: new onset left eye discomfort in the setting of ALS and long-term Remicade use; normal exam; no vision changes.  Plan: eye referral placed - patient will be contacted to schedule.      (Z23) Need for prophylactic vaccination and inoculation against influenza  Plan: flu shot given today.     Digna Krause MD   [facility]  [address] [place] [id number]  T: [phone], F: [phone]    SUBJECTIVE                                                      Britt Pichardo is a very pleasant 82 year old female who presents with left eye discomfort:    Ongoing for the last several weeks. No associated eye redness or drainage. No recent URI symptoms. No vision changes.    PMH significant for ALS, primarily affecting the left side, and RA on Remicade.    OBJECTIVE                                                      BP (!) 141/78 (BP Location: Left arm, Patient Position: Sitting, Cuff Size: Adult Small)   Pulse 66   Temp 98  F (36.7  C)   Ht 1.499 m (4' 11\")   Wt 54 kg (119 lb)   SpO2 97%   BMI 24.04 kg/m    Constitutional: well-appearing  Eyes: lids and conjunctivae normal; extraocular motion intact    ---    (Note documentation was completed, in part, with [employer] voice-recognition software. Documentation was reviewed, but some grammatical, spelling, and word errors may remain.)    "

## 2022-10-18 ENCOUNTER — TRANSFERRED RECORDS (OUTPATIENT)
Dept: HEALTH INFORMATION MANAGEMENT | Facility: CLINIC | Age: 82
End: 2022-10-18

## 2022-10-18 DIAGNOSIS — G12.21 ALS (AMYOTROPHIC LATERAL SCLEROSIS) (H): ICD-10-CM

## 2022-10-18 LAB
ALT SERPL-CCNC: 18 IU/L (ref 5–35)
AST SERPL-CCNC: 33 U/L (ref 5–34)
CREATININE (EXTERNAL): 0.65 MG/DL (ref 0.5–1.3)

## 2022-10-18 RX ORDER — RILUZOLE 50 MG/1
TABLET, FILM COATED ORAL
Qty: 60 TABLET | Refills: 5 | Status: SHIPPED | OUTPATIENT
Start: 2022-10-18 | End: 2023-04-25

## 2023-01-01 ENCOUNTER — IMMUNIZATION (OUTPATIENT)
Dept: NURSING | Facility: CLINIC | Age: 83
End: 2023-01-01
Payer: MEDICARE

## 2023-01-01 DIAGNOSIS — E78.5 HYPERLIPIDEMIA LDL GOAL <130: ICD-10-CM

## 2023-01-01 DIAGNOSIS — G12.21 ALS (AMYOTROPHIC LATERAL SCLEROSIS) (H): ICD-10-CM

## 2023-01-01 PROCEDURE — 90662 IIV NO PRSV INCREASED AG IM: CPT

## 2023-01-01 PROCEDURE — 91320 SARSCV2 VAC 30MCG TRS-SUC IM: CPT

## 2023-01-01 PROCEDURE — G0008 ADMIN INFLUENZA VIRUS VAC: HCPCS | Mod: 59

## 2023-01-01 PROCEDURE — 90480 ADMN SARSCOV2 VAC 1/ONLY CMP: CPT

## 2023-01-01 RX ORDER — LOVASTATIN 20 MG
20 TABLET ORAL AT BEDTIME
Qty: 90 TABLET | Refills: 0 | Status: SHIPPED | OUTPATIENT
Start: 2023-01-01 | End: 2024-01-01

## 2023-01-01 RX ORDER — RILUZOLE 50 MG/1
TABLET, FILM COATED ORAL
Qty: 60 TABLET | Refills: 3 | Status: SHIPPED | OUTPATIENT
Start: 2023-01-01 | End: 2024-01-01

## 2023-01-29 NOTE — DISCHARGE INSTRUCTIONS
Lumbar Puncture Discharge Instructions     After you go home:      You may resume your normal diet    Continue to drink at least 8 ounces of fluid every 1-2 hours until bedtime tonight and continue to drink extra fluids for the next 2 days    Caffeinated beverages may help prevent or reduce spinal headaches    Care of Puncture Site:      If there is a bandaid - you may remove it tomorrow morning    You may shower tomorrow    No tub baths, whirlpools or swimming for 48 hours     Activity - to help prevent spinal headache or spinal fluid leakage:      Minimize your activity today. Flat bedrest for 24 hrs is strongly suggested as this will help to prevent a spinal headache.  You can be up to the bathroom and for meals.    Resume normal activities tomorrow.     Avoid strenuous activity for the next 2 days    Do not drive a vehicle until tomorrow morning    Medicines:      You may resume all medications    Resume your Warfarin/Coumadin at your regular dose today. Follow up with your provider to have your INR rechecked    Resume your Platelet Inhibitors and Aspirin tomorrow at your regular dose    For minor pain, you may take Acetaminophen (Tylenol) or Ibuprofen (Advil)            Call the provider who ordered this procedure if:      Your headache becomes worse or is severe. (A minor headache is not unusual)    You have nausea or vomiting    The site is red, swollen, hot or tender    You have chills or a fever greater than 101 F (38 C)    Any questions or concerns    If you have questions call:        [facility] Radiology Dept @ [phone]    The provider who performed your procedure was __Dr. Alvarez_______________.   No

## 2023-01-30 ENCOUNTER — TRANSFERRED RECORDS (OUTPATIENT)
Dept: HEALTH INFORMATION MANAGEMENT | Facility: CLINIC | Age: 83
End: 2023-01-30
Payer: MEDICARE

## 2023-01-30 LAB
ALT SERPL-CCNC: 20 IU/L (ref 5–35)
AST SERPL-CCNC: 34 U/L (ref 5–34)
CREATININE (EXTERNAL): 0.66 MG/DL (ref 0.5–1.3)

## 2023-03-07 DIAGNOSIS — G12.21 ALS (AMYOTROPHIC LATERAL SCLEROSIS) (H): Primary | ICD-10-CM

## 2023-03-09 ENCOUNTER — OFFICE VISIT (OUTPATIENT)
Dept: NEUROLOGY | Facility: CLINIC | Age: 83
End: 2023-03-09
Payer: MEDICARE

## 2023-03-09 ENCOUNTER — THERAPY VISIT (OUTPATIENT)
Dept: OCCUPATIONAL THERAPY | Facility: CLINIC | Age: 83
End: 2023-03-09
Payer: MEDICARE

## 2023-03-09 ENCOUNTER — THERAPY VISIT (OUTPATIENT)
Dept: PHYSICAL THERAPY | Facility: CLINIC | Age: 83
End: 2023-03-09
Payer: MEDICARE

## 2023-03-09 VITALS
BODY MASS INDEX: 23.43 KG/M2 | WEIGHT: 116 LBS | DIASTOLIC BLOOD PRESSURE: 77 MMHG | OXYGEN SATURATION: 96 % | SYSTOLIC BLOOD PRESSURE: 137 MMHG | HEART RATE: 68 BPM | RESPIRATION RATE: 16 BRPM

## 2023-03-09 DIAGNOSIS — G12.21 ALS (AMYOTROPHIC LATERAL SCLEROSIS) (H): ICD-10-CM

## 2023-03-09 DIAGNOSIS — G12.21 ALS (AMYOTROPHIC LATERAL SCLEROSIS) (H): Primary | ICD-10-CM

## 2023-03-09 DIAGNOSIS — Z78.9 IMPAIRED MOBILITY AND ADLS: ICD-10-CM

## 2023-03-09 DIAGNOSIS — Z74.09 IMPAIRED MOBILITY AND ADLS: ICD-10-CM

## 2023-03-09 DIAGNOSIS — R49.0 DYSPHONIA: Primary | ICD-10-CM

## 2023-03-09 PROCEDURE — 99214 OFFICE O/P EST MOD 30 MIN: CPT | Performed by: PSYCHIATRY & NEUROLOGY

## 2023-03-09 PROCEDURE — 97116 GAIT TRAINING THERAPY: CPT | Mod: GP | Performed by: PHYSICAL THERAPIST

## 2023-03-09 PROCEDURE — 97535 SELF CARE MNGMENT TRAINING: CPT | Mod: GO | Performed by: OCCUPATIONAL THERAPIST

## 2023-03-09 PROCEDURE — 97162 PT EVAL MOD COMPLEX 30 MIN: CPT | Mod: GP | Performed by: PHYSICAL THERAPIST

## 2023-03-09 PROCEDURE — 94375 RESPIRATORY FLOW VOLUME LOOP: CPT | Performed by: INTERNAL MEDICINE

## 2023-03-09 PROCEDURE — 97165 OT EVAL LOW COMPLEX 30 MIN: CPT | Mod: GO | Performed by: OCCUPATIONAL THERAPIST

## 2023-03-09 ASSESSMENT — REVISED AMYOTROPHIC LATERAL SCLEROSIS FUNCTIONAL RATING SCALE (ALSFRS-R)
CUTTING_FOOD_AND_HANDLING_UTENSILES: 2
WALKING: EARLY AMBULATION DIFFICULTIES
BULBAR_SUBTOTAL: 10
RESPIRATORY_SUBTOTAL_SCORE: 12
ORTHOPENA: 4
GROSS_MOTOR_SUBTOTAL_SCORE: 8
HANDWRITING: SLOW OR SLOPPY: ALL WORDS ARE LEGIBLE
DRESSING_AND_HYGEINE: 2
TURNING_IN_BED_AND_ADJUSTING_BED_CLOTHES: 3
SWALLOWING: NORMAL EATING HABITS
RESPIRATORY_INSUFFICIENCY: 4
SPEECH: 3
TURNING_IN_BED_AND_ADJUSTING_BED_CLOTHES: SOMEWHAT SLOW AND CLUMSY, BUT NO HELP NEEDED
HANDWRITING: 3
SWALLOWING: 4
SPEECH: DETECTABLE SPEECH DISTURBANCES
CLIMBING_STAIRS: MILD UNSTEADINESS OR FATIGUE
SALIVATION: SLIGHT BUT DEFINITE EXCESS OF SALIVA IN MOUTH; MAY HAVE NIGHTTIME DROOLING
SIX_ITEM_SUBTOTAL: 18
WALKING: 3
FINE_MOTOR_SUBTOTAL_SCORE: 7
DRESSING_AND_HYGEINE: INTERMITTENT ASSISTANCE OR SUBSTITUTE METHODS
ALSFRS_TOTAL_SCORE: 37
CLIMBING_STAIRS: 2
DYSPNEA: 4
SALIVATION: 3

## 2023-03-09 ASSESSMENT — PAIN SCALES - GENERAL: PAINLEVEL: NO PAIN (0)

## 2023-03-09 NOTE — DISCHARGE INSTRUCTIONS
Orthotics order for a right side ankle brace (AFO). Call and make an appointment at [facility]: [phone]  Recommend this due to your right foot slap with walking. This will turn into foot drop which is a safety concern  When you get the brace, wear it outside your home for safety  We will have [employer] send you a cane. Use this outside of your home. Start using it inside your home if you notice balance issues or increased fatigue  Continue to focus on energy conservation. Assess how you feel after an activity or exercise. You should feel back to baseline within 30 minutes. If you are not, then modify the activity  Message me if you have any questions!    Adrianna Low PT, DPT  Physical Therapist  [facility] - [address]  [address] [place] [address]  [email or web address]  Appointments: [phone]

## 2023-03-09 NOTE — LETTER
3/9/2023       RE: Britt Pichardo  [address]  [facility] [address]     Dear Colleague,    Thank you for referring your patient, Britt Pichardo, to the [facility] [place] at [facility]. Please see a copy of my visit note below.    Digna Krause MD (PCP) should receive a copy of this note    March 9, 2023    Dear Dr. Krause,    I had the pleasure to see Britt in follow-up at the [facility] today.  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  Symptoms began in May 2017 with painless left upper extremity distal weakness.  I last saw her in clinic in September 2022.  Since that time, her weakness has slowly progressed.  She is having more right and left hand weakness now.  The left arm is essentially useless.  She has noted more difficulty cutting her food with the right hand, and brushing her teeth.  She has zipper pullers.  She has special equipment for feeding provided by OT but she does not use it.  She is also noticing more hoarse voice, but no dysarthria.  I had noted this in the last visit and I did not feel it was ALS related.  She denies dysphagia and she has mild sialorrhea occurring on some days but not others.  No accumulation of thick secretions.  Denies dyspnea on exertion or orthopnea, or nonrefreshing sleep.  She has noted more leg weakness in the last 6 months she had 1 or 2 falls.  Her daughter visits her every other day and helps her at home.  She does have a walker that she uses when she goes for a walk around the [place] every week.  She has noted that her walking speed has clearly declined, and she is more unsteady to the point she has to use the walker consistently now.  Also reports some difficulty getting up from a chair.  Those symptoms are relatively new.  She also has rheumatoid arthritis and gets anti-TNF drugs every 8  weeks.    She is on riluzole 50 mg bid for ALS. ALT and AST were last checked 1/30/2023 and were normal. She is not on Radicava.     She reports occasional leg cramps that get relieved when she wakes up and walks for a few minutes around.  I offered her magnesium but she did not want to take any medication for the cramps.  Denies head drop    Pulmonary function test were done today results were satisfactory.  FVC was 81% predicted, without change from last time.  MIP was a little lower than prior at -29 centimeters H2O.    /77   Pulse 68   Resp 16   Wt 52.6 kg (116 lb)   SpO2 96%   BMI 23.43 kg/m      On a brief exam today, Britt is in good spirits.  She has neck flexion weakness of 4 out of 5.  Extension is normal.  She has very mild if any weakness of orbicularis oculi, but no clear weakness of orbicularis oris.  There is no tongue atrophy or fasciculations.  Lateral tongue movements are done fast.  Voice is a little hoarse but there is no dysarthria.  Jaw jerk is present.  Lower extremity examination shows 4 out of 5 strength for bilateral hip flexion, knee flexion, and foot dorsiflexion.  Knee extension is 4+ on the left and probably 5 on the right. The leg weakness is new compared to the last visit.     In summary Britt is experiencing progression of her limb onset ALS, with now involvement of the lower extremities as well.  She will see occupational and physical therapy in clinic today. We need to discuss home safety and the use of cane or walker. Pulmonary function tests are stable and do not require intervention or treatment with BiPAP.      I also discussed the new FDA approved medication for ALS, Relyvrio.  She is not sure she wants to take it,but she will think about it.  Side effects including diarrhea were explained.  If she decides to start this drug, she should have liver function test rechecked in 1 month.    I again listened to her talking, and she does not have spastic dysarthria.  I do  not believe her voice problem is ALS related and I would like an opinion from ENT.     Given her slow progression, she will return to clinic for follow-up in 6 months or sooner if needed.     Billing MDM level 4 (moderate) due to 1) Problems assessed: One chronic disorder with progression, exacerbation, or SE of treatment (ALS) and 2) Risk: prescription drug management.     Sincerely,      Mason Beckham MD, FAAN

## 2023-03-09 NOTE — PROGRESS NOTES
Digna Krause MD (PCP) should receive a copy of this note    March 9, 2023    Dear Dr. Krause,    I had the pleasure to see Britt in follow-up at the [facility] today.  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  Symptoms began in May 2017 with painless left upper extremity distal weakness.  I last saw her in clinic in September 2022.  Since that time, her weakness has slowly progressed.  She is having more right and left hand weakness now.  The left arm is essentially useless.  She has noted more difficulty cutting her food with the right hand, and brushing her teeth.  She has zipper pullers.  She has special equipment for feeding provided by OT but she does not use it.  She is also noticing more hoarse voice, but no dysarthria.  I had noted this in the last visit and I did not feel it was ALS related.  She denies dysphagia and she has mild sialorrhea occurring on some days but not others.  No accumulation of thick secretions.  Denies dyspnea on exertion or orthopnea, or nonrefreshing sleep.  She has noted more leg weakness in the last 6 months she had 1 or 2 falls.  Her daughter visits her every other day and helps her at home.  She does have a walker that she uses when she goes for a walk around the [facility] every week.  She has noted that her walking speed has clearly declined, and she is more unsteady to the point she has to use the walker consistently now.  Also reports some difficulty getting up from a chair.  Those symptoms are relatively new.  She also has rheumatoid arthritis and gets anti-TNF drugs every 8 weeks.    She is on riluzole 50 mg bid for ALS. ALT and AST were last checked 1/30/2023 and were normal. She is not on Radicava.     She reports occasional leg cramps that get relieved when she wakes up and walks for a few minutes around.  I offered her magnesium but she did not want to take any medication for the  cramps.  Denies head drop    Pulmonary function test were done today results were satisfactory.  FVC was 81% predicted, without change from last time.  MIP was a little lower than prior at -29 centimeters H2O.    /77   Pulse 68   Resp 16   Wt 52.6 kg (116 lb)   SpO2 96%   BMI 23.43 kg/m      On a brief exam today, Britt is in good spirits.  She has neck flexion weakness of 4 out of 5.  Extension is normal.  She has very mild if any weakness of orbicularis oculi, but no clear weakness of orbicularis oris.  There is no tongue atrophy or fasciculations.  Lateral tongue movements are done fast.  Voice is a little hoarse but there is no dysarthria.  Jaw jerk is present.  Lower extremity examination shows 4 out of 5 strength for bilateral hip flexion, knee flexion, and foot dorsiflexion.  Knee extension is 4+ on the left and probably 5 on the right. The leg weakness is new compared to the last visit.     In summary Britt is experiencing progression of her limb onset ALS, with now involvement of the lower extremities as well.  She will see occupational and physical therapy in clinic today. We need to discuss home safety and the use of cane or walker. Pulmonary function tests are stable and do not require intervention or treatment with BiPAP.      I also discussed the new FDA approved medication for ALS, Relyvrio.  She is not sure she wants to take it,but she will think about it.  Side effects including diarrhea were explained.  If she decides to start this drug, she should have liver function test rechecked in 1 month.    I again listened to her talking, and she does not have spastic dysarthria.  I do not believe her voice problem is ALS related and I would like an opinion from ENT.     Given her slow progression, she will return to clinic for follow-up in 6 months or sooner if needed.     Billing MDM level 4 (moderate) due to 1) Problems assessed: One chronic disorder with progression, exacerbation, or SE of  treatment (ALS) and 2) Risk: prescription drug management.     Sincerely,      Mason Beckham MD, FAAN

## 2023-03-09 NOTE — PROGRESS NOTES
"    OUTPATIENT PHYSICAL THERAPY CLINIC NOTE  Britt Pichardo  YOB: 1940  [id number]    Type of visit:         Evaluation     Date of service: 3/9/2023    Referring provider: Mason Beckham MD     present: No    Others present at visit:  Child(adriana)-daughter, Courtney    Medical diagnosis:   Amyotrophic lateral sclerosis (ALS)    Date of diagnosis: 11/29/2018    Pertinent history of current problem (include personal factors and/or comorbidities that impact the plan of care):  limb onset weakness; mostly distal UE weakness L > R beginning in 2017    Cardio-respiratory status:  Forced vital capacity: 81 % of predicted     Height/Weight: 4'11\" / 116lbs    Living environment:  House    Living environment barriers:  2 stairs to enter (0 railing present)  A flight of stairs to basement (1 railing present)     Current assistance/living environment:  Lives alone  Patient's daughter assist patient with outdoor house chores, fine motor tasks multiple times a week      Current mobility equipment:  4WW- rarely uses: too fast  WW- 's walker. Too tall per daughter  *Recommend: tracey SEC (also recommended tracey WW, but patient not interested)  *Recommend: R AFO      Current ADL equipment:  See OT note    Technology used: Phone    Patient concerns/goals: 2 falls- at [place] (tripped on grass/cement) and in bathroom, some muscle fatigue (mostly thighs) with walking.  Patient's daughter reports patient is walking less distance and overall    Evaluation   Interview completed.   Pain assessment:  Pain denied     Range of motion: Bilateral dorsiflexion limited near neutral     Manual muscle testing: Bilateral hip flexion 3+/5, B knee flexion/extension 5/5, L ankle dorsiflexion 4/5, R ankle DF 3-/5   Gait: Patient ambulates without a device modified independently-mild reduction in duy, reduced arm swing, R foot slap present throughout.    Cognition: Intact   5 times sit to stand: NT   10MWT: 0.95 " m/s without device   Romberg EO/EC/head turns: 30 seconds    Fall Risk Screen:   Has the patient fallen 2 or more times in the last year? Yes      Has the patient fallen and had an injury in the past year? No       Timed Up and Go Score: gait speed <1.0 m/s    Is the patient a fall risk? Yes     Impairments:  Fatigue  Muscle atrophy  Range of motion     Treatment diagnosis:  Impaired mobility  Impaired activities of daily living    Clinical Presentation: Evolving/Changing  Clinical Presentation Rationale: Personal factors, body systems involved, progressive nature of ALS  Clinical Decision Making (Complexity): Moderate complexity     Recommendations/Plan of care:  1 session evaluation & treatment.     Goals:   Target date: 3/9/2023  Patient, family and/or caregiver will verbalize understanding of evaluation results and implications for functional performance.  Patient, family and/or caregiver will verbalize/demonstrate understanding of compensatory methods /equipment to enhance functional independence and safety.  Patient, family and/or caregiver will verbalize/demonstrate understanding of home program.    Educational assessment/barriers to learning:   No barriers noted     Treatment provided this date:   Gait training-15 minutes  -Educated patient on energy conservation techniques including pacing of activities, frequent rest breaks, use of assistive device and monitoring signs of fatigue (increased muscle soreness, weakness, cramps, fasciculations) for safe functional mobility and performance of daily tasks  -Educated patient on exercise recommendations including focus on stretching and light cardiovascular conditioning as main modes. Educated on safe strengthening principles including endurance focus for muscle groups that have anti gravity strength. Educated patient to monitor signs for red flags after exercise (increased weakness, cramping, fasciculations) that last >30 minutes.  -Educated patient on fall risk  "reduction techniques including energy conservations, monitoring signs of fatigue, performance of single tasks with rest breaks in between activities and use of assisted devices.  -Educated patient on recommendation to use assistive device with all mobility outside of home for energy conservation and balance stability on uneven surfaces.  Initially recommended standard walker due to 4 wheeled walker being \"too fast\", but patient reports not able to imagine herself using walker at this time.  Recommended a second option to use straight cane for balance stability and mild energy conservation.  PT demonstrated appropriate two-point gait pattern with cane and patient returned demonstrates.  Patient verbalizes understanding and agreement  -Also recommended patient get fitted for right AFO due to foot slap present throughout gait which is a fall risk concern, especially on uneven surfaces or longer distances.  Patient verbalizes understanding    Response to treatment/recommendations: Patient verbalized understanding and agreement    Goal attainment:  All goals met     Risks and benefits of evaluation/treatment have been explained.  Patient, family and/or caregiver are in agreement with Plan of Care.     Timed Code Treatment Minutes: 15  Total Treatment Time (sum of timed and untimed services): 26    Signature: Digna Low PT   Date: 3/9/2023    Certification:  Onset date: 3/9/2023  Start of care date: 3/9/2023  Certification date from 3/9/2023 to 3/9/2023    I CERTIFY THE NEED FOR THESE SERVICES FURNISHED UNDER        THIS PLAN OF TREATMENT AND WHILE UNDER MY CARE     (Physician attestation of this document indicates review and certification of the therapy plan).            "

## 2023-03-09 NOTE — PATIENT INSTRUCTIONS
ENT referral for the voice  PT and OT will see you today  Consider trying the new ALS medication Relyvrio.   Follow up 6 months

## 2023-03-09 NOTE — PROGRESS NOTES
"[facility]    OUTPATIENT OCCUPATIONAL THERAPY CLINIC NOTE  Britt Pichardo  YOB: 1940  [id number]    Type of visit:  Evaluation            Date of service: March 9, 2023    Referring provider: Dr. Mason Beckham    Others present at visit:  Child(adriana), daughtersGerri    Medical diagnosis:   Amyotrophic lateral sclerosis (ALS), probable     Date of diagnosis: 11/29/18     Pertinent medical history:  Onset of distal UE weakness L > R, 2017    Additional Occupational Profile Information (patterns of daily living, interests, values and needs): Pt is an 82 y.o. woman who is a mother dx with limb onset ALS living alone in her own home who manages all of her own IADL with support of her daughters.    Cardio-respiratory status:   Forced vital capacity: 81 % of predicted     Height/Weight: 4' 11\" / 117#    Living environment:  House  Tub/shower on main-doesn't like to use  Walk-in shower in basement no bars, textured floor  Regular height toilets    Living environment barriers:  2 stairs to enter (0 railing present)    Steps to basement with railing laundry in basement and shower    Current assistance/living environment:  Lives alone    Dtr coming over 5 days per week to assist-mainly outdoors    Current mobility equipment:  Cane recommended by PT 3/10/22- not using anything right now but interested in standard walker/cane    Current ADL equipment:  Shower chair-not using   Button extender   Button hook   plier and needle nose plier to aid opening items   Zipper pull    Technology used: NT    Patient concerns/goals: Notes R hand is much weaker now    Evaluation    Interview completed   Pain assessment:  Pain denied    Range of motion:  R handed; UE AROM R WFL   Manual muscle testing: UE's: L no active movement 0/5 R 3/5 ; R  fairly weak with grasp, lateral and palmar pinch and L long finger flexors " of index and middle are weak  Shoulder at least 3/5    Cognition:  Appears WFL    ADL:   Feeding self:  Ind, cutting difficult, may just  food to bite off, not using rocker knife; at restaurant has family assist to cut food  Grooming: Ind, R-one handed, AE  UE Dressing:  Ind, some assist with jacket  LE Dressing:  Ind, button hook used, lays on bed to button pants, zipper pull  Showering/bathing: Stands to shower, stairs to basement shower  Toileting/transfer:  Denies problems    IADL:   Home management:  Does all, harder to reach higher cabinets to put dishware away, pt does own shopping, does own laundry and Using shoot for laundry to get items to basement and Ind cleaning  Driving:  Reports Ind, mows her lawn with help of dtr, hires for snow care, walking slower than past, pt gardens with assist of dtr  Occupation: retired  Emergency Call system:flip phone in pocket at all times      Fall Risk Screen:   Has the patient fallen 2 or more times in the last year? No      Has the patient fallen and had an injury in the past year? No       Timed Up and Go Score: defer to PT eval today    Is the patient a fall risk? No     Impairments:  Muscle atrophy  Coordination   Fatigue   ROM   balance    Treatment diagnosis:  Impaired mobility activities of daily living and IADL    Assessment of Occupational Performance: 1-3  Identified Performance Deficits (ie: feeding, social skills):  Home management, bathing, dressing  Clinical Decision Making (Complexity):Low complexity     Recommendations/Plan of care:  Patient would benefit from interventions to enhance safety and independence.  Rehab potential good for stated goals.  Occupational therapy intervention for  self care/home management   1 session evaluation & treatment.    Goals:   Target date: today  Patient, family and/or caregiver will verbalize/demonstrate understanding of compensatory methods /equipment to enhance functional independence and safety.    Educational  assessment/barriers to learning:  none      Treatment provided this date:   Self care/home management, 10 minutes of training in:  - Educated on possible grab bar/rail at top of stairs to aid in balance.  - Discussed safety with carrying basket upstairs for laundry.  - Educated and demo'd on Self ROM for L shoulder flexion while seated and in bed.     Response to treatment/recommendations: receptive     Goal attainment:  All goals met    Risks and benefits of evaluation/treatment have been explained.  Patient, family and/or caregiver are in agreement with Plan of Care.     Timed Code Treatment Minutes: 10  Total Treatment Time (sum of timed and untimed services): 20 min    Electronically signed by:  Niurka GRANDE/MARY ELLEN, ATP      Occupational Therapist, Assistive   [phone]      fax: [phone]      [email or web address]  Seating Clinic- [facility], [address]  [place] [address]    March 9, 2023    Certification:  Onset date:  3/9/23  Start of care date: 3/9/23  Certification date from  3/9/23 to  3/9/23    I CERTIFY THE NEED FOR THESE SERVICES FURNISHED UNDER        THIS PLAN OF TREATMENT AND WHILE UNDER MY CARE     (Physician attestation of this document indicates review and certification of the therapy plan).

## 2023-03-10 DIAGNOSIS — G12.21 ALS (AMYOTROPHIC LATERAL SCLEROSIS) (H): Primary | ICD-10-CM

## 2023-03-10 LAB
EXPTIME-PRE: 5.2 SEC
FEF2575-%PRED-PRE: 106 %
FEF2575-PRE: 1.3 L/SEC
FEF2575-PRED: 1.23 L/SEC
FEFMAX-%PRED-PRE: 92 %
FEFMAX-PRE: 3.37 L/SEC
FEFMAX-PRED: 3.65 L/SEC
FEV1-%PRED-PRE: 87 %
FEV1-PRE: 1.25 L
FEV1FEV6-PRE: 83 %
FEV1FEV6-PRED: 77 %
FEV1FVC-PRE: 83 %
FEV1FVC-PRED: 78 %
FIFMAX-PRE: 1.64 L/SEC
FVC-%PRED-PRE: 81 %
FVC-PRE: 1.51 L
FVC-PRED: 1.86 L
MEP-PRE: 26 CMH2O
MIP-PRE: -29 CMH2O

## 2023-03-14 ENCOUNTER — TELEPHONE (OUTPATIENT)
Dept: OTOLARYNGOLOGY | Facility: CLINIC | Age: 83
End: 2023-03-14
Payer: MEDICARE

## 2023-03-14 NOTE — TELEPHONE ENCOUNTER
[facility] Call Center    Phone Message    May a detailed message be left on voicemail: yes     Reason for Call: Other: Dr. Beckham wants the pt to see [facility] for Dysphonia. The answers on the voice decision tree indicate a community clinic. Can the pt be seen here at Voice/Throat? Please call daughter Courtney to discuss. Thanks.     Action Taken: Message routed to:  [facility]): ent    Travel Screening: Not Applicable

## 2023-03-22 NOTE — PATIENT INSTRUCTIONS
OT evaluation today  Follow up in 6 months  Continue the riluzole    I would kindly ask you to schedule an MRI/A to evaluate for TIA/stroke, and an echocardiogram. Please call [phone] to schedule.     Will do an EKG today.     Diminished/Wheezes

## 2023-04-25 DIAGNOSIS — G12.21 ALS (AMYOTROPHIC LATERAL SCLEROSIS) (H): ICD-10-CM

## 2023-04-25 RX ORDER — RILUZOLE 50 MG/1
TABLET, FILM COATED ORAL
Qty: 60 TABLET | Refills: 2 | Status: SHIPPED | OUTPATIENT
Start: 2023-04-25 | End: 2023-08-02

## 2023-05-02 ENCOUNTER — PATIENT OUTREACH (OUTPATIENT)
Dept: CARE COORDINATION | Facility: CLINIC | Age: 83
End: 2023-05-02
Payer: MEDICARE

## 2023-05-16 ENCOUNTER — PATIENT OUTREACH (OUTPATIENT)
Dept: CARE COORDINATION | Facility: CLINIC | Age: 83
End: 2023-05-16
Payer: MEDICARE

## 2023-05-18 DIAGNOSIS — E78.5 HYPERLIPIDEMIA LDL GOAL <130: ICD-10-CM

## 2023-05-18 RX ORDER — LOVASTATIN 20 MG
20 TABLET ORAL AT BEDTIME
Qty: 90 TABLET | Refills: 0 | Status: SHIPPED | OUTPATIENT
Start: 2023-05-18 | End: 2023-08-15

## 2023-05-18 NOTE — TELEPHONE ENCOUNTER
Prescription approved per [facility] Refill Protocol.  Adelaida Gloria, RN  [facility] Triage Nurse

## 2023-05-30 ENCOUNTER — TELEPHONE (OUTPATIENT)
Dept: INTERNAL MEDICINE | Facility: CLINIC | Age: 83
End: 2023-05-30
Payer: MEDICARE

## 2023-05-30 NOTE — TELEPHONE ENCOUNTER
Reason for Call:  Appointment Request    Patient requesting this type of appt:  Preventive     Requested provider: Digna Krause    Reason patient unable to be scheduled: Not within requested timeframe    When does patient want to be seen/preferred time: Within next 2 months    Comments: Patient will also add appointment to waitlist in [facility].    Could we send this information to you in [facility] [employer] [place] or would you prefer to receive a phone call?:   Patient would prefer a phone call   Okay to leave a detailed message?: Yes at Home number on file [phone] (home)    Call taken on 5/30/2023 at 4:23 PM by MITUL SCHMITZ

## 2023-06-12 ENCOUNTER — TRANSFERRED RECORDS (OUTPATIENT)
Dept: HEALTH INFORMATION MANAGEMENT | Facility: CLINIC | Age: 83
End: 2023-06-12
Payer: MEDICARE

## 2023-06-12 LAB
ALT SERPL-CCNC: 14 IU/L (ref 5–35)
AST SERPL-CCNC: 31 U/L (ref 5–34)
CREATININE (EXTERNAL): 0.62 MG/DL (ref 0.5–1.3)
GFR ESTIMATED (EXTERNAL): 97.5 ML/MIN/1.73M2

## 2023-06-13 NOTE — TELEPHONE ENCOUNTER
FUTURE VISIT INFORMATION:      FUTURE VISIT INFORMATION:    Date: 8/11/23    Time: 8 AM    [place] [facility]  REFERRAL INFORMATION:    Referring provider: Mason Beckham MD    Referring providers [place] [facility]    Reason for visit/diagnosis:  Per daughter/ref Dysphonia [R49.0] ALS (amyotrophic lateral sclerosis) (H) [G12.21], Mason Beckham MD in [facility]. Recs in [facility]. Location verified.    RECORDS REQUESTED FROM:       Clinic name Comments Records Status Imaging Status   [facility] 3/9/23 OV with Mason Beckham MD Epic    Procedure PFT 3/9/23 [facility] 9/23/21 OV with Haley Levy,  [facility]    Imaging MR brain 10/22/20 Epic Pacs

## 2023-07-05 ENCOUNTER — OFFICE VISIT (OUTPATIENT)
Dept: INTERNAL MEDICINE | Facility: CLINIC | Age: 83
End: 2023-07-05
Payer: MEDICARE

## 2023-07-05 VITALS
BODY MASS INDEX: 23.22 KG/M2 | HEIGHT: 59 IN | HEART RATE: 59 BPM | DIASTOLIC BLOOD PRESSURE: 70 MMHG | OXYGEN SATURATION: 96 % | SYSTOLIC BLOOD PRESSURE: 132 MMHG | WEIGHT: 115.2 LBS | RESPIRATION RATE: 16 BRPM

## 2023-07-05 DIAGNOSIS — M05.79 RHEUMATOID ARTHRITIS INVOLVING MULTIPLE SITES WITH POSITIVE RHEUMATOID FACTOR (H): ICD-10-CM

## 2023-07-05 DIAGNOSIS — Z00.00 MEDICARE ANNUAL WELLNESS VISIT, SUBSEQUENT: Primary | ICD-10-CM

## 2023-07-05 DIAGNOSIS — R73.01 IMPAIRED FASTING GLUCOSE: ICD-10-CM

## 2023-07-05 DIAGNOSIS — Z13.1 SCREENING FOR DIABETES MELLITUS: ICD-10-CM

## 2023-07-05 DIAGNOSIS — Z78.0 ASYMPTOMATIC MENOPAUSE: ICD-10-CM

## 2023-07-05 DIAGNOSIS — E55.9 VITAMIN D DEFICIENCY: ICD-10-CM

## 2023-07-05 DIAGNOSIS — E78.00 PURE HYPERCHOLESTEROLEMIA: ICD-10-CM

## 2023-07-05 LAB
ALBUMIN SERPL BCG-MCNC: 4.3 G/DL (ref 3.5–5.2)
ALP SERPL-CCNC: 70 U/L (ref 35–104)
ALT SERPL W P-5'-P-CCNC: 22 U/L (ref 0–50)
ANION GAP SERPL CALCULATED.3IONS-SCNC: 9 MMOL/L (ref 7–15)
AST SERPL W P-5'-P-CCNC: 39 U/L (ref 0–45)
BILIRUB SERPL-MCNC: 0.6 MG/DL
BUN SERPL-MCNC: 14.5 MG/DL (ref 8–23)
CALCIUM SERPL-MCNC: 9.7 MG/DL (ref 8.8–10.2)
CHLORIDE SERPL-SCNC: 100 MMOL/L (ref 98–107)
CHOLEST SERPL-MCNC: 144 MG/DL
CREAT SERPL-MCNC: 0.58 MG/DL (ref 0.51–0.95)
DEPRECATED CALCIDIOL+CALCIFEROL SERPL-MC: 34 UG/L (ref 20–75)
DEPRECATED HCO3 PLAS-SCNC: 26 MMOL/L (ref 22–29)
GFR SERPL CREATININE-BSD FRML MDRD: 89 ML/MIN/1.73M2
GLUCOSE SERPL-MCNC: 79 MG/DL (ref 70–99)
HBA1C MFR BLD: 4.9 % (ref 0–5.6)
HDLC SERPL-MCNC: 70 MG/DL
LDLC SERPL CALC-MCNC: 58 MG/DL
NONHDLC SERPL-MCNC: 74 MG/DL
POTASSIUM SERPL-SCNC: 4.8 MMOL/L (ref 3.4–5.3)
PROT SERPL-MCNC: 7.3 G/DL (ref 6.4–8.3)
SODIUM SERPL-SCNC: 135 MMOL/L (ref 136–145)
TRIGL SERPL-MCNC: 82 MG/DL

## 2023-07-05 PROCEDURE — 83036 HEMOGLOBIN GLYCOSYLATED A1C: CPT | Performed by: INTERNAL MEDICINE

## 2023-07-05 PROCEDURE — 82306 VITAMIN D 25 HYDROXY: CPT | Performed by: INTERNAL MEDICINE

## 2023-07-05 PROCEDURE — G0439 PPPS, SUBSEQ VISIT: HCPCS | Performed by: INTERNAL MEDICINE

## 2023-07-05 PROCEDURE — 36415 COLL VENOUS BLD VENIPUNCTURE: CPT | Performed by: INTERNAL MEDICINE

## 2023-07-05 PROCEDURE — 80061 LIPID PANEL: CPT | Performed by: INTERNAL MEDICINE

## 2023-07-05 PROCEDURE — 80053 COMPREHEN METABOLIC PANEL: CPT | Performed by: INTERNAL MEDICINE

## 2023-07-05 ASSESSMENT — ENCOUNTER SYMPTOMS
EYE PAIN: 0
CONSTIPATION: 0
FREQUENCY: 0
SORE THROAT: 0
NERVOUS/ANXIOUS: 0
DYSURIA: 0
NAUSEA: 0
SHORTNESS OF BREATH: 0
DIARRHEA: 0
WEAKNESS: 1
PALPITATIONS: 0
HEADACHES: 0
DIZZINESS: 0
FEVER: 0
ARTHRALGIAS: 0
JOINT SWELLING: 0
HEARTBURN: 0
HEMATURIA: 0
MYALGIAS: 0
COUGH: 0
ABDOMINAL PAIN: 0
BREAST MASS: 0
PARESTHESIAS: 0
HEMATOCHEZIA: 0
CHILLS: 0

## 2023-07-05 ASSESSMENT — ACTIVITIES OF DAILY LIVING (ADL)
CURRENT_FUNCTION: SHOPPING REQUIRES ASSISTANCE
CURRENT_FUNCTION: HOUSEWORK REQUIRES ASSISTANCE

## 2023-07-05 ASSESSMENT — PAIN SCALES - GENERAL: PAINLEVEL: NO PAIN (0)

## 2023-07-05 NOTE — PROGRESS NOTES
ASSESSMENT/PLAN                                                       (Z00.00) Medicare annual wellness visit, subsequent  (primary encounter diagnosis)  Comment: PMH, PSH, FH, SH, medications, allergies, immunizations, and preventative health measures reviewed and updated as appropriate.  Plan: see below for plans.      (E78.00) Pure hypercholesterolemia  (Z13.1) Screening for diabetes mellitus  (E55.9) Vitamin D deficiency  (R73.01) Impaired fasting glucose  Plan: non-fasting labs today.     (Z78.0) Asymptomatic menopause  Plan: DEXA ordered - patient to schedule.     (M05.79) Rheumatoid arthritis involving multiple sites with positive rheumatoid factor (H)  Comment: stable on current regimen; followed by rheumatology.     Appropriate preventive services were discussed with this patient, including applicable screening as appropriate for cardiovascular disease, diabetes, osteopenia/osteoporosis, and glaucoma.  As appropriate for age/gender, discussed screening for colorectal cancer, prostate cancer, breast cancer, and cervical cancer. Checklist reviewing preventive services available has been given to the patient.    Reviewed patients plan of care. The Basic Care Plan (routine screening as documented in Health Maintenance) for Britt Pichardo meets the Care Plan requirement. This Care Plan has been established and reviewed with the Patient and daughter.    Digna Krause MD   [address] [address]  T: [phone], F: [phone]    SUBJECTIVE                                                      Britt Pichardo is a very pleasant 83 year old female who presents for her subsequent AWV:    Accompanied by daughter.    Current providers (other than myself): [facility], [facility] (neurology)    PMH, PSH, FH, SH, medications, allergies, immunizations, preventative health, and health risk assessment reviewed and updated as appropriate.    Past  Medical History:   Diagnosis Date     ALS (amyotrophic lateral sclerosis) (H)      Pure hypercholesterolemia      RA (rheumatoid arthritis) (H)      Past Surgical History:   Procedure Laterality Date     LUMBAR FUSION  ,     Family History   Problem Relation Age of Onset     Heart Disease Mother         details unknown     Melanoma Sister      Diabetes No family hx of      Cerebrovascular Disease No family hx of      Coronary Artery Disease Early Onset No family hx of      Myocardial Infarction No family hx of      Breast Cancer No family hx of      Ovarian Cancer No family hx of      Colon Cancer No family hx of      Social History     Occupational History     Occupation: Retired - cook at [employer], [employer] with [employer]   Tobacco Use     Smoking status: Former     Years: 15.00     Types: Cigarettes     Quit date: 1967     Years since quittin.5     Smokeless tobacco: Never     Tobacco comments:     social smoking only   Vaping Use     Vaping Use: Never used   Substance and Sexual Activity     Alcohol use: Yes     Comment: couple drinks/week     Drug use: No     Sexual activity: Not Currently   Social History Narrative    .    Lives at home alone - daughter helps.    Two adult daughters.    Two grandchildren.    Walks and stays active during the day.      No Known Allergies     Current Outpatient Medications   Medication Sig     aspirin 81 MG tablet Take 1 tablet by mouth every 48 hours     calcium carbonate-vitamin D 600-200 MG-UNIT TABS Take 1 tablet by mouth every other day      Ferrous Gluconate 240 (27 FE) MG TABS Take 1 tablet by mouth every other day      folic acid (FOLVITE) 1 MG tablet Take 1 mg by mouth daily Except for the day she takes methotrexate sodium     InFLIXimab (REMICADE IV) Inject 200 mg into the vein Patient takes this every 8 weeks     lovastatin (MEVACOR) 20 MG tablet Take 1 tablet (20 mg) by mouth At Bedtime     methotrexate 2.5 MG tablet Take 3 tablets by mouth once  a week     oxybutynin ER (DITROPAN XL) 5 MG 24 hr tablet Take 1 tablet (5 mg) by mouth once daily     riluzole (RILUTEK) 50 MG tablet TAKE ONE TABLET BY MOUTH EVERY 12 HOURS     Immunization History   Administered Date(s) Administered     COVID-19 Bivalent 12+ (Pfizer) 11/08/2022     COVID-19 MONOVALENT 12+ (Pfizer) 02/09/2021, 03/02/2021, 09/01/2021     COVID-19 Monovalent 12+ (Pfizer 2022) 05/20/2022     Flu, Unspecified 10/10/2013, 10/14/2015     HepB, Unspecified 10/06/1999, 11/10/1999, 04/19/2000     Influenza (High Dose) 3 valent vaccine 10/12/2017, 11/09/2018, 10/22/2019, 09/28/2020     Influenza (IIV3) PF 10/10/2013, 11/01/2014     Influenza Vaccine 65+ (Fluzone HD) 11/04/2021, 10/12/2022     Influenza Vaccine >6 months (Alfuria,Fluzone) 11/09/2016     Pneumo Conj 13-V (2010&after) 12/15/2014     Pneumococcal 23 valent 03/05/2007     TD,PF 7+ (Tenivac) 03/05/2007, 01/09/2018     PREVENTATIVE HEALTH                                                      BMI: within normal limits   Blood pressure: within normal limits   Breast CA screening: screening no longer indicated  Colon CA screening: screening no longer indicated  Lung CA screening: patient does not meet screening criteria  Dexa: DUE  Screening cholesterol: n/a - already being treated for this condition  Screening diabetes: DUE  Alcohol misuse screening: alcohol use reviewed - no intervention indicated at this time  Immunizations: reviewed; Shingrix series DUE - not covered by Medicare (may obtain in pharmacy if desired)     HEALTH RISK ASSESSMENT                                                      In general, how would you rate your overall physical health? fair  Outside of work, how many days during the week do you exercise? None  Outside of work, approximately how many minutes a day do you exercise? n/a     If you drink alcohol do you typically have >3 drinks per day or >7 drinks per week? No  Do you usually eat at least 4 servings of fruit and  "vegetables a day, include whole grains & fiber and avoid regularly eating high fat or \"junk\" foods? No     Do you have any problems taking medications regularly? No  Do you have any side effects from medications? No    Assistance with daily activities: YES - shopping requires assistance, housework requires assistance    Safety concerns: No    Fall risk assessment: completed today (see ambulatory assessments)    Hearing concerns: No    In the past 6 months, have you been bothered by leaking of urine: Yes    In general, how would you rate your overall mental or emotional health: fair    PHQ-2/PHQ-9 assessment: completed today (see ambulatory assessments)    Additional concerns today: No    OBJECTIVE                                                      /70 (BP Location: Left arm, Patient Position: Sitting, Cuff Size: Adult Regular)   Pulse 59   Resp 16   Ht 1.499 m (4' 11\")   Wt 52.3 kg (115 lb 3.2 oz)   SpO2 96%   BMI 23.27 kg/m    Constitutional: well-appearing  Head, Ears, and Eyes: normocephalic; normal external auditory canal and pinna; tympanic membranes visualized and normal; normal lids and conjunctivae  Neck: supple, symmetric, no thyromegaly or lymphadenopathy  Respiratory: normal respiratory effort; clear to auscultation bilaterally  Cardiovascular: regular rate and rhythm; no edema  Gastrointestinal: soft, non-tender, and non-distended; no organomegaly or masses  Musculoskeletal: significant left hand weakness and loss of function  Psych: normal judgment and insight; normal mood and affect; recent and remote memory intact    Cognitive impairment noted: No  ---  (Note was completed, in part, with statusboom voice-recognition software. Documentation was reviewed, but some grammatical, spelling, and word errors may remain.)    "

## 2023-07-17 ENCOUNTER — ANCILLARY ORDERS (OUTPATIENT)
Dept: INTERNAL MEDICINE | Facility: CLINIC | Age: 83
End: 2023-07-17

## 2023-07-17 ENCOUNTER — ANCILLARY PROCEDURE (OUTPATIENT)
Dept: BONE DENSITY | Facility: CLINIC | Age: 83
End: 2023-07-17
Attending: INTERNAL MEDICINE
Payer: MEDICARE

## 2023-07-17 DIAGNOSIS — Z78.0 MENOPAUSE: ICD-10-CM

## 2023-07-17 DIAGNOSIS — Z78.0 ASYMPTOMATIC MENOPAUSE: ICD-10-CM

## 2023-07-17 PROCEDURE — 77081 DXA BONE DENSITY APPENDICULR: CPT | Mod: 59 | Performed by: INTERNAL MEDICINE

## 2023-07-17 PROCEDURE — 77080 DXA BONE DENSITY AXIAL: CPT | Performed by: INTERNAL MEDICINE

## 2023-07-18 PROBLEM — M81.0 OSTEOPOROSIS: Status: ACTIVE | Noted: 2023-07-18

## 2023-07-20 ENCOUNTER — TELEPHONE (OUTPATIENT)
Dept: INTERNAL MEDICINE | Facility: CLINIC | Age: 83
End: 2023-07-20
Payer: MEDICARE

## 2023-07-20 NOTE — TELEPHONE ENCOUNTER
"Triage RN attempted to contact patient to relay message from the provider below...    \"Your bone scan results demonstrate osteoporosis, which is significant bone thinning. This means you are at increased risk for fracture from mild trauma like a fall.       Based on your results, I would recommend the following:     - START a bone building medication (like Fosamax, Actonel, or Boniva)     - make sure you are getting enough calcium and vitamin D in your diet (best!) or with supplements (1200mg of calcium and 800 i.u. of vitamin D daily are recommended)     - perform weight lifting exercises daily - this will stimulate bone growth     - exercise regularly - goal is 30 minutes a day, 5 days a week of cardiovascularly vigorous exercise (running, biking, swimming)     - follow-up bone scan in 2 years     Please feel free to contact me with any questions or concerns.      And please let me know if you would be okay with me prescribing one of the bone building medications above.\"    Patient Contact    Attempt # 1    Was call answered?  No.  Left message on voicemail with information to call me back.    Upon call back: please relay message from the provider.     Adelaida Gloria RN    "

## 2023-07-21 NOTE — TELEPHONE ENCOUNTER
Spoke to patient to discuss result note and recommendations.       Patient says she was previously taking OTC Calcium daily and was advised to take every other day instead (pt unable to recall who told her this). She has not taken Calcium supplement in a while and says she will start taking the recommended Calcium and Vit D supplements daily.     Patient does not want to start a bone building medication right now but does agree to start taking daily walks (with walker) as tolerated d/t her ALS. Pt is not able to swim or do other forms of exercise. She will call clinic back if she changes her mind.       Routing to Team   - Patient requesting a lab letter to be mailed to the home.

## 2023-08-01 DIAGNOSIS — G12.21 ALS (AMYOTROPHIC LATERAL SCLEROSIS) (H): ICD-10-CM

## 2023-08-02 RX ORDER — RILUZOLE 50 MG/1
TABLET, FILM COATED ORAL
Qty: 60 TABLET | Refills: 0 | Status: SHIPPED | OUTPATIENT
Start: 2023-08-02 | End: 2023-08-30

## 2023-08-03 NOTE — TELEPHONE ENCOUNTER
Mevacor     Last Written Prescription Date: 2-8-16  Last Fill Quantity: 90, # refills: 3  [place] [facility] [facility] or [facility] prescribing provider: 5-1-17       Lab Results   Component Value Date    CHOL 162 12/28/2016     Lab Results   Component Value Date    HDL 74 12/28/2016     Lab Results   Component Value Date    LDL 77 12/28/2016     Lab Results   Component Value Date    TRIG 57 12/28/2016     Lab Results   Component Value Date    CHOLHDLRATIO 2.1 12/15/2014     Ditropan      Last Written Prescription Date: 2-8-169  Last Fill Quantity: 90,  # refills: 3   Last [place] [facility] [facility] or [facility] prescribing provider: 5-1-17                                             Prescriptions approved per [facility] Refill Protocol.     Clindamycin Counseling: I counseled the patient regarding use of clindamycin as an antibiotic for prophylactic and/or therapeutic purposes. Clindamycin is active against numerous classes of bacteria, including skin bacteria. Side effects may include nausea, diarrhea, gastrointestinal upset, rash, hives, yeast infections, and in rare cases, colitis.

## 2023-08-11 ENCOUNTER — PRE VISIT (OUTPATIENT)
Dept: OTOLARYNGOLOGY | Facility: CLINIC | Age: 83
End: 2023-08-11

## 2023-08-11 ENCOUNTER — OFFICE VISIT (OUTPATIENT)
Dept: OTOLARYNGOLOGY | Facility: CLINIC | Age: 83
End: 2023-08-11
Attending: PSYCHIATRY & NEUROLOGY
Payer: MEDICARE

## 2023-08-11 ENCOUNTER — THERAPY VISIT (OUTPATIENT)
Dept: SPEECH THERAPY | Facility: CLINIC | Age: 83
End: 2023-08-11
Payer: MEDICARE

## 2023-08-11 VITALS
DIASTOLIC BLOOD PRESSURE: 80 MMHG | SYSTOLIC BLOOD PRESSURE: 148 MMHG | HEIGHT: 59 IN | BODY MASS INDEX: 22.78 KG/M2 | WEIGHT: 113 LBS | HEART RATE: 63 BPM

## 2023-08-11 DIAGNOSIS — R49.0 DYSPHONIA: ICD-10-CM

## 2023-08-11 DIAGNOSIS — J38.3 VOCAL CORD BOWING: Primary | ICD-10-CM

## 2023-08-11 DIAGNOSIS — G12.21 ALS (AMYOTROPHIC LATERAL SCLEROSIS) (H): Primary | ICD-10-CM

## 2023-08-11 DIAGNOSIS — R49.0 DYSPHONIA: Primary | ICD-10-CM

## 2023-08-11 DIAGNOSIS — G12.21 ALS (AMYOTROPHIC LATERAL SCLEROSIS) (H): ICD-10-CM

## 2023-08-11 PROCEDURE — 92524 BEHAVRAL QUALIT ANALYS VOICE: CPT | Mod: GN | Performed by: SPEECH-LANGUAGE PATHOLOGIST

## 2023-08-11 PROCEDURE — 31579 LARYNGOSCOPY TELESCOPIC: CPT | Performed by: OTOLARYNGOLOGY

## 2023-08-11 PROCEDURE — 99204 OFFICE O/P NEW MOD 45 MIN: CPT | Mod: 25 | Performed by: OTOLARYNGOLOGY

## 2023-08-11 PROCEDURE — 92610 EVALUATE SWALLOWING FUNCTION: CPT | Mod: GN | Performed by: SPEECH-LANGUAGE PATHOLOGIST

## 2023-08-11 ASSESSMENT — PAIN SCALES - GENERAL: PAINLEVEL: NO PAIN (0)

## 2023-08-11 NOTE — PATIENT INSTRUCTIONS
"Suleiman De La Torre,    It was a pleasure to meet you and Harper today.      You can go on [employer] and search for \"teacher voice amplifiers\". Read through the reviews and design options to see what you might like- I usually recommend a wireless option with either over-the-ear headset or lapel natan. [employer], [employer] or [employer] are two fairly popular brands to get you started. The nice thing is that you can speak quietly into the natan and simply turn up the volume on the speaker.       Voice Banking:   I will send you a second message with the location for this service, as our previous contact has apparently moved to a new position.   Remember this is important to do while your voice is still relatively clear and loud. It takes a while to record all the speech sounds that you would need for an AAC device.        Ashley Toro. (voice), M.S., CCC-SLP  Speech-Language Pathologist  [facility]  [phone]  [email or web address]  Pronouns: she/her/hers    "

## 2023-08-11 NOTE — PROGRESS NOTES
Outpatient Speech Language Therapy Evaluation - MEDICARE CERTIFICATION    PLAN OF TREATMENT FOR OUTPATIENT REHABILITATION  (COMPLETE FOR INITIAL CLAIMS ONLY)  Patient's Last Name, First Name, M.I.  YOB: 1940  Britt Pichardo                        Provider's Name  Kristi Pena, LATONIA   Medical Record No.  [id number]                              Onset Date:  8/11/2023   Start of Care Date: 8/11/2023     Type: Speech Language Therapy Medical Diagnosis: Dysphonia (R49.0)                        Therapy Diagnosis: Dysphonia (R49.0)   Visits from SOC:  1/10   _________________________________________________________________________________  Plan of Treatment:   EVAL ONLY   Speech therapy    A course of speech therapy is not currently recommended  Information regarding personal voice amplifiers was discussed today  Medical intervention by Dr. Valles, should Britt wish to pursue  Consideration of voice banking   She demonstrates a Guarded prognosis for improvement given adherence to therapeutic recommendations.   Positive indicators: positive response to therapy probes  Negative indicators: ALS prevents any intensive therapy techniques due to muscle fatigue factors    _________________________________________________________________________________    I CERTIFY THE NEED FOR THESE SERVICES FURNISHED UNDER THIS PLAN OF TREATMENT AND WHILE UNDER MY CARE       (Physician attestation of this document indicates review and certification of the therapy plan).     Certification date from: 8/11/2023  Certification date to: 11/9/23    Referring Provider: Dr. [facility]  CLINICAL EVALUATION REPORT    Patient: Britt Pichardo  Date of Service: 8/11/2023  Clinician: Kristi Pena, BMus, MS, CCC-SLP  Seen in conjunction with: Dr. Cally Valles  Referring physician: Dr. Beckham  Visit Count: 1    HISTORY  PATIENT INFORMATION  Britt  "Marino is a 83 year old female presenting today for evaluation of dysphonia. Salient details of her symptom history are as follows:  Chief complaint: Her voice has been getting progressively softer and harder to hear, though she doesn't necessarily get asked to repeat herself. Today is a \"good day\" and she's louder than she usually is.   Onset: 4-6 months   Course: Worsening    CURRENT SYMPTOMS INCLUDE:  VOICE: She does not sing. She typically just talks to herself and family. She denies pain or fatigue with voice use. She notices that her voice is hard to get out if she hasn't talked yet during the day. She states that she tries to talk to herself since she lives alone and just wants to hear some sounds.  COUGH/THROAT CLEAR: Denies coughing, but does clear her throat a little more than average. She sometimes feels like she gets something in her throat that will make her sneeze. She clears her throat, but isn't sure what the trigger is.   SWALLOWING: Denies  BREATHING: Denies    OTHER PERTINENT HISTORY  Medications: none related to larynx  Post-Nasal Drip/Congestion: Denies  Please also refer to Dr. Cally Valles's dictation.     Past Medical History:   Diagnosis Date    ALS (amyotrophic lateral sclerosis) (H)     Osteoporosis     Pure hypercholesterolemia     RA (rheumatoid arthritis) (H)      Past Surgical History:   Procedure Laterality Date    LUMBAR FUSION  2002,2004       OBJECTIVE FINDINGS  Patient Supplied Answers To VHI Questionnaire      8/7/2023     7:48 PM   Voice Handicap Index (VHI-10)   My voice makes it difficult for people to hear me 2   People have difficulty understanding me in a noisy room 2   My voice difficulties restrict my personal and social life.  0   I feel left out of conversations because of my voice 0   My voice problem causes me to lose income 0   I feel as though I have to strain to produce voice 2   The clarity of my voice is unpredictable 0   My voice problem upsets me 0   My voice " "makes me feel handicapped 0   People ask, \"What's wrong with your voice?\" 0   VHI-10 6        Patient Supplied Answers To CSI Questionnaire      8/7/2023     7:49 PM   Cough Severity Index (CSI)   My cough is worse when I lie down 0   My coughing problem causes me to restrict my personal and social life 0   I tend to avoid places because of my cough problem 0   I feel embarrassed because of my coughing problem 0   People ask, ''What's wrong?'' because I cough a lot 0   I run out of air when I cough 0   My coughing problem affects my voice 0   My coughing problem limits my physical activity 0   My coughing problem upsets me 0   People ask me if I am sick because I cough a lot 0   CSI Score 0        Patient Supplied Answers To EAT Questionnaire      8/7/2023     7:48 PM   Eating Assessment Tool (EAT-10)   My swallowing problem has caused me to lose weight 0   My swallowing problem interferes with my ability to go out for meals 0   Swallowing liquids takes extra effort 0   Swallowing solids takes extra effort 0   Swallowing pills takes extra effort 0   Swallowing is painful 0   The pleasure of eating is affected by my swallowing 0   When I swallow food sticks in my throat 0   I cough when I eat 0   Swallowing is stressful 0   EAT-10 0        Patient Supplied Answers to Dyspnea Index Questionnaire:       No data to display             Speech follow up as discussed with patient:       No data to display                PERCEPTUAL EVALUATION (81274)  VOICE/ SPEECH/ NON-COMMUNICATIVE LARYNGEAL BEHAVIORS EVALUATION  Palpation of the laryngeal area shows:  tenderness of the thyrohyoid area  additional closure of the thyrohyoid space on phonation  R>L   Soreness of left SCM belly  Breathing pattern:  shoulder and neck involvement, overall shallow breath pattern, and insufficient breath stream for duration of phonation  Tension:  is not overtly evident  Cough/ Throat clear:  throat clear is primary, observed occasionally during " "today's session, and variably wet and dry   Britt states today is a typical voice day, with clinician observing voice quality characterized by:  Roughness: Mild  Breathiness: Mild to moderate  Strain: Mild to moderate  Habitual pitch is 190 and is lower than normal limits   Pitch glide reveals range of 183 to 485  Loudness is moderately reduced for the setting  Maximum Phonation Time: 3 seconds  GLOBAL ASSESSMENT OF DYSPHONIA: 45/100  The GRBAS is a perceptual rating of voice change. 0 indicated no impairment, 3 indicates a severe impairment. \"C\" and \"I\" may be used to signify if these feature was observed consistently or intermittently respectively. This is a rating based on clinical judgement of disordered voice quality.  G ( 2 ) General Dysphonia     R ( 1 ) Roughness     B ( 2 ) Breathiness     A ( 2 ) Asthenia     S ( 1 ) Strain  In a vocal diadochokinesis task, rate and rhythm are slightly discoordinated; glottic coup is improved on the first onset, rougher on the second  Whisper is normal; soft phonation is consistent with speaking voice; a yell is mildly reduced, but clearer      LARYNGEAL EXAMINATION   Procedure: Flexible endoscopy with chip-tip technology with stroboscopy, right nostril; topical anesthesia with 3% Lidocaine and 0.25% phenylephrine was applied.   Performed by: Dr. Cally Valles  Verbal consent was obtained and witnessed prior to this procedure.   A time-out was performed, verifying patient, procedure, and site.     This exam shows:  Velar Function: WNL  Secretions:  Minimal  Laryngeal Mucosa: Essentially healthy laryngeal and pharyngeal mucosa  Vocal fold mucosa:    RTVF -  smooth and white, but slightly bowed edges  LTVF -  smooth and white, but slightly bowed edges  Vocal fold function:   Vocal folds are bilaterally mobile and meet at midline, movement is brisk and symmetric, and exam is neurologically normal.  Narrow Band Imaging (NBI) demonstrated: Findings consistent with halogen " light  Airway is patent as visualized below the glottis  Elongation of the vocal folds for pitch increase is adequate  mild four-way constrictive supraglottic hyperfunction during connected speech    The addition of stroboscopy provided the following information:  Vibratory Behavior: Present bilaterally  Periodicity: Inconsistent periodicity with frequent aperiodicity  Symmetry:  intermittent symmetry  Amplitude Right: mildly reduced  Amplitude Left: mildly reduced  Mucosal Wave Right: mildly reduced  Mucosal Wave Left: mildly reduced  Glottic closure:  on phonation glottic closure is complete but inconsistent  Closure Pattern: complete  Closure Plane: at glottic level  Phase Distribution: open-phase predominant     STIMULABILITY: results of therapy probes during perceptual and laryngeal evaluation demonstrate limited improvement with therapy probes  ASSESSMENT / PLAN  IMPRESSIONS: Britt Pichardo is presenting today with Dysphonia (R49.0) in the context of 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 Laryngoscopy revealed overall healthy anatomy and physiology with the exception of slight bilateral bowing, mild 4-way supraglottic hyperfunction, and poor coordination of respiration with phonation. She perceptually demonstrated mild-moderately rough, breathy, and strained voice quality, poor respiratory mechanics including very shallow inhalation with vertical breathing pattern. Speech therapy for voice is not currently recommended with our department, with preference for medical intervention followed by re-evaluation post-injection. However, information regarding voice banking and amplification devices was provided today and may be appropriate.   A course of speech therapy is not currently recommended  Information regarding personal voice amplifiers was discussed today  Medical intervention by Dr. Valles, should Britt wish to pursue  Consideration of voice banking   She demonstrates a Guarded prognosis for improvement given adherence to  therapeutic recommendations.   Positive indicators: positive response to therapy probes  Negative indicators: ALS prevents any intensive therapy techniques due to muscle fatigue factors  Research: n/a    TOTAL SERVICE TIME: 60 minutes  EVALUATION OF VOICE AND RESONANCE ([id number])    Oz Toro (voice), M.S., CCC-SLP  Speech-Language Pathologist  [facility]  [phone]  [email or web address]  Pronouns: she/her/hers      *this report was created in part through the use of computerized dictation software, and though reviewed following completion, some typographic errors may persist.  If there is confusion regarding any of this notes contents, please contact me for clarification

## 2023-08-11 NOTE — LETTER
8/11/2023       RE: Britt Pichardo  [address]  [facility] [address]     Dear Colleague,    Thank you for referring your patient, Britt Pichardo, to the [facility] [place] at [facility]. Please see a copy of my visit note below.                                                                                     Outpatient Speech Language Therapy Evaluation - MEDICARE CERTIFICATION    PLAN OF TREATMENT FOR OUTPATIENT REHABILITATION  (COMPLETE FOR INITIAL CLAIMS ONLY)  Patient's Last Name, First Name, M.I.  YOB: 1940  Britt Pichardo                        Provider's Name  Kristi Pena, SLP   Medical Record No.  [id number]                              Onset Date:  8/11/2023   Start of Care Date: 8/11/2023     Type: Speech Language Therapy Medical Diagnosis: Dysphonia (R49.0)                        Therapy Diagnosis: Dysphonia (R49.0)   Visits from SOC:  1/10   _________________________________________________________________________________  Plan of Treatment:   EVAL ONLY   Speech therapy    A course of speech therapy is not currently recommended  Information regarding personal voice amplifiers was discussed today  Medical intervention by Dr. Valles, should Britt wish to pursue  Consideration of voice banking   She demonstrates a Guarded prognosis for improvement given adherence to therapeutic recommendations.   Positive indicators: positive response to therapy probes  Negative indicators: ALS prevents any intensive therapy techniques due to muscle fatigue factors    _________________________________________________________________________________    I CERTIFY THE NEED FOR THESE SERVICES FURNISHED UNDER THIS PLAN OF TREATMENT AND WHILE UNDER MY CARE       (Physician attestation of this document indicates review and certification of the therapy plan).     Certification date from: 8/11/2023  Certification date to:  "11/9/23    Referring Provider: Dr. B[facility]  CLINICAL EVALUATION REPORT    Patient: Britt Pichardo  Date of Service: 8/11/2023  Clinician: Angelo Toro, MS, CCC-SLP  Seen in conjunction with: Dr. Cally Valles  Referring physician: Dr. Beckham  Visit Count: 1    HISTORY  PATIENT INFORMATION  Britt Pichardo is a 83 year old female presenting today for evaluation of dysphonia. Salient details of her symptom history are as follows:  Chief complaint: Her voice has been getting progressively softer and harder to hear, though she doesn't necessarily get asked to repeat herself. Today is a \"good day\" and she's louder than she usually is.   Onset: 4-6 months   Course: Worsening    CURRENT SYMPTOMS INCLUDE:  VOICE: She does not sing. She typically just talks to herself and family. She denies pain or fatigue with voice use. She notices that her voice is hard to get out if she hasn't talked yet during the day. She states that she tries to talk to herself since she lives alone and just wants to hear some sounds.  COUGH/THROAT CLEAR: Denies coughing, but does clear her throat a little more than average. She sometimes feels like she gets something in her throat that will make her sneeze. She clears her throat, but isn't sure what the trigger is.   SWALLOWING: Denies  BREATHING: Denies    OTHER PERTINENT HISTORY  Medications: none related to larynx  Post-Nasal Drip/Congestion: Denies  Please also refer to Dr. Cally Valles's dictation.     Past Medical History:   Diagnosis Date    ALS (amyotrophic lateral sclerosis) (H)     Osteoporosis     Pure hypercholesterolemia     RA (rheumatoid arthritis) (H)      Past Surgical History:   Procedure Laterality Date    LUMBAR FUSION  2002,2004       OBJECTIVE FINDINGS  Patient Supplied Answers To VHI Questionnaire      8/7/2023     7:48 PM   Voice Handicap Index (VHI-10)   My voice makes it difficult for people to hear me 2   People have difficulty understanding me " "in a noisy room 2   My voice difficulties restrict my personal and social life.  0   I feel left out of conversations because of my voice 0   My voice problem causes me to lose income 0   I feel as though I have to strain to produce voice 2   The clarity of my voice is unpredictable 0   My voice problem upsets me 0   My voice makes me feel handicapped 0   People ask, \"What's wrong with your voice?\" 0   VHI-10 6        Patient Supplied Answers To CSI Questionnaire      8/7/2023     7:49 PM   Cough Severity Index (CSI)   My cough is worse when I lie down 0   My coughing problem causes me to restrict my personal and social life 0   I tend to avoid places because of my cough problem 0   I feel embarrassed because of my coughing problem 0   People ask, ''What's wrong?'' because I cough a lot 0   I run out of air when I cough 0   My coughing problem affects my voice 0   My coughing problem limits my physical activity 0   My coughing problem upsets me 0   People ask me if I am sick because I cough a lot 0   CSI Score 0        Patient Supplied Answers To EAT Questionnaire      8/7/2023     7:48 PM   Eating Assessment Tool (EAT-10)   My swallowing problem has caused me to lose weight 0   My swallowing problem interferes with my ability to go out for meals 0   Swallowing liquids takes extra effort 0   Swallowing solids takes extra effort 0   Swallowing pills takes extra effort 0   Swallowing is painful 0   The pleasure of eating is affected by my swallowing 0   When I swallow food sticks in my throat 0   I cough when I eat 0   Swallowing is stressful 0   EAT-10 0        Patient Supplied Answers to Dyspnea Index Questionnaire:       No data to display             Speech follow up as discussed with patient:       No data to display                PERCEPTUAL EVALUATION (28143)  VOICE/ SPEECH/ NON-COMMUNICATIVE LARYNGEAL BEHAVIORS EVALUATION  Palpation of the laryngeal area shows:  tenderness of the thyrohyoid area  additional " "closure of the thyrohyoid space on phonation  R>L   Soreness of left SCM belly  Breathing pattern:  shoulder and neck involvement, overall shallow breath pattern, and insufficient breath stream for duration of phonation  Tension:  is not overtly evident  Cough/ Throat clear:  throat clear is primary, observed occasionally during today's session, and variably wet and dry   Britt states today is a typical voice day, with clinician observing voice quality characterized by:  Roughness: Mild  Breathiness: Mild to moderate  Strain: Mild to moderate  Habitual pitch is 190 and is lower than normal limits   Pitch glide reveals range of 183 to 485  Loudness is moderately reduced for the setting  Maximum Phonation Time: 3 seconds  GLOBAL ASSESSMENT OF DYSPHONIA: 45/100  The GRBAS is a perceptual rating of voice change. 0 indicated no impairment, 3 indicates a severe impairment. \"C\" and \"I\" may be used to signify if these feature was observed consistently or intermittently respectively. This is a rating based on clinical judgement of disordered voice quality.  G ( 2 ) General Dysphonia     R ( 1 ) Roughness     B ( 2 ) Breathiness     A ( 2 ) Asthenia     S ( 1 ) Strain  In a vocal diadochokinesis task, rate and rhythm are slightly discoordinated; glottic coup is improved on the first onset, rougher on the second  Whisper is normal; soft phonation is consistent with speaking voice; a yell is mildly reduced, but clearer      LARYNGEAL EXAMINATION   Procedure: Flexible endoscopy with chip-tip technology with stroboscopy, right nostril; topical anesthesia with 3% Lidocaine and 0.25% phenylephrine was applied.   Performed by: Dr. Cally Valles  Verbal consent was obtained and witnessed prior to this procedure.   A time-out was performed, verifying patient, procedure, and site.     This exam shows:  Velar Function: WNL  Secretions:  Minimal  Laryngeal Mucosa: Essentially healthy laryngeal and pharyngeal mucosa  Vocal fold mucosa:  "   RTVF -  smooth and white, but slightly bowed edges  LTVF -  smooth and white, but slightly bowed edges  Vocal fold function:   Vocal folds are bilaterally mobile and meet at midline, movement is brisk and symmetric, and exam is neurologically normal.  Narrow Band Imaging (NBI) demonstrated: Findings consistent with halogen light  Airway is patent as visualized below the glottis  Elongation of the vocal folds for pitch increase is adequate  mild four-way constrictive supraglottic hyperfunction during connected speech    The addition of stroboscopy provided the following information:  Vibratory Behavior: Present bilaterally  Periodicity: Inconsistent periodicity with frequent aperiodicity  Symmetry:  intermittent symmetry  Amplitude Right: mildly reduced  Amplitude Left: mildly reduced  Mucosal Wave Right: mildly reduced  Mucosal Wave Left: mildly reduced  Glottic closure:  on phonation glottic closure is complete but inconsistent  Closure Pattern: complete  Closure Plane: at glottic level  Phase Distribution: open-phase predominant     STIMULABILITY: results of therapy probes during perceptual and laryngeal evaluation demonstrate limited improvement with therapy probes  ASSESSMENT / PLAN  IMPRESSIONS: Britt Pichardo is presenting today with Dysphonia (R49.0) in the context of 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 Laryngoscopy revealed overall healthy anatomy and physiology with the exception of slight bilateral bowing, mild 4-way supraglottic hyperfunction, and poor coordination of respiration with phonation. She perceptually demonstrated mild-moderately rough, breathy, and strained voice quality, poor respiratory mechanics including very shallow inhalation with vertical breathing pattern. Speech therapy for voice is not currently recommended with our department, with preference for medical intervention followed by re-evaluation post-injection. However, information regarding voice banking and amplification devices was provided  today and may be appropriate.   A course of speech therapy is not currently recommended  Information regarding personal voice amplifiers was discussed today  Medical intervention by Dr. Valles, should Britt wish to pursue  Consideration of voice banking   She demonstrates a Guarded prognosis for improvement given adherence to therapeutic recommendations.   Positive indicators: positive response to therapy probes  Negative indicators: ALS prevents any intensive therapy techniques due to muscle fatigue factors  Research: n/a    TOTAL SERVICE TIME: 60 minutes  EVALUATION OF VOICE AND RESONANCE (94102)    Oz Toro (voice) M.S., CCC-SLP  Speech-Language Pathologist  [facility]  [phone]  [email or web address]  Pronouns: she/her/hers      *this report was created in part through the use of computerized dictation software, and though reviewed following completion, some typographic errors may persist.  If there is confusion regarding any of this notes contents, please contact me for clarification      Again, thank you for allowing me to participate in the care of your patient.      Sincerely,    Speech Language Pathologist

## 2023-08-11 NOTE — PROGRESS NOTES
SPEECH LANGUAGE PATHOLOGY EVALUATION    See electronic medical record for Abuse and Falls Screening details.    Subjective      Presenting condition or subjective complaint: voice changes  Date of onset:    Pt's daughter reports this has been going on for some time with gradual decline.  Relevant medical history:   ALS  Dates & types of surgery: 2 back surgeries    Prior diagnostic imaging/testing results:     Pt seen for clinical swallow evaluation in 2021 with functional swallow noted at that time.  Prior therapy history for the same diagnosis, illness or injury:    Pt denies previous voice or swallow therapy.    Living Environment  Social support: Alone   Help at home: Home management tasks (cooking, cleaning); Home and Yard maintenance tasks; Emergency call system  Equipment owned: Walker; Walker with wheels; Bedrail; Grab bars     Employment: No    Hobbies/Interests: sports    Patient goals for therapy: stronger voice    Pain assessment: Pain denied     Objective     SWALLOW EVALUTION  Dysphagia history: Pt denies trouble swallowing at this time. She is eating/drinking everything she wants to. She denies coughing/choking when eating and denies a history of reflux.   Current Diet/Method of Nutritional Intake: thin liquids (level 0), regular diet        CLINICAL SWALLOW EVALUATION  Oral Motor Function: Dentition: natural dentition, upper dentures  Secretion management: WFL  Mucosal quality: good  Mandibular function: intact  Oral labial function: WFL  Lingual function: WFL  Velar function: intact   Buccal function: intact   Laryngeal function: pt demonstrates presbyphonia.     Level of assist required for feeding: no assistance needed   Textures Trialed:   Clinical Swallow Eval: Thin Liquids  Mode of presentation: cup, self-fed   Volume presented: 5 oz water  Preparatory Phase: WFL  Oral Phase: WFL  Pharyngeal phase of swallow: intact   Strategies trialed during procedure: none   Diagnostic statement: No overt  clinical s/sx of aspiration/penetration noted. Upon scope exam, pt demonstrated adequate management of her oral secretions.     ESOPHAGEAL PHASE OF SWALLOW  no observed or reported concerns related to esophageal function     SWALLOW ASSESSMENT CLINICAL IMPRESSIONS AND RATIONALE  Diet Consistency Recommendations: thin liquids (level 0), regular diet    Recommended Feeding/Eating Techniques: none   Medication Administration Recommendations: Whole with liquid, one at a time if they are large  Instrumental Assessment Recommendations: no     Assessment & Plan   CLINICAL IMPRESSIONS   Medical Diagnosis: Amyotrophic lateral sclerosis    Treatment Diagnosis: oropharyngeal dyspahgia   Impression/Assessment: Pt is a 83 year old female with voice complaints. She denies any trouble with swallowing and upon clinical evaluation today demonstrates safe functional swallow. No further evaluation indicated at this time. Encouraged her to follow up with her ALS team if there are changes in her swallowing function.       PLAN OF CARE  Evaluation only    Recommended Referrals to Other Professionals: none  Education Assessment:   Learner/Method: Patient;Family;No Barriers to Learning    Risks and benefits of evaluation/treatment have been explained.   Patient/Family/caregiver agrees with Plan of Care.     Evaluation Time:    SLP Eval: oral/pharyngeal swallow function, clinical minutes (59346): 13     Present: Not applicable     Signing Clinician: Madison Orr, SLP      Thank you for the referral of Britt Pichardo. If you have any questions about this report, please contact me using the information below.      Madison Orr MS, CCC-SLP  Speech-Language Pathology  [facility] [place]  Department of Otolaryngology/D&T - 4th floor  Phone: [phone]  Email: [email or web address]

## 2023-08-11 NOTE — PATIENT INSTRUCTIONS
1.  You were seen in the ENT Clinic today by . If you have any questions or concerns after your appointment, please call [phone]. Press option #1 for scheduling related needs. Press option #3 for Nurse advice.    2. Plan is to return to clinic as needed      Celeste Gao LPN  [phone]  [facility] Otolaryngology

## 2023-08-11 NOTE — PROGRESS NOTES
"    [facility]   at the [facility]   Otolaryngology Clinic     Patient: Britt Pichardo    MRN: [id number]    : 1940    Age/Gender: 83 year old female  Date of Service: 2023  Rendering Provider:   Cally Valles MD     Referring Provider   PCP: Digna Krause  Referring Physician: Mason Beckham MD  [address] [place], [place] [id number] [address]  Reason for Consultation   Dysphonia    History   HISTORY OF PRESENT ILLNESS: Ms. Pichardo is a 83 year old female who presents to us today with dysphonia.      Of note she has ALS    she presents today for evaluation. she reports:  history obtained together with SLP please see below from SLP notes  Chief complaint: Her voice has been getting progressively softer and harder to hear, though she doesn't necessarily get asked to repeat herself. Today is a \"good day\" and she's louder than she usually is.   Onset: 4-6 months   Course: Worsening     CURRENT SYMPTOMS INCLUDE:  VOICE: She does not sing. She typically just talks to herself and family. She denies pain or fatigue with voice use. She notices that her voice is hard to get out if she hasn't talked yet during the day. She states that she tries to talk to herself since she lives alone and just wants to hear some sounds.  COUGH/THROAT CLEAR: Denies coughing, but does clear her throat a little more than average. She sometimes feels like she gets something in her throat that will make her sneeze. She clears her throat, but isn't sure what the trigger is.   SWALLOWING: Denies  BREATHING: Denies     OTHER PERTINENT HISTORY  Medications: none related to larynx  Post-Nasal Drip/Congestion: Denies    PAST MEDICAL HISTORY:   Past Medical History:   Diagnosis Date    ALS (amyotrophic lateral sclerosis) (H)     Osteoporosis     Pure hypercholesterolemia     RA (rheumatoid arthritis) (H)        PAST SURGICAL HISTORY:   Past Surgical History:   Procedure Laterality Date    LUMBAR " FUSION  ,       CURRENT MEDICATIONS:   Current Outpatient Medications:     calcium carbonate-vitamin D 600-200 MG-UNIT TABS, Take 1 tablet by mouth every other day , Disp: , Rfl:     Ferrous Gluconate 240 (27 FE) MG TABS, Take 1 tablet by mouth every other day , Disp: , Rfl:     folic acid (FOLVITE) 1 MG tablet, Take 1 mg by mouth daily Except for the day she takes methotrexate sodium, Disp: , Rfl:     InFLIXimab (REMICADE IV), Inject 200 mg into the vein Patient takes this every 8 weeks, Disp: , Rfl:     lovastatin (MEVACOR) 20 MG tablet, Take 1 tablet (20 mg) by mouth At Bedtime, Disp: 90 tablet, Rfl: 0    methotrexate 2.5 MG tablet, Take 3 tablets by mouth once a week, Disp: , Rfl:     oxybutynin ER (DITROPAN XL) 5 MG 24 hr tablet, Take 1 tablet (5 mg) by mouth once daily, Disp: 90 tablet, Rfl: 2    riluzole (RILUTEK) 50 MG tablet, TAKE ONE TABLET BY MOUTH EVERY 12 HOURS, Disp: 60 tablet, Rfl: 0    ALLERGIES: Patient has no known allergies.    SOCIAL HISTORY:    Social History     Socioeconomic History    Marital status:      Spouse name: Not on file    Number of children: Not on file    Years of education: Not on file    Highest education level: Not on file   Occupational History    Occupation: Retired - cook at [employer], payroll with [employer]   Tobacco Use    Smoking status: Former     Years: 15.00     Types: Cigarettes     Quit date: 1967     Years since quittin.6    Smokeless tobacco: Never    Tobacco comments:     social smoking only   Vaping Use    Vaping Use: Never used   Substance and Sexual Activity    Alcohol use: Yes     Comment: several drinks/week    Drug use: No    Sexual activity: Not Currently   Other Topics Concern    Parent/sibling w/ CABG, MI or angioplasty before 65F 55M? Yes     Comment: brother had bypass in his 50's   Social History Narrative    .    Lives at home alone - daughter helps.    Two adult daughters.    Two grandchildren.     Social Determinants of  Health     Financial Resource Strain: Not on file   Food Insecurity: Not on file   Transportation Needs: Not on file   Physical Activity: Not on file   Stress: Not on file   Social Connections: Not on file   Intimate Partner Violence: Not on file   Housing Stability: Not on file         FAMILY HISTORY:   Family History   Problem Relation Age of Onset    Heart Disease Mother         details unknown    Melanoma Sister     Diabetes No family hx of     Cerebrovascular Disease No family hx of     Coronary Artery Disease Early Onset No family hx of     Myocardial Infarction No family hx of     Breast Cancer No family hx of     Ovarian Cancer No family hx of     Colon Cancer No family hx of      Non-contributory for problems with anesthesia    REVIEW OF SYSTEMS:   The patient was asked a 14 point review of systems regarding constitutional symptoms, eye symptoms, ears, nose, mouth, throat symptoms, cardiovascular symptoms, respiratory symptoms, gastrointestinal symptoms, genitourinary symptoms, musculoskeletal symptoms, integumentary symptoms, neurological symptoms, psychiatric symptoms, endocrine symptoms, hematologic/lymphatic symptoms, and allergic/ immunologic symptoms.   The pertinent factors have been included in the HPI and below.  Patient Supplied Answers to Review of Systems       No data to display                Physical Examination   The patient underwent a physical examination as described below. The pertinent positive and negative findings are summarized after the description of the examination.  Constitutional: The patient's developmental and nutritional status was assessed. The patient's voice quality was assessed.  Head and Face: The head and face were inspected for deformities. The sinuses were palpated. The salivary glands were palpated. Facial muscle strength was assessed bilaterally.  Eyes: Extraocular movements and primary gaze alignment were assessed.  Ears, Nose, Mouth and Throat: The ears and nose  were examined for deformities. The nasal septum, mucosa, and turbinates were inspected by anterior rhinoscopy. The lips, teeth, and gums were examined for abnormalities. The oral mucosa, tongue, palate, tonsils, lateral and posterior pharynx were inspected for the presence of asymmetry or mucosal lesions.    Neck: The tracheal position was noted, and the neck mass palpated to determine if there were any asymmetries, abnormal neck masses, thyromegally, or thyroid nodules.  Respiratory: The nature of the breathing and chest expansion/symmetry was observed.  Cardiovascular: The patient was examined to determine the presence of any edema or jugular venous distension.  Abdomen: The contour of the abdomen was noted.  Lymphatic: The patient was examined for infraclavicular lymphadenopathy.  Musculoskeletal: The patient was inspected for the presence of skeletal deformities.  Extremities: The extremities were examined for any clubbing or cyanosis.  Skin: The skin was examined for inflammatory or neoplastic conditions.  Neurologic: The patient's orientation, mood, and affect were noted. The cranial nerve  functions were examined.  Other pertinent positive and negative findings on physical examination:      OC/OP: no lesions, uvula midline, soft palate elevates symmetrically   Neck: no lesions, no TH tenderness to palpation    All other physical examination findings were within normal limits and noncontributory.  Procedures   Video Laryngoscopy with Stroboscopy (CPT 18943)    Preoperative Diagnosis:  Dysphonia and throat symptoms  Postoperative Diagnosis: Dysphonia and throat symptoms  Indication:  Patient has new or persistent dysphonia and throat symptoms.  This requires evaluation by stroboscopy to fully delineate the laryngeal functioning; especially mucosal wave assessment, ultrasharp visualization of lesions on the vocal folds, and overall functioning of the larynx.  Details of Procedure: A 70 degree rigid telescopic  laryngoscope or a distal chip flexible scope was used. The lighting was obtained via a light cable connected to a stroboscopic unit. The telescope was inserted either transorally or transnasally until the vocal folds could be visualized. The patient was instructed to sustain the vowel  ee  at a comfortable pitch and loudness as the voice was monitored by a microphone connected to a fundamental frequency tracker. This circuit tracked vocal periodicity, allowing the light to flash in synchrony with the glottal cycles. A setting on the stroboscope was set to change the phase of light strobing with relation to the vocal fundamental frequency, producing an image of 1 to 2 glottal cycles every second. The video images were recorded for analysis. Use of the variable speed, slow and stop scan allowed careful study of pertinent segments of laryngeal function to increase accuracy of clinical assessments of function and dysfunction.  In particular, features of glottal closure, mucosal wave on the vocal fold cover and laryngeal symmetry were analyzed. Lastly, the patient was asked to phonate speech samples and auditory/perceptual evaluation of voice and resonance were performed.  The vocal quality was carefully evaluated for hoarseness, breathiness, loudness, phrase length and intelligibility to determine the source of dysphonia.    Findings:      B. LARYNGOVIDEOSTROBOSCOPY   Anatomic/Physiological Deviations:  presbylarynx  Mucosal wave: Right:  No restriction     Left: No restriction  Bilateral Vocal Fold Vibration: ASymmetric  Vocal Process: Right: No restriction    Left:  No restriction  Vocal Fold closure: Glottal gap    Complication(s): None  Disposition: Patient tolerated the procedure well            Review of Relevant Clinical Data   I personally reviewed:  Notes: Bhavani 3/9/23  I again listened to her talking, and she does not have spastic dysarthria.  I do not believe her voice problem is ALS related and I would  "like an opinion from ENT.        Labs:  No results found for: TSH  Lab Results   Component Value Date     (L) 07/05/2023    CO2 26 07/05/2023    BUN 14.5 07/05/2023    CREAT 0.8 10/22/2020     Lab Results   Component Value Date    WBC 9.7 03/31/2021    HGB 13.3 03/31/2021    HCT 41.9 03/31/2021     (H) 03/31/2021     03/31/2021     No results found for: PT, PTT, INR  No results found for: ZHOU  No components found for: RHEUMATOIDFACTOR,  RF  Lab Results   Component Value Date    CRP 34.4 (H) 04/29/2012     No components found for: CKTOT, URICACID  No components found for: C3, C4, DSDNAAB, NDNAABIFA  No results found for: MPOAB    Patient reported Quality of Life (QOL) Measures   Patient Supplied Answers To VHI Questionnaire      8/7/2023     7:48 PM   Voice Handicap Index (VHI-10)   My voice makes it difficult for people to hear me 2   People have difficulty understanding me in a noisy room 2   My voice difficulties restrict my personal and social life.  0   I feel left out of conversations because of my voice 0   My voice problem causes me to lose income 0   I feel as though I have to strain to produce voice 2   The clarity of my voice is unpredictable 0   My voice problem upsets me 0   My voice makes me feel handicapped 0   People ask, \"What's wrong with your voice?\" 0   VHI-10 6         Patient Supplied Answers To EAT Questionnaire      8/7/2023     7:48 PM   Eating Assessment Tool (EAT-10)   My swallowing problem has caused me to lose weight 0   My swallowing problem interferes with my ability to go out for meals 0   Swallowing liquids takes extra effort 0   Swallowing solids takes extra effort 0   Swallowing pills takes extra effort 0   Swallowing is painful 0   The pleasure of eating is affected by my swallowing 0   When I swallow food sticks in my throat 0   I cough when I eat 0   Swallowing is stressful 0   EAT-10 0         Patient Supplied Answers To CSI Questionnaire      8/7/2023     " 7:49 PM   Cough Severity Index (CSI)   My cough is worse when I lie down 0   My coughing problem causes me to restrict my personal and social life 0   I tend to avoid places because of my cough problem 0   I feel embarrassed because of my coughing problem 0   People ask, ''What's wrong?'' because I cough a lot 0   I run out of air when I cough 0   My coughing problem affects my voice 0   My coughing problem limits my physical activity 0   My coughing problem upsets me 0   People ask me if I am sick because I cough a lot 0   CSI Score 0         Patient Supplied Answers to Dyspnea Index Questionnaire:       No data to display                Impression & Plan     IMPRESSION: Ms. 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 Pichardo. If you have any questions, please do not hesitate to contact me.    Cally Valles MD    Laryngology    [facility]  Department of  Otolaryngology - Head and Neck Surgery  Clinics & Surgery Center  [address] [place] [address]  Appointment line: [phone]  Fax: [phone]  [email or web address]

## 2023-08-11 NOTE — LETTER
"2023       RE: Britt Pichardo  [address]  [facility] [address]     Dear Colleague,    Thank you for referring your patient, Britt Pichardo, to the [facility] [place] at [facility]. Please see a copy of my visit note below.        [facility]   at the [facility]   Otolaryngology Clinic     Patient: Britt Pichardo    MRN: [id number]    : 1940    Age/Gender: 83 year old female  Date of Service: 2023  Rendering Provider:   Cally Valles MD     Referring Provider   PCP: Digna Krause  Referring Physician: Mason Beckham MD  [address] [place], [place] [id number], [place] [address]  Reason for Consultation   Dysphonia    History   HISTORY OF PRESENT ILLNESS: Ms. Pichardo is a 83 year old female who presents to us today with dysphonia.      Of note she has ALS    she presents today for evaluation. she reports:  history obtained together with SLP please see below from SLP notes  Chief complaint: Her voice has been getting progressively softer and harder to hear, though she doesn't necessarily get asked to repeat herself. Today is a \"good day\" and she's louder than she usually is.   Onset: 4-6 months   Course: Worsening     CURRENT SYMPTOMS INCLUDE:  VOICE: She does not sing. She typically just talks to herself and family. She denies pain or fatigue with voice use. She notices that her voice is hard to get out if she hasn't talked yet during the day. She states that she tries to talk to herself since she lives alone and just wants to hear some sounds.  COUGH/THROAT CLEAR: Denies coughing, but does clear her throat a little more than average. She sometimes feels like she gets something in her throat that will make her sneeze. She clears her throat, but isn't sure what the trigger is.   SWALLOWING: Denies  BREATHING: Denies     OTHER PERTINENT HISTORY  Medications: none related to " larynx  Post-Nasal Drip/Congestion: Denies    PAST MEDICAL HISTORY:   Past Medical History:   Diagnosis Date    ALS (amyotrophic lateral sclerosis) (H)     Osteoporosis     Pure hypercholesterolemia     RA (rheumatoid arthritis) (H)        PAST SURGICAL HISTORY:   Past Surgical History:   Procedure Laterality Date    LUMBAR FUSION  ,       CURRENT MEDICATIONS:   Current Outpatient Medications:     calcium carbonate-vitamin D 600-200 MG-UNIT TABS, Take 1 tablet by mouth every other day , Disp: , Rfl:     Ferrous Gluconate 240 (27 FE) MG TABS, Take 1 tablet by mouth every other day , Disp: , Rfl:     folic acid (FOLVITE) 1 MG tablet, Take 1 mg by mouth daily Except for the day she takes methotrexate sodium, Disp: , Rfl:     InFLIXimab (REMICADE IV), Inject 200 mg into the vein Patient takes this every 8 weeks, Disp: , Rfl:     lovastatin (MEVACOR) 20 MG tablet, Take 1 tablet (20 mg) by mouth At Bedtime, Disp: 90 tablet, Rfl: 0    methotrexate 2.5 MG tablet, Take 3 tablets by mouth once a week, Disp: , Rfl:     oxybutynin ER (DITROPAN XL) 5 MG 24 hr tablet, Take 1 tablet (5 mg) by mouth once daily, Disp: 90 tablet, Rfl: 2    riluzole (RILUTEK) 50 MG tablet, TAKE ONE TABLET BY MOUTH EVERY 12 HOURS, Disp: 60 tablet, Rfl: 0    ALLERGIES: Patient has no known allergies.    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 Yes     Comment: brother had bypass in his 50's   Social History Narrative    .    Lives at home alone - daughter helps.    Two adult daughters.    Two grandchildren.     Social Determinants of Health     Financial Resource Strain: Not on file   Food Insecurity: Not on file   Transportation Needs: Not on file   Physical Activity: Not on file   Stress: Not on file   Social Connections: Not on file   Intimate Partner Violence: Not on file   Housing Stability: Not on file         FAMILY HISTORY:   Family History   Problem Relation Age of Onset    Heart Disease Mother         details unknown    Melanoma Sister     Diabetes No family hx of     Cerebrovascular Disease No family hx of     Coronary Artery Disease Early Onset No family hx of     Myocardial Infarction No family hx of     Breast Cancer No family hx of     Ovarian Cancer No family hx of     Colon Cancer No family hx of      Non-contributory for problems with anesthesia    REVIEW OF SYSTEMS:   The patient was asked a 14 point review of systems regarding constitutional symptoms, eye symptoms, ears, nose, mouth, throat symptoms, cardiovascular symptoms, respiratory symptoms, gastrointestinal symptoms, genitourinary symptoms, musculoskeletal symptoms, integumentary symptoms, neurological symptoms, psychiatric symptoms, endocrine symptoms, hematologic/lymphatic symptoms, and allergic/ immunologic symptoms.   The pertinent factors have been included in the HPI and below.  Patient Supplied Answers to Review of Systems       No data to display                Physical Examination   The patient underwent a physical examination as described below. The pertinent positive and negative findings are summarized after the description of the examination.  Constitutional: The patient's developmental and nutritional status was assessed. The patient's  voice quality was assessed.  Head and Face: The head and face were inspected for deformities. The sinuses were palpated. The salivary glands were palpated. Facial muscle strength was assessed bilaterally.  Eyes: Extraocular movements and primary gaze alignment were assessed.  Ears, Nose, Mouth and Throat: The ears and nose were examined for deformities. The nasal septum, mucosa, and turbinates were inspected by anterior rhinoscopy. The lips, teeth, and gums were examined for abnormalities. The oral mucosa, tongue, palate, tonsils, lateral and posterior pharynx were inspected for the presence of asymmetry or mucosal lesions.    Neck: The tracheal position was noted, and the neck mass palpated to determine if there were any asymmetries, abnormal neck masses, thyromegally, or thyroid nodules.  Respiratory: The nature of the breathing and chest expansion/symmetry was observed.  Cardiovascular: The patient was examined to determine the presence of any edema or jugular venous distension.  Abdomen: The contour of the abdomen was noted.  Lymphatic: The patient was examined for infraclavicular lymphadenopathy.  Musculoskeletal: The patient was inspected for the presence of skeletal deformities.  Extremities: The extremities were examined for any clubbing or cyanosis.  Skin: The skin was examined for inflammatory or neoplastic conditions.  Neurologic: The patient's orientation, mood, and affect were noted. The cranial nerve  functions were examined.  Other pertinent positive and negative findings on physical examination:      OC/OP: no lesions, uvula midline, soft palate elevates symmetrically   Neck: no lesions, no TH tenderness to palpation    All other physical examination findings were within normal limits and noncontributory.  Procedures   Video Laryngoscopy with Stroboscopy (CPT 66378)    Preoperative Diagnosis:  Dysphonia and throat symptoms  Postoperative Diagnosis: Dysphonia and throat symptoms  Indication:  Patient  has new or persistent dysphonia and throat symptoms.  This requires evaluation by stroboscopy to fully delineate the laryngeal functioning; especially mucosal wave assessment, ultrasharp visualization of lesions on the vocal folds, and overall functioning of the larynx.  Details of Procedure: A 70 degree rigid telescopic laryngoscope or a distal chip flexible scope was used. The lighting was obtained via a light cable connected to a stroboscopic unit. The telescope was inserted either transorally or transnasally until the vocal folds could be visualized. The patient was instructed to sustain the vowel  ee  at a comfortable pitch and loudness as the voice was monitored by a microphone connected to a fundamental frequency tracker. This circuit tracked vocal periodicity, allowing the light to flash in synchrony with the glottal cycles. A setting on the stroboscope was set to change the phase of light strobing with relation to the vocal fundamental frequency, producing an image of 1 to 2 glottal cycles every second. The video images were recorded for analysis. Use of the variable speed, slow and stop scan allowed careful study of pertinent segments of laryngeal function to increase accuracy of clinical assessments of function and dysfunction.  In particular, features of glottal closure, mucosal wave on the vocal fold cover and laryngeal symmetry were analyzed. Lastly, the patient was asked to phonate speech samples and auditory/perceptual evaluation of voice and resonance were performed.  The vocal quality was carefully evaluated for hoarseness, breathiness, loudness, phrase length and intelligibility to determine the source of dysphonia.    Findings:      B. LARYNGOVIDEOSTROBOSCOPY   Anatomic/Physiological Deviations:  presbylarynx  Mucosal wave: Right:  No restriction     Left: No restriction  Bilateral Vocal Fold Vibration: ASymmetric  Vocal Process: Right: No restriction    Left:  No restriction  Vocal Fold closure:  "Glottal gap    Complication(s): None  Disposition: Patient tolerated the procedure well            Review of Relevant Clinical Data   I personally reviewed:  Notes: Bhavani 3/9/23  I again listened to her talking, and she does not have spastic dysarthria.  I do not believe her voice problem is ALS related and I would like an opinion from ENT.        Labs:  No results found for: TSH  Lab Results   Component Value Date     (L) 07/05/2023    CO2 26 07/05/2023    BUN 14.5 07/05/2023    CREAT 0.8 10/22/2020     Lab Results   Component Value Date    WBC 9.7 03/31/2021    HGB 13.3 03/31/2021    HCT 41.9 03/31/2021     (H) 03/31/2021     03/31/2021     No results found for: PT, PTT, INR  No results found for: ZHOU  No components found for: RHEUMATOIDFACTOR,  RF  Lab Results   Component Value Date    CRP 34.4 (H) 04/29/2012     No components found for: CKTOT, URICACID  No components found for: C3, C4, DSDNAAB, NDNAABIFA  No results found for: MPOAB    Patient reported Quality of Life (QOL) Measures   Patient Supplied Answers To VHI Questionnaire      8/7/2023     7:48 PM   Voice Handicap Index (VHI-10)   My voice makes it difficult for people to hear me 2   People have difficulty understanding me in a noisy room 2   My voice difficulties restrict my personal and social life.  0   I feel left out of conversations because of my voice 0   My voice problem causes me to lose income 0   I feel as though I have to strain to produce voice 2   The clarity of my voice is unpredictable 0   My voice problem upsets me 0   My voice makes me feel handicapped 0   People ask, \"What's wrong with your voice?\" 0   VHI-10 6         Patient Supplied Answers To EAT Questionnaire      8/7/2023     7:48 PM   Eating Assessment Tool (EAT-10)   My swallowing problem has caused me to lose weight 0   My swallowing problem interferes with my ability to go out for meals 0   Swallowing liquids takes extra effort 0   Swallowing solids " takes extra effort 0   Swallowing pills takes extra effort 0   Swallowing is painful 0   The pleasure of eating is affected by my swallowing 0   When I swallow food sticks in my throat 0   I cough when I eat 0   Swallowing is stressful 0   EAT-10 0         Patient Supplied Answers To CSI Questionnaire      8/7/2023     7:49 PM   Cough Severity Index (CSI)   My cough is worse when I lie down 0   My coughing problem causes me to restrict my personal and social life 0   I tend to avoid places because of my cough problem 0   I feel embarrassed because of my coughing problem 0   People ask, ''What's wrong?'' because I cough a lot 0   I run out of air when I cough 0   My coughing problem affects my voice 0   My coughing problem limits my physical activity 0   My coughing problem upsets me 0   People ask me if I am sick because I cough a lot 0   CSI Score 0         Patient Supplied Answers to Dyspnea Index Questionnaire:       No data to display                Impression & Plan     IMPRESSION: Ms. 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 Pichardo. If you have any questions, please do not hesitate to contact me.    Cally Valles MD    Laryngology    [facility]  Department of  Otolaryngology - Head and Neck Surgery  Clinics & Surgery Center  [address] [place] [address]  [id number]  Appointment line: [phone]  Fax: [phone]  [email or web address]       Again, thank you for allowing me to participate in the care of your patient.      Sincerely,    Cally Valles MD

## 2023-08-14 DIAGNOSIS — E78.5 HYPERLIPIDEMIA LDL GOAL <130: ICD-10-CM

## 2023-08-15 RX ORDER — LOVASTATIN 20 MG
20 TABLET ORAL AT BEDTIME
Qty: 90 TABLET | Refills: 0 | Status: SHIPPED | OUTPATIENT
Start: 2023-08-15 | End: 2023-01-01

## 2023-08-22 ENCOUNTER — TRANSFERRED RECORDS (OUTPATIENT)
Dept: HEALTH INFORMATION MANAGEMENT | Facility: CLINIC | Age: 83
End: 2023-08-22
Payer: MEDICARE

## 2023-08-30 DIAGNOSIS — G12.21 ALS (AMYOTROPHIC LATERAL SCLEROSIS) (H): ICD-10-CM

## 2023-08-30 RX ORDER — RILUZOLE 50 MG/1
TABLET, FILM COATED ORAL
Qty: 60 TABLET | Refills: 2 | Status: SHIPPED | OUTPATIENT
Start: 2023-08-30 | End: 2023-01-01

## 2023-09-13 DIAGNOSIS — G12.21 ALS (AMYOTROPHIC LATERAL SCLEROSIS) (H): Primary | ICD-10-CM

## 2023-09-14 ENCOUNTER — THERAPY VISIT (OUTPATIENT)
Dept: SPEECH THERAPY | Facility: CLINIC | Age: 83
End: 2023-09-14
Payer: MEDICARE

## 2023-09-14 ENCOUNTER — THERAPY VISIT (OUTPATIENT)
Dept: PHYSICAL THERAPY | Facility: CLINIC | Age: 83
End: 2023-09-14
Payer: MEDICARE

## 2023-09-14 ENCOUNTER — OFFICE VISIT (OUTPATIENT)
Dept: NEUROLOGY | Facility: CLINIC | Age: 83
End: 2023-09-14
Payer: MEDICARE

## 2023-09-14 ENCOUNTER — ALLIED HEALTH/NURSE VISIT (OUTPATIENT)
Dept: NEUROLOGY | Facility: CLINIC | Age: 83
End: 2023-09-14

## 2023-09-14 VITALS
OXYGEN SATURATION: 96 % | WEIGHT: 112.7 LBS | HEART RATE: 61 BPM | DIASTOLIC BLOOD PRESSURE: 86 MMHG | BODY MASS INDEX: 22.13 KG/M2 | SYSTOLIC BLOOD PRESSURE: 139 MMHG | HEIGHT: 60 IN

## 2023-09-14 DIAGNOSIS — G12.21 ALS (AMYOTROPHIC LATERAL SCLEROSIS) (H): Primary | ICD-10-CM

## 2023-09-14 DIAGNOSIS — G12.21 ALS (AMYOTROPHIC LATERAL SCLEROSIS) (H): ICD-10-CM

## 2023-09-14 DIAGNOSIS — R47.1 DYSARTHRIA: ICD-10-CM

## 2023-09-14 PROCEDURE — 92522 EVALUATE SPEECH PRODUCTION: CPT | Mod: GN | Performed by: SPEECH-LANGUAGE PATHOLOGIST

## 2023-09-14 PROCEDURE — 97535 SELF CARE MNGMENT TRAINING: CPT | Mod: GP | Performed by: PHYSICAL THERAPIST

## 2023-09-14 PROCEDURE — 99214 OFFICE O/P EST MOD 30 MIN: CPT | Performed by: PSYCHIATRY & NEUROLOGY

## 2023-09-14 PROCEDURE — 97162 PT EVAL MOD COMPLEX 30 MIN: CPT | Mod: GP | Performed by: PHYSICAL THERAPIST

## 2023-09-14 PROCEDURE — 94375 RESPIRATORY FLOW VOLUME LOOP: CPT | Performed by: INTERNAL MEDICINE

## 2023-09-14 PROCEDURE — 97116 GAIT TRAINING THERAPY: CPT | Mod: GP | Performed by: PHYSICAL THERAPIST

## 2023-09-14 ASSESSMENT — REVISED AMYOTROPHIC LATERAL SCLEROSIS FUNCTIONAL RATING SCALE (ALSFRS-R)
DRESSING_AND_HYGEINE: 2
SALIVATION: SLIGHT BUT DEFINITE EXCESS OF SALIVA IN MOUTH; MAY HAVE NIGHTTIME DROOLING
TURNING_IN_BED_AND_ADJUSTING_BED_CLOTHES: 3
CUTTING_FOOD_AND_HANDLING_UTENSILES: 2
DRESSING_AND_HYGEINE: INTERMITTENT ASSISTANCE OR SUBSTITUTE METHODS
SIX_ITEM_SUBTOTAL: 17
CLIMBING_STAIRS: 1
CLIMBING_STAIRS: NEEDS ASSISTANCE
SWALLOWING: 4
RESPIRATORY_INSUFFICIENCY: 4
WALKING: WALKS WITH ASSISTANCE
WALKING: 2
HANDWRITING: SLOW OR SLOPPY: ALL WORDS ARE LEGIBLE
BULBAR_SUBTOTAL: 10
ORTHOPENA: 4
HANDWRITING: 3
FINE_MOTOR_SUBTOTAL_SCORE: 7
SPEECH: DETECTABLE SPEECH DISTURBANCES
TURNING_IN_BED_AND_ADJUSTING_BED_CLOTHES: SOMEWHAT SLOW AND CLUMSY, BUT NO HELP NEEDED
GROSS_MOTOR_SUBTOTAL_SCORE: 6
SALIVATION: 3
DYSPNEA: 4
SWALLOWING: NORMAL EATING HABITS
RESPIRATORY_SUBTOTAL_SCORE: 12
ALSFRS_TOTAL_SCORE: 35
SPEECH: 3

## 2023-09-14 ASSESSMENT — PAIN SCALES - GENERAL: PAINLEVEL: MILD PAIN (2)

## 2023-09-14 NOTE — PATIENT INSTRUCTIONS
Let us know if you need a wrist splint for your left hand or a metal walker (like your 's) before your next visit  Start thinking about next steps if your right hand doesn't work    Adrianna Low PT, DPT  Physical Therapist  [address]  [address] [address]  [email or web address]  Appointments: [phone]

## 2023-09-14 NOTE — PROGRESS NOTES
CLINICAL NUTRITION SERVICES - ASSESSMENT NOTE    Britt Pichardo 83 year old referred for MNT related to ALS    Visit Type: Initial   Referring Physician: Dr. Beckham  Pt accompanied by: daughter    NUTRITION HISTORY  Factors affecting nutrition intake include:N/A  Current diet: Regular diet  Current appetite/intake: Good. Intake of 3 meals per day, plus one snack.   PEG Tube: N/A    Diet Recall  Breakfast Cereal with 1% milk, 1/2 orange, 1/2 banana, sometimes toast with butter   Lunch Yogurt (regular), granola, lunchmeat, cheese, 3 olives   Dinner Salad with tomatoes and cucumbers, soup, frozen dinners   Snacks Chex mix   Beverages Coffee, water   Alcohol 1 light beer     ANTHROPOMETRICS  Height: 1.524 m (5')   Weight: 51.1 kg (112 lb 11.2 oz)   BMI: 22.01 kg/m    Weight Status:  Normal BMI  IBW: 45.5 kg  % IBW: 112%  Weight History: Pt notes a slow wt loss over the past 4 years. 2019 wt = 128#. 10# (8%) wt loss over the past 12 months.   Wt Readings from Last 10 Encounters:   09/14/23 51.1 kg (112 lb 11.2 oz)   08/11/23 51.3 kg (113 lb)   07/05/23 52.3 kg (115 lb 3.2 oz)   03/09/23 52.6 kg (116 lb)   10/12/22 54 kg (119 lb)   09/08/22 53.2 kg (117 lb 3.2 oz)   06/01/22 54.7 kg (120 lb 11.2 oz)   03/10/22 54.9 kg (121 lb)   09/24/21 54.3 kg (119 lb 9.6 oz)   03/31/21 56.8 kg (125 lb 3.2 oz)     Dosing Weight: 51 kg    ALS FRS-6 (Modification of H-B equations for ALS)   BMR = 1031 Kcal/day   1 - Speech Score - 3   4 - Handwriting -  3   6 - Dressing and Hygiene -  2   7 - Turning in Bed - 3   8 - Walking - 2   10 a - Dyspnea - 4   Total ALS FRS 6 score =  17   Kcal needs per ALS- FRS = 1814     Medications/vitamins/minerals/herbals:   Reviewed    LABS  Labs reviewed    ASSESSED NUTRITION NEEDS:  Estimated Energy Needs: 8657-7964 kcals (30-35 Kcal/Kg)  Justification: increased needs associated with progression of ALS  Estimated Protein Needs: 50-60 grams protein (1-1.2 g pro/Kg)  Justification: increased needs  associated with progression of ALS  Estimated Fluid Needs: 1275 mL   Justification: maintenance    NUTRITION DIAGNOSIS:  Inadequate oral intake related to increased needs associated with progression of ALS as evidenced by 10# (8%) wt loss over the past 12 months.     INTERVENTIONS  Recommendations / Nutrition Prescription  1. Continue Regular diet  2. Consume 1-2 ONS per day  3. Increase protein at breakfast and dinner  4. Add Banatrol daily for loose stools    Nutrition Education  Provided written & verbal education on:   - Ways to maximize kcal and protein intake. Discussed calorie and protein needs for maintenance of weight and nutrition status.  Advised pt to aim for at least 1530 kcal and 50 g protein via 5-6 small frequent meals.  Discussed that as ALS progresses, eating may become more difficult and discussed ways to cope with this.   - ONS (Ensure Enlive, Ensure Plus/Boost Plus, CIB, Benecalorie, Scandi shake etc). Suggested ways to incorporate these supplements to avoid flavor fatigue. Encouraged pt to start consuming 1-2 ONS daily.       Provided pt with corresponding education materials/handouts on:  Six Small Meals a Day, High Calorie, High Protein Recipe booklet, Tips to Increase the Calories in Your Diet, Tips to Increase the Protein in Your Diet and Protein Sources.      Pt verbalize understanding of materials provided during consult.   Patient Understanding: Excellent  Expected Compliance: Excellent     Implementation  Composition of Meals and Snacks, General/healthful diet, and Medical Food Supplement    Goals  1.  Aim for 5-6 small frequent meals  2.  Aim for 1530 kcal and 50 g protein/day  3. Weight maintenance    Follow-Up Plans: Pt has RD contact information for questions.    Pt encouraged to follow up with RD at next clinic visit in 3-6 months.    MONITORING AND EVALUATION:  -Food intake  -Fluid/beverage intake  -Liquid meal replacement or supplement  -Weight trends    Radha Calvo RDN,  LD

## 2023-09-14 NOTE — PROGRESS NOTES
Digna Krause MD  [place], September 14, 2023    Dear Dr Krause,    I had the pleasure to see Britt in follow-up at the [facility] today.  She was escorted by her daughter.  She is an 83-year-old woman with very slowly progressive limb onset ALS, with symptoms beginning with left hand weakness in 2017.  Over the past 6 months, she has experienced the expected progression of the disease.  She is having more weakness in her right hand, which now makes it difficult to cut food on her own.  She can still brush her teeth independently.  Handwriting is a bit challenging and not always legible.  She has trouble raising the arm overhead as well.  Of note, Britt lives alone at home, and her left hand is essentially paralyzed. She also is experiencing leg weakness in the last year or two, manifesting with instability when getting up from a chair and tendency to fall.  She has had a couple of falls in the last 6 months.  She has a cane and a walker, but she uses them rather infrequently.  During her last visit in clinic our physical therapist also recommended AFOs, but she does not wear them. She occasionally will get a strong cramp at the right lateral calf when she wakes up in the morning, but getting up from bed and stretching is enough to take care of it.  Occasionally her hands will contract as well and she has to stretch them.  Those cramps are not very prolonged.    She has dysphonia for at least a year.  She saw ENT recently and no alternative explanation besides the motor neuron disease could be found.  Voice therapy was recommended.  She reports mild drooling occurring only at night and rarely during the day, which is not very bothersome.  She has not experienced any difficulty with thick secretions (phlegm).  Denies dysphagia or pseudobulbar affect or head drop.    Britt has lost some weight during the course of this disease.  She is now 116  pounds.  3 years ago she was 128 pounds.  Her appetite is okay.            Latest Ref Rng & Units 3/9/2023     9:21 AM   PFT   FVC L 1.51    FEV1 L 1.25    FVC% % 81    FEV1% % 87      MIP -29 cm H2O (low)    /86 (BP Location: Right arm, Patient Position: Right side, Cuff Size: Adult Small)   Pulse 61   Ht 1.524 m (5')   Wt 51.1 kg (112 lb 11.2 oz)   SpO2 96%   BMI 22.01 kg/m      On a brief focused physical exam, she has dysphonia and very mild dysarthria.  I cannot exclude that there is a mild spastic component to the dysarthria.  Lateral tongue movements are rapid. I do not see tongue fasciculations or weakness.  There is however a brisk jaw jerk.  There is very mild weakness of orbicularis oris and orbicularis oculi.  Jaw strength is normal.  Neck flexion is 4/5; extension is full.  Leg strength is 4/5 for bilateral hip flexors, knee flexors, and foot dorsiflexors, similar to the previous visit. Knee extensors are 5/5.    In summary, Britt is experiencing slow progression of her limb onset ALS, as expected. I explained to her that the hand weakness is expected to worsen further, and at some point she should consider getting a PCA at home, because she will not be able to perform very basic ADLs like feeding or brushing teeth, etc. if she loses the function of both hands.  She understands that.  She is also at risk for falling, and I encouraged her to use her cane and walker much more regularly.  She will see our physical and occupational therapists in Clinic  today and discuss those issues.  She will likely benefit from special equipment to facilitate feeding or dressing, as determined by OT.    During the last visit I was not sure if her dysphonia was ALS related, but our ENT physicians were unable to find another explanation. Despite the lack of tongue fasciculations or overt atrophy, there are some bulbar signs on her exam including a brisk jaw jerk, and mild facial weakness.  Therefore, I feel that  the dysphonia and mild dysarthria may actually represent progression of ALS at this point.  She will see our speech therapist again.  She does not have troublesome dysphagia or sialorrhea or pseudobulbar affect.    Her pulmonary function tests have indicated low MIP values for a long time, and there is a gradual decline in the MIP values compared to 5 years ago.  I explained to Britt that this is a sign of diaphragmatic weakness.  From a respiratory standpoint, she remains asymptomatic with no orthopnea, major dyspnea on exertion, morning headaches, or nonrefreshing sleep.  I explained however that early initiation of NIV in this setting is beneficial in ALS, and can improve the patient's daytime energy, quality of life, and prolong survival, even if she is currently asymptomatic.  She is interested and we will prescribe her  NIV, which I consider medically necessary, based on today's face-to-face evaluation.     We discussed her weight loss at some length.  I told her that rapid weight loss can accelerate disease progression, and we would like to aim at higher weight ideally.  She will meet our dietitian today for further details on how to boost her nutrition.     Britt will continue riluzole for ALS.  Recent comprehensive metabolic panel done in July 2023 was normal.  She is not willing to start Relyvrio. She had declined Radicava in the past.     I will see her in follow-up in 6 months, sooner if needed.  Total time spent on this encounter today 35 minutes of which 20 face-to-face, 10 in postvisit note dictation, editing, and orders, and 5 in previsit chart review.    Sincerely,    Mason Beckham MD FAAN

## 2023-09-14 NOTE — PROGRESS NOTES
"    OUTPATIENT PHYSICAL THERAPY CLINIC NOTE  Britt Pichardo  YOB: 1940  [id number]    Type of visit:         Evaluation     Date of service: 9/14/2023    Referring provider: Mason Beckham MD     present: No    Others present at visit:  Child(adriana)-daughter, Courtney    Medical diagnosis:   Amyotrophic lateral sclerosis (ALS)    Date of diagnosis: 11/29/2018    Pertinent history of current problem (include personal factors and/or comorbidities that impact the plan of care):  limb onset weakness; mostly distal UE weakness L > R beginning in 2017    Cardio-respiratory status:  Forced vital capacity: 81 % of predicted     Height/Weight: 4'11\" / 112lbs    Living environment:  House    Living environment barriers:  2 stairs to enter (0 railing present)  A flight of stairs to basement (1 railing present)     Current assistance/living environment:  Lives alone  Patient's daughter assist patient with outdoor house chores, fine motor tasks multiple times a week      Current mobility equipment:  4WW- rarely uses: too fast  WW- 's walker. Too tall per daughter  SEC (also recommended tracey WW, but patient not interested)  *Recommended transport w/c 16x16     Current ADL equipment:  See OT note  *L wrist splint (size Medium)    Technology used: Phone    Patient concerns/goals: Patient reports weaker overall, most notably in the right hand.  Patient did not get right AFO and has cane but does not use.  Patient reports 1 fall since last follow-up that occurred when she was walking on rocks and reaching forward    Evaluation   Interview completed.   Pain assessment:  Pain denied     Range of motion: Bilateral dorsiflexion limited near neutral     Manual muscle testing: Bilateral hip flexion 3+/5, B knee flexion/extension 5/5, L ankle dorsiflexion 4/5, R ankle DF 3+/5   Gait: Patient ambulates without a device modified independently-mild swayback posture, reduced duy, decreased heel strike " but no foot catching/foot slap today   Cognition: Intact   5 times sit to stand: 16 seconds   10MWT: 0.54 m/s without device   FRS: 35      Fall Risk Screen:   Has the patient fallen 2 or more times in the last year? Yes      Has the patient fallen and had an injury in the past year? No       Timed Up and Go Score: gait speed <1.0 m/s    Is the patient a fall risk? Yes     Impairments:  Fatigue  Muscle atrophy  Range of motion  Balance     Treatment diagnosis:  Impaired mobility  Impaired activities of daily living    Clinical Presentation: Evolving/Changing  Clinical Presentation Rationale: Personal factors, body systems involved, progressive nature of ALS  Clinical Decision Making (Complexity): Moderate complexity     Recommendations/Plan of care:  1 session evaluation & treatment.     Goals:   Target date: 9/14/2023  Patient, family and/or caregiver will verbalize understanding of evaluation results and implications for functional performance.  Patient, family and/or caregiver will verbalize/demonstrate understanding of compensatory methods /equipment to enhance functional independence and safety.  Patient, family and/or caregiver will verbalize/demonstrate understanding of home program.    Educational assessment/barriers to learning:   No barriers noted     Treatment provided this date:   Gait training-10 minutes   -PT appropriately fit straight point cane, however patient did not receive Timoteo size cane; will request 1 from [employer]  -PT demonstrated two-point gait pattern with use of straight cane and patient returned demonstrated, but required hand overhand facilitation initially with improved performance with repetition.  Recommended patient use a cane at all times when outside the house and to start using inside the house when practicing    ADL/self-care management-15 minutes   -Collaborated with patient on equipment recommendations including use of left wrist splint to improve  function of  hand.  Patient believes she has 1 of these at home so will find it and if not she will follow-up to get 1 ordered  -Recommended patient use cane at all times when outside home due to balance deficits and will have [employer] return old cane when they deliver more appropriate size cane for her  -Recommended patient get transport wheelchair to increase access and community mobility with family to improve quality of life and function.  Patient agreeable; will request from [employer]  -Collaborated on next steps, especially if right hand continues to get weaker leading to inability to function independently at home. Patient and daughter understanding and will think of PCA vs assisted living    Response to treatment/recommendations: Patient verbalized understanding and agreement    Goal attainment:  All goals met     Risks and benefits of evaluation/treatment have been explained.  Patient, family and/or caregiver are in agreement with Plan of Care.     Timed Code Treatment Minutes: 25  Total Treatment Time (sum of timed and untimed services): 35    Signature: Digna Low, PT   Date: 9/14/2023    Certification:  Onset date: 9/14/2023  Start of care date: 9/14/2023  Certification date from 9/14/2023 to 9/14/2023    I CERTIFY THE NEED FOR THESE SERVICES FURNISHED UNDER        THIS PLAN OF TREATMENT AND WHILE UNDER MY CARE     (Physician attestation of this document indicates review and certification of the therapy plan).

## 2023-09-14 NOTE — PATIENT INSTRUCTIONS
Follow up 6 months  Will have you see our dietician, physical, occupational and speech therapist    We can offer you a BiPAP machine at night

## 2023-09-14 NOTE — NURSING NOTE
Chief Complaint   Patient presents with    RECHECK       Vitals were taken and medications were reconciled.      Phuong Reaves, Technician  10:07 AM  September 14, 2023

## 2023-09-14 NOTE — LETTER
9/14/2023       RE: Britt Pichardo  [address]  [place] [address]     Dear Colleague,    Thank you for referring your patient, Britt Pichardo, to the [facility]UROLOG[facility] [place] at [facility]. Please see a copy of my visit note below.    Digna Krause MD  [place], September 14, 2023    Dear Dr Krause,    I had the pleasure to see Britt in follow-up at the [facility] today.  She was escorted by her daughter.  She is an 83-year-old woman with very slowly progressive limb onset ALS, with symptoms beginning with left hand weakness in 2017.  Over the past 6 months, she has experienced the expected progression of the disease.  She is having more weakness in her right hand, which now makes it difficult to cut food on her own.  She can still brush her teeth independently.  Handwriting is a bit challenging and not always legible.  She has trouble raising the arm overhead as well.  Of note, Britt lives alone at home, and her left hand is essentially paralyzed. She also is experiencing leg weakness in the last year or two, manifesting with instability when getting up from a chair and tendency to fall.  She has had a couple of falls in the last 6 months.  She has a cane and a walker, but she uses them rather infrequently.  During her last visit in clinic our physical therapist also recommended AFOs, but she does not wear them. She occasionally will get a strong cramp at the right lateral calf when she wakes up in the morning, but getting up from bed and stretching is enough to take care of it.  Occasionally her hands will contract as well and she has to stretch them.  Those cramps are not very prolonged.    She has dysphonia for at least a year.  She saw ENT recently and no alternative explanation besides the motor neuron disease could be found.  Voice therapy was recommended.   She reports mild drooling occurring only at night and rarely during the day, which is not very bothersome.  She has not experienced any difficulty with thick secretions (phlegm).  Denies dysphagia or pseudobulbar affect or head drop.    Britt has lost some weight during the course of this disease.  She is now 116 pounds.  3 years ago she was 128 pounds.  Her appetite is okay.            Latest Ref Rng & Units 3/9/2023     9:21 AM   PFT   FVC L 1.51    FEV1 L 1.25    FVC% % 81    FEV1% % 87      MIP -29 cm H2O (low)    /86 (BP Location: Right arm, Patient Position: Right side, Cuff Size: Adult Small)   Pulse 61   Ht 1.524 m (5')   Wt 51.1 kg (112 lb 11.2 oz)   SpO2 96%   BMI 22.01 kg/m      On a brief focused physical exam, she has dysphonia and very mild dysarthria.  I cannot exclude that there is a mild spastic component to the dysarthria.  Lateral tongue movements are rapid. I do not see tongue fasciculations or weakness.  There is however a brisk jaw jerk.  There is very mild weakness of orbicularis oris and orbicularis oculi.  Jaw strength is normal.  Neck flexion is 4/5; extension is full.  Leg strength is 4/5 for bilateral hip flexors, knee flexors, and foot dorsiflexors, similar to the previous visit. Knee extensors are 5/5.    In summary, Britt is experiencing slow progression of her limb onset ALS, as expected. I explained to her that the hand weakness is expected to worsen further, and at some point she should consider getting a PCA at home, because she will not be able to perform very basic ADLs like feeding or brushing teeth, etc. if she loses the function of both hands.  She understands that.  She is also at risk for falling, and I encouraged her to use her cane and walker much more regularly.  She will see our physical and occupational therapists in Clinic  today and discuss those issues.  She will likely benefit from special equipment to facilitate feeding or dressing, as determined by  OT.    During the last visit I was not sure if her dysphonia was ALS related, but our ENT physicians were unable to find another explanation. Despite the lack of tongue fasciculations or overt atrophy, there are some bulbar signs on her exam including a brisk jaw jerk, and mild facial weakness.  Therefore, I feel that the dysphonia and mild dysarthria may actually represent progression of ALS at this point.  She will see our speech therapist again.  She does not have troublesome dysphagia or sialorrhea or pseudobulbar affect.    Her pulmonary function tests have indicated low MIP values for a long time, and there is a gradual decline in the MIP values compared to 5 years ago.  I explained to Britt that this is a sign of diaphragmatic weakness.  From a respiratory standpoint, she remains asymptomatic with no orthopnea, major dyspnea on exertion, morning headaches, or nonrefreshing sleep.  I explained however that early initiation of NIV in this setting is beneficial in ALS, and can improve the patient's daytime energy, quality of life, and prolong survival, even if she is currently asymptomatic.  She is interested and we will prescribe her  NIV, which I consider medically necessary, based on today's face-to-face evaluation.     We discussed her weight loss at some length.  I told her that rapid weight loss can accelerate disease progression, and we would like to aim at higher weight ideally.  She will meet our dietitian today for further details on how to boost her nutrition.     Britt will continue riluzole for ALS.  Recent comprehensive metabolic panel done in July 2023 was normal.  She is not willing to start Relyvrio. She had declined Radicava in the past.     I will see her in follow-up in 6 months, sooner if needed.  Total time spent on this encounter today 35 minutes of which 20 face-to-face, 10 in postvisit note dictation, editing, and orders, and 5 in previsit chart review.          Again, thank you for  allowing me to participate in the care of your patient.      Sincerely,    Mason Beckham MD

## 2023-09-14 NOTE — PROGRESS NOTES
[facility]                                                                                   OUTPATIENT SPEECH LANGUAGE PATHOLOGY    PLAN OF TREATMENT FOR OUTPATIENT REHABILITATION   Patient's Last Name, First Name, Britt Valentine YOB: 1940   Provider's Name   [facility]   Medical Record No.  [id number]     Onset Date:  11/29/2018 Start of Care Date:  9/14/23     Medical Diagnosis:   ALS      SLP Treatment Diagnosis:  Dysarthria, Dysphagia   Plan of Treatment  Frequency/Duration: one time only eval and treat within ALS Interdisciplinary Clinic    /       Certification date from  9/14/23   To     9/14/23       See note for plan of treatment details and functional goals     Haley Levy, SLP                         I CERTIFY THE NEED FOR THESE SERVICES FURNISHED UNDER        THIS PLAN OF TREATMENT AND WHILE UNDER MY CARE     (Physician attestation of this document indicates review and certification of the therapy plan).              Referring Provider:  Dr. Beckham    Initial Assessment  See Epic Evaluation-                SPEECH LANGUAGE PATHOLOGY EVALUATION    See electronic medical record for Abuse and Falls Screening details.    Subjective   Presenting condition or subjective complaint: ALS  Date of onset: diagnosed 11/29/2018  Relevant medical history: Refer to medical record  Prior therapy history for the same diagnosis, illness or injury: patient is followed in this ALS Multidisciplinary Clinic on average every 3 months, last seen by this SLP 9/23/2021. ENT SLP and Voice Clinic 8/11/23  Others at Clinic Visit: Child(adriana)  Patient goals for therapy/concerns: Increased speech deficits  Pain assessment: Pain denied     Objective   Cardio-Respiratory Status: Forced vital capacity: 81 % of predicted  3/9/23, not yet assessed today  Height/Weight: 5'0 and 112lbs  Functional Rating Scale (ALS-FRS):  35/48  10 Meter Walk  test: defer to PT  Speaking Rate: 169 words per minute      Oral Motor/Swallowing  Current Diet/Method of Nutritional Intake: thin liquids (level 0), regular diet    Level of assist required for feeding: no assistance needed  Oral Motor Function: Not formally assessed as MD and recently SLP eval found no deficits  Swallow Function: Not assessed as she has consistently denied dysphagia/changes in swallow function  Dysphagia Diagnosis: Swallow within functional limits per her report, not assessed today  Diet Texture Recommendations: thin liquids (level 0), regular diet  Recommended Feeding/Eating Techniques: see description below  Medication Administration Recommendations: continue current  Instrumental Assessment Recommendations: instrumental evaluation not recommended at this time  Dysphagia Intervention: None      Speech Intelligibility/Functional Communication   Methods of communication: Verbal    Speech Intelligibility: intact   Respiration Observations: shallow breathing  Phonation: breathy quality, harsh quality, dysphonia  Resonance: WNL  Articulation: WNL  Speaking Rate: 169 words per minute per [place] reading (norm is 140+wpm)    Motor speech level of impairment: mild to moderate impairment  Speech Intelligibility/Communication: Impacts social activities  Augmentative and Alternative Communication (AAC):  Introduced concept, trained in Voice Preservation as below    Communication Intervention: Neurologist questions if vocal changes are related to ALS progression vs another issue, she was recently seen by ENT for dysphonia and mild vocal fold bowing noted however neurologist now feels changes are likely related to ALS therefore speech eval was requested during ALS Clinic. SLP eval findings suggest dysphonia although no clear dysarthria noted by SLP, specifically the flaccid and/or spastic dysarthria typical of ALS (MD did feel minor dysarthria present). SLP trained use of speech strategies to improve  intelligibility and reduce fatigue with speaking, specifically monitoring impact of fatigue on speaking and compensating for that by alternative forms of communication, using fewer words, etc. SLP introduced concept of Augmentative-Alternative Communication and educated in detail regarding Voice Preservation. She is not interested in Voice Preservation and AAC is not yet indicated.        Assessment & Plan   Clinical Impressions  Medical Diagnosis: ALS  Treatment Diagnosis:  Dysarthria, Dysphagia  Impression/Assessment: Pt is a 83 year old female with ALS. The following significant findings have been identified: impaired speech intelligibility, characterized by mild-moderate dysphonia. Identified deficits interfere with their ability to communicate within the home or community as compared to previous level of function.    Plan of Care  Treatment Interventions:  Speech    Prognosis to achieve stated therapy goals is good   Rehab potential is impacted by: comorbidities    Long Term Goals: Based on today's evaluation session patient and/or caregiver will have understanding of current communication and/or swallowing status and recommendations for management.  Frequency of Treatment: one time only eval and treat within ALS Interdisciplinary Clinic  Duration of Treatment: one time only eval and treat within ALS Interdisciplinary Clinic  Recommended Referrals to Other Professionals:  None     Risks and benefits of evaluation/treatment have been explained.   Patient/Family/caregiver agrees with Plan of Care.     Recommendations: Continue compensatory speech strategies.    Education provided/response: Speech    Treatment provided this date:   Swallow intervention, 0 minutes (see above for details)  Communication intervention, 6 minutes (see above for details)    Response to recommendations/treatment: verbalized understanding, asked appropriate questions, agreed to recommendations    Goal attainment: All goals met    Total  Evaluation Time (minutes): 10  Timed Code Treatment (minutes): 0 minutes  Total Treatment Time (sum of timed and untimed services): 6     Present: Not applicable     Signing Clinician: Haley Levy SLP    Certification:  Onset date: 9/14/23  Start of care date: 9/14/23  Certification date from 9/14/23 to 9/14/23    I CERTIFY THE NEED FOR THESE SERVICES FURNISHED UNDER        THIS PLAN OF TREATMENT AND WHILE UNDER MY CARE     (Physician attestation of this document indicates review and certification of the therapy plan).

## 2023-09-15 DIAGNOSIS — G12.21 ALS (AMYOTROPHIC LATERAL SCLEROSIS) (H): Primary | ICD-10-CM

## 2023-09-15 LAB
EXPTIME-PRE: 3.61 SEC
FEF2575-%PRED-PRE: 135 %
FEF2575-PRE: 1.64 L/SEC
FEF2575-PRED: 1.21 L/SEC
FEFMAX-%PRED-PRE: 110 %
FEFMAX-PRE: 3.96 L/SEC
FEFMAX-PRED: 3.59 L/SEC
FEV1-%PRED-PRE: 96 %
FEV1-PRE: 1.38 L
FEV1FEV6-PRE: 86 %
FEV1FEV6-PRED: 77 %
FEV1FVC-PRE: 86 %
FEV1FVC-PRED: 78 %
FIFMAX-PRE: 2.38 L/SEC
FVC-%PRED-PRE: 87 %
FVC-PRE: 1.61 L
FVC-PRED: 1.85 L
MEP-PRE: 34 CMH2O
MIP-PRE: -26 CMH2O

## 2023-10-02 ENCOUNTER — TRANSFERRED RECORDS (OUTPATIENT)
Dept: HEALTH INFORMATION MANAGEMENT | Facility: CLINIC | Age: 83
End: 2023-10-02

## 2023-10-09 DIAGNOSIS — N39.3 STRESS INCONTINENCE OF URINE: ICD-10-CM

## 2023-10-09 RX ORDER — OXYBUTYNIN CHLORIDE 5 MG/1
TABLET, EXTENDED RELEASE ORAL
Qty: 90 TABLET | Refills: 1 | Status: SHIPPED | OUTPATIENT
Start: 2023-10-09 | End: 2024-01-01

## 2024-01-01 ENCOUNTER — APPOINTMENT (OUTPATIENT)
Dept: PHYSICAL THERAPY | Facility: CLINIC | Age: 84
DRG: 542 | End: 2024-01-01
Attending: INTERNAL MEDICINE
Payer: MEDICARE

## 2024-01-01 ENCOUNTER — PATIENT OUTREACH (OUTPATIENT)
Dept: CARE COORDINATION | Facility: CLINIC | Age: 84
End: 2024-01-01
Payer: MEDICARE

## 2024-01-01 ENCOUNTER — ALLIED HEALTH/NURSE VISIT (OUTPATIENT)
Dept: NEUROLOGY | Facility: CLINIC | Age: 84
End: 2024-01-01

## 2024-01-01 ENCOUNTER — HEALTH MAINTENANCE LETTER (OUTPATIENT)
Age: 84
End: 2024-01-01

## 2024-01-01 ENCOUNTER — HOSPITAL ENCOUNTER (EMERGENCY)
Facility: CLINIC | Age: 84
Discharge: HOME | End: 2024-04-01
Attending: EMERGENCY MEDICINE | Admitting: EMERGENCY MEDICINE
Payer: MEDICARE

## 2024-01-01 ENCOUNTER — OFFICE VISIT (OUTPATIENT)
Dept: NEUROLOGY | Facility: CLINIC | Age: 84
End: 2024-01-01
Payer: MEDICARE

## 2024-01-01 ENCOUNTER — TRANSITIONAL CARE UNIT VISIT (OUTPATIENT)
Dept: GERIATRICS | Facility: CLINIC | Age: 84
End: 2024-01-01
Payer: MEDICARE

## 2024-01-01 ENCOUNTER — TELEPHONE (OUTPATIENT)
Dept: GERIATRICS | Facility: CLINIC | Age: 84
End: 2024-01-01
Payer: MEDICARE

## 2024-01-01 ENCOUNTER — APPOINTMENT (OUTPATIENT)
Dept: CARDIOLOGY | Facility: CLINIC | Age: 84
DRG: 542 | End: 2024-01-01
Attending: INTERNAL MEDICINE
Payer: MEDICARE

## 2024-01-01 ENCOUNTER — APPOINTMENT (OUTPATIENT)
Dept: PHYSICAL THERAPY | Facility: CLINIC | Age: 84
DRG: 542 | End: 2024-01-01
Payer: MEDICARE

## 2024-01-01 ENCOUNTER — PATIENT OUTREACH (OUTPATIENT)
Dept: NEUROLOGY | Facility: CLINIC | Age: 84
End: 2024-01-01

## 2024-01-01 ENCOUNTER — HOSPITAL ENCOUNTER (OUTPATIENT)
Dept: GENERAL RADIOLOGY | Facility: CLINIC | Age: 84
Discharge: HOME | End: 2024-10-01
Attending: PHYSICIAN ASSISTANT | Admitting: PHYSICIAN ASSISTANT
Payer: MEDICARE

## 2024-01-01 ENCOUNTER — APPOINTMENT (OUTPATIENT)
Dept: CT IMAGING | Facility: CLINIC | Age: 84
DRG: 542 | End: 2024-01-01
Attending: EMERGENCY MEDICINE
Payer: MEDICARE

## 2024-01-01 ENCOUNTER — PATIENT OUTREACH (OUTPATIENT)
Dept: CARE COORDINATION | Facility: CLINIC | Age: 84
End: 2024-01-01

## 2024-01-01 ENCOUNTER — DOCUMENTATION ONLY (OUTPATIENT)
Dept: GERIATRICS | Facility: CLINIC | Age: 84
End: 2024-01-01
Payer: MEDICARE

## 2024-01-01 ENCOUNTER — HOSPITAL ENCOUNTER (EMERGENCY)
Facility: CLINIC | Age: 84
Discharge: HOME | End: 2024-07-14
Attending: EMERGENCY MEDICINE | Admitting: EMERGENCY MEDICINE
Payer: MEDICARE

## 2024-01-01 ENCOUNTER — APPOINTMENT (OUTPATIENT)
Dept: GENERAL RADIOLOGY | Facility: CLINIC | Age: 84
End: 2024-01-01
Attending: EMERGENCY MEDICINE
Payer: MEDICARE

## 2024-01-01 ENCOUNTER — DOCUMENTATION ONLY (OUTPATIENT)
Dept: OTHER | Facility: CLINIC | Age: 84
End: 2024-01-01
Payer: MEDICARE

## 2024-01-01 ENCOUNTER — APPOINTMENT (OUTPATIENT)
Dept: CT IMAGING | Facility: CLINIC | Age: 84
End: 2024-01-01
Attending: EMERGENCY MEDICINE
Payer: MEDICARE

## 2024-01-01 ENCOUNTER — MEDICAL CORRESPONDENCE (OUTPATIENT)
Dept: HEALTH INFORMATION MANAGEMENT | Facility: CLINIC | Age: 84
End: 2024-01-01

## 2024-01-01 ENCOUNTER — HOSPITAL ENCOUNTER (EMERGENCY)
Facility: CLINIC | Age: 84
Discharge: HOME | End: 2024-06-13
Attending: EMERGENCY MEDICINE | Admitting: EMERGENCY MEDICINE
Payer: MEDICARE

## 2024-01-01 ENCOUNTER — OFFICE VISIT (OUTPATIENT)
Dept: INTERNAL MEDICINE | Facility: CLINIC | Age: 84
End: 2024-01-01
Payer: MEDICARE

## 2024-01-01 ENCOUNTER — OFFICE VISIT (OUTPATIENT)
Dept: NEUROSURGERY | Facility: CLINIC | Age: 84
End: 2024-01-01
Payer: MEDICARE

## 2024-01-01 ENCOUNTER — HOSPITAL ENCOUNTER (EMERGENCY)
Facility: CLINIC | Age: 84
Discharge: HOME | DRG: 542 | End: 2024-08-16
Attending: EMERGENCY MEDICINE | Admitting: EMERGENCY MEDICINE
Payer: MEDICARE

## 2024-01-01 ENCOUNTER — APPOINTMENT (OUTPATIENT)
Dept: CT IMAGING | Facility: CLINIC | Age: 84
DRG: 542 | End: 2024-01-01
Attending: INTERNAL MEDICINE
Payer: MEDICARE

## 2024-01-01 ENCOUNTER — MEDICAL CORRESPONDENCE (OUTPATIENT)
Dept: HEALTH INFORMATION MANAGEMENT | Facility: CLINIC | Age: 84
End: 2024-01-01
Payer: MEDICARE

## 2024-01-01 ENCOUNTER — OFFICE VISIT (OUTPATIENT)
Dept: PULMONOLOGY | Facility: CLINIC | Age: 84
End: 2024-01-01
Payer: MEDICARE

## 2024-01-01 ENCOUNTER — TRANSFERRED RECORDS (OUTPATIENT)
Dept: HEALTH INFORMATION MANAGEMENT | Facility: CLINIC | Age: 84
End: 2024-01-01
Payer: MEDICARE

## 2024-01-01 ENCOUNTER — NURSING HOME VISIT (OUTPATIENT)
Dept: GERIATRICS | Facility: CLINIC | Age: 84
End: 2024-01-01
Payer: MEDICARE

## 2024-01-01 ENCOUNTER — LAB REQUISITION (OUTPATIENT)
Dept: LAB | Facility: CLINIC | Age: 84
End: 2024-01-01

## 2024-01-01 ENCOUNTER — THERAPY VISIT (OUTPATIENT)
Dept: PHYSICAL THERAPY | Facility: CLINIC | Age: 84
End: 2024-01-01
Payer: MEDICARE

## 2024-01-01 ENCOUNTER — APPOINTMENT (OUTPATIENT)
Dept: GENERAL RADIOLOGY | Facility: CLINIC | Age: 84
DRG: 542 | End: 2024-01-01
Attending: NURSE PRACTITIONER
Payer: MEDICARE

## 2024-01-01 ENCOUNTER — LAB REQUISITION (OUTPATIENT)
Dept: LAB | Facility: CLINIC | Age: 84
End: 2024-01-01
Payer: MEDICARE

## 2024-01-01 ENCOUNTER — APPOINTMENT (OUTPATIENT)
Dept: GENERAL RADIOLOGY | Facility: CLINIC | Age: 84
DRG: 542 | End: 2024-01-01
Attending: EMERGENCY MEDICINE
Payer: MEDICARE

## 2024-01-01 ENCOUNTER — MYC MEDICAL ADVICE (OUTPATIENT)
Dept: NEUROSURGERY | Facility: CLINIC | Age: 84
End: 2024-01-01

## 2024-01-01 ENCOUNTER — HOSPITAL ENCOUNTER (OUTPATIENT)
Dept: CT IMAGING | Facility: CLINIC | Age: 84
Discharge: HOME | End: 2024-02-13
Attending: PSYCHIATRY & NEUROLOGY | Admitting: PSYCHIATRY & NEUROLOGY
Payer: MEDICARE

## 2024-01-01 ENCOUNTER — HOSPITAL ENCOUNTER (INPATIENT)
Facility: CLINIC | Age: 84
LOS: 5 days | Discharge: NURSING, REHAB OR LONG-TERM CARE | DRG: 542 | End: 2024-08-23
Attending: EMERGENCY MEDICINE | Admitting: INTERNAL MEDICINE
Payer: MEDICARE

## 2024-01-01 ENCOUNTER — APPOINTMENT (OUTPATIENT)
Dept: MRI IMAGING | Facility: CLINIC | Age: 84
DRG: 542 | End: 2024-01-01
Attending: PHYSICIAN ASSISTANT
Payer: MEDICARE

## 2024-01-01 ENCOUNTER — THERAPY VISIT (OUTPATIENT)
Dept: OCCUPATIONAL THERAPY | Facility: CLINIC | Age: 84
End: 2024-01-01
Payer: MEDICARE

## 2024-01-01 ENCOUNTER — DOCUMENTATION ONLY (OUTPATIENT)
Dept: ORTHOPEDICS | Facility: CLINIC | Age: 84
End: 2024-01-01

## 2024-01-01 VITALS
TEMPERATURE: 97.6 F | WEIGHT: 96 LBS | DIASTOLIC BLOOD PRESSURE: 85 MMHG | RESPIRATION RATE: 18 BRPM | HEART RATE: 76 BPM | BODY MASS INDEX: 19.35 KG/M2 | SYSTOLIC BLOOD PRESSURE: 130 MMHG | HEIGHT: 59 IN | OXYGEN SATURATION: 97 %

## 2024-01-01 VITALS
HEART RATE: 86 BPM | WEIGHT: 98 LBS | BODY MASS INDEX: 19.76 KG/M2 | DIASTOLIC BLOOD PRESSURE: 78 MMHG | OXYGEN SATURATION: 94 % | TEMPERATURE: 97.8 F | SYSTOLIC BLOOD PRESSURE: 112 MMHG | RESPIRATION RATE: 18 BRPM | HEIGHT: 59 IN

## 2024-01-01 VITALS
HEIGHT: 63 IN | RESPIRATION RATE: 18 BRPM | TEMPERATURE: 98.1 F | OXYGEN SATURATION: 92 % | SYSTOLIC BLOOD PRESSURE: 132 MMHG | BODY MASS INDEX: 18.07 KG/M2 | WEIGHT: 102 LBS | DIASTOLIC BLOOD PRESSURE: 80 MMHG | HEART RATE: 75 BPM

## 2024-01-01 VITALS
HEIGHT: 63 IN | HEART RATE: 85 BPM | OXYGEN SATURATION: 90 % | BODY MASS INDEX: 18.07 KG/M2 | DIASTOLIC BLOOD PRESSURE: 85 MMHG | SYSTOLIC BLOOD PRESSURE: 123 MMHG | RESPIRATION RATE: 19 BRPM | WEIGHT: 102 LBS | TEMPERATURE: 97.6 F

## 2024-01-01 VITALS
RESPIRATION RATE: 16 BRPM | TEMPERATURE: 97.4 F | OXYGEN SATURATION: 94 % | DIASTOLIC BLOOD PRESSURE: 96 MMHG | WEIGHT: 109.35 LBS | SYSTOLIC BLOOD PRESSURE: 146 MMHG | HEART RATE: 79 BPM | BODY MASS INDEX: 19.37 KG/M2

## 2024-01-01 VITALS
DIASTOLIC BLOOD PRESSURE: 74 MMHG | BODY MASS INDEX: 21.79 KG/M2 | HEART RATE: 71 BPM | TEMPERATURE: 97.9 F | RESPIRATION RATE: 15 BRPM | WEIGHT: 123 LBS | HEIGHT: 63 IN | OXYGEN SATURATION: 93 % | SYSTOLIC BLOOD PRESSURE: 129 MMHG

## 2024-01-01 VITALS
BODY MASS INDEX: 20.99 KG/M2 | WEIGHT: 104.1 LBS | RESPIRATION RATE: 16 BRPM | HEART RATE: 76 BPM | DIASTOLIC BLOOD PRESSURE: 84 MMHG | SYSTOLIC BLOOD PRESSURE: 134 MMHG | OXYGEN SATURATION: 92 % | HEIGHT: 59 IN

## 2024-01-01 VITALS
OXYGEN SATURATION: 94 % | DIASTOLIC BLOOD PRESSURE: 78 MMHG | HEART RATE: 66 BPM | WEIGHT: 105.7 LBS | HEIGHT: 63 IN | SYSTOLIC BLOOD PRESSURE: 120 MMHG | BODY MASS INDEX: 18.73 KG/M2

## 2024-01-01 VITALS
OXYGEN SATURATION: 93 % | WEIGHT: 98 LBS | DIASTOLIC BLOOD PRESSURE: 69 MMHG | RESPIRATION RATE: 18 BRPM | TEMPERATURE: 98.6 F | HEIGHT: 59 IN | SYSTOLIC BLOOD PRESSURE: 130 MMHG | BODY MASS INDEX: 19.76 KG/M2 | HEART RATE: 73 BPM

## 2024-01-01 VITALS
RESPIRATION RATE: 18 BRPM | TEMPERATURE: 97.1 F | OXYGEN SATURATION: 93 % | SYSTOLIC BLOOD PRESSURE: 154 MMHG | DIASTOLIC BLOOD PRESSURE: 79 MMHG | HEART RATE: 73 BPM

## 2024-01-01 VITALS
TEMPERATURE: 98 F | HEART RATE: 67 BPM | WEIGHT: 102.6 LBS | DIASTOLIC BLOOD PRESSURE: 92 MMHG | OXYGEN SATURATION: 91 % | SYSTOLIC BLOOD PRESSURE: 133 MMHG | BODY MASS INDEX: 18.18 KG/M2 | RESPIRATION RATE: 18 BRPM | HEIGHT: 63 IN

## 2024-01-01 VITALS
SYSTOLIC BLOOD PRESSURE: 149 MMHG | OXYGEN SATURATION: 90 % | DIASTOLIC BLOOD PRESSURE: 83 MMHG | TEMPERATURE: 98 F | RESPIRATION RATE: 18 BRPM | WEIGHT: 104.4 LBS | BODY MASS INDEX: 21.05 KG/M2 | HEART RATE: 77 BPM | HEIGHT: 59 IN

## 2024-01-01 VITALS
SYSTOLIC BLOOD PRESSURE: 141 MMHG | WEIGHT: 108.1 LBS | DIASTOLIC BLOOD PRESSURE: 84 MMHG | OXYGEN SATURATION: 93 % | BODY MASS INDEX: 21.79 KG/M2 | HEIGHT: 59 IN | HEART RATE: 71 BPM

## 2024-01-01 VITALS
RESPIRATION RATE: 18 BRPM | SYSTOLIC BLOOD PRESSURE: 140 MMHG | HEART RATE: 69 BPM | BODY MASS INDEX: 19.66 KG/M2 | OXYGEN SATURATION: 94 % | WEIGHT: 111 LBS | TEMPERATURE: 97.7 F | DIASTOLIC BLOOD PRESSURE: 93 MMHG

## 2024-01-01 VITALS
BODY MASS INDEX: 21.05 KG/M2 | OXYGEN SATURATION: 92 % | DIASTOLIC BLOOD PRESSURE: 72 MMHG | SYSTOLIC BLOOD PRESSURE: 117 MMHG | RESPIRATION RATE: 18 BRPM | HEART RATE: 78 BPM | TEMPERATURE: 98.1 F | HEIGHT: 59 IN | WEIGHT: 104.4 LBS

## 2024-01-01 VITALS — DIASTOLIC BLOOD PRESSURE: 82 MMHG | OXYGEN SATURATION: 95 % | SYSTOLIC BLOOD PRESSURE: 124 MMHG | HEART RATE: 76 BPM

## 2024-01-01 VITALS
SYSTOLIC BLOOD PRESSURE: 118 MMHG | HEART RATE: 84 BPM | RESPIRATION RATE: 20 BRPM | DIASTOLIC BLOOD PRESSURE: 60 MMHG | TEMPERATURE: 97.6 F | OXYGEN SATURATION: 94 %

## 2024-01-01 VITALS
SYSTOLIC BLOOD PRESSURE: 131 MMHG | HEART RATE: 67 BPM | TEMPERATURE: 97.7 F | WEIGHT: 102 LBS | OXYGEN SATURATION: 97 % | HEIGHT: 63 IN | BODY MASS INDEX: 18.07 KG/M2 | DIASTOLIC BLOOD PRESSURE: 78 MMHG | RESPIRATION RATE: 18 BRPM

## 2024-01-01 VITALS
WEIGHT: 97 LBS | BODY MASS INDEX: 19.56 KG/M2 | SYSTOLIC BLOOD PRESSURE: 163 MMHG | TEMPERATURE: 97.2 F | OXYGEN SATURATION: 91 % | DIASTOLIC BLOOD PRESSURE: 71 MMHG | HEART RATE: 90 BPM | RESPIRATION RATE: 16 BRPM | HEIGHT: 59 IN

## 2024-01-01 DIAGNOSIS — U07.1 INFECTION DUE TO 2019 NOVEL CORONAVIRUS: ICD-10-CM

## 2024-01-01 DIAGNOSIS — E87.1 HYPONATREMIA: ICD-10-CM

## 2024-01-01 DIAGNOSIS — S22.051D CLOSED STABLE BURST FRACTURE OF FIFTH THORACIC VERTEBRA WITH ROUTINE HEALING, SUBSEQUENT ENCOUNTER: ICD-10-CM

## 2024-01-01 DIAGNOSIS — Z78.9 IMPAIRED MOBILITY AND ADLS: ICD-10-CM

## 2024-01-01 DIAGNOSIS — N39.3 STRESS INCONTINENCE OF URINE: ICD-10-CM

## 2024-01-01 DIAGNOSIS — R53.1 WEAKNESS GENERALIZED: ICD-10-CM

## 2024-01-01 DIAGNOSIS — Z11.1 ENCOUNTER FOR SCREENING FOR RESPIRATORY TUBERCULOSIS: ICD-10-CM

## 2024-01-01 DIAGNOSIS — G12.21 ALS (AMYOTROPHIC LATERAL SCLEROSIS) (H): Primary | ICD-10-CM

## 2024-01-01 DIAGNOSIS — Z74.09 IMPAIRED MOBILITY AND ACTIVITIES OF DAILY LIVING: ICD-10-CM

## 2024-01-01 DIAGNOSIS — R29.6 FALLS FREQUENTLY: Primary | ICD-10-CM

## 2024-01-01 DIAGNOSIS — G12.21 ALS (AMYOTROPHIC LATERAL SCLEROSIS) (H): ICD-10-CM

## 2024-01-01 DIAGNOSIS — M05.79 RHEUMATOID ARTHRITIS INVOLVING MULTIPLE SITES WITH POSITIVE RHEUMATOID FACTOR (H): ICD-10-CM

## 2024-01-01 DIAGNOSIS — Z78.9 IMPAIRED MOBILITY AND ACTIVITIES OF DAILY LIVING: ICD-10-CM

## 2024-01-01 DIAGNOSIS — U07.1 COVID-19: ICD-10-CM

## 2024-01-01 DIAGNOSIS — Z99.81 ON SUPPLEMENTAL OXYGEN THERAPY: ICD-10-CM

## 2024-01-01 DIAGNOSIS — S00.03XA SCALP HEMATOMA, INITIAL ENCOUNTER: ICD-10-CM

## 2024-01-01 DIAGNOSIS — R53.81 PHYSICAL DECONDITIONING: ICD-10-CM

## 2024-01-01 DIAGNOSIS — R09.02 HYPOXIA: ICD-10-CM

## 2024-01-01 DIAGNOSIS — S22.000A COMPRESSION FRACTURE OF THORACIC VERTEBRA, UNSPECIFIED THORACIC VERTEBRAL LEVEL, INITIAL ENCOUNTER (H): ICD-10-CM

## 2024-01-01 DIAGNOSIS — G31.84 MCI (MILD COGNITIVE IMPAIRMENT): ICD-10-CM

## 2024-01-01 DIAGNOSIS — M06.9 RHEUMATOID ARTHRITIS, INVOLVING UNSPECIFIED SITE, UNSPECIFIED WHETHER RHEUMATOID FACTOR PRESENT (H): ICD-10-CM

## 2024-01-01 DIAGNOSIS — R29.6 FALLS FREQUENTLY: ICD-10-CM

## 2024-01-01 DIAGNOSIS — R33.9 URINARY RETENTION: ICD-10-CM

## 2024-01-01 DIAGNOSIS — R42 DIZZINESS: ICD-10-CM

## 2024-01-01 DIAGNOSIS — E87.1 HYPO-OSMOLALITY AND HYPONATREMIA: ICD-10-CM

## 2024-01-01 DIAGNOSIS — M62.81 MUSCLE WEAKNESS OF LEFT UPPER EXTREMITY: ICD-10-CM

## 2024-01-01 DIAGNOSIS — S22.051D CLOSED STABLE BURST FRACTURE OF FIFTH THORACIC VERTEBRA WITH ROUTINE HEALING, SUBSEQUENT ENCOUNTER: Primary | ICD-10-CM

## 2024-01-01 DIAGNOSIS — S09.90XA CLOSED HEAD INJURY, INITIAL ENCOUNTER: ICD-10-CM

## 2024-01-01 DIAGNOSIS — S09.90XD TRAUMATIC INJURY OF HEAD, SUBSEQUENT ENCOUNTER: ICD-10-CM

## 2024-01-01 DIAGNOSIS — S01.81XA FACIAL LACERATION, INITIAL ENCOUNTER: ICD-10-CM

## 2024-01-01 DIAGNOSIS — R42 DIZZINESS AND GIDDINESS: ICD-10-CM
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
## 2024-01-01 DIAGNOSIS — S22.42XA CLOSED FRACTURE OF MULTIPLE RIBS OF LEFT SIDE, INITIAL ENCOUNTER: ICD-10-CM

## 2024-01-01 DIAGNOSIS — Z74.09 IMPAIRED MOBILITY AND ADLS: ICD-10-CM

## 2024-01-01 DIAGNOSIS — R40.4 UNRESPONSIVE EPISODE: Primary | ICD-10-CM

## 2024-01-01 DIAGNOSIS — S22.000A COMPRESSION FRACTURE OF THORACIC VERTEBRA, UNSPECIFIED THORACIC VERTEBRAL LEVEL, INITIAL ENCOUNTER (H): Primary | ICD-10-CM
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
## 2024-01-01 DIAGNOSIS — E78.00 PURE HYPERCHOLESTEROLEMIA: ICD-10-CM

## 2024-01-01 DIAGNOSIS — Z79.899 MEDICATION MANAGEMENT: ICD-10-CM

## 2024-01-01 DIAGNOSIS — Z09 HOSPITAL DISCHARGE FOLLOW-UP: ICD-10-CM

## 2024-01-01 DIAGNOSIS — E78.5 HYPERLIPIDEMIA LDL GOAL <130: ICD-10-CM
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
## 2024-01-01 DIAGNOSIS — S22.42XD CLOSED FRACTURE OF MULTIPLE RIBS OF LEFT SIDE WITH ROUTINE HEALING, SUBSEQUENT ENCOUNTER: ICD-10-CM

## 2024-01-01 DIAGNOSIS — Z78.9 IMPAIRED INSTRUMENTAL ACTIVITIES OF DAILY LIVING (IADL): ICD-10-CM

## 2024-01-01 DIAGNOSIS — S00.03XD HEMATOMA OF LEFT PARIETAL SCALP, SUBSEQUENT ENCOUNTER: ICD-10-CM

## 2024-01-01 DIAGNOSIS — S40.011A CONTUSION OF RIGHT SHOULDER, INITIAL ENCOUNTER: ICD-10-CM

## 2024-01-01 LAB
ALBUMIN SERPL BCG-MCNC: 3.5 G/DL (ref 3.5–5.2)
ALBUMIN SERPL BCG-MCNC: 3.6 G/DL (ref 3.5–5.2)
ALBUMIN SERPL BCG-MCNC: 3.6 G/DL (ref 3.5–5.2)
ALBUMIN SERPL BCG-MCNC: 3.7 G/DL (ref 3.5–5.2)
ALBUMIN SERPL BCG-MCNC: 3.7 G/DL (ref 3.5–5.2)
ALBUMIN UR-MCNC: NEGATIVE MG/DL
ALP SERPL-CCNC: 67 U/L (ref 40–150)
ALP SERPL-CCNC: 68 U/L (ref 40–150)
ALP SERPL-CCNC: 73 U/L (ref 40–150)
ALP SERPL-CCNC: 78 U/L (ref 40–150)
ALP SERPL-CCNC: 85 U/L (ref 40–150)
ALT SERPL W P-5'-P-CCNC: 13 U/L (ref 0–50)
ALT SERPL W P-5'-P-CCNC: 13 U/L (ref 0–50)
ALT SERPL W P-5'-P-CCNC: 17 U/L (ref 0–50)
ALT SERPL W P-5'-P-CCNC: 25 U/L (ref 0–50)
ALT SERPL W P-5'-P-CCNC: ABNORMAL U/L
ALT SERPL-CCNC: 21 IU/L (ref 5–35)
AMORPH CRY #/AREA URNS HPF: ABNORMAL /HPF
ANION GAP SERPL CALCULATED.3IONS-SCNC: 10 MMOL/L (ref 7–15)
ANION GAP SERPL CALCULATED.3IONS-SCNC: 10 MMOL/L (ref 7–15)
ANION GAP SERPL CALCULATED.3IONS-SCNC: 11 MMOL/L (ref 7–15)
ANION GAP SERPL CALCULATED.3IONS-SCNC: 12 MMOL/L (ref 7–15)
ANION GAP SERPL CALCULATED.3IONS-SCNC: 12 MMOL/L (ref 7–15)
ANION GAP SERPL CALCULATED.3IONS-SCNC: 13 MMOL/L (ref 7–15)
ANION GAP SERPL CALCULATED.3IONS-SCNC: 14 MMOL/L (ref 7–15)
ANION GAP SERPL CALCULATED.3IONS-SCNC: 8 MMOL/L (ref 7–15)
ANION GAP SERPL CALCULATED.3IONS-SCNC: 8 MMOL/L (ref 7–15)
APPEARANCE UR: CLEAR
AST SERPL W P-5'-P-CCNC: 29 U/L (ref 0–45)
AST SERPL W P-5'-P-CCNC: 34 U/L (ref 0–45)
AST SERPL W P-5'-P-CCNC: 36 U/L (ref 0–45)
AST SERPL W P-5'-P-CCNC: 41 U/L (ref 0–45)
AST SERPL W P-5'-P-CCNC: ABNORMAL U/L
AST SERPL-CCNC: 38 U/L (ref 5–34)
ATRIAL RATE - MUSE: 68 BPM
ATRIAL RATE - MUSE: 75 BPM
ATRIAL RATE - MUSE: 75 BPM
BACTERIA #/AREA URNS HPF: ABNORMAL /HPF
BACTERIA UR CULT: NORMAL
BASOPHILS # BLD AUTO: 0 10E3/UL (ref 0–0.2)
BASOPHILS # BLD AUTO: 0 10E3/UL (ref 0–0.2)
BASOPHILS # BLD AUTO: 0.1 10E3/UL (ref 0–0.2)
BASOPHILS NFR BLD AUTO: 0 %
BASOPHILS NFR BLD AUTO: 0 %
BASOPHILS NFR BLD AUTO: 1 %
BILIRUB DIRECT SERPL-MCNC: <0.2 MG/DL (ref 0–0.3)
BILIRUB SERPL-MCNC: 0.2 MG/DL
BILIRUB SERPL-MCNC: 0.3 MG/DL
BILIRUB SERPL-MCNC: 0.4 MG/DL
BILIRUB SERPL-MCNC: 0.5 MG/DL
BILIRUB SERPL-MCNC: 0.7 MG/DL
BILIRUB UR QL STRIP: NEGATIVE
BUN SERPL-MCNC: 12.1 MG/DL (ref 8–23)
BUN SERPL-MCNC: 13 MG/DL (ref 8–23)
BUN SERPL-MCNC: 14.5 MG/DL (ref 8–23)
BUN SERPL-MCNC: 15.2 MG/DL (ref 8–23)
BUN SERPL-MCNC: 15.3 MG/DL (ref 8–23)
BUN SERPL-MCNC: 16.2 MG/DL (ref 8–23)
BUN SERPL-MCNC: 18.1 MG/DL (ref 8–23)
BUN SERPL-MCNC: 18.8 MG/DL (ref 8–23)
BUN SERPL-MCNC: 19 MG/DL (ref 8–23)
BUN SERPL-MCNC: 20.7 MG/DL (ref 8–23)
BUN SERPL-MCNC: 22.6 MG/DL (ref 8–23)
CALCIUM SERPL-MCNC: 9 MG/DL (ref 8.8–10.4)
CALCIUM SERPL-MCNC: 9.1 MG/DL (ref 8.8–10.2)
CALCIUM SERPL-MCNC: 9.1 MG/DL (ref 8.8–10.2)
CALCIUM SERPL-MCNC: 9.2 MG/DL (ref 8.8–10.2)
CALCIUM SERPL-MCNC: 9.2 MG/DL (ref 8.8–10.4)
CALCIUM SERPL-MCNC: 9.2 MG/DL (ref 8.8–10.4)
CALCIUM SERPL-MCNC: 9.3 MG/DL (ref 8.8–10.4)
CALCIUM SERPL-MCNC: 9.3 MG/DL (ref 8.8–10.4)
CHLORIDE SERPL-SCNC: 100 MMOL/L (ref 98–107)
CHLORIDE SERPL-SCNC: 92 MMOL/L (ref 98–107)
CHLORIDE SERPL-SCNC: 93 MMOL/L (ref 98–107)
CHLORIDE SERPL-SCNC: 94 MMOL/L (ref 98–107)
CHLORIDE SERPL-SCNC: 96 MMOL/L (ref 98–107)
CHLORIDE SERPL-SCNC: 98 MMOL/L (ref 98–107)
CHLORIDE SERPL-SCNC: 98 MMOL/L (ref 98–107)
CHLORIDE SERPL-SCNC: 99 MMOL/L (ref 98–107)
CK SERPL-CCNC: 93 U/L (ref 26–192)
COLOR UR AUTO: ABNORMAL
CREAT SERPL-MCNC: 0.37 MG/DL (ref 0.51–0.95)
CREAT SERPL-MCNC: 0.38 MG/DL (ref 0.51–0.95)
CREAT SERPL-MCNC: 0.4 MG/DL (ref 0.51–0.95)
CREAT SERPL-MCNC: 0.47 MG/DL (ref 0.51–0.95)
CREAT SERPL-MCNC: 0.47 MG/DL (ref 0.51–0.95)
CREAT SERPL-MCNC: 0.55 MG/DL (ref 0.51–0.95)
CREAT SERPL-MCNC: 0.59 MG/DL (ref 0.51–0.95)
CREAT SERPL-MCNC: 0.61 MG/DL (ref 0.51–0.95)
CREAT SERPL-MCNC: 0.64 MG/DL (ref 0.51–0.95)
CREAT SERPL-MCNC: 0.64 MG/DL (ref 0.51–0.95)
CREAT SERPL-MCNC: 0.67 MG/DL (ref 0.51–0.95)
CREATININE (EXTERNAL): 0.57 MG/DL (ref 0.5–1.3)
CRP SERPL-MCNC: 12.65 MG/L
DEPRECATED HCO3 PLAS-SCNC: 26 MMOL/L (ref 22–29)
DEPRECATED HCO3 PLAS-SCNC: 26 MMOL/L (ref 22–29)
DEPRECATED HCO3 PLAS-SCNC: 28 MMOL/L (ref 22–29)
DIASTOLIC BLOOD PRESSURE - MUSE: NORMAL MMHG
EGFRCR SERPLBLD CKD-EPI 2021: 85 ML/MIN/1.73M2
EGFRCR SERPLBLD CKD-EPI 2021: 86 ML/MIN/1.73M2
EGFRCR SERPLBLD CKD-EPI 2021: 86 ML/MIN/1.73M2
EGFRCR SERPLBLD CKD-EPI 2021: 87 ML/MIN/1.73M2
EGFRCR SERPLBLD CKD-EPI 2021: 88 ML/MIN/1.73M2
EGFRCR SERPLBLD CKD-EPI 2021: 89 ML/MIN/1.73M2
EGFRCR SERPLBLD CKD-EPI 2021: >90 ML/MIN/1.73M2
EOSINOPHIL # BLD AUTO: 0.1 10E3/UL (ref 0–0.7)
EOSINOPHIL # BLD AUTO: 0.2 10E3/UL (ref 0–0.7)
EOSINOPHIL # BLD AUTO: 0.4 10E3/UL (ref 0–0.7)
EOSINOPHIL NFR BLD AUTO: 1 %
EOSINOPHIL NFR BLD AUTO: 3 %
EOSINOPHIL NFR BLD AUTO: 4 %
ERYTHROCYTE [DISTWIDTH] IN BLOOD BY AUTOMATED COUNT: 12.2 % (ref 10–15)
ERYTHROCYTE [DISTWIDTH] IN BLOOD BY AUTOMATED COUNT: 12.3 % (ref 10–15)
ERYTHROCYTE [DISTWIDTH] IN BLOOD BY AUTOMATED COUNT: 12.7 % (ref 10–15)
ERYTHROCYTE [DISTWIDTH] IN BLOOD BY AUTOMATED COUNT: 13.1 % (ref 10–15)
EXPTIME-PRE: 2.5 SEC
EXPTIME-PRE: 3.1 SEC
FEF2575-%PRED-PRE: 106 %
FEF2575-%PRED-PRE: 62 %
FEF2575-PRE: 0.74 L/SEC
FEF2575-PRE: 1.28 L/SEC
FEF2575-PRED: 1.19 L/SEC
FEF2575-PRED: 1.2 L/SEC
FEFMAX-%PRED-PRE: 61 %
FEFMAX-%PRED-PRE: 85 %
FEFMAX-PRE: 2.13 L/SEC
FEFMAX-PRE: 3.04 L/SEC
FEFMAX-PRED: 3.48 L/SEC
FEFMAX-PRED: 3.54 L/SEC
FEV1-%PRED-PRE: 50 %
FEV1-%PRED-PRE: 75 %
FEV1-PRE: 0.72 L
FEV1-PRE: 1.08 L
FEV1FEV6-PRE: 86 %
FEV1FEV6-PRE: 86 %
FEV1FEV6-PRED: 77 %
FEV1FEV6-PRED: 77 %
FEV1FVC-PRE: 86 %
FEV1FVC-PRE: 88 %
FEV1FVC-PRED: 78 %
FEV1FVC-PRED: 78 %
FIFMAX-PRE: 0.94 L/SEC
FIFMAX-PRE: 1.93 L/SEC
FLUAV RNA SPEC QL NAA+PROBE: NEGATIVE
FLUBV RNA RESP QL NAA+PROBE: NEGATIVE
FVC-%PRED-PRE: 44 %
FVC-%PRED-PRE: 68 %
FVC-PRE: 0.81 L
FVC-PRE: 1.25 L
FVC-PRED: 1.83 L
FVC-PRED: 1.84 L
GAMMA INTERFERON BACKGROUND BLD IA-ACNC: 0 IU/ML
GLUCOSE SERPL-MCNC: 100 MG/DL (ref 70–99)
GLUCOSE SERPL-MCNC: 110 MG/DL (ref 70–99)
GLUCOSE SERPL-MCNC: 114 MG/DL (ref 70–99)
GLUCOSE SERPL-MCNC: 159 MG/DL (ref 70–99)
GLUCOSE SERPL-MCNC: 160 MG/DL (ref 70–99)
GLUCOSE SERPL-MCNC: 185 MG/DL (ref 70–99)
GLUCOSE SERPL-MCNC: 188 MG/DL (ref 70–99)
GLUCOSE SERPL-MCNC: 206 MG/DL (ref 70–99)
GLUCOSE SERPL-MCNC: 209 MG/DL (ref 70–99)
GLUCOSE SERPL-MCNC: 72 MG/DL (ref 70–99)
GLUCOSE SERPL-MCNC: 95 MG/DL (ref 70–99)
GLUCOSE UR STRIP-MCNC: NEGATIVE MG/DL
HCO3 SERPL-SCNC: 24 MMOL/L (ref 22–29)
HCO3 SERPL-SCNC: 24 MMOL/L (ref 22–29)
HCO3 SERPL-SCNC: 25 MMOL/L (ref 22–29)
HCO3 SERPL-SCNC: 26 MMOL/L (ref 22–29)
HCO3 SERPL-SCNC: 27 MMOL/L (ref 22–29)
HCO3 SERPL-SCNC: 27 MMOL/L (ref 22–29)
HCO3 SERPL-SCNC: 28 MMOL/L (ref 22–29)
HCO3 SERPL-SCNC: 35 MMOL/L (ref 22–29)
HCT VFR BLD AUTO: 38.5 % (ref 35–47)
HCT VFR BLD AUTO: 39.4 % (ref 35–47)
HCT VFR BLD AUTO: 40.1 % (ref 35–47)
HCT VFR BLD AUTO: 44.9 % (ref 35–47)
HGB BLD-MCNC: 12.8 G/DL (ref 11.7–15.7)
HGB BLD-MCNC: 12.9 G/DL (ref 11.7–15.7)
HGB BLD-MCNC: 13.2 G/DL (ref 11.7–15.7)
HGB BLD-MCNC: 14 G/DL (ref 11.7–15.7)
HGB BLD-MCNC: 14.9 G/DL (ref 11.7–15.7)
HGB UR QL STRIP: NEGATIVE
HOLD SPECIMEN: NORMAL
IMM GRANULOCYTES # BLD: 0 10E3/UL
IMM GRANULOCYTES NFR BLD: 0 %
IMM GRANULOCYTES NFR BLD: 0 %
IMM GRANULOCYTES NFR BLD: 1 %
INTERPRETATION ECG - MUSE: NORMAL
KETONES UR STRIP-MCNC: 10 MG/DL
LEUKOCYTE ESTERASE UR QL STRIP: NEGATIVE
LVEF ECHO: NORMAL
LYMPHOCYTES # BLD AUTO: 1.9 10E3/UL (ref 0.8–5.3)
LYMPHOCYTES # BLD AUTO: 2.6 10E3/UL (ref 0.8–5.3)
LYMPHOCYTES # BLD AUTO: 4.7 10E3/UL (ref 0.8–5.3)
LYMPHOCYTES NFR BLD AUTO: 26 %
LYMPHOCYTES NFR BLD AUTO: 30 %
LYMPHOCYTES NFR BLD AUTO: 43 %
M TB IFN-G BLD-IMP: NEGATIVE
M TB IFN-G CD4+ BCKGRND COR BLD-ACNC: 10 IU/ML
MCH RBC QN AUTO: 32 PG (ref 26.5–33)
MCH RBC QN AUTO: 32.6 PG (ref 26.5–33)
MCH RBC QN AUTO: 32.7 PG (ref 26.5–33)
MCH RBC QN AUTO: 32.8 PG (ref 26.5–33)
MCHC RBC AUTO-ENTMCNC: 31.2 G/DL (ref 31.5–36.5)
MCHC RBC AUTO-ENTMCNC: 32.7 G/DL (ref 31.5–36.5)
MCHC RBC AUTO-ENTMCNC: 32.9 G/DL (ref 31.5–36.5)
MCHC RBC AUTO-ENTMCNC: 33.2 G/DL (ref 31.5–36.5)
MCV RBC AUTO: 100 FL (ref 78–100)
MCV RBC AUTO: 103 FL (ref 78–100)
MCV RBC AUTO: 99 FL (ref 78–100)
MCV RBC AUTO: 99 FL (ref 78–100)
MEP-PRE: 28 CMH2O
MEP-PRE: 34 CMH2O
MIP-PRE: -17 CMH2O
MIP-PRE: -21 CMH2O
MITOGEN IGNF BCKGRD COR BLD-ACNC: 0 IU/ML
MITOGEN IGNF BCKGRD COR BLD-ACNC: 0 IU/ML
MONOCYTES # BLD AUTO: 0.6 10E3/UL (ref 0–1.3)
MONOCYTES # BLD AUTO: 0.8 10E3/UL (ref 0–1.3)
MONOCYTES # BLD AUTO: 0.9 10E3/UL (ref 0–1.3)
MONOCYTES NFR BLD AUTO: 13 %
MONOCYTES NFR BLD AUTO: 7 %
MONOCYTES NFR BLD AUTO: 8 %
NEUTROPHILS # BLD AUTO: 4.3 10E3/UL (ref 1.6–8.3)
NEUTROPHILS # BLD AUTO: 5 10E3/UL (ref 1.6–8.3)
NEUTROPHILS # BLD AUTO: 5.1 10E3/UL (ref 1.6–8.3)
NEUTROPHILS NFR BLD AUTO: 45 %
NEUTROPHILS NFR BLD AUTO: 59 %
NEUTROPHILS NFR BLD AUTO: 59 %
NITRATE UR QL: NEGATIVE
NRBC # BLD AUTO: 0 10E3/UL
NRBC BLD AUTO-RTO: 0 /100
NT-PROBNP SERPL-MCNC: 556 PG/ML (ref 0–1800)
P AXIS - MUSE: 56 DEGREES
P AXIS - MUSE: 57 DEGREES
P AXIS - MUSE: 73 DEGREES
PH UR STRIP: 6.5 [PH] (ref 5–7)
PLATELET # BLD AUTO: 180 10E3/UL (ref 150–450)
PLATELET # BLD AUTO: 190 10E3/UL (ref 150–450)
PLATELET # BLD AUTO: 198 10E3/UL (ref 150–450)
PLATELET # BLD AUTO: 204 10E3/UL (ref 150–450)
PLATELET # BLD AUTO: 212 10E3/UL (ref 150–450)
POTASSIUM SERPL-SCNC: 3.6 MMOL/L (ref 3.4–5.3)
POTASSIUM SERPL-SCNC: 3.7 MMOL/L (ref 3.4–5.3)
POTASSIUM SERPL-SCNC: 3.7 MMOL/L (ref 3.4–5.3)
POTASSIUM SERPL-SCNC: 3.8 MMOL/L (ref 3.4–5.3)
POTASSIUM SERPL-SCNC: 3.9 MMOL/L (ref 3.4–5.3)
POTASSIUM SERPL-SCNC: 4.1 MMOL/L (ref 3.4–5.3)
POTASSIUM SERPL-SCNC: 4.2 MMOL/L (ref 3.4–5.3)
POTASSIUM SERPL-SCNC: 4.4 MMOL/L (ref 3.4–5.3)
POTASSIUM SERPL-SCNC: 4.7 MMOL/L (ref 3.4–5.3)
PR INTERVAL - MUSE: 152 MS
PR INTERVAL - MUSE: 152 MS
PR INTERVAL - MUSE: 160 MS
PROCALCITONIN SERPL IA-MCNC: 0.06 NG/ML
PROCALCITONIN SERPL IA-MCNC: 0.11 NG/ML
PROT SERPL-MCNC: 7 G/DL (ref 6.4–8.3)
PROT SERPL-MCNC: 7.1 G/DL (ref 6.4–8.3)
PROT SERPL-MCNC: 7.3 G/DL (ref 6.4–8.3)
QRS DURATION - MUSE: 70 MS
QRS DURATION - MUSE: 72 MS
QRS DURATION - MUSE: 78 MS
QT - MUSE: 396 MS
QT - MUSE: 414 MS
QT - MUSE: 430 MS
QTC - MUSE: 440 MS
QTC - MUSE: 442 MS
QTC - MUSE: 480 MS
QUANTIFERON MITOGEN: 10 IU/ML
QUANTIFERON NIL TUBE: 0 IU/ML
QUANTIFERON TB1 TUBE: 0 IU/ML
QUANTIFERON TB2 TUBE: 0
R AXIS - MUSE: -13 DEGREES
R AXIS - MUSE: -22 DEGREES
R AXIS - MUSE: -31 DEGREES
RBC # BLD AUTO: 3.9 10E6/UL (ref 3.8–5.2)
RBC # BLD AUTO: 3.94 10E6/UL (ref 3.8–5.2)
RBC # BLD AUTO: 4.05 10E6/UL (ref 3.8–5.2)
RBC # BLD AUTO: 4.38 10E6/UL (ref 3.8–5.2)
RBC URINE: 0 /HPF
RSV RNA SPEC NAA+PROBE: NEGATIVE
SARS-COV-2 RNA RESP QL NAA+PROBE: POSITIVE
SODIUM SERPL-SCNC: 130 MMOL/L (ref 135–145)
SODIUM SERPL-SCNC: 131 MMOL/L (ref 135–145)
SODIUM SERPL-SCNC: 131 MMOL/L (ref 135–145)
SODIUM SERPL-SCNC: 133 MMOL/L (ref 135–145)
SODIUM SERPL-SCNC: 134 MMOL/L (ref 135–145)
SODIUM SERPL-SCNC: 135 MMOL/L (ref 135–145)
SODIUM SERPL-SCNC: 135 MMOL/L (ref 135–145)
SODIUM SERPL-SCNC: 138 MMOL/L (ref 135–145)
SODIUM SERPL-SCNC: 141 MMOL/L (ref 135–145)
SP GR UR STRIP: 1.01 (ref 1–1.03)
SYSTOLIC BLOOD PRESSURE - MUSE: NORMAL MMHG
T AXIS - MUSE: 23 DEGREES
T AXIS - MUSE: 31 DEGREES
T AXIS - MUSE: 68 DEGREES
TROPONIN T SERPL HS-MCNC: 18 NG/L
TROPONIN T SERPL HS-MCNC: 25 NG/L
UROBILINOGEN UR STRIP-MCNC: NORMAL MG/DL
VENTRICULAR RATE- MUSE: 68 BPM
VENTRICULAR RATE- MUSE: 75 BPM
VENTRICULAR RATE- MUSE: 75 BPM
WBC # BLD AUTO: 11.1 10E3/UL (ref 4–11)
WBC # BLD AUTO: 5.2 10E3/UL (ref 4–11)
WBC # BLD AUTO: 7.3 10E3/UL (ref 4–11)
WBC # BLD AUTO: 8.6 10E3/UL (ref 4–11)
WBC URINE: 2 /HPF

## 2024-01-01 PROCEDURE — 250N000011 HC RX IP 250 OP 636: Performed by: INTERNAL MEDICINE

## 2024-01-01 PROCEDURE — 80048 BASIC METABOLIC PNL TOTAL CA: CPT | Mod: ORL | Performed by: NURSE PRACTITIONER

## 2024-01-01 PROCEDURE — G2211 COMPLEX E/M VISIT ADD ON: HCPCS | Performed by: PSYCHIATRY & NEUROLOGY

## 2024-01-01 PROCEDURE — 93005 ELECTROCARDIOGRAM TRACING: CPT

## 2024-01-01 PROCEDURE — 86481 TB AG RESPONSE T-CELL SUSP: CPT | Performed by: PHYSICIAN ASSISTANT

## 2024-01-01 PROCEDURE — 96374 THER/PROPH/DIAG INJ IV PUSH: CPT

## 2024-01-01 PROCEDURE — 93306 TTE W/DOPPLER COMPLETE: CPT

## 2024-01-01 PROCEDURE — 250N000013 HC RX MED GY IP 250 OP 250 PS 637: Performed by: INTERNAL MEDICINE

## 2024-01-01 PROCEDURE — 94375 RESPIRATORY FLOW VOLUME LOOP: CPT | Performed by: INTERNAL MEDICINE

## 2024-01-01 PROCEDURE — 99309 SBSQ NF CARE MODERATE MDM 30: CPT | Performed by: NURSE PRACTITIONER

## 2024-01-01 PROCEDURE — 258N000003 HC RX IP 258 OP 636: Performed by: EMERGENCY MEDICINE

## 2024-01-01 PROCEDURE — 36415 COLL VENOUS BLD VENIPUNCTURE: CPT | Performed by: HOSPITALIST

## 2024-01-01 PROCEDURE — 85048 AUTOMATED LEUKOCYTE COUNT: CPT | Performed by: EMERGENCY MEDICINE

## 2024-01-01 PROCEDURE — L0464 TLSO 4MOD SACRO-SCAP PRE: HCPCS

## 2024-01-01 PROCEDURE — 85049 AUTOMATED PLATELET COUNT: CPT | Performed by: INTERNAL MEDICINE

## 2024-01-01 PROCEDURE — 97165 OT EVAL LOW COMPLEX 30 MIN: CPT | Mod: GO

## 2024-01-01 PROCEDURE — 99233 SBSQ HOSP IP/OBS HIGH 50: CPT | Performed by: HOSPITALIST

## 2024-01-01 PROCEDURE — 97530 THERAPEUTIC ACTIVITIES: CPT | Mod: GP | Performed by: PHYSICAL THERAPIST

## 2024-01-01 PROCEDURE — 80076 HEPATIC FUNCTION PANEL: CPT | Performed by: INTERNAL MEDICINE

## 2024-01-01 PROCEDURE — 36415 COLL VENOUS BLD VENIPUNCTURE: CPT | Performed by: INTERNAL MEDICINE

## 2024-01-01 PROCEDURE — 84145 PROCALCITONIN (PCT): CPT | Performed by: INTERNAL MEDICINE

## 2024-01-01 PROCEDURE — 72146 MRI CHEST SPINE W/O DYE: CPT | Mod: MG

## 2024-01-01 PROCEDURE — 86140 C-REACTIVE PROTEIN: CPT | Performed by: HOSPITALIST

## 2024-01-01 PROCEDURE — 80048 BASIC METABOLIC PNL TOTAL CA: CPT | Performed by: EMERGENCY MEDICINE

## 2024-01-01 PROCEDURE — 99285 EMERGENCY DEPT VISIT HI MDM: CPT | Mod: 25

## 2024-01-01 PROCEDURE — 97162 PT EVAL MOD COMPLEX 30 MIN: CPT | Mod: GP | Performed by: PHYSICAL THERAPIST

## 2024-01-01 PROCEDURE — 80048 BASIC METABOLIC PNL TOTAL CA: CPT | Performed by: INTERNAL MEDICINE

## 2024-01-01 PROCEDURE — 87637 SARSCOV2&INF A&B&RSV AMP PRB: CPT | Performed by: EMERGENCY MEDICINE

## 2024-01-01 PROCEDURE — P9604 ONE-WAY ALLOW PRORATED TRIP: HCPCS | Mod: ORL | Performed by: NURSE PRACTITIONER

## 2024-01-01 PROCEDURE — 80053 COMPREHEN METABOLIC PANEL: CPT | Performed by: EMERGENCY MEDICINE

## 2024-01-01 PROCEDURE — 36415 COLL VENOUS BLD VENIPUNCTURE: CPT | Performed by: EMERGENCY MEDICINE

## 2024-01-01 PROCEDURE — 70450 CT HEAD/BRAIN W/O DYE: CPT | Mod: MA

## 2024-01-01 PROCEDURE — 94799 UNLISTED PULMONARY SVC/PX: CPT | Performed by: INTERNAL MEDICINE

## 2024-01-01 PROCEDURE — 84145 PROCALCITONIN (PCT): CPT | Performed by: HOSPITALIST

## 2024-01-01 PROCEDURE — 71046 X-RAY EXAM CHEST 2 VIEWS: CPT

## 2024-01-01 PROCEDURE — 82247 BILIRUBIN TOTAL: CPT | Performed by: INTERNAL MEDICINE

## 2024-01-01 PROCEDURE — 99308 SBSQ NF CARE LOW MDM 20: CPT | Performed by: NURSE PRACTITIONER

## 2024-01-01 PROCEDURE — G2211 COMPLEX E/M VISIT ADD ON: HCPCS | Performed by: INTERNAL MEDICINE

## 2024-01-01 PROCEDURE — 85018 HEMOGLOBIN: CPT | Performed by: HOSPITALIST

## 2024-01-01 PROCEDURE — 85041 AUTOMATED RBC COUNT: CPT | Performed by: INTERNAL MEDICINE

## 2024-01-01 PROCEDURE — 80048 BASIC METABOLIC PNL TOTAL CA: CPT | Mod: ORL | Performed by: INTERNAL MEDICINE

## 2024-01-01 PROCEDURE — 36415 COLL VENOUS BLD VENIPUNCTURE: CPT | Mod: ORL | Performed by: NURSE PRACTITIONER

## 2024-01-01 PROCEDURE — 93005 ELECTROCARDIOGRAM TRACING: CPT | Mod: XU

## 2024-01-01 PROCEDURE — 83880 ASSAY OF NATRIURETIC PEPTIDE: CPT | Performed by: EMERGENCY MEDICINE

## 2024-01-01 PROCEDURE — 250N000011 HC RX IP 250 OP 636: Performed by: EMERGENCY MEDICINE

## 2024-01-01 PROCEDURE — 210N000002 HC R&B HEART CARE

## 2024-01-01 PROCEDURE — 99284 EMERGENCY DEPT VISIT MOD MDM: CPT | Mod: 25

## 2024-01-01 PROCEDURE — 12002 RPR S/N/AX/GEN/TRNK2.6-7.5CM: CPT

## 2024-01-01 PROCEDURE — 36415 COLL VENOUS BLD VENIPUNCTURE: CPT | Mod: ORL | Performed by: INTERNAL MEDICINE

## 2024-01-01 PROCEDURE — 84484 ASSAY OF TROPONIN QUANT: CPT | Performed by: INTERNAL MEDICINE

## 2024-01-01 PROCEDURE — 84155 ASSAY OF PROTEIN SERUM: CPT | Performed by: EMERGENCY MEDICINE

## 2024-01-01 PROCEDURE — G1010 CDSM STANSON: HCPCS

## 2024-01-01 PROCEDURE — 96360 HYDRATION IV INFUSION INIT: CPT | Mod: 59

## 2024-01-01 PROCEDURE — 72125 CT NECK SPINE W/O DYE: CPT | Mod: MA

## 2024-01-01 PROCEDURE — 99305 1ST NF CARE MODERATE MDM 35: CPT | Performed by: INTERNAL MEDICINE

## 2024-01-01 PROCEDURE — XW033E5 INTRODUCTION OF REMDESIVIR ANTI-INFECTIVE INTO PERIPHERAL VEIN, PERCUTANEOUS APPROACH, NEW TECHNOLOGY GROUP 5: ICD-10-PCS | Performed by: INTERNAL MEDICINE

## 2024-01-01 PROCEDURE — 258N000003 HC RX IP 258 OP 636: Performed by: INTERNAL MEDICINE

## 2024-01-01 PROCEDURE — P9604 ONE-WAY ALLOW PRORATED TRIP: HCPCS | Performed by: PHYSICIAN ASSISTANT

## 2024-01-01 PROCEDURE — 250N000013 HC RX MED GY IP 250 OP 250 PS 637: Performed by: EMERGENCY MEDICINE

## 2024-01-01 PROCEDURE — 120N000001 HC R&B MED SURG/OB

## 2024-01-01 PROCEDURE — 73030 X-RAY EXAM OF SHOULDER: CPT | Mod: RT

## 2024-01-01 PROCEDURE — 82374 ASSAY BLOOD CARBON DIOXIDE: CPT | Performed by: HOSPITALIST

## 2024-01-01 PROCEDURE — 85025 COMPLETE CBC W/AUTO DIFF WBC: CPT | Performed by: EMERGENCY MEDICINE

## 2024-01-01 PROCEDURE — 82374 ASSAY BLOOD CARBON DIOXIDE: CPT | Performed by: EMERGENCY MEDICINE

## 2024-01-01 PROCEDURE — 84484 ASSAY OF TROPONIN QUANT: CPT | Performed by: HOSPITALIST

## 2024-01-01 PROCEDURE — 70450 CT HEAD/BRAIN W/O DYE: CPT | Mod: ME

## 2024-01-01 PROCEDURE — 96361 HYDRATE IV INFUSION ADD-ON: CPT

## 2024-01-01 PROCEDURE — 97535 SELF CARE MNGMENT TRAINING: CPT | Mod: GP | Performed by: PHYSICAL THERAPIST

## 2024-01-01 PROCEDURE — 99232 SBSQ HOSP IP/OBS MODERATE 35: CPT | Performed by: INTERNAL MEDICINE

## 2024-01-01 PROCEDURE — 99214 OFFICE O/P EST MOD 30 MIN: CPT | Performed by: PSYCHIATRY & NEUROLOGY

## 2024-01-01 PROCEDURE — P9604 ONE-WAY ALLOW PRORATED TRIP: HCPCS | Mod: ORL | Performed by: INTERNAL MEDICINE

## 2024-01-01 PROCEDURE — 250N000012 HC RX MED GY IP 250 OP 636 PS 637: Performed by: HOSPITALIST

## 2024-01-01 PROCEDURE — 81001 URINALYSIS AUTO W/SCOPE: CPT | Performed by: EMERGENCY MEDICINE

## 2024-01-01 PROCEDURE — 99221 1ST HOSP IP/OBS SF/LOW 40: CPT | Performed by: PHYSICIAN ASSISTANT

## 2024-01-01 PROCEDURE — 36415 COLL VENOUS BLD VENIPUNCTURE: CPT | Performed by: PHYSICIAN ASSISTANT

## 2024-01-01 PROCEDURE — 72070 X-RAY EXAM THORAC SPINE 2VWS: CPT

## 2024-01-01 PROCEDURE — 999N000128 HC STATISTIC PERIPHERAL IV START W/O US GUIDANCE

## 2024-01-01 PROCEDURE — 97535 SELF CARE MNGMENT TRAINING: CPT | Mod: GO

## 2024-01-01 PROCEDURE — 82550 ASSAY OF CK (CPK): CPT | Performed by: HOSPITALIST

## 2024-01-01 PROCEDURE — 99232 SBSQ HOSP IP/OBS MODERATE 35: CPT | Performed by: HOSPITALIST

## 2024-01-01 PROCEDURE — 99239 HOSP IP/OBS DSCHRG MGMT >30: CPT | Performed by: HOSPITALIST

## 2024-01-01 PROCEDURE — 99223 1ST HOSP IP/OBS HIGH 75: CPT | Mod: AI | Performed by: INTERNAL MEDICINE

## 2024-01-01 PROCEDURE — 93306 TTE W/DOPPLER COMPLETE: CPT | Mod: 26 | Performed by: INTERNAL MEDICINE

## 2024-01-01 PROCEDURE — 80048 BASIC METABOLIC PNL TOTAL CA: CPT | Performed by: NURSE PRACTITIONER

## 2024-01-01 PROCEDURE — 87086 URINE CULTURE/COLONY COUNT: CPT | Performed by: HOSPITALIST

## 2024-01-01 PROCEDURE — 71250 CT THORAX DX C-: CPT | Mod: MG

## 2024-01-01 PROCEDURE — 258N000003 HC RX IP 258 OP 636: Performed by: HOSPITALIST

## 2024-01-01 PROCEDURE — 99214 OFFICE O/P EST MOD 30 MIN: CPT | Performed by: INTERNAL MEDICINE

## 2024-01-01 PROCEDURE — 99310 SBSQ NF CARE HIGH MDM 45: CPT | Performed by: NURSE PRACTITIONER

## 2024-01-01 PROCEDURE — 99213 OFFICE O/P EST LOW 20 MIN: CPT | Performed by: NURSE PRACTITIONER

## 2024-01-01 PROCEDURE — 99207 PR NO CHARGE LOS: CPT

## 2024-01-01 RX ORDER — CALCIUM CARBONATE 500 MG/1
1000 TABLET, CHEWABLE ORAL 4 TIMES DAILY PRN
Status: DISCONTINUED | OUTPATIENT
Start: 2024-01-01 | End: 2024-01-01 | Stop reason: HOSPADM

## 2024-01-01 RX ORDER — NALOXONE HYDROCHLORIDE 0.4 MG/ML
0.2 INJECTION, SOLUTION INTRAMUSCULAR; INTRAVENOUS; SUBCUTANEOUS
Status: DISCONTINUED | OUTPATIENT
Start: 2024-01-01 | End: 2024-01-01

## 2024-01-01 RX ORDER — PANTOPRAZOLE SODIUM 40 MG/1
40 TABLET, DELAYED RELEASE ORAL
Status: DISCONTINUED | OUTPATIENT
Start: 2024-01-01 | End: 2024-01-01

## 2024-01-01 RX ORDER — IPRATROPIUM BROMIDE AND ALBUTEROL SULFATE 2.5; .5 MG/3ML; MG/3ML
1 SOLUTION RESPIRATORY (INHALATION) 4 TIMES DAILY PRN
COMMUNITY
Start: 2024-01-01

## 2024-01-01 RX ORDER — ACETAMINOPHEN 325 MG/1
650 TABLET ORAL EVERY 4 HOURS PRN
DISCHARGE
Start: 2024-01-01

## 2024-01-01 RX ORDER — HEPARIN SODIUM 5000 [USP'U]/.5ML
5000 INJECTION, SOLUTION INTRAVENOUS; SUBCUTANEOUS EVERY 8 HOURS
Status: DISCONTINUED | OUTPATIENT
Start: 2024-01-01 | End: 2024-01-01

## 2024-01-01 RX ORDER — DEXAMETHASONE SODIUM PHOSPHATE 10 MG/ML
6 INJECTION, SOLUTION INTRAMUSCULAR; INTRAVENOUS DAILY
Status: DISCONTINUED | OUTPATIENT
Start: 2024-01-01 | End: 2024-01-01

## 2024-01-01 RX ORDER — SODIUM CHLORIDE 9 MG/ML
INJECTION, SOLUTION INTRAVENOUS CONTINUOUS
Status: ACTIVE | OUTPATIENT
Start: 2024-01-01 | End: 2024-01-01

## 2024-01-01 RX ORDER — DIPHENHYDRAMINE HYDROCHLORIDE AND LIDOCAINE HYDROCHLORIDE AND ALUMINUM HYDROXIDE AND MAGNESIUM HYDRO
10 KIT EVERY 6 HOURS PRN
Status: DISCONTINUED | OUTPATIENT
Start: 2024-01-01 | End: 2024-01-01 | Stop reason: HOSPADM

## 2024-01-01 RX ORDER — DEXAMETHASONE SODIUM PHOSPHATE 10 MG/ML
6 INJECTION, SOLUTION INTRAMUSCULAR; INTRAVENOUS ONCE
Status: COMPLETED | OUTPATIENT
Start: 2024-01-01 | End: 2024-01-01

## 2024-01-01 RX ORDER — ACETAMINOPHEN 325 MG/1
650 TABLET ORAL EVERY 4 HOURS PRN
Status: DISCONTINUED | OUTPATIENT
Start: 2024-01-01 | End: 2024-01-01 | Stop reason: HOSPADM

## 2024-01-01 RX ORDER — METHOCARBAMOL 500 MG/1
500 TABLET, FILM COATED ORAL 3 TIMES DAILY PRN
Status: DISCONTINUED | OUTPATIENT
Start: 2024-01-01 | End: 2024-01-01 | Stop reason: HOSPADM

## 2024-01-01 RX ORDER — NALOXONE HYDROCHLORIDE 0.4 MG/ML
0.4 INJECTION, SOLUTION INTRAMUSCULAR; INTRAVENOUS; SUBCUTANEOUS
Status: DISCONTINUED | OUTPATIENT
Start: 2024-01-01 | End: 2024-01-01

## 2024-01-01 RX ORDER — LIDOCAINE 40 MG/G
CREAM TOPICAL
Status: DISCONTINUED | OUTPATIENT
Start: 2024-01-01 | End: 2024-01-01 | Stop reason: HOSPADM

## 2024-01-01 RX ORDER — METHOCARBAMOL 500 MG/1
500 TABLET, FILM COATED ORAL 3 TIMES DAILY PRN
DISCHARGE
Start: 2024-01-01 | End: 2024-01-01

## 2024-01-01 RX ORDER — LOVASTATIN 20 MG
20 TABLET ORAL AT BEDTIME
Qty: 90 TABLET | Refills: 0 | Status: SHIPPED | OUTPATIENT
Start: 2024-01-01 | End: 2024-01-01

## 2024-01-01 RX ORDER — DOXYCYCLINE 100 MG/1
100 CAPSULE ORAL EVERY 12 HOURS SCHEDULED
Status: DISCONTINUED | OUTPATIENT
Start: 2024-01-01 | End: 2024-01-01 | Stop reason: HOSPADM

## 2024-01-01 RX ORDER — DEXAMETHASONE 2 MG/1
6 TABLET ORAL DAILY
Status: DISCONTINUED | OUTPATIENT
Start: 2024-01-01 | End: 2024-01-01 | Stop reason: HOSPADM

## 2024-01-01 RX ORDER — OXYBUTYNIN CHLORIDE 5 MG/1
TABLET, EXTENDED RELEASE ORAL
Qty: 90 TABLET | Refills: 0 | Status: SHIPPED | OUTPATIENT
Start: 2024-01-01 | End: 2024-01-01

## 2024-01-01 RX ORDER — ACETAMINOPHEN 650 MG/1
650 SUPPOSITORY RECTAL EVERY 4 HOURS PRN
Status: DISCONTINUED | OUTPATIENT
Start: 2024-01-01 | End: 2024-01-01 | Stop reason: HOSPADM

## 2024-01-01 RX ORDER — CEFTRIAXONE 1 G/1
1 INJECTION, POWDER, FOR SOLUTION INTRAMUSCULAR; INTRAVENOUS EVERY 24 HOURS
Status: DISCONTINUED | OUTPATIENT
Start: 2024-01-01 | End: 2024-01-01

## 2024-01-01 RX ORDER — RILUZOLE 50 MG/1
TABLET, FILM COATED ORAL
Qty: 60 TABLET | Refills: 2 | Status: SHIPPED | OUTPATIENT
Start: 2024-01-01

## 2024-01-01 RX ORDER — DOXYCYCLINE 100 MG/10ML
100 INJECTION, POWDER, LYOPHILIZED, FOR SOLUTION INTRAVENOUS EVERY 12 HOURS
Status: DISCONTINUED | OUTPATIENT
Start: 2024-01-01 | End: 2024-01-01

## 2024-01-01 RX ORDER — ACETAMINOPHEN 325 MG/1
650 TABLET ORAL ONCE
Status: COMPLETED | OUTPATIENT
Start: 2024-01-01 | End: 2024-01-01

## 2024-01-01 RX ORDER — DOXYCYCLINE 100 MG/1
100 CAPSULE ORAL EVERY 12 HOURS
DISCHARGE
Start: 2024-01-01 | End: 2024-01-01

## 2024-01-01 RX ORDER — AMOXICILLIN 250 MG
2 CAPSULE ORAL 2 TIMES DAILY PRN
Status: DISCONTINUED | OUTPATIENT
Start: 2024-01-01 | End: 2024-01-01 | Stop reason: HOSPADM

## 2024-01-01 RX ORDER — OXYBUTYNIN CHLORIDE 5 MG/1
5 TABLET, EXTENDED RELEASE ORAL EVERY EVENING
Status: DISCONTINUED | OUTPATIENT
Start: 2024-01-01 | End: 2024-01-01 | Stop reason: HOSPADM

## 2024-01-01 RX ORDER — ENOXAPARIN SODIUM 100 MG/ML
40 INJECTION SUBCUTANEOUS EVERY 24 HOURS
Status: DISCONTINUED | OUTPATIENT
Start: 2024-01-01 | End: 2024-01-01 | Stop reason: HOSPADM

## 2024-01-01 RX ORDER — RILUZOLE 50 MG/1
TABLET, FILM COATED ORAL
Qty: 60 TABLET | Refills: 0 | Status: SHIPPED | OUTPATIENT
Start: 2024-01-01 | End: 2024-01-01

## 2024-01-01 RX ORDER — AMOXICILLIN 250 MG
1 CAPSULE ORAL 2 TIMES DAILY PRN
Status: DISCONTINUED | OUTPATIENT
Start: 2024-01-01 | End: 2024-01-01 | Stop reason: HOSPADM

## 2024-01-01 RX ORDER — PANTOPRAZOLE SODIUM 40 MG/1
40 TABLET, DELAYED RELEASE ORAL
Status: DISCONTINUED | OUTPATIENT
Start: 2024-01-01 | End: 2024-01-01 | Stop reason: HOSPADM

## 2024-01-01 RX ORDER — RILUZOLE 50 MG/1
50 TABLET, FILM COATED ORAL EVERY 12 HOURS
Status: DISCONTINUED | OUTPATIENT
Start: 2024-01-01 | End: 2024-01-01 | Stop reason: HOSPADM

## 2024-01-01 RX ADMIN — OXYBUTYNIN CHLORIDE 5 MG: 5 TABLET, EXTENDED RELEASE ORAL at 22:37

## 2024-01-01 RX ADMIN — OXYBUTYNIN CHLORIDE 5 MG: 5 TABLET, EXTENDED RELEASE ORAL at 21:21

## 2024-01-01 RX ADMIN — DEXAMETHASONE SODIUM PHOSPHATE 6 MG: 10 INJECTION, SOLUTION INTRAMUSCULAR; INTRAVENOUS at 16:37

## 2024-01-01 RX ADMIN — DEXAMETHASONE SODIUM PHOSPHATE 6 MG: 10 INJECTION INTRAMUSCULAR; INTRAVENOUS at 19:53

## 2024-01-01 RX ADMIN — RILUZOLE 50 MG: 50 TABLET, FILM COATED ORAL at 09:04

## 2024-01-01 RX ADMIN — SODIUM CHLORIDE 50 ML: 900 INJECTION INTRAVENOUS at 00:46

## 2024-01-01 RX ADMIN — DOXYCYCLINE 100 MG: 100 INJECTION, POWDER, LYOPHILIZED, FOR SOLUTION INTRAVENOUS at 01:46

## 2024-01-01 RX ADMIN — DOXYCYCLINE 100 MG: 100 INJECTION, POWDER, LYOPHILIZED, FOR SOLUTION INTRAVENOUS at 01:56

## 2024-01-01 RX ADMIN — DOXYCYCLINE 100 MG: 100 INJECTION, POWDER, LYOPHILIZED, FOR SOLUTION INTRAVENOUS at 13:19

## 2024-01-01 RX ADMIN — DOXYCYCLINE 100 MG: 100 INJECTION, POWDER, LYOPHILIZED, FOR SOLUTION INTRAVENOUS at 00:40

## 2024-01-01 RX ADMIN — DOXYCYCLINE 100 MG: 100 INJECTION, POWDER, LYOPHILIZED, FOR SOLUTION INTRAVENOUS at 03:36

## 2024-01-01 RX ADMIN — PANTOPRAZOLE SODIUM 40 MG: 40 TABLET, DELAYED RELEASE ORAL at 06:22

## 2024-01-01 RX ADMIN — CEFTRIAXONE SODIUM 1 G: 1 INJECTION, POWDER, FOR SOLUTION INTRAMUSCULAR; INTRAVENOUS at 01:52

## 2024-01-01 RX ADMIN — RILUZOLE 50 MG: 50 TABLET, FILM COATED ORAL at 21:21

## 2024-01-01 RX ADMIN — RILUZOLE 50 MG: 50 TABLET, FILM COATED ORAL at 08:22

## 2024-01-01 RX ADMIN — CEFTRIAXONE SODIUM 1 G: 1 INJECTION, POWDER, FOR SOLUTION INTRAMUSCULAR; INTRAVENOUS at 00:57

## 2024-01-01 RX ADMIN — DEXAMETHASONE SODIUM PHOSPHATE 6 MG: 10 INJECTION, SOLUTION INTRAMUSCULAR; INTRAVENOUS at 16:49

## 2024-01-01 RX ADMIN — DOXYCYCLINE 100 MG: 100 INJECTION, POWDER, LYOPHILIZED, FOR SOLUTION INTRAVENOUS at 16:48

## 2024-01-01 RX ADMIN — SODIUM CHLORIDE 1000 ML: 9 INJECTION, SOLUTION INTRAVENOUS at 18:22

## 2024-01-01 RX ADMIN — SODIUM CHLORIDE: 9 INJECTION, SOLUTION INTRAVENOUS at 16:36

## 2024-01-01 RX ADMIN — SODIUM CHLORIDE 50 ML: 9 INJECTION, SOLUTION INTRAVENOUS at 02:12

## 2024-01-01 RX ADMIN — DOXYCYCLINE 100 MG: 100 INJECTION, POWDER, LYOPHILIZED, FOR SOLUTION INTRAVENOUS at 12:20

## 2024-01-01 RX ADMIN — RILUZOLE 50 MG: 50 TABLET, FILM COATED ORAL at 13:51

## 2024-01-01 RX ADMIN — OXYBUTYNIN CHLORIDE 5 MG: 5 TABLET, EXTENDED RELEASE ORAL at 20:49

## 2024-01-01 RX ADMIN — DEXAMETHASONE SODIUM PHOSPHATE 6 MG: 10 INJECTION, SOLUTION INTRAMUSCULAR; INTRAVENOUS at 13:15

## 2024-01-01 RX ADMIN — RILUZOLE 50 MG: 50 TABLET, FILM COATED ORAL at 20:49

## 2024-01-01 RX ADMIN — PANTOPRAZOLE SODIUM 40 MG: 40 TABLET, DELAYED RELEASE ORAL at 06:32

## 2024-01-01 RX ADMIN — RILUZOLE 50 MG: 50 TABLET, FILM COATED ORAL at 00:46

## 2024-01-01 RX ADMIN — REMDESIVIR 100 MG: 100 INJECTION, POWDER, LYOPHILIZED, FOR SOLUTION INTRAVENOUS at 20:52

## 2024-01-01 RX ADMIN — SODIUM CHLORIDE 50 ML: 900 INJECTION INTRAVENOUS at 21:51

## 2024-01-01 RX ADMIN — REMDESIVIR 100 MG: 100 INJECTION, POWDER, LYOPHILIZED, FOR SOLUTION INTRAVENOUS at 21:08

## 2024-01-01 RX ADMIN — RILUZOLE 50 MG: 50 TABLET, FILM COATED ORAL at 22:37

## 2024-01-01 RX ADMIN — REMDESIVIR 100 MG: 100 INJECTION, POWDER, LYOPHILIZED, FOR SOLUTION INTRAVENOUS at 20:20

## 2024-01-01 RX ADMIN — CEFTRIAXONE SODIUM 1 G: 1 INJECTION, POWDER, FOR SOLUTION INTRAMUSCULAR; INTRAVENOUS at 01:18

## 2024-01-01 RX ADMIN — CEFTRIAXONE SODIUM 1 G: 1 INJECTION, POWDER, FOR SOLUTION INTRAMUSCULAR; INTRAVENOUS at 01:03

## 2024-01-01 RX ADMIN — OXYBUTYNIN CHLORIDE 5 MG: 5 TABLET, EXTENDED RELEASE ORAL at 21:03

## 2024-01-01 RX ADMIN — REMDESIVIR 200 MG: 100 INJECTION, POWDER, LYOPHILIZED, FOR SOLUTION INTRAVENOUS at 00:01

## 2024-01-01 RX ADMIN — RILUZOLE 50 MG: 50 TABLET, FILM COATED ORAL at 20:20

## 2024-01-01 RX ADMIN — SODIUM CHLORIDE 50 ML: 900 INJECTION INTRAVENOUS at 21:12

## 2024-01-01 RX ADMIN — OXYBUTYNIN CHLORIDE 5 MG: 5 TABLET, EXTENDED RELEASE ORAL at 20:20

## 2024-01-01 RX ADMIN — DOXYCYCLINE 100 MG: 100 INJECTION, POWDER, LYOPHILIZED, FOR SOLUTION INTRAVENOUS at 13:58

## 2024-01-01 RX ADMIN — ACETAMINOPHEN 650 MG: 325 TABLET ORAL at 18:25

## 2024-01-01 RX ADMIN — DEXAMETHASONE 6 MG: 2 TABLET ORAL at 11:01

## 2024-01-01 RX ADMIN — DEXAMETHASONE SODIUM PHOSPHATE 6 MG: 10 INJECTION, SOLUTION INTRAMUSCULAR; INTRAVENOUS at 15:04

## 2024-01-01 RX ADMIN — RILUZOLE 50 MG: 50 TABLET, FILM COATED ORAL at 08:51

## 2024-01-01 RX ADMIN — RILUZOLE 50 MG: 50 TABLET, FILM COATED ORAL at 08:19

## 2024-01-01 RX ADMIN — ENOXAPARIN SODIUM 40 MG: 40 INJECTION SUBCUTANEOUS at 21:21

## 2024-01-01 RX ADMIN — SODIUM CHLORIDE 50 ML: 900 INJECTION INTRAVENOUS at 21:56

## 2024-01-01 RX ADMIN — ENOXAPARIN SODIUM 40 MG: 40 INJECTION SUBCUTANEOUS at 20:21

## 2024-01-01 RX ADMIN — DOXYCYCLINE 100 MG: 100 INJECTION, POWDER, LYOPHILIZED, FOR SOLUTION INTRAVENOUS at 02:49

## 2024-01-01 RX ADMIN — REMDESIVIR 100 MG: 100 INJECTION, POWDER, LYOPHILIZED, FOR SOLUTION INTRAVENOUS at 21:37

## 2024-01-01 RX ADMIN — CEFTRIAXONE SODIUM 1 G: 1 INJECTION, POWDER, FOR SOLUTION INTRAMUSCULAR; INTRAVENOUS at 00:40

## 2024-01-01 ASSESSMENT — ACTIVITIES OF DAILY LIVING (ADL)
ADLS_ACUITY_SCORE: 33
ADLS_ACUITY_SCORE: 44
ADLS_ACUITY_SCORE: 49
ADLS_ACUITY_SCORE: 35
ADLS_ACUITY_SCORE: 50
ADLS_ACUITY_SCORE: 35
ADLS_ACUITY_SCORE: 49
ADLS_ACUITY_SCORE: 51
ADLS_ACUITY_SCORE: 47
ADLS_ACUITY_SCORE: 56
ADLS_ACUITY_SCORE: 35
ADLS_ACUITY_SCORE: 42
ADLS_ACUITY_SCORE: 49
ADLS_ACUITY_SCORE: 35
ADLS_ACUITY_SCORE: 33
ADLS_ACUITY_SCORE: 49
ADLS_ACUITY_SCORE: 44
ADLS_ACUITY_SCORE: 49
ADLS_ACUITY_SCORE: 56
ADLS_ACUITY_SCORE: 55
ADLS_ACUITY_SCORE: 35
ADLS_ACUITY_SCORE: 44
ADLS_ACUITY_SCORE: 47
ADLS_ACUITY_SCORE: 44
ADLS_ACUITY_SCORE: 41
ADLS_ACUITY_SCORE: 55
ADLS_ACUITY_SCORE: 49
ADLS_ACUITY_SCORE: 44
ADLS_ACUITY_SCORE: 49
ADLS_ACUITY_SCORE: 35
ADLS_ACUITY_SCORE: 42
ADLS_ACUITY_SCORE: 41
ADLS_ACUITY_SCORE: 45
ADLS_ACUITY_SCORE: 44
ADLS_ACUITY_SCORE: 33
ADLS_ACUITY_SCORE: 44
ADLS_ACUITY_SCORE: 46
ADLS_ACUITY_SCORE: 33
ADLS_ACUITY_SCORE: 49
ADLS_ACUITY_SCORE: 41
ADLS_ACUITY_SCORE: 49
ADLS_ACUITY_SCORE: 46
ADLS_ACUITY_SCORE: 35
ADLS_ACUITY_SCORE: 50
ADLS_ACUITY_SCORE: 41
ADLS_ACUITY_SCORE: 49
ADLS_ACUITY_SCORE: 49
ADLS_ACUITY_SCORE: 44
ADLS_ACUITY_SCORE: 55
ADLS_ACUITY_SCORE: 42
ADLS_ACUITY_SCORE: 42
ADLS_ACUITY_SCORE: 35
ADLS_ACUITY_SCORE: 41
ADLS_ACUITY_SCORE: 41
ADLS_ACUITY_SCORE: 44
ADLS_ACUITY_SCORE: 41
ADLS_ACUITY_SCORE: 35
ADLS_ACUITY_SCORE: 47
ADLS_ACUITY_SCORE: 49
ADLS_ACUITY_SCORE: 41
ADLS_ACUITY_SCORE: 44
ADLS_ACUITY_SCORE: 49
ADLS_ACUITY_SCORE: 46
ADLS_ACUITY_SCORE: 42
ADLS_ACUITY_SCORE: 49
ADLS_ACUITY_SCORE: 35
ADLS_ACUITY_SCORE: 49
ADLS_ACUITY_SCORE: 50
ADLS_ACUITY_SCORE: 55
ADLS_ACUITY_SCORE: 55
ADLS_ACUITY_SCORE: 35
ADLS_ACUITY_SCORE: 35
ADLS_ACUITY_SCORE: 51
ADLS_ACUITY_SCORE: 49
ADLS_ACUITY_SCORE: 35
ADLS_ACUITY_SCORE: 49
ADLS_ACUITY_SCORE: 45
ADLS_ACUITY_SCORE: 42
ADLS_ACUITY_SCORE: 35
ADLS_ACUITY_SCORE: 44
ADLS_ACUITY_SCORE: 41
ADLS_ACUITY_SCORE: 55
ADLS_ACUITY_SCORE: 33
ADLS_ACUITY_SCORE: 56
ADLS_ACUITY_SCORE: 35
ADLS_ACUITY_SCORE: 33
ADLS_ACUITY_SCORE: 41
DEPENDENT_IADLS:: TRANSPORTATION
ADLS_ACUITY_SCORE: 44
ADLS_ACUITY_SCORE: 46
ADLS_ACUITY_SCORE: 51
ADLS_ACUITY_SCORE: 49
ADLS_ACUITY_SCORE: 37
ADLS_ACUITY_SCORE: 44
ADLS_ACUITY_SCORE: 42
ADLS_ACUITY_SCORE: 49
ADLS_ACUITY_SCORE: 49
ADLS_ACUITY_SCORE: 47
ADLS_ACUITY_SCORE: 44
ADLS_ACUITY_SCORE: 41
ADLS_ACUITY_SCORE: 50
ADLS_ACUITY_SCORE: 35
ADLS_ACUITY_SCORE: 44
ADLS_ACUITY_SCORE: 49
ADLS_ACUITY_SCORE: 47
ADLS_ACUITY_SCORE: 51
ADLS_ACUITY_SCORE: 49
ADLS_ACUITY_SCORE: 47
ADLS_ACUITY_SCORE: 49
ADLS_ACUITY_SCORE: 47
ADLS_ACUITY_SCORE: 35
ADLS_ACUITY_SCORE: 33
ADLS_ACUITY_SCORE: 42
ADLS_ACUITY_SCORE: 41
ADLS_ACUITY_SCORE: 44
ADLS_ACUITY_SCORE: 49
ADLS_ACUITY_SCORE: 51
ADLS_ACUITY_SCORE: 45
ADLS_ACUITY_SCORE: 41
ADLS_ACUITY_SCORE: 33
ADLS_ACUITY_SCORE: 55
ADLS_ACUITY_SCORE: 49
ADLS_ACUITY_SCORE: 49
ADLS_ACUITY_SCORE: 41
ADLS_ACUITY_SCORE: 55
ADLS_ACUITY_SCORE: 44
ADLS_ACUITY_SCORE: 49
ADLS_ACUITY_SCORE: 51

## 2024-01-01 ASSESSMENT — COLUMBIA-SUICIDE SEVERITY RATING SCALE - C-SSRS
2. HAVE YOU ACTUALLY HAD ANY THOUGHTS OF KILLING YOURSELF IN THE PAST MONTH?: NO
1. IN THE PAST MONTH, HAVE YOU WISHED YOU WERE DEAD OR WISHED YOU COULD GO TO SLEEP AND NOT WAKE UP?: NO
2. HAVE YOU ACTUALLY HAD ANY THOUGHTS OF KILLING YOURSELF IN THE PAST MONTH?: NO
1. IN THE PAST MONTH, HAVE YOU WISHED YOU WERE DEAD OR WISHED YOU COULD GO TO SLEEP AND NOT WAKE UP?: NO
6. HAVE YOU EVER DONE ANYTHING, STARTED TO DO ANYTHING, OR PREPARED TO DO ANYTHING TO END YOUR LIFE?: NO
2. HAVE YOU ACTUALLY HAD ANY THOUGHTS OF KILLING YOURSELF IN THE PAST MONTH?: NO
1. IN THE PAST MONTH, HAVE YOU WISHED YOU WERE DEAD OR WISHED YOU COULD GO TO SLEEP AND NOT WAKE UP?: NO

## 2024-01-01 ASSESSMENT — REVISED AMYOTROPHIC LATERAL SCLEROSIS FUNCTIONAL RATING SCALE (ALSFRS-R)
ALSFRS_TOTAL_SCORE: 36
GROSS_MOTOR_SUBTOTAL_SCORE: 8
CUTTING_FOOD_AND_HANDLING_UTENSILES: 1
RESPIRATORY_INSUFFICIENCY: 4
WALKING: EARLY AMBULATION DIFFICULTIES
FINE_MOTOR_SUBTOTAL_SCORE: 6
SALIVATION: 3
SWALLOWING: 4
RESPIRATORY_SUBTOTAL_SCORE: 12
ORTHOPENA: 4
HANDWRITING: 3
DRESSING_AND_HYGEINE: 2
DYSPNEA: 4
WALKING: 3
CLIMBING_STAIRS: 1
TURNING_IN_BED_AND_ADJUSTING_BED_CLOTHES: 4
TURNING_IN_BED_AND_ADJUSTING_BED_CLOTHES: NORMAL
SALIVATION: SLIGHT BUT DEFINITE EXCESS OF SALIVA IN MOUTH; MAY HAVE NIGHTTIME DROOLING
CLIMBING_STAIRS: NEEDS ASSISTANCE
BULBAR_SUBTOTAL: 10
SWALLOWING: NORMAL EATING HABITS
SPEECH: 3
HANDWRITING: SLOW OR SLOPPY: ALL WORDS ARE LEGIBLE
SPEECH: DETECTABLE SPEECH DISTURBANCES
DRESSING_AND_HYGEINE: INTERMITTENT ASSISTANCE OR SUBSTITUTE METHODS
SIX_ITEM_SUBTOTAL: 19

## 2024-01-01 ASSESSMENT — PAIN SCALES - GENERAL
PAINLEVEL: NO PAIN (0)
PAINLEVEL: NO PAIN (0)
PAINLEVEL: MODERATE PAIN (4)

## 2024-03-14 NOTE — PROGRESS NOTES
Social Work -ALS Clinic Progress Note  [facility]    Data/Intervention:    Patient Name:  Britt Pichardo  /Age:  1940 (84 year old)    Visit Type: in person    Collaborated With:    -Britt and dtr Harper  -Dr Beckham and members of the ALS interdisciplinary team    Psychosocial info/Concerns:   Pt with advancing ALS symptoms ada in UE and now with leg weakness also and who lives alone. They just started with [employer] private pay services twice a week. Harper indicated that she is starting to look at residential/ALFs and has some places in mind. Britt hasn't wanted to use NIV but now the issue is getting the mask on/off.     Intervention/Education/Resources Provided:  Reviewed also the option of assistance from [facility] for help with finding an [facility]/residential care. Reviewed the specific care needs/level of care to request when looking for a facility.  Discussed using [employer] meals which are offered for no charge for people with ALS. Discussed how to start request for meals and to send medical verification to me to complete.     Assessment/Plan:  Pt/dtr had no further specific questions today.    Provided patient/family with contact information and encouraged them to contact me between clinic visits as needed.     Britt Matute, MSW, [facility]    [facility]  [phone]        22-Jun-2020 23:05

## 2024-03-14 NOTE — PROGRESS NOTES
"Digna Krause MD  [place], March 14, 2024    Dear Dr. Krause,    I had the pleasure to see Britt in follow-up at the [facility]SA certified [facility] today.  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  At this point, Britt tells me that her right hand weakness has further progressed from the last visit.  Her left arm is useless.  She has significant difficulty gripping with the right hand.  She can still use a pen but her writing is not fully legible.  She cannot cut her food without assistance.  She can brush her teeth, but she cannot comb her hair.  She still lives alone and is using a personal care attendant twice a week for about 4 hours at a time.  She has a manual wheelchair, a cane, and the walker.  She cannot walk very long distances due to unsteadiness of the gait and fatigue and she has fallen at least 3-4 times since the last visit.  She does not have a power wheelchair.  She tries to keep herself active at home by walking around.  She has relatively mild muscle cramps that are not very bothersome.    Her speech is a little more slurred and her voice is hoarser than the last visit, although she is generally intelligible.  She has mild sialorrhea, and she does not believe she needs any medications for it.  She has mild difficulty clearing thick secretions from her throat.  She denies pseudobulbar affect or dysphagia.  She has no dyspnea on exertion, orthopnea, nonrefreshing sleep, or morning headaches.    BP (!) 141/84 (BP Location: Right arm, Patient Position: Sitting, Cuff Size: Adult Small)   Pulse 71   Ht 1.499 m (4' 11.02\")   Wt 49 kg (108 lb 1.6 oz)   SpO2 93%   BMI 21.82 kg/m          Latest Ref Rng & Units 3/14/2024     9:45 AM   PFT   FVC L 1.25  P   FEV1 L 1.08  P   FVC% % 68  P   FEV1% % 75  P      P Preliminary result     MIP very low -17 cm H2O    EXAM was not repeated.     In summary, Britt has " slowly progressive limb onset ALS, which is now quite advanced, as it has been almost 7 years since symptom onset.      We discussed a number of practical issues.  Britt has lost a considerable amount of weight.  She used to be 123 lbs 4 years ago, and she is now 108.  I emphasized the importance of high-calorie diet and protein supplementation.  She will discuss those concepts with our registered dietitian today.    She will be evaluated by our physical and occupational therapists today.  Given her severe hand weakness and difficulty doing ADLs, she should strongly consider moving to an assisted living facility at this point.    I believe that this patient requires a power wheelchair to allow activities of daily living to be performed in her house.  Due to severe leg weakness from ALS, she cannot ambulate safely with a cane or walker.  She does have the cognitive and manual skills required to safely operate the PWC and alternative means of supporting her mobility are not appropriate.    I do not think she has any specific questions for her speech therapist today.  Her sialorrhea is mild and will be monitored for now.    She definitely has restrictive respiratory physiology and worse PFTs compared to the last visit. NIV (noninvasive ventilation) at night is medically necessary for this patient.  Unfortunately, she does not have the hand strength required to put her BiPAP machine on her face at night.  I wonder whether we could find a lighter piece of NIV equipment (REHANA?) that she will be able to put on her face without assistance. We will look into it.    She will continue riluzole for ALS.  Her liver function tests done in February 2024 at the rheumatology clinic were satisfactory.    Follow-up in 6 months, sooner if needed.    Sincerely,    Mason Beckham MD, FAAN    The longitudinal plan of care for the diagnosis(es)/condition(s) as documented were addressed during this visit. Due to the added complexity  in care, I will continue to support Britt in the subsequent management and with ongoing continuity of care: ALS    Billing MDM level 4 (moderate) based on 1) Problems assessed: One chronic disorder with progression, exacerbation, or SE of treatment (ALS), and 2) Risk: prescription drug management.

## 2024-03-14 NOTE — LETTER
3/14/2024       RE: Britt Pichardo  [address]  [facility] [address]       Dear Colleague,    Thank you for referring your patient, Britt Pichardo, to the [facility]. Please see a copy of my visit note below.    Digna Krause MD  [place], March 14, 2024    Dear Dr. Krause,    I had the pleasure to see Britt in follow-up at the [facility] today.  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  At this point, Britt tells me that her right hand weakness has further progressed from the last visit.  Her left arm is useless.  She has significant difficulty gripping with the right hand.  She can still use a pen but her writing is not fully legible.  She cannot cut her food without assistance.  She can brush her teeth, but she cannot comb her hair.  She still lives alone and is using a personal care attendant twice a week for about 4 hours at a time.  She has a manual wheelchair, a cane, and the walker.  She cannot walk very long distances due to unsteadiness of the gait and fatigue and she has fallen at least 3-4 times since the last visit.  She does not have a power wheelchair.  She tries to keep herself active at home by walking around.  She has relatively mild muscle cramps that are not very bothersome.    Her speech is a little more slurred and her voice is hoarser than the last visit, although she is generally intelligible.  She has mild sialorrhea, and she does not believe she needs any medications for it.  She has mild difficulty clearing thick secretions from her throat.  She denies pseudobulbar affect or dysphagia.  She has no dyspnea on exertion, orthopnea, nonrefreshing sleep, or morning headaches.    BP (!) 141/84 (BP Location: Right arm, Patient Position: Sitting, Cuff Size: Adult Small)    "Pulse 71   Ht 1.499 m (4' 11.02\")   Wt 49 kg (108 lb 1.6 oz)   SpO2 93%   BMI 21.82 kg/m          Latest Ref Rng & Units 3/14/2024     9:45 AM   PFT   FVC L 1.25  P   FEV1 L 1.08  P   FVC% % 68  P   FEV1% % 75  P      P Preliminary result     MIP very low -17 cm H2O    EXAM was not repeated.     In summary, Britt has slowly progressive limb onset ALS, which is now quite advanced, as it has been almost 7 years since symptom onset.      We discussed a number of practical issues.  Britt has lost a considerable amount of weight.  She used to be 123 lbs 4 years ago, and she is now 108.  I emphasized the importance of high-calorie diet and protein supplementation.  She will discuss those concepts with our registered dietitian today.    She will be evaluated by our physical and occupational therapists today.  Given her severe hand weakness and difficulty doing ADLs, she should strongly consider moving to an assisted living facility at this point.    I believe that this patient requires a power wheelchair to allow activities of daily living to be performed in her house.  Due to severe leg weakness from ALS, she cannot ambulate safely with a cane or walker.  She does have the cognitive and manual skills required to safely operate the PWC and alternative means of supporting her mobility are not appropriate.    I do not think she has any specific questions for her speech therapist today.  Her sialorrhea is mild and will be monitored for now.    She definitely has restrictive respiratory physiology and worse PFTs compared to the last visit. NIV (noninvasive ventilation) at night is medically necessary for this patient.  Unfortunately, she does not have the hand strength required to put her BiPAP machine on her face at night.  I wonder whether we could find a lighter piece of NIV equipment (REHANA?) that she will be able to put on her face without assistance. We will look into it.    She will continue riluzole for ALS.  Her " liver function tests done in February 2024 at the rheumatology clinic were satisfactory.    Follow-up in 6 months, sooner if needed.    The longitudinal plan of care for the diagnosis(es)/condition(s) as documented were addressed during this visit. Due to the added complexity in care, I will continue to support Britt in the subsequent management and with ongoing continuity of care: ALS    Billing MDM level 4 (moderate) based on 1) Problems assessed: One chronic disorder with progression, exacerbation, or SE of treatment (ALS), and 2) Risk: prescription drug management.           Again, thank you for allowing me to participate in the care of your patient.      Sincerely,    Mason Beckham MD

## 2024-03-14 NOTE — PROGRESS NOTES
"CLINICAL NUTRITION SERVICES - REASSESSMENT NOTE     EVALUATION OF PREVIOUS PLAN OF CARE:   Referring Physician: Dr. Beckham  Current diet: Regular  Current appetite/intake: Appetite reduced, fair intake.    Monitoring from previous assessment:   -Food intake - Similar to previous documentation. Britt shares she feels she is eating all day. Her daughter notes a decrease in intake.  -Liquid meal replacement or supplement - none    -Weight trends - 4# (4%) wt loss over past 6 months  Wt Readings from Last 10 Encounters:   03/14/24 49 kg (108 lb 1.6 oz)   09/14/23 51.1 kg (112 lb 11.2 oz)   08/11/23 51.3 kg (113 lb)   07/05/23 52.3 kg (115 lb 3.2 oz)   03/09/23 52.6 kg (116 lb)   10/12/22 54 kg (119 lb)   09/08/22 53.2 kg (117 lb 3.2 oz)   06/01/22 54.7 kg (120 lb 11.2 oz)   03/10/22 54.9 kg (121 lb)   09/24/21 54.3 kg (119 lb 9.6 oz)     Previous Goals:   1.  Aim for 5-6 small frequent meals  2.  Aim for 1530 kcal and 50 g protein/day  3. Weight maintenance   Evaluation: Not met     Previous Nutrition Diagnosis:   Inadequate oral intake related to increased needs associated with progression of ALS as evidenced by 10# (8%) wt loss over the past 12 months.     Evaluation: No change     NEW FINDINGS:   Britt continues to lose weight, but at a slower rate than previously noted. Britt feels she is eating \"all day\". Encouraged patient to add 1 ONS daily (Ensure Enlive, Ensure Plus/Boost Plus, CIB, Benecalorie, Scandi shake etc).     CURRENT NUTRITION DIAGNOSIS   Inadequate oral intake related to decrease in appetite and increased needs associated with progression of ALS as evidenced by 4# (4%) wt loss over the past 6 months.        INTERVENTIONS   Recommendations / Nutrition Prescription   1. Continue Regular diet  2. Consume 1-2 ONS per day     Implementation  Medical Food Supplement     Goals   1.  Aim for 5-6 small frequent meals  2.  Add 1 ONS daily  3. Weight maintenance     Follow up/Monitoring:   Progress towards " goals will be monitored and evaluated per protocol and Practice Guidelines    Radha Calvo RDN, LD

## 2024-03-14 NOTE — NURSING NOTE
Chief Complaint   Patient presents with    RECHECK    Als       Vitals were taken and medications were reconciled.    Phuong Reaves, Technician  10:32 AM  March 14, 2024

## 2024-03-14 NOTE — PATIENT INSTRUCTIONS
Next step is use your cane, I would recommend you use this inside your home  Recommend getting up and moving around home at least every 1 hour - not exertional activity  Recommend looking at residential facilities  Reach out to us if your walking gets worse or you would like to look at power wheelchair options    Adrianna Low PT, DPT  Physical Therapist  [facility] - [address]  [address] [place] [address]  [email or web address]  Appointments: [phone]

## 2024-03-14 NOTE — PROGRESS NOTES
"[facility] Rehabilitation Services    OUTPATIENT OCCUPATIONAL THERAPY CLINIC NOTE  Britt Pichardo  YOB: 1940  [id number]    Type of visit:  Evaluation            Date of service: March 14, 2024    Referring provider: Dr. Mason Beckham    Others present at visit:  Child(adriana), daughters-Courtney    Medical diagnosis:   Amyotrophic lateral sclerosis (ALS)     Date of diagnosis: 11/29/18     Pertinent medical history:  Onset of distal UE weakness L > R, 2017    Additional Occupational Profile Information (patterns of daily living, interests, values and needs): Pt is an 82 y.o. woman who is a mother dx with limb onset ALS living alone in her own home who manages all of her own IADL with support of her daughters.    Cardio-respiratory status:   Forced vital capacity: 68 % of predicted   MIP-Pre -17    Height/Weight: 4' 11\" / 108#    Living environment:  House  Tub/shower on main-doesn't like to use  Walk-in shower in basement no bars, textured floor  Regular height toilets    Living environment barriers:  2 stairs to enter (0 railing present)    Steps to basement with railing laundry in basement and shower    Current assistance/living environment:  Lives alone  Dtr Courtney very involved and reports patient does not go outside unless family is present due to recent fall outside.   PCA started recently w/ provision of 4 hours a week-2x weekly, one day in the morning and one day in the afternoon.     Current mobility equipment:  SEC but for long distance she is using the transport w/c they obtained from ALS. Daughter reports they have not   considered a power w/c as patient's home is too small and likely not w/c accessible.     Current ADL equipment:  Shower chair-not using   Button extender   Button hook   plier and needle nose plier to aid opening items   Zipper pull   5 in 1 bottle/jar open recommended from ALS as of " 3/14/24.    Technology used: NT    Patient concerns/goals: Notes R hand is much weaker now    Evaluation    Interview completed   Pain assessment:  Pain denied    Range of motion:  R handed; UE AROM R WFL   Manual muscle testing: UE's: L active shoulder, elbow, wrist and hand but no functional use at this time. R shoulder biceps strength 4/5, shoulder flex 3+/5  ; R  fairly weak with grasp but pt still able to use cane, lateral and palmar pinch and L long finger flexors of index and middle are weak and barely functional at this time.      Cognition:  Appears WFL    ADL:   Feeding self:  Ind, cutting difficult, may just  food to bite off, not using rocker knife; at restaurant has family assist to cut food  Grooming: Ind, R-one handed, AE  UE Dressing:  Ind, some assist with jacket and recently getting harder per patient.  LE Dressing:  Ind, button hook used, lays on bed to button pants, zipper pull has been helpful  Showering/bathing: Stands to shower, stairs to basement shower- shower chair;  haven't use it.Has a grab in shower.Encouraged pt to use shower chair due to fall risk.   Toileting/transfer:  Denies problems- no AE.    IADL:   Home management:  Does all, harder to reach higher cabinets to put dishware away, pt does own shopping, does own laundry and Using shoot for laundry to get items to basement and Ind cleaning  Driving:  Not been driving since October of 2023.  Occupation: retired  Emergency Call system:flip phone in pocket at all times, also has a house line phone. Pt could benefit from life alert.      Fall Risk Screen:   Has the patient fallen 2 or more times in the last year? Yes, 4 times.       Has the patient fallen and had an injury in the past year? Yes, completed a CT scan followed up by ALS neurologist.       Timed Up and Go Score: defer to PT     Is the patient a fall risk? Yes.      Impairments:  Muscle atrophy  Coordination   Fatigue   ROM   balance    Treatment diagnosis:  Impaired  mobility activities of daily living and IADL    Assessment of Occupational Performance: 1-3  Identified Performance Deficits (ie: feeding, social skills):  Home management, bathing, dressing  Clinical Decision Making (Complexity):Low complexity     Recommendations/Plan of care:  Patient would benefit from interventions to enhance safety and independence.  Rehab potential good for stated goals.  Occupational therapy intervention for  self care/home management   1 session evaluation & treatment.    Goals:   Target date: today  Patient, family and/or caregiver will verbalize/demonstrate understanding of compensatory methods /equipment to enhance functional independence and safety.    Educational assessment/barriers to learning:  none      Treatment provided this date:   Self care/home management, 9 minutes of training in:  - Education on equipment for ease w/ opening lids/tops of food containers- showed patient and daughter a picture of 5 in 1 lid/jar/bottle opener. Pt and daughter agreeable to trial at home. Will order from ALS.  -Provided some fall prevention strategies w/ pt to keep items lower, cease use of step stool, keep home clutter free and included being mindful of when she is to attempt picking something off the ground before attempt.   -Encouraged patient and daughter to possibly look at [facility] per MD recommendation and for the time and provided suggestions PCA can assist w/ such as w/ morning routine/self cares, showers, laundry, cooking and other IADLs. Also recommended both patient and daughter to sit down w/ PCA to create a checklist of tasks to assist pt with when they are in the home.    Therapeutic procedures, 10 mins of training in:  -Provided education to facilitate LUE SROM w/ elbow/hand flex/ext w/ holding at wrist for stabilization and maintain ROM/extensibility, positioning of L hand w/ use of R hand w/ palm down during ambulation for joint protection. Pt engaged in 3-5 reps of  open/close hand; keeping hand in lap for safe joint positioning w/ good follow through as instructed. Provided ROM for shoulder in scaption and abduction w/ cues to prevent substitution of shoulder hiking; some soft tissue tightness noted in shoulders w/ pt to give feedback to daughter re: tolerated range. Daughter observed shoulder ex and verbalized understanding to assist w/ HEP-ROM 5-10 reps, 2x daily. Pt to complete hand/elbow SROM 5 reps, 2x daily.       Response to treatment/recommendations: receptive     Goal attainment:  All goals met    Risks and benefits of evaluation/treatment have been explained.  Patient, family and/or caregiver are in agreement with Plan of Care.     Timed Code Treatment Minutes: 19 mins  Low moderately complexity Evaluation: 10 mins  Total Treatment Time (sum of timed and untimed services): 29 mins    Krysta Mason, OTR, 3/14/24      OUTPATIENT OCCUPATIONAL THERAPY           PLAN OF TREATMENT FOR OUTPATIENT REHABILITATION   Patient's Last Name, First Name, Britt Valentine YOB: 1960   Provider's Name   [facility]    Medical Record No.  [id number]   Onset Date:  11/29/18 Start of Care Date:  3/14/24      Medical Diagnosis:   ALS        OT Treatment Diagnosis:  Impaired mobility/ADLs/IADLs Plan of Treatment  Frequency/Duration:  Eval and 1x visit     Certification date from  3/14/24-3/14/24   To           See note for plan of treatment details and functional goals      Krysta Mason, OTR            Doxepin Pregnancy And Lactation Text: This medication is Pregnancy Category C and it isn't known if it is safe during pregnancy. It is also excreted in breast milk and breast feeding isn't recommended.

## 2024-03-14 NOTE — PROGRESS NOTES
"    OUTPATIENT PHYSICAL THERAPY CLINIC NOTE  Britt Pichardo  YOB: 1940  [id number]    Type of visit:         Evaluation     Date of service: 3/14/2024    Referring provider: Mason Beckham MD     present: No    Others present at visit:  Child(adriana)-daughter, Courtney    Medical diagnosis:   Amyotrophic lateral sclerosis (ALS)    Date of diagnosis: 11/29/2018    Pertinent history of current problem (include personal factors and/or comorbidities that impact the plan of care):  limb onset weakness; mostly distal UE weakness L > R beginning in 2017    Cardio-respiratory status:  Forced vital capacity: 68% of predicted     Height/Weight: 4'11\" / 108lbs    Living environment:  House    Living environment barriers:  2 stairs to enter (0 railing present)  A flight of stairs to basement (1 railing present)     Current assistance/living environment:  Lives alone  Patient's daughter assist patient with outdoor house chores, fine motor tasks multiple times a week      Current mobility equipment:  4WW- uses only when at the mall  WW- 's walker. Too tall per daughter  SEC- uses outside the home  Transport w/c 16x16- longer community distances     Current ADL equipment:  See OT note    Technology used: Phone    Patient concerns/goals: Patient reports weaker overall, with 1 fall in the past 6 months.Thinking about moving to an assisted living facility, but has not looked at any yet    Evaluation   Interview completed.   Pain assessment:  Pain denied     Range of motion: Bilateral dorsiflexion limited near neutral     Manual muscle testing: Bilateral hip flexion 3+/5, B knee flexion/extension 5/5, L ankle dorsiflexion 4/5, R ankle DF 3+/5   Gait: Patient ambulates with SEC modified independent- narrow base of support, mild B knee flexion in stance phase of gait, reduced duy and stride length. Occasional poor placement of SEC leading to instability, but patient able to step out and correct in " path   Cognition: Intact   10MWT: 0.43 m/s with SEC   FRS: 36      Fall Risk Screen:   Has the patient fallen 2 or more times in the last year? Yes      Has the patient fallen and had an injury in the past year? No       Timed Up and Go Score: gait speed <1.0 m/s    Is the patient a fall risk? Yes     Impairments:  Fatigue  Muscle atrophy  Range of motion  Balance  Posture     Treatment diagnosis:  Impaired mobility  Impaired activities of daily living    Clinical Presentation: Evolving/Changing  Clinical Presentation Rationale: Personal factors, body systems involved, progressive nature of ALS  Clinical Decision Making (Complexity): Moderate complexity     Recommendations/Plan of care:  1 session evaluation & treatment.     Goals:   Target date: 3/14/2024  Patient, family and/or caregiver will verbalize understanding of evaluation results and implications for functional performance.  Patient, family and/or caregiver will verbalize/demonstrate understanding of compensatory methods /equipment to enhance functional independence and safety.  Patient, family and/or caregiver will verbalize/demonstrate understanding of home program.    Educational assessment/barriers to learning:   No barriers noted     Treatment provided this date:   ADL/self-care management-18 minutes   --Educated patient on energy conservation techniques including pacing of activities, frequent rest breaks, use of assistive device and monitoring signs of fatigue (increased muscle soreness, weakness, cramps, fasciculations) for safe functional mobility and performance of daily tasks  -Educated patient on exercise recommendations including focus on stretching and light cardiovascular conditioning as main modes. Educated on safe strengthening principles including endurance focus for muscle groups that have anti gravity strength. Educated patient to monitor signs for red flags after exercise (increased weakness, cramping, fasciculations) that last >30  minutes. Recommended patient walk around home, short distances inside home with SEC since she feels better/more energy with movement vs sedentary lifestyle  -Educated patient on fall risk reduction techniques including energy conservations, monitoring signs of fatigue, performance of single tasks with rest breaks in between activities and use of assisted devices. Recommended patient use SEC or rollator for safety due to increased BLE strength deficits and falls over the past year  -Recommended patient look into facility living to increase safety and increase overall activity performance. Patient verbalizes understandign/agreement    Response to treatment/recommendations: Patient verbalized understanding and agreement    Goal attainment:  All goals met     Risks and benefits of evaluation/treatment have been explained.  Patient, family and/or caregiver are in agreement with Plan of Care.     Timed Code Treatment Minutes: 18  Total Treatment Time (sum of timed and untimed services): 30    Signature: Digna Low PT   Date: 3/14/2024    Certification:  Onset date: 3/14/2024  Start of care date: 3/14/2024  Certification date from 3/14/2024 to 3/14/2024    I CERTIFY THE NEED FOR THESE SERVICES FURNISHED UNDER        THIS PLAN OF TREATMENT AND WHILE UNDER MY CARE     (Physician attestation of this document indicates review and certification of the therapy plan).

## 2024-03-14 NOTE — PATIENT INSTRUCTIONS
Follow-up 6 months    There is a fine balance between walking in the house and keeping yourself physically active, versus the risk of falling due to your weakness.  You will discuss this with physical therapy today.    I think a power wheelchair would be useful for you, and we will start working towards that direction. Someone will call you about scheduling an evaluation.    I will ask our dietitian to see you today. It is very important that you do not lose further weight, so we should aim at a high calorie diet and potentially using boosters.    There may be a lighter piece of breathing machine (BiPAP) that would be easier to put on your face by yourself.  Please call [employer] @ [phone] and explain you would like them to come out and bring mask options for you to try.  Use there machine nightly if possible. Let us know if you have difficulty getting them to help.    Please call [facility] @ [phone] for questions or concerns during regular business hours. For a more efficient way to communicate, use [employer] and address the message to your physician. Remember, [employer] is only read during business hours. Do not leave urgent messages on voicemail or [employer]. If situation is urgent, contact the Neurology Clinic @ [phone] and ask to speak to a Triage Nurse or Call 911 or visit an Emergency Department.    Please call your pharmacy if you need a medication refill. They will send us an electronic message.

## 2024-04-01 NOTE — ED PROVIDER NOTES
History     Chief Complaint:  Fall and Generalized Weakness       HPI   Britt Pichardo is a 84 year old female with a history of ALS, RA, and hypercholesteremia who presents to the ED woth her daughter for evaluation after a fall. The patient states she has had three falls in the last 24 hours, one yesterday morning and two this morning. States the first yesterday was while getting out of bed. She slid to the floor and could not get back up. States the most recent fall this morning was while walking to the kitchen, she slipped on the wood floor and fell face first into a plastic laudry basket. Reports hitting her head which is now tender to the touch. Also notes pain in her right arm that started after the most recent fall and is worsening with time. The patient's daughter reports the patient has been generally weaker lately, complaining of dizziness, and losing weight. She is worried about dehaydation or something worse. The patient denies abdominal pain, chest pain, head pain, or neck pain.    Independent Historian:    Daughter - They report as noted above.    Review of External Notes:  Yes-I reviewed her neurology visit on March 14 regarding her amyotrophic lateral sclerosis.    Medications:    Infliximab  Lovastatin  Methotrexate  Oxybutynin  Riluzole     Past Medical History:    ALS  Osteoporosis  Hypercholesteremia  RA    Past Surgical History:    Lumbar fusion    Physical Exam   No data found.       Physical Exam   Eye:  Pupils are equal, round, and reactive.  Extraocular movements intact.    ENT:  No rhinorrhea.  Moist mucus membranes.  Normal tongue and tonsil.    Cardiac:  Regular rate and rhythm.  No murmurs, gallops, or rubs.    Pulmonary:  Clear to auscultation bilaterally.  No wheezes, rales, or rhonchi.    Abdomen:  Positive bowel sounds.  Abdomen is soft and non-distended, without focal tenderness.    Musculoskeletal:  Normal movement of all extremities without evidence for deficit.  There is  mild tenderness over the right proximal humerus without evidence of deformity or dislocation.  There is no midline tenderness to the neck or back.  There is mild tenderness over the right zygomatic arch.    Skin:  Warm and dry without rashes.    Neurologic: Cranial nerves II through XII intact.  Non-focal exam without asymmetric weakness or numbness.     Psychiatric:  Normal affect with appropriate interaction with examiner.    Emergency Department Course   ECG  ECG taken at 1156, ECG read at 1213  Sinus rhythm with marked sinus arrhythmia  Low voltage QRS  Cannot rule out anterior infarct, age undetermined   Rate 75 bpm. UT interval 152 ms. QRS duration 72 ms. QT/QTc 396/442 ms. P-R-T axes 73 -13 68.    Imaging:  Head CT w/o contrast   Final Result   IMPRESSION:    1. No acute intracranial pathology.    2. Chronic small vessel ischemic disease and diffuse cerebral volume   loss.      KATHI HUGHES MD            SYSTEM ID:  [id number]      XR Shoulder Right G/E 3 Views   Final Result   IMPRESSION:   1. Small amount of atelectasis or infiltrate in the right lung base.   2. The bones are diffusely demineralized. There is no evidence of   fracture. No subluxation or dislocation. No signs of degenerative or   inflammatory arthropathy.      COURTNEY ODELL MD            SYSTEM ID:  [id number]          Laboratory:  Labs Ordered and Resulted from Time of ED Arrival to Time of ED Departure   COMPREHENSIVE METABOLIC PANEL - Abnormal       Result Value    Sodium 135      Potassium 4.7      Carbon Dioxide (CO2) 26      Anion Gap 11      Urea Nitrogen 18.1      Creatinine 0.64      GFR Estimate 86      Calcium 9.1      Chloride 98      Glucose 209 (*)     Alkaline Phosphatase 78      AST        ALT        Protein Total 7.3      Albumin 3.6      Bilirubin Total 0.7     COMPREHENSIVE METABOLIC PANEL - Abnormal    Sodium 138      Potassium 3.6      Carbon Dioxide (CO2) 26      Anion Gap 12      Urea Nitrogen 19.0       Creatinine 0.67      GFR Estimate 85      Calcium 9.2      Chloride 100      Glucose 185 (*)     Alkaline Phosphatase 85      AST 41      ALT 25      Protein Total 7.0      Albumin 3.6      Bilirubin Total 0.5     CBC WITH PLATELETS AND DIFFERENTIAL    WBC Count 8.6      RBC Count 3.94      Hemoglobin 12.9      Hematocrit 39.4            MCH 32.7      MCHC 32.7      RDW 13.1      Platelet Count 212      % Neutrophils 59      % Lymphocytes 30      % Monocytes 7      % Eosinophils 3      % Basophils 1      % Immature Granulocytes 0      NRBCs per 100 WBC 0      Absolute Neutrophils 5.1      Absolute Lymphocytes 2.6      Absolute Monocytes 0.6      Absolute Eosinophils 0.2      Absolute Basophils 0.1      Absolute Immature Granulocytes 0.0      Absolute NRBCs 0.0            Emergency Department Course & Assessments:    Interventions:  Medications - No data to display     Assessments:  1207 I obtained history and performed physical exam as noted above.   1430 I rechecked and updated the patient.   1508 I rechecked and updated the patient.     Independent Interpretation (X-rays, CTs, rhythm strip):  None    Consultations/Discussion of Management or Tests:  None       Social Determinants of Health affecting care:  None     Disposition:  The patient was discharged.    Impression & Plan    Medical decision making: This pleasant 84-year-old with a recent diagnosis of ALS presents to us after having several falls.  The patient is still living independently in her own house.  She notes that it can be a challenge to get out of her bed because of the mattress is sent and fairly high.  She slipped down yesterday morning and this morning trying to get out of bed, but denied any significant pain or serious trauma with this.  A more worrisome fall happened earlier today when she was walking across her living room where she lost her balance and fell onto a plastic clothes hamper.  She did injure her right arm and struck her  face though she did not suffer a loss of consciousness.  Nonetheless, with 3 falls in the last 28 hours, of daughter brought her here for further evaluation.    On my exam, she appears clinically well.  She is tender over her face, with her advanced age, imaging of the brain was obtained which is negative.  She has tenderness over the proximal humerus but allows full range of motion and with a negative x-ray, I feel she is low risk for a true fracture.  Laboratory investigation was pursued because of this increased weakness, though it is otherwise unremarkable.    At this point, I spoke with the patient and her daughter and how to proceed.  We checked orthostatic vital signs and they are normal.  We ambulated the patient up and down the frazier which she was able to do without assistance.  Daughter is concerned that with her ALS that she may be declining and may benefit from a change from independent living to assisted living.  The patient still feels that she is doing well at home and would prefer to be discharged.  The daughter is in support of this, though they will continue to work with her primary team on making sure this is watched closely as the anticipation of further decline is high with her diagnosis.  Otherwise, explained there should never be hesitation return to the ER if they feel unsafe at home.  Questions were answered and the patient is discharged in stable condition.      Diagnosis:    ICD-10-CM    1. Closed head injury, initial encounter  S09.90XA       2. Contusion of right shoulder, initial encounter  S40.011A       3. Fall at home, initial encounter  W19.XXXA     Y92.009       4. ALS (amyotrophic lateral sclerosis) (H)  G12.21     HISTORY OF           Discharge Medications:  Discharge Medication List as of 4/1/2024  4:11 PM             Scribe Disclosure:  I, Kusum Valentin, am serving as a scribe at 2:50 PM on 4/1/2024 to document services personally performed by Trierweiler, Chad A, MD based on my  observations and the provider's statements to me.    4/1/2024   Trierweiler, Chad A, MD Trierweiler, Chad A, MD  04/02/24 8193

## 2024-04-01 NOTE — ED TRIAGE NOTES
Patient presents to the ER with complaints of 3 falls in the last 24 hours. Daughter states that the patient lives alone but wears a bractlet that alerts her when she falls at home. Patient fell out of bed yesterday morning, this morning and one fall in the kitchen around 10:45. Endorses right arm pain, right cheek pain, being dizzy and generalized weakness. Denies LOC or blood thinners. Hx of ALS.      Triage Assessment (Adult)       Row Name 04/01/24 1145          Triage Assessment    Airway WDL WDL        Respiratory WDL    Respiratory WDL WDL        Skin Circulation/Temperature WDL    Skin Circulation/Temperature WDL WDL        Cardiac WDL    Cardiac WDL WDL        Peripheral/Neurovascular WDL    Peripheral Neurovascular WDL WDL        Cognitive/Neuro/Behavioral WDL    Cognitive/Neuro/Behavioral WDL WDL

## 2024-05-16 NOTE — PROGRESS NOTES
COVID-19 PCR test completed. Patient handout For Patients Who Have Been Tested for Covid-19 (Coronavirus) was given to the patient, which includes test result notification process.    
No

## 2024-06-13 NOTE — DISCHARGE INSTRUCTIONS
If you drop something, you should ask for help rather than trying to bend over and grab it so that you do not lose your balance and fall again.  I would recommend you use an assistive device when getting around to prevent further falls.  There is evidence of broken ribs on the left side but not from today's fall as they are already healing.  Please follow with your primary care provider to ensure you are improving as expected and return immediately if you have worsening symptoms or new concerns of any kind. Tylenol as needed for pain.

## 2024-06-13 NOTE — ED TRIAGE NOTES
Patient fell in the shower today. She hit her head and landed on her buttocks. Patient has ALS at baseline.     Triage Assessment (Adult)       Row Name 06/13/24 0857          Triage Assessment    Airway WDL WDL        Respiratory WDL    Respiratory WDL WDL        Skin Circulation/Temperature WDL    Skin Circulation/Temperature WDL WDL        Cardiac WDL    Cardiac WDL WDL        Peripheral/Neurovascular WDL    Peripheral Neurovascular WDL WDL        Cognitive/Neuro/Behavioral WDL    Cognitive/Neuro/Behavioral WDL WDL

## 2024-06-13 NOTE — ED PROVIDER NOTES
"  Emergency Department Note      History of Present Illness     Chief Complaint  Fall     HPI  Britt Pichardo is an 84 year old female with ALS on riluzole and RA on Remicade who presents alone after a fall.  She was in her bathroom bending over to  a cloth on the ground when she lost her balance and fell.  She hit the posterior aspect of her head where she has hematoma but she did not lose consciousness.  She denies significant headache, only tenderness at the site of injury.  She denies nausea, vomiting, or vision change.  She denies neck pain or any other injuries.  She does mention that over the last couple of weeks she has a soreness in her chest or breast when she is sleeping which he thinks may be related to her life alert monitor as it resolves within half an hour of awakening.  With this fall she did not have lightheadedness, chest pain, shortness of breath, or palpitations. She denies recent illness.    Independent Historian  Not applicable.     Review of External Notes  I reviewed a neurology note from March which noted she has ALS.   Past Medical History   Medical History and Problem List  ALS   Osteoporosis   Hypercholesterolemia   Rheumatoid arthritis     Medications  Ferrous Gluconate 240 (27 FE) MG TABS  folic acid (FOLVITE) 1 MG tablet  InFLIXimab (REMICADE IV)  lovastatin (MEVACOR) 20 MG tablet  methotrexate 2.5 MG tablet  oxyBUTYnin ER (DITROPAN XL) 5 MG 24 hr tablet  riluzole (RILUTEK) 50 MG tablet    Surgical History   Lumbar fusion     Physical Exam   Patient Vitals for the past 24 hrs:   BP Temp Temp src Pulse Resp SpO2 Height Weight   06/13/24 1130 129/74 -- -- 71 15 93 % -- --   06/13/24 0853 (!) 163/84 97.9  F (36.6  C) Temporal 66 16 93 % 1.6 m (5' 3\") 55.8 kg (123 lb)     Physical Exam  General: Well-developed and well-nourished. Well appearing elderly  woman. Cooperative.  Head:  Small to moderate left posterior occipital hematoma without overlying " laceration.  Eyes:  Conjunctivae, lids, and sclerae are normal.  ENT:    Normal nose. Moist mucous membranes.  Neck:  Cervical collar in place.  CV:  Regular rate and rhythm. Normal heart sounds with no murmurs, rubs, or gallops detected.  Resp:  No respiratory distress. Clear to auscultation bilaterally without decreased breath sounds, wheezing, rales, or rhonchi.  GI:  Soft. Non-distended. Non-tender.    MS:  Normal ROM. No bilateral lower extremity edema.  No tenderness to palpation throughout the extremities or the chest wall.  Skin:  Warm. Non-diaphoretic. No pallor.  Neuro:  Awake and alert. Normal strength.  Psych: Normal mood and affect. Normal speech.  Vitals reviewed.    Diagnostics   Imaging  CT Head w/o Contrast   Final Result   IMPRESSION:    1.  Head CT: No evidence for acute intracranial hemorrhage,   hydrocephalus or transcortical infarct. No skull fracture.   2.  Cervical spine CT: No evidence of acute fracture or posttraumatic   subluxation.          ZULY LINARES DO            SYSTEM ID:  [id number]      CT Cervical Spine w/o Contrast   Final Result   IMPRESSION:    1.  Head CT: No evidence for acute intracranial hemorrhage,   hydrocephalus or transcortical infarct. No skull fracture.   2.  Cervical spine CT: No evidence of acute fracture or posttraumatic   subluxation.          ZULY LINARES DO            SYSTEM ID:  [id number]      XR Chest 2 Views   Final Result   IMPRESSION: Mild left base atelectasis versus ill-defined airspace   disease. Right lung appears clear. No visible pneumothorax. Left   fourth and fifth rib fractures appear subacute with the suggestion of   callus formation. No visible acute displaced fracture.      ASIA LIAO MD            SYSTEM ID:  [id number]        Independent Interpretation  CT Head: No intracranial hemorrhage.  CT head shows scalp hematoma noted.  ED Course    Social Determinants of Health adding to complexity of care  Established neurologist.     ED Course  ED  Course as of 06/15/24 1426   Thu Jun 13, 2024   9078 I obtained history and examined the patient as noted above.    1130 I checked on the patient and removed her C-collar. She is comfortable with discharge.      Medical Decision Making / Diagnosis   BHARGAV De La Torre is an 84 year old woman with ALS who was bending over to  a cloth from the ground when she lost her balance and fell.  She hit the posterior aspect of her head but did not lose consciousness.  She denies any other recent falls but mentions some soreness in her left chest or breast when she is sleeping over the last couple weeks that resolves within a half an hour of awakening.  On exam she appears well outside of a left posterior parietal hematoma without overlying laceration.    Fortunately, head CT is without intracranial hemorrhage or skull fracture and cervical spine is similarly unremarkable.  I offered her an x-ray given her nighttime chest pain which interestingly revealed subacute rib fractures.  She adamantly denies any fall but I do suspect this is the cause for her recent pain.  It does not warrant further workup or treatment as she describes the pain is quite manageable, improving, and absent during the day.  She required no interventions while in the emergency department and is appropriate for discharge after this mechanical fall.  I recommended she start asking for help if she is drops something so she does not bend over and fall again.  I encouraged her to use an assistive device as well.  I recommended she follow-up with primary care to ensure she is improving as expected but return if she has worsening symptoms or new concerns.  All questions answered.  Amenable to discharge.    Disposition  She was discharged.     ICD-10 Codes:    ICD-10-CM    1. Fall at home, initial encounter  W19.XXXA     Y92.009       2. Scalp hematoma, initial encounter  S00.03XA       3. Closed fracture of multiple ribs of left side, initial encounter  S22.42XA      Subacute, healing         Discharge Medications  Discharge Medication List as of 6/13/2024 12:03 PM        Scribe Disclosure:  I, Richard Travis, am serving as a scribe at 9:52 AM on 6/13/2024 to document services personally performed by Dia Lee MD based on my observations and the provider's statements to me.        Dia Lee MD  06/15/24 [id number]

## 2024-06-25 NOTE — PROGRESS NOTES
ASSESSMENT/PLAN                                                      (W19.XXXD) Fall, subsequent encounter  (primary encounter diagnosis)  (S00.03XD) Hematoma of left parietal scalp, subsequent encounter  (S22.42XD) Closed fracture of multiple ribs of left side with routine healing, subsequent encounter  Comment: no recurrent falls; left parietal hematoma resolved; pain associated with left-sided rib fractures improving over time.  Plan: patient encouraged to use her walker at all times and avoid bending over to pick things up off the floor.    (M05.79) Rheumatoid arthritis involving multiple sites with positive rheumatoid factor (H)  Comment: stable on Remicade; followed by rheumatology.    (E78.00) Pure hypercholesterolemia  Comment: well-controlled on lovastatin.    (Z79.899) Medication management  Plan: may discontinue folic acid supplement, now that she is off of methotrexate, and iron supplement, due to lack of indication.    The longitudinal plan of care for the diagnosis(es)/condition(s) as documented were addressed during this visit. Due to the added complexity in care, I will continue to support Britt in the subsequent management and with ongoing continuity of care.    Digna Krause MD   [address] [address]  T: [phone], F: [phone]    SUBJECTIVE                                                      Britt Pichardo is a very pleasant 84 year old female who presents for ER follow-up:    Accompanied by daughter.    Patient presented to the ER 6/13/2024 after a fall at home.  Patient hit the posterior aspect of her head. Did not lose consciousness. Patient also mentioned soreness in her chest, when sleeping, that has been ongoing for the last several weeks.    PMH significant for ALS on riluzole, RA on Remicade, and osteoporosis. Exam significant for a small to moderate left posterior occipital hematoma without overlying laceration.     CT head and  "cervical spine unremarkable. CXR significant for left fourth and fifth rib fractures which appear subacute in nature. Discharged home with supportive care measures.    Patient is doing well today. No recurrent falls. Posterior occipital hematoma has resolved.  Pain associated with left-sided rib fractures is improving over time.    OBJECTIVE                                                      /78   Pulse 66   Ht 1.6 m (5' 3\")   Wt 47.9 kg (105 lb 11.2 oz)   SpO2 94%   BMI 18.72 kg/m    Constitutional: well-appearing  Respiratory: normal respiratory effort; clear to auscultation bilaterally  Cardiovascular: regular rate and rhythm; no edema  Musculoskeletal: walks with walker  Psych: normal judgment and insight; normal mood and affect; recent and remote memory intact    ---    (Note documentation was completed, in part, with [employer] voice-recognition software. Documentation was reviewed, but some grammatical, spelling, and word errors may remain.)    "

## 2024-07-14 NOTE — ED NOTES
Pt has ALS and reports she simply moved too fast trying to move papers from one chair to another. She struck her head on a metal chair and fell onto the kitchen floor. Denies thinners, or LOC. Bleeding controlled in triage. A&Ox4

## 2024-07-14 NOTE — ED TRIAGE NOTES
Patient with mechanical unwitnessed fall today at AL facility. Patient denies LOC or thinners, has lac to forehead. C Collar per EMS protocol, has been dizzy x 2 weeks.

## 2024-07-15 NOTE — ED PROVIDER NOTES
Emergency Department Note      History of Present Illness     Chief Complaint   Fall and Laceration    HPI   Britt Pichardo is a 84 year old female with history of ALS who presents via EMS for evaluation after a fall. The patient reports that she was moving papers around from one chair to another, moving too fast, when she lost her balance and fell over. States that she caught herself on a metal chair so she did not fall to the ground, but she did hit the right side of her head. Reports that she pushed her call button for help standing back up. She notes that because of her ALS, her left upper extremity is very weak at baseline. Her family member reports she has been ambulatory since the incident and is at baseline mentation. States that the patient also has healing rib fractures. She denies loss of consciousness, neck pain, and headache. She is not anticoagulated.     Independent Historian   Daughter as detailed above.    Review of External Notes   None     Past Medical History     Medical History and Problem List   ALS  Osteoporosis  Hypercholesterolemia  Rheumatoid arthritis     Medications   Lovastatin  Oxybutynin  Riluzole  Infliximab     Surgical History   Lumbar fusion     Physical Exam     Patient Vitals for the past 24 hrs:   BP Temp Temp src Pulse Resp SpO2   07/14/24 1812 (!) 154/79 97.1  F (36.2  C) Temporal 73 18 93 %     Physical Exam  Constitutional: Elderly white female supine, no respiratory distress.  HENT: No signs of trauma. 3 cm laceration over the right forehead. No bony step off.   Eyes: EOM are normal. Pupils are equal, round, and reactive to light.   Neck: Normal range of motion. No JVD present. No cervical adenopathy. No posterior midline tenderness. Wearing cervical collar.   Cardiovascular: Regular rhythm.  Exam reveals no gallop and no friction rub.    No murmur heard.  Pulmonary/Chest: Bilateral breath sounds normal. No wheezes, rhonchi or rales. Minimal tenderness over the left  side. No crepitus or flail.   Abdominal: Soft. No tenderness. No rebound or guarding.   Musculoskeletal: No edema. No tenderness.   Lymphadenopathy: No lymphadenopathy.   Neurological: Alert and oriented to person, place, and time. Normal strength. Coordination normal. GCS 15. Fluent speech. No facial asymmetry. Weakness of left arm.   Skin: Skin is warm and dry. No rash noted. No erythema.     Diagnostics     Lab Results   Labs Ordered and Resulted from Time of ED Arrival to Time of ED Departure   CBC WITH PLATELETS AND DIFFERENTIAL - Abnormal       Result Value    WBC Count 11.1 (*)     RBC Count 4.05      Hemoglobin 13.2      Hematocrit 40.1      MCV 99      MCH 32.6      MCHC 32.9      RDW 12.7      Platelet Count 198      % Neutrophils 45      % Lymphocytes 43      % Monocytes 8      % Eosinophils 4      % Basophils 0      % Immature Granulocytes 0      NRBCs per 100 WBC 0      Absolute Neutrophils 5.0      Absolute Lymphocytes 4.7      Absolute Monocytes 0.8      Absolute Eosinophils 0.4      Absolute Basophils 0.0      Absolute Immature Granulocytes 0.0      Absolute NRBCs 0.0     BASIC METABOLIC PANEL - Normal    Sodium 135      Potassium 3.7      Chloride 99      Carbon Dioxide (CO2) 28      Anion Gap 8      Urea Nitrogen 15.2      Creatinine 0.61      GFR Estimate 87      Calcium 9.1      Glucose 95         Imaging   No orders to display       EKG   ECG taken at 1822, ECG read at 1828  Normal sinus rhythm   Possible left atrial enlargement  Left axis deviation    Rate 68 bpm. NM interval 152 ms. QRS duration 70 ms. QT/QTc 414/440 ms. P-R-T axes 56 -31 23.    Independent Interpretation   None    ED Course      Medications Administered   Medications   sodium chloride 0.9% BOLUS 1,000 mL (0 mLs Intravenous Stopped 7/14/24 2003)       Procedures   Laceration repair: 1% xylocaine with epinephrine used for local anesthesia. Wound was copiously irrigated and explored, no foreign body was seen. The wound was  approximated with 5-0 ethylon simple. Bacitracin applied.     Discussion of Management   None    ED Course   ED Course as of 07/14/24 2034   Sun Jul 14, 2024 1952 I obtained history and examined the patient as noted above.    2004 I rechecked and updated the patient.        Optional/Additional Documentation  None    Medical Decision Making / Diagnosis     CMS Diagnoses: None    MIPS       None    [facility]   Britt Pichardo is a 84 year old female was in a chair when she reached for something and slipped. Her head hit the lamp or the chair by the desk. She did not fall onto the floor and denies head, neck pain and new focal numbness or weakness. She does have ALS. Her chest is a little sore but she is recovering from some broken ribs and does not think she hit the chest. She is not on a blood thinner and has no nausea. On exam there is a small laceration, 3 cm on the right forehead laterally. Mild closed head trauma with facial laceration. I do not feel she requires CT scanning. Sutures should be out in seven days.     Disposition   The patient was discharged.     Diagnosis     ICD-10-CM    1. Closed head injury, initial encounter  S09.90XA       2. Facial laceration, initial encounter  S01.81XA            Scribe Disclosure:  I, Susie Stern, am serving as a scribe at 8:34 PM on 7/14/2024 to document services personally performed by Tejas Mauricio MD based on my observations and the provider's statements to me.        Tejas Mauricio MD  07/14/24 5939

## 2024-07-15 NOTE — PROGRESS NOTES
[facility]  Community Health Worker Initial Outreach    Background: Primary Care - Care Coordination program identified per system criteria based on ED discharge report and reviewed for possible outreach.    CHW will not proceed with patient outreach due to the following reason:    Patient has discharged to an Assisted Living Facility where patient is receiving on-site support, including support for ED follow up plan.    Plan: Primary Care - Care Coordination episode addressed appropriately per reason noted above.        Aura Pearson  Community Health Worker  [facility] Care Resource [facility]    *Connected Care Resource Team does NOT follow patient ongoing. Referrals are identified based on internal discharge reports and the outreach is to ensure patient has an understanding of their discharge instructions.

## 2024-08-16 NOTE — ED PROVIDER NOTES
Emergency Department Note      History of Present Illness     Chief Complaint   Fall      HPI   Britt Pichardo is a 84 year old female presenting following a mechanical fall.  The patient was walking at her care facility when she lost her balance falling backwards striking her head on the wall.  She did not lose consciousness.  Has been ambulatory since that time.  A c-collar was placed by EMS however she is complaining of no significant neck pain.  She denies any numbness tingling weakness, there was no laceration present.  There are no further aggravating or alleviating factors no other conditions.    Independent Historian   None    Review of External Notes   Office visit from June 25 of this year was reviewed for a fall, Emergency Department visit from July 14 of this year for a fall was reviewed.    Past Medical History     Medical History and Problem List   Past Medical History:   Diagnosis Date    ALS (amyotrophic lateral sclerosis) (H)     Osteoporosis     Pure hypercholesterolemia     RA (rheumatoid arthritis) (H)        Medications   calcium carbonate-vitamin D 600-200 MG-UNIT TABS  InFLIXimab (REMICADE IV)  lovastatin (MEVACOR) 20 MG tablet  oxyBUTYnin ER (DITROPAN XL) 5 MG 24 hr tablet  riluzole (RILUTEK) 50 MG tablet        Surgical History   Past Surgical History:   Procedure Laterality Date    LUMBAR FUSION  2002,2004       Physical Exam     Patient Vitals for the past 24 hrs:   BP Temp Temp src Pulse Resp SpO2 Weight   08/16/24 2012 -- -- -- -- -- 94 % --   08/16/24 1902 (!) 140/93 -- -- 69 -- 93 % --   08/16/24 1826 -- -- -- -- -- 93 % --   08/16/24 1815 138/76 97.7  F (36.5  C) Oral 74 18 93 % --   08/16/24 1805 -- -- -- -- -- -- 50.3 kg (111 lb)     Physical Exam  General: Alert, interactive   Head:  Scalp is atraumatic  Eyes:  The pupils are equal, round, and reactive to light    EOM's intact    No scleral icterus  ENT:      Nose:  The external nose is normal  Ears:  External ears are  normal      Neck:  Normal range of motion.      There is no rigidity.    Trachea is in the midline         CV:  Regular rate and rhythm    No murmur   Resp:  Breath sounds are clear bilaterally    Non-labored, no retractions or accessory muscle use      MS:  Normal strength in all 4 extremities, no midline cervical or thoracic tenderness  Skin:  Warm and dry, No rash or lesions noted.  Neuro:   Strength 5/5 x4.  Sensation intact  In all 4 extremities.     GCS: 15  Psych: Awake. Alert.  Normal affect.      Appropriate interactions.  Diagnostics     Lab Results   Labs Ordered and Resulted from Time of ED Arrival to Time of ED Departure - No data to display    Imaging   CT Cervical Spine w/o Contrast   Final Result   IMPRESSION:   1.  No acute fracture or traumatic subluxation within the cervical spine.      CT Head w/o Contrast   Final Result   IMPRESSION:   1.  No CT evidence for acute intracranial process.   2.  Brain atrophy and presumed chronic microvascular ischemic changes as above.        Independent Interpretation   CT Head: No intracranial hemorrhage or midline shift.    ED Course      Medications Administered   Medications   acetaminophen (TYLENOL) tablet 650 mg (650 mg Oral $Given 8/16/24 2127)       Procedures   Procedures     Discussion of Management   None    ED Course        Additional Documentation  None    Medical Decision Making / Diagnosis   BHARGAV   Britt Pichardo is a 84 year old female presenting following a mechanical fall.  Given her age CT imaging was undertaken thankfully demonstrating no signs of acute intracranial hemorrhage, skull fracture, more concerning illness.  Cervical spine CT is unremarkable as well.  Patient is neurologically intact and is feeling well.  She was ambulatory in the emergency department without incident and I believe she can safely be discharged home.  We have recommended Tylenol for pain management and return if new symptoms develop.    Disposition   The patient was  discharged.     Diagnosis     ICD-10-CM    1. Closed head injury, initial encounter  S09.90XA            Discharge Medications   Discharge Medication List as of 8/16/2024  8:16 PM            Louis Oliveira MD      Trigger, Louis Ching MD  08/16/24 2030

## 2024-08-16 NOTE — ED TRIAGE NOTES
Pt BIBA from assisted living. Pt had been transferring from her wheelchair when she slipped off the chair and fell backwards, hitting posterior head against wall. Pt did not lose consciousness. Pt is not on blood thinners. Pt reported mild head pain for EMS but denies pain right now. Glucose 111. BP 150s/80s. Pt has hx of ALS and rheumatoid arthritis. Pt had 1 beer today. Arrived in C-Collar. Denies neck pain.

## 2024-08-18 PROBLEM — S22.000A COMPRESSION FRACTURE OF THORACIC VERTEBRA, UNSPECIFIED THORACIC VERTEBRAL LEVEL, INITIAL ENCOUNTER (H): Status: ACTIVE | Noted: 2024-01-01

## 2024-08-18 PROBLEM — R29.6 FALLS FREQUENTLY: Status: ACTIVE | Noted: 2024-01-01

## 2024-08-18 PROBLEM — U07.1 COVID-19: Status: ACTIVE | Noted: 2024-01-01

## 2024-08-18 PROBLEM — R09.02 HYPOXIA: Status: ACTIVE | Noted: 2024-01-01

## 2024-08-18 NOTE — ED NOTES
Bed: ED20  Expected date:   Expected time:   Means of arrival:   Comments:  84F, multiple falls, HA, dizzy, hit head, no thinners

## 2024-08-18 NOTE — ED TRIAGE NOTES
DENY from nursing home, pt fell off the toilet today hitting her head on the wall. Pt reports no blood thinners. In c-collar from EMS. Pt reports feeling dizzy prior to fall

## 2024-08-19 NOTE — ED NOTES
[facility]  ED Nurse Handoff Report    ED Chief complaint: Fall      ED Diagnosis:   Final diagnoses:   COVID-19   Hypoxia   Falls frequently   Compression fracture of thoracic vertebra, unspecified thoracic vertebral level, initial encounter (H)       Code Status: DNR / DNI    Allergies: No Known Allergies    Patient Story:  Hx: ALS 84 fe She states that she had gone to the bathroom successfully, was getting up and began to get her things together and started to stumble backwards and then fell and hit her head against the wall.     She states that she did not lose consciousness, she does states she has had a significant runny nose and nonspecific weakness for the last 3 days and also a very recent fall in which she was seen and had a CT scan done and was sent home.  Dtr reports pt has had several recent falls  Focused Assessment:  pt has ALS and is weak and unsteady on her feet even with 2 people.  L arm extremely weak.     Treatments and/or interventions provided: labs, CT, decadron.   Patient's response to treatments and/or interventions:   Results for orders placed or performed during the hospital encounter of 08/18/24   CT Head w/o Contrast     Status: None    Narrative    EXAM: CT HEAD W/O CONTRAST  LOCATION: [facility]  DATE: 8/18/2024    INDICATION: Head injury.  COMPARISON: CT 8/16/2024.  TECHNIQUE: Routine CT Head without IV contrast. Multiplanar reformats. Dose reduction techniques were used.    FINDINGS:  INTRACRANIAL CONTENTS: No intracranial hemorrhage, extraaxial collection, or mass effect.  No CT evidence of acute infarct. Moderate presumed chronic small vessel ischemic changes. Moderate generalized volume loss. No hydrocephalus.     VISUALIZED ORBITS/SINUSES/MASTOIDS: No intraorbital abnormality. No paranasal sinus mucosal disease. No middle ear or mastoid effusion.    BONES/SOFT TISSUES: No acute abnormality.      Impression    IMPRESSION:  1.  No CT  evidence for acute intracranial process.  2.  Brain atrophy and presumed chronic microvascular ischemic changes similar to prior.   CT Cervical Spine w/o Contrast     Status: None    Narrative    EXAM: CT CERVICAL SPINE W/O CONTRAST  LOCATION: [facility]  DATE: 8/18/2024    INDICATION: Head injury.  COMPARISON: None.  TECHNIQUE: Routine CT Cervical Spine without IV contrast. Multiplanar reformats. Dose reduction techniques were used.    FINDINGS:  VERTEBRA: Normal vertebral body heights and alignment. No fracture or posttraumatic subluxation.     CANAL/FORAMINA: No high-grade canal or neural foraminal stenosis.    PARASPINAL: No extraspinal abnormality.      Impression    IMPRESSION:  1.  No fracture or posttraumatic subluxation.   XR Chest 2 Views     Status: None    Narrative    EXAM: XR CHEST 2 VIEWS  LOCATION: [facility]  DATE: 8/18/2024    INDICATION: Hypoxia.  COMPARISON: None.      Impression    IMPRESSION: Interstitial pulmonary edema, bilateral small pleural effusions and cardiomegaly suggestive of congestive heart failure. Hazy left mid lung opacity could reflect alveolar edema versus superimposed infectious/inflammatory process.     Age-indeterminate upper/mid thoracic vertebral body compression fracture. Lumbar spinal fusion hardware.       Comprehensive metabolic panel     Status: Abnormal   Result Value Ref Range    Sodium 133 (L) 135 - 145 mmol/L    Potassium 3.8 3.4 - 5.3 mmol/L    Carbon Dioxide (CO2) 26 22 - 29 mmol/L    Anion Gap 11 7 - 15 mmol/L    Urea Nitrogen 16.2 8.0 - 23.0 mg/dL    Creatinine 0.55 0.51 - 0.95 mg/dL    GFR Estimate 89 >60 mL/min/1.73m2    Calcium 9.0 8.8 - 10.4 mg/dL    Chloride 96 (L) 98 - 107 mmol/L    Glucose 100 (H) 70 - 99 mg/dL    Alkaline Phosphatase 67 40 - 150 U/L    AST 29 0 - 45 U/L    ALT 13 0 - 50 U/L    Protein Total 7.1 6.4 - 8.3 g/dL    Albumin 3.7 3.5 - 5.2 g/dL    Bilirubin Total 0.4 <=1.2 mg/dL    Symptomatic Influenza A/B, RSV, & SARS-CoV2 PCR (COVID-19) Nasopharyngeal     Status: Abnormal    Specimen: Nasopharyngeal; Swab   Result Value Ref Range    Influenza A PCR Negative Negative    Influenza B PCR Negative Negative    RSV PCR Negative Negative    SARS CoV2 PCR Positive (A) Negative    Narrative    Testing was performed using the Xpert Xpress CoV2/Flu/RSV Assay on the [employer] GeneXpert Instrument. This test should be ordered for the detection of SARS-CoV-2, influenza, and RSV viruses in individuals who meet clinical and/or epidemiological criteria. Test performance is unknown in asymptomatic patients. This test is for in vitro diagnostic use under the FDA EUA for laboratories certified under CLIA to perform high or moderate complexity testing. This test has not been FDA cleared or approved. A negative result does not rule out the presence of PCR inhibitors in the specimen or target RNA in concentration below the limit of detection for the assay. If only one viral target is positive but coinfection with multiple targets is suspected, the sample should be re-tested with another FDA cleared, approved, or authorized test, if coinfection would change clinical management. This test was validated by the [facility] [employer]. These laboratories are certified under the Clinical Laboratory Improvement Amendments of 1988 (CLIA-88) as qualified to perform high complexity laboratory testing.   CBC with platelets and differential     Status: None   Result Value Ref Range    WBC Count 7.3 4.0 - 11.0 10e3/uL    RBC Count 3.90 3.80 - 5.20 10e6/uL    Hemoglobin 12.8 11.7 - 15.7 g/dL    Hematocrit 38.5 35.0 - 47.0 %    MCV 99 78 - 100 fL    MCH 32.8 26.5 - 33.0 pg    MCHC 33.2 31.5 - 36.5 g/dL    RDW 12.3 10.0 - 15.0 %    Platelet Count 204 150 - 450 10e3/uL    % Neutrophils 59 %    % Lymphocytes 26 %    % Monocytes 13 %    % Eosinophils 1 %    % Basophils 0 %    % Immature Granulocytes 1 %    NRBCs per 100 WBC  0 <1 /100    Absolute Neutrophils 4.3 1.6 - 8.3 10e3/uL    Absolute Lymphocytes 1.9 0.8 - 5.3 10e3/uL    Absolute Monocytes 0.9 0.0 - 1.3 10e3/uL    Absolute Eosinophils 0.1 0.0 - 0.7 10e3/uL    Absolute Basophils 0.0 0.0 - 0.2 10e3/uL    Absolute Immature Granulocytes 0.0 <=0.4 10e3/uL    Absolute NRBCs 0.0 10e3/uL   UA with Microscopic reflex to Culture     Status: Abnormal    Specimen: Urine, Midstream   Result Value Ref Range    Color Urine Light Yellow Colorless, Straw, Light Yellow, Yellow    Appearance Urine Clear Clear    Glucose Urine Negative Negative mg/dL    Bilirubin Urine Negative Negative    Ketones Urine 10 (A) Negative mg/dL    Specific Gravity Urine 1.014 1.003 - 1.035    Blood Urine Negative Negative    pH Urine 6.5 5.0 - 7.0    Protein Albumin Urine Negative Negative mg/dL    Urobilinogen Urine Normal Normal, 2.0 mg/dL    Nitrite Urine Negative Negative    Leukocyte Esterase Urine Negative Negative    Bacteria Urine Few (A) None Seen /HPF    Amorphous Crystals Urine Few (A) None Seen /HPF    RBC Urine 0 <=2 /HPF    WBC Urine 2 <=5 /HPF    Narrative    Urine Culture not indicated   Nt probnp inpatient (BNP)     Status: Normal   Result Value Ref Range    N terminal Pro BNP Inpatient 556 0 - 1,800 pg/mL   Procalcitonin     Status: Normal   Result Value Ref Range    Procalcitonin 0.11 <0.50 ng/mL   EKG 12-lead, tracing only     Status: None   Result Value Ref Range    Systolic Blood Pressure  mmHg    Diastolic Blood Pressure  mmHg    Ventricular Rate 75 BPM    Atrial Rate 75 BPM    FL Interval 160 ms    QRS Duration 78 ms     ms    QTc 480 ms    P Axis 57 degrees    R AXIS -22 degrees    T Axis 31 degrees    Interpretation ECG       Sinus rhythm  Possible Left atrial enlargement  Minimal voltage criteria for LVH, may be normal variant ( R in aVL )  Cannot rule out Anteroseptal infarct (cited on or before 01-Apr-2024)  Abnormal ECG  When compared with ECG of 14-Jul-2024 18:22,  Questionable  change in initial forces of Septal leads  Confirmed by GENERATED REPORT, COMPUTER ([id number]),  Brenda Monge ([id number]) on 8/18/2024 8:36:25 PM     CBC with platelets + differential     Status: None    Narrative    The following orders were created for panel order CBC with platelets + differential.  Procedure                               Abnormality         Status                     ---------                               -----------         ------                     CBC with platelets and d...[[id number]]                      Final result                 Please view results for these tests on the individual orders.       To be done/followed up on inpatient unit:  hospitalist orders    Does this patient have any cognitive concerns?:  none    Activity level - Baseline/Home:  Independent and walker  Activity Level - Current:   Stand with assist x2    Patient's Preferred language: English   Needed?: No    Isolation: None  Infection: Not Applicable  COVID r/o and special precautions  Patient tested for COVID 19 prior to admission: YES  Bariatric?: No    Vital Signs:   Vitals:    08/18/24 2000 08/18/24 2015 08/18/24 2030 08/18/24 2045   BP: (!) 153/89      Pulse: 78      Resp:       Temp:       SpO2: 91% (!) 87% 94% 92%       Cardiac Rhythm:     Was the PSS-3 completed:   Yes  What interventions are required if any?               Family Comments: Dtr here  OBS brochure/video discussed/provided to patient/family: N/A              Name of person given brochure if not patient:               Relationship to patient:     For the majority of the shift this patient's behavior was Green.   Behavioral interventions performed were none.    ED NURSE PHONE NUMBER: *[phone]

## 2024-08-19 NOTE — PROGRESS NOTES
RECEIVING UNIT ED HANDOFF REVIEW    ED Nurse Handoff Report was reviewed by: Cristine Duran RN on August 18, 2024 at 10:32 PM

## 2024-08-19 NOTE — PROGRESS NOTES
DATE & TIME: 8/19/24  [id number]   Cognitive Concerns/ Orientation : Alert and oriented x4, forgetful at times.     ABNL VS/O2: VSS, on 2L O2, sating 95%. Denied SOB, N/V, CP. Tele-NSR  MOBILITY: Strict bedrest at least until MRI completion.  Pt with h/o ALS, unable to move L arm, R arm weakness (baseline).   PAIN MANAGMENT: Denied pain  DIET: Soft & Bite sized (level 6) with thin liquids. Tolerating thin liquids. Room service.   BOWEL/BLADDER: Purewick in place with minimal output.  Bladderscanned for 352-451 ml this shift.  Did not meet parameters (greater than 500 ml) for straight cath.  No BM noted this shift  DRAIN/DEVICES: L PIV x1 SL and R PIV infusing 0.9% NS @ 50 ml/hr.  On intermittent abx.  SKIN: L foot scab. Scattered bruises.   D/C DATE: Pending  OTHER IMPORTANT INFO: Covid positive, special precautions maintained. Seen by Neurosurgery this shift.  Echo and MRI-Thoracic completion pending.  Visited by daughter this shift.

## 2024-08-19 NOTE — CONSULTS
Neurosurgery Consult    HPI    Ms. Pichardo is a 84-year-old female who has had recurrent falls, has ALS, RA admitted yesterday through the ER due to a fall from her toilet.  She has been in the emergency department for falls on 6/13,7/14, 8/16.    Imaging revealed a T5 compression fracture, age-indeterminate, with small prevertebral hematoma.    Today patient states she is not having any back pain when she lies down, denies any radicular symptoms in her lower extremities, denies any numbness in her thorax or lower extremities.    At baseline she is able to walk with a walker.  Her left upper extremity is most affected by her ALS.    Her daughter is with her today, she is a nurse here at [employer], and contributes to the history.    Medical history  ALS  RA      Social history  Patient here with her daughter.      B/P: 127/64, T: 97, P: 78, R: 18       Exam    Alert and oriented no acute distress  Moving all extremities  Right upper extremity with 5 out of 5 strength  Right lower extremity and left lower extremity with 5 out of 5 strength    Left upper extremity affected by ALS, able to lift it up and hold antigravity, but minimal strength with hand grasp and flexion extension at the elbow.    Reflexes absent patella and ankle, negative ankle clonus  Sensation intact in thorax and lower extremities bilaterally    Patient is tender to palpation at the level of T5 in the midline.    Imaging    Thoracic CT scan demonstrated    IMPRESSION:  1.  Moderate age-indeterminate T5 compression fracture, favor acute with small prevertebral hematoma.    Assessment    Status post multiple falls  ALS  Moderate age-indeterminate T5 compression fracture, possibly acute, with small prevertebral hematoma.    Plan:      Hold anticoagulation    Obtain thoracic MRI without contrast    Continue bedrest until MRI completed

## 2024-08-19 NOTE — PROGRESS NOTES
[facility]  Hospitalist Progress Note   08/19/2024          Assessment and Plan:       Britt Pichardo is a 84 year old female with medical history of ALS, rheumatoid arthritis, osteoporosis, hyperlipidemia, falls, cognitive impairment scented from assisted living facility after a fall.    Fall at care facility.  History of frequent falls.  Physical deconditioning from medical illness, senile frailty.  Cognitive impairment, progressive per family report.  -Patient sent in from care facility after she fell off the toilet hitting her head on the wall.    Of note patient seen in ED on 6/13, 7/14, 8/16, 8/18 for falls.    Was placed in cervical collar, patient awake, able to answer simple questions, forgetful..    --On exam forgetful during encounter.    --Liver enzymes, bilirubin within normal limits.  CT head no acute intracranial pathology.  CT C-spine no fracture or posttraumatic subluxation.  -- Address medical issues as discussed.  Closely monitor mental status.  Fall precautions.  PT evaluation, OT evaluation-will likely benefit from rehabilitation.  Recommend neurocognitive assessment, neurology evaluation as outpatient.    Age-indeterminate T5 compression fracture.  Small prevertebral hematoma.  Patient complaining of back pain.  CT thoracic spine with Moderate age-indeterminate T5 compression fracture, favor acute with small prevertebral hematoma.   Neurosurgery following, recommend MRI thoracic spine.Appreciate input.  Bedrest until MRI imaging completed and cleared by neurosurgery.  Pain management with Tylenol for mild-moderate pain, oxycodone as needed for moderate to severe pain.    As needed Robaxin for muscle spasms.    Bacteriuria.  UA with nitrite negative, leukocyte esterase negative.  Few bacteria present  Already on antibiotics for possible pneumonia.  Follow urine cultures, de-escalate antibiotics accordingly.    Elevated troponin likely in the setting of demand ischemia from  fall, hypoxic respiratory failure.  Bilateral pleural effusions, cardiomegaly on imaging.  Patient denies any chest pain.  High-sensitivity troponin 25 >18.  proBNP 556.  EKG heart rate 75, QTc 480, sinus rhythm.  Cardiac monitoring overnight.  Echocardiogram pending.  Holding of aspirin, blood thinners given concern for prevertebral hematoma, awaiting MRI T-spine  Intake output monitoring, daily weights.    Acute hypoxic respiratory failure likely multifactorial from COVID-19 infection, pleural effusion, cardiomegaly, possible pneumonia, deconditioning.  COVID-19 infection 8/18/2024.  Hazy left midlung opacity-edema versus infectious etiology  Report of hypoxia to 88% on admission, requiring 2 L nasal oxygen for O2 sats in mid 90s  -COVID-19 PCR positive, influenza A, B, RSV negative.  Afebrile.  WBC 7.3.  Procalcitonin 0.06.  Chest x-ray with interstitial pulmonary edema, bilateral small pleural effusions and cardiomegaly suggestive of congestive heart failure, hazy left midlung opacity could reflect alveolar edema versus superimposed infectious process.  -- Started on Decadron, continue 6 mg oral daily.  Started on remdesivir, continue during hospital stay for total of 5 days.  Empirically started on IV ceftriaxone, doxycycline.  Unlikely bacterial pneumonia given afebrile, no leukocytosis, low procalcitonin.  Follow clinical improvement in a.m., de-escalate to oral antibiotics or discontinue antibiotics.   Special precautions.  Aggressive incentive spirometry, Acapella use.  Wean off supplemental nasal oxygen.    Mild hyponatremia likely from poor oral intake.  Sodium 133, chloride 96.  Bicarb 26, creatinine 0.55.  Blood glucose 100.  proBNP 556.  Gentle hydration with NS at 50 cc/h for 5 hours.  BMP in AM.    ALS (limb onset ALS)   Diagnosed since 2017 and followed (has slow progressive limb onset ALS).  Resident of care facility, needs assistance with ADLs.  Continue PTA riluzole with plans for 6-month  follow-up 9/2024   Recommend to follow-up with neurology in clinic after discharge.    Rheumatoid arthritis  history of RA and + RA. stable on remicade reported in EPIC   - received q 8 weeks.     Osteoporosis.  Age-appropriate health maintenance including bone health maintenance on PCP visit.    Urinary retention.  Straight cath.  If fails straight cath x 3,  Buchanan    Orders Placed This Encounter      Soft & Bite Sized Diet (level 6) Thin Liquids (level 0)      DVT Prophylaxis: SCDs, ambulate.  Hold pharmacological DVT prophylaxis until okay by neurosurgery  Code Status: No CPR- Do NOT Intubate  Disposition: Expected discharge in greater than 2 days pending clinical improvement    Medically Ready for Discharge: Anticipated in 2-4 Days    Discussed with patient, her daughter by the bedside bedside RN  >51 minutes spent by me on the date of service doing chart review, history, exam, documentation & further activities per the note.      Anand Alcazar MD        Interval History:        Patient lying in bed.  Forgetful.  Continues to have hypoxia, drops to high 80s on room air.  Requiring 2 L nasal oxygen for O2 sats in mid 90s.  On special precautions for COVID-19 infection.  Denies any chest pain or palpitations.  Denies any headache or dizziness.  Denies any new tingling or numbness.  Complains of pain in her back.  Neurosurgery recommended MRI thoracic spine.  Tolerating oral diet.  Daughter by bedside, questions answered.       Physical Exam:        Physical Exam   Temp:  [97  F (36.1  C)-98  F (36.7  C)] 97  F (36.1  C)  Pulse:  [62-80] 78  Resp:  [16-18] 18  BP: (127-166)/() 127/64  FiO2 (%):  [2 %] 2 %  SpO2:  [87 %-96 %] 95 %    PHYSICAL EXAM  GENERAL: Patient is in no distress. Alert and oriented.  Forgetful.  HEENT: Oropharynx pink  LUNGS:  bilateral decreased breath sounds, no wheezing or crackles.  Respirations unlabored  ABDOMEN: Soft, no abdominal tenderness, bowel sounds heard   NEURO: Moving  all extremities  EXTREMITIES: No pedal edema.   SKIN: Warm, dry. bruising.  PSYCHIATRY Cooperative       Medications:        Current Facility-Administered Medications   Medication Dose Route Frequency Provider Last Rate Last Admin    cefTRIAXone (ROCEPHIN) 1 g vial to attach to  mL bag for ADULTS or NS 50 mL bag for PEDS  1 g Intravenous Q24H Daisha Torres MD   1 g at 08/19/24 0152    dexAMETHasone PF (DECADRON) injection 6 mg  6 mg Intravenous Daily Daisha Torres MD        doxycycline (VIBRAMYCIN) 100 mg vial to attach to  mL bag  100 mg Intravenous Q12H Daisha Torres MD   100 mg at 08/19/24 0249    oxyBUTYnin ER (DITROPAN XL) 24 hr tablet 5 mg  5 mg Oral QPM Daisha Torres MD   5 mg at 08/18/24 2237    remdesivir 100 mg in sodium chloride 0.9 % 250 mL intermittent infusion  100 mg Intravenous Q24H Daisha Torres MD        And    sodium chloride 0.9% BOLUS 50 mL  50 mL Intravenous Q24H Daisha Torres MD        riluzole (RILUTEK) tablet 50 mg  50 mg Oral Q12H Daisha Torres MD   50 mg at 08/18/24 2237    sodium chloride (PF) 0.9% PF flush 3 mL  3 mL Intracatheter Q8H Daisha Torres MD   3 mL at 08/19/24 0158     Current Facility-Administered Medications   Medication Dose Route Frequency Provider Last Rate Last Admin    acetaminophen (TYLENOL) tablet 650 mg  650 mg Oral Q4H PRN Daisha Torres MD        Or    acetaminophen (TYLENOL) Suppository 650 mg  650 mg Rectal Q4H PRN Daisha Torres MD        calcium carbonate (TUMS) chewable tablet 1,000 mg  1,000 mg Oral 4x Daily PRN Daisha Torres MD        lidocaine (LMX4) cream   Topical Q1H PRN Daisha Torres MD        lidocaine 1 % 0.1-1 mL  0.1-1 mL Other Q1H PRN Daisha Torres MD        senna-docusate (SENOKOT-S/PERICOLACE) 8.6-50 MG per tablet 1 tablet  1 tablet Oral BID Daisha Barrow MD        Or    senna-docusate (SENOKOT-S/PERICOLACE) 8.6-50 MG per tablet 2 tablet  2 tablet Oral BID Daisha Barrow,  MD        sodium chloride (PF) 0.9% PF flush 3 mL  3 mL Intracatheter q1 min Daisha Garcia MD                Data:      All new lab and imaging data was reviewed.

## 2024-08-19 NOTE — PROGRESS NOTES
DATE & TIME: 2300-0730    Cognitive Concerns/ Orientation : Alert and oriented x4, forgetful at times.     ABNL VS/O2: VSS, on 3L O2, sating 95-97%. Denied SOB, N/V, CP.   MOBILITY: Strict bedrest and lie flat until seen by neurosurgery. Pt with h/o ALS, unable to move L arm, R arm weakness (baseline).   PAIN MANAGMENT: Denied pain  DIET: Soft & Bite sized (level 6) with thin liquids. Tolerating thin liquids. Room service.   BOWEL/BLADDER: Purewick in place with minimal output  DRAIN/DEVICES: PIV x2 SL with intermittent abx  SKIN: L foot scab. Scattered bruises. Skin check completed with 2nd RN DW.   D/C DATE: Pending  OTHER IMPORTANT INFO: Covid positive, special precautions maintained. Neurosurgery consult pending. Echo pending.

## 2024-08-19 NOTE — PHARMACY-ADMISSION MEDICATION HISTORY
Pharmacist Admission Medication History    Admission medication history is complete. The information provided in this note is only as accurate as the sources available at the time of the update.    Information Source(s): Patient, Family member, and CareEverywhere/SureScripts via in-person    Pertinent Information: Patient lives at nursing home but manages her own medications. Med hx obtained from pt and daughter. Daughter reports she occasionally misses doses of riluzole     Changes made to PTA medication list:  Added: None  Deleted: None  Changed: None    Allergies reviewed with patient and updates made in EHR: yes    Medication History Completed By: Christel Oquendo RPH 8/18/2024 8:04 PM    PTA Med List   Medication Sig Last Dose    calcium carbonate-vitamin D 600-200 MG-UNIT TABS Take 1 tablet by mouth daily 8/18/2024    InFLIXimab (REMICADE IV) Inject 200 mg into the vein Patient takes this every 8 weeks 8/7/2024    lovastatin (MEVACOR) 20 MG tablet Take 1 tablet (20 mg) by mouth At Bedtime 8/17/2024 at pm    oxyBUTYnin ER (DITROPAN XL) 5 MG 24 hr tablet Take 1 tablet (5 mg) by mouth once daily 8/17/2024 at pm    riluzole (RILUTEK) 50 MG tablet TAKE ONE TABLET BY MOUTH EVERY 12 HOURS 8/18/2024 at am

## 2024-08-19 NOTE — H&P
[facility]    History and Physical - Hospitalist Service       Date of Admission:  8/18/2024    Assessment & Plan    Britt Pichardo is a 84 year old female history of recurrent falls, ALS, (mainly limb), RA, who was admitted through the ER with a fall today off her her toilet 8/18/2024.     Patient was seen in the emergency room for falls on 6/13, 7/14, 8/16 and current admission    # Acute hypoxic respiratory failure   # COVID +   # CXR with infiltrates/fluid/interstitial edema   - EKG with no acute ST changes. NSR. Minimal voltage for LVH, cannot rule out infarct  - Check troponin 25 and ordered 2nd   - Check ECHO  - Treat COVID with remdesivir for 5 days.   - Decadron 6 mg IV daily for 10 days   - Check procalcitonin and low 0.11   - CT chest showing bibasilar consolidation with volume loss likely atelectasis.  Subsegmental atelectasis in the right middle lobe and inferior lingula.  -Given high risk of developing pneumonia will cover with antibiotics given consideration of consolidation>> started on Rocephin and Doxy  -Continue oxygen 2 liters NC   - monitor closely     # Recurrent falls  - multiple visits in the ER for falls  - CT head negative, CT cervical spine negative.   - palpation along spine with pain in thoracic vertebral area   - see below for thoracic fracture   - Discussion with Dr. Patel and ok to remove cervical collar   - multiple visits to ER for falls and imaging       # CXR with age indeterminate upper/mid thoracic compression fracture.   # T5 compression fracture   - 8/18 CT of thoracic spine showing a moderate age indeterminant T5 compression fracture.  Favor acute with small prevertebral hematoma  -Anticipate likely will need a MRI  -Neurosurgical consultation  -Patient to stay in bed. ?? Activity as per neurosurgery   -Given the potential of a prevertebral hematoma her subcu heparin was stopped which was initially started for tomorrow.  Will need to have  clearance from neurosurgery    # ALS (limb onset ALS)   - Diagnosed since 2017 and followed (has slow progressive limb onset ALS)   - Patient denies baseline respiratory or swallowing problems (eat regular diet and no oxygen)   -At baseline reportedly has left arm that does not move much.  Right hand weakness.  Lives in a care facility as she is unable to manage through a lot of her ADLs  -Reported to have restrictive respiratory physiology and worse PFTs at her neurology clinic 3/2024  -Remains on riluzole with plans for 6-month follow-up 9/2024     # Rheumatoid arthritis  - history of RA and + RA   - stable on remicade reported in EPIC   - received q 8 weeks.     # CODE STATUS  - DNR/DNI        Diet:  regular diet prior to admission   DVT Prophylaxis: Pneumatic Compression Devices and Start on heparin subcutaneous for DVT prevention initially heparin subcu stopped after imaging showed a T5 compression fracture with small prevertebral hematoma (anticoagulation has not been started until seen by neurosurgery and lien for anticoagulation)   Buchanan Catheter: Not present  Lines: None     Cardiac Monitoring: None  Code Status:  DNR/DNI     Clinically Significant Risk Factors Present on Admission                                           Disposition Plan     Medically Ready for Discharge: Anticipated in 2-4 Days           ROSA MARIA KING MD  Hospitalist Service  [facility]  Securely message with [employer] (more info)  Text page via [employer] Paging/Directory     ______________________________________________________________________    Chief Complaint   Fall off toilet hitting her head     History is obtained from the patient, daughter and [facility], ER provider     History of Present Illness   Britt Pichardo is a 84 year old female history of recurrent falls, ALS, (mainly limb), RA, who was admitted through the ER with a fall today off her her toilet 8/18/2024.     Patient currently resides in the nursing  home given her debility from her ALS.  She fell off the toilet today hitting her head on the wall.  She is not on blood thinners.  Placed in a c-collar by EMS.  [place] dizzy prior to her fall.   In the ER a CT of her head, cervical CT done both showing no acute injury.  Chest x-ray showing interstitial pulmonary edema, small effusions and question of CHF.  Hazy midlung opacity alveolar edema versus infection.  Patient tested positive for COVID    Labs with a sodium 133 potassium 3.8 creatinine 0.55.  LFTs normal.  Troponin of 25.  White count 7.3 hemoglobin 12.8 with negative urine    Patient was seen in the emergency room.  Her daughter was present as well who is a nurse at [employer].  Reviewed all of her imaging and results to date.  Reviewed her medications plans were to get additional imaging including a thoracic CT and CT of her chest.        Past Medical History    Past Medical History:   Diagnosis Date    ALS (amyotrophic lateral sclerosis) (H)     Osteoporosis     Pure hypercholesterolemia     RA (rheumatoid arthritis) (H)        Past Surgical History   Past Surgical History:   Procedure Laterality Date    LUMBAR FUSION  2002,2004       Prior to Admission Medications   Prior to Admission Medications   Prescriptions Last Dose Informant Patient Reported? Taking?   InFLIXimab (REMICADE IV) 8/7/2024  Yes Yes   Sig: Inject 200 mg into the vein Patient takes this every 8 weeks   calcium carbonate-vitamin D 600-200 MG-UNIT TABS 8/18/2024 Self Yes Yes   Sig: Take 1 tablet by mouth daily   lovastatin (MEVACOR) 20 MG tablet 8/17/2024 at pm  No Yes   Sig: Take 1 tablet (20 mg) by mouth At Bedtime   oxyBUTYnin ER (DITROPAN XL) 5 MG 24 hr tablet 8/17/2024 at pm  No Yes   Sig: Take 1 tablet (5 mg) by mouth once daily   riluzole (RILUTEK) 50 MG tablet 8/18/2024 at am  No Yes   Sig: TAKE ONE TABLET BY MOUTH EVERY 12 HOURS      Facility-Administered Medications: None        Review of Systems    The 10 point Review of Systems  is negative other than noted in the HPI or here. 7    Social History   I have reviewed this patient's social history and updated it with pertinent information if needed.  Social History     Tobacco Use    Smoking status: Former     Current packs/day: 0.00     Types: Cigarettes     Start date: 1952     Quit date: 1967     Years since quittin.6    Smokeless tobacco: Never    Tobacco comments:     social smoking only   Vaping Use    Vaping status: Never Used   Substance Use Topics    Alcohol use: Yes     Comment: several drinks/week    Drug use: No         Family History   I have reviewed this patient's family history and updated it with pertinent information if needed.  Family History   Problem Relation Age of Onset    Heart Disease Mother         details unknown    Melanoma Sister     Diabetes No family hx of     Cerebrovascular Disease No family hx of     Coronary Artery Disease Early Onset No family hx of     Myocardial Infarction No family hx of     Breast Cancer No family hx of     Ovarian Cancer No family hx of     Colon Cancer No family hx of          Allergies   No Known Allergies     Physical Exam   Vital Signs: Temp: 98  F (36.7  C)   BP: (!) 164/96 Pulse: 62   Resp: 18 SpO2: 91 % O2 Device: Nasal cannula    Weight: 0 lbs 0 oz    General Appearance: Patient is very pleasant in no distress awake and alert  Respiratory: Clear to auscultation bilaterally without wheezes or rhonchi  Cardiovascular: Regular rate and rhythm without gallop rub normal S1-S2  GI: Positive bowel sounds soft rebound guarding or tenderness  Skin: No overt edema or redness  She has contractures of her left arm and does not really have much mobility.  Right hand with limited mobility as well      Medical Decision Making       75 MINUTES SPENT BY ME on the date of service doing chart review, history, exam, documentation & further activities per the note.      Data     I have personally reviewed the following data over the  past 24 hrs:    7.3  \   12.8   / 204     133 (L) 96 (L) 16.2 /  100 (H)   3.8 26 0.55 \     ALT: 13 AST: 29 AP: 67 TBILI: 0.4   ALB: 3.7 TOT PROTEIN: 7.1 LIPASE: N/A     Trop: 25 (H) BNP: 556     Procal: 0.11 CRP: N/A Lactic Acid: N/A         Imaging results reviewed over the past 24 hrs:   Recent Results (from the past 24 hour(s))   CT Cervical Spine w/o Contrast    Narrative    EXAM: CT CERVICAL SPINE W/O CONTRAST  LOCATION: [facility]  DATE: 8/18/2024    INDICATION: Head injury.  COMPARISON: None.  TECHNIQUE: Routine CT Cervical Spine without IV contrast. Multiplanar reformats. Dose reduction techniques were used.    FINDINGS:  VERTEBRA: Normal vertebral body heights and alignment. No fracture or posttraumatic subluxation.     CANAL/FORAMINA: No high-grade canal or neural foraminal stenosis.    PARASPINAL: No extraspinal abnormality.      Impression    IMPRESSION:  1.  No fracture or posttraumatic subluxation.   CT Head w/o Contrast    Narrative    EXAM: CT HEAD W/O CONTRAST  LOCATION: [facility]  DATE: 8/18/2024    INDICATION: Head injury.  COMPARISON: CT 8/16/2024.  TECHNIQUE: Routine CT Head without IV contrast. Multiplanar reformats. Dose reduction techniques were used.    FINDINGS:  INTRACRANIAL CONTENTS: No intracranial hemorrhage, extraaxial collection, or mass effect.  No CT evidence of acute infarct. Moderate presumed chronic small vessel ischemic changes. Moderate generalized volume loss. No hydrocephalus.     VISUALIZED ORBITS/SINUSES/MASTOIDS: No intraorbital abnormality. No paranasal sinus mucosal disease. No middle ear or mastoid effusion.    BONES/SOFT TISSUES: No acute abnormality.      Impression    IMPRESSION:  1.  No CT evidence for acute intracranial process.  2.  Brain atrophy and presumed chronic microvascular ischemic changes similar to prior.   XR Chest 2 Views    Narrative    EXAM: XR CHEST 2 VIEWS  LOCATION: [facility]  DATE: 8/18/2024    INDICATION: Hypoxia.  COMPARISON: None.      Impression    IMPRESSION: Interstitial pulmonary edema, bilateral small pleural effusions and cardiomegaly suggestive of congestive heart failure. Hazy left mid lung opacity could reflect alveolar edema versus superimposed infectious/inflammatory process.     Age-indeterminate upper/mid thoracic vertebral body compression fracture. Lumbar spinal fusion hardware.       CT Chest w/o Contrast    Narrative    EXAM: CT CHEST W/O CONTRAST  LOCATION: [facility]  DATE: 8/18/2024    INDICATION: infiltrates and COVID  COMPARISON: None.  TECHNIQUE: CT chest without IV contrast. Multiplanar reformats were obtained. Dose reduction techniques were used.  CONTRAST: None.    FINDINGS:   LUNGS AND PLEURA: Bibasilar consolidation with volume loss, likely atelectasis. Subsegmental atelectasis in the medial right middle lobe and inferior lingula.    MEDIASTINUM/AXILLAE: No lymphadenopathy.    CORONARY ARTERY CALCIFICATION: Moderate.    UPPER ABDOMEN: No significant finding.    MUSCULOSKELETAL: T5 vertebral body compression fracture. Please see separate neuroradiology report.      Impression    IMPRESSION:   1.  Bibasilar consolidation with volume loss, likely atelectasis. Subsegmental atelectasis in the medial right middle lobe and inferior lingula.    2.  T5 vertebral body compression fracture.     CT Thoracic Spine w/o Contrast    Narrative    EXAM: CT THORACIC SPINE W/O CONTRAST  LOCATION: [facility]  DATE: 8/18/2024    INDICATION: thoracic fracture on CXR  COMPARISON: Chest x-ray August 18, 2024.  TECHNIQUE: Routine CT Thoracic Spine without IV contrast. Multiplanar reformats. Dose reduction techniques were used.     FINDINGS:  VERTEBRA: Moderate (50% height loss) T5 age-indeterminate compression fracture. No fracture or posttraumatic subluxation.     CANAL/FORAMINA: No canal or neural foraminal  stenosis.    PARASPINAL: Likely small prevertebral hematoma at T5. Please see dedicated CT chest for further findings.      Impression    IMPRESSION:  1.  Moderate age-indeterminate T5 compression fracture, favor acute with small prevertebral hematoma.

## 2024-08-20 NOTE — PROGRESS NOTES
[facility]    Medicine Progress Note - Hospitalist Service    Date of Admission:  8/18/2024    Interval History   Patient resting in bed with her daughter present at the bedside.  Plans for orthotic placement today.  Activity level increase as allowed by neurosurgery.  Her daughter is also concerned about patient losing strength because she is laying in bed.  She has been slightly confused since being in the hospital.  Changed back to her regular diet.  Awaiting input from neurosurgery on level of activity and whether the patient can go on subcu heparin or Lovenox for DVT prevention.    Assessment & Plan   Britt Pichardo is a 84 year old female with medical history of ALS, rheumatoid arthritis, osteoporosis, hyperlipidemia, falls, cognitive impairment scented from assisted living facility after a fall.     Fall at care facility.  History of frequent falls.  Physical deconditioning from medical illness, senile frailty.  Cognitive impairment, progressive per family report.  -Patient sent in from care facility after she fell off the toilet hitting her head on the wall.    Of note patient seen in ED on 6/13, 7/14, 8/16, 8/18 for falls.    Was placed in cervical collar, patient awake, able to answer simple questions, forgetful..    --On exam forgetful during encounter.    --Liver enzymes, bilirubin within normal limits.  CT head no acute intracranial pathology.  CT C-spine no fracture or posttraumatic subluxation.  - AT baseline she is able to ambulate.   - limited upper extremity usage L> R  -She has to lean over her walker when ambulating given her upper extremity limitations  - PT evaluation, OT evaluation-will likely benefit from rehabilitation.  Recommend neurocognitive assessment, neurology evaluation as outpatient.  -Activity levels cannot be increased until okayed by neurosurgery.  Awaiting input regarding this.     Age-indeterminate T5 compression fracture. Suspected acute   Small  prevertebral hematoma. On CT not seen on MRI   Patient complaining of back pain.  CT thoracic spine with Moderate age-indeterminate T5 compression fracture, favor acute with small prevertebral hematoma.   Neurosurgery following,  MRI thoracic spine showing T5 acute incomplete burst fracture approximately 50% loss of height with no retropulsion. Moderate edema in the T4-T5 and T5-T6 posterior interspinous ligament. Anterior longitudinal ligament, posterior longitudinal ligament and ligamentum flavum are preserved.   - orthotics per neurosurgery   - Contacted neurosurgery Requested input from neurosurgery regarding usage of lovenox or heparin subcutaneous for DVT prevention   - Will need to re discuss 8/21 and await input.     Bacteriuria.  Cultures mixed cornelia   UA with nitrite negative, leukocyte esterase negative.  Few bacteria present  Already on antibiotics for possible pneumonia.  Continue course of antibiotics for URI        Elevated troponin likely in the setting of demand ischemia from fall, hypoxic respiratory failure.  Bilateral pleural effusions, cardiomegaly on imaging.  Patient denies any chest pain.  High-sensitivity troponin 25 >18.  proBNP 556.  EKG heart rate 75, QTc 480, sinus rhythm.  Cardiac monitoring overnight.  8/20 ECHO with normal EF.  No wall motion abnormality.  1+ mitral regurgitation.  Holding of aspirin, blood thinners given concern for prevertebral hematoma, awaiting MRI T-spine update from neurosurgery        Acute hypoxic respiratory failure likely multifactorial from COVID-19 infection, pleural effusion, cardiomegaly, possible pneumonia, deconditioning.  COVID-19 infection 8/18/2024.  Hazy left midlung opacity-edema versus infectious etiology  Report of hypoxia to 88% on admission, requiring 2 L nasal oxygen for O2 sats in mid 90s  -COVID-19 PCR positive, influenza A, B, RSV negative.  Afebrile.  WBC 7.3.  Procalcitonin 0.06.  Chest x-ray with interstitial pulmonary edema, bilateral  small pleural effusions and cardiomegaly suggestive of congestive heart failure, hazy left midlung opacity could reflect alveolar edema versus superimposed infectious process.  -- Started on Decadron, continue 6 mg oral daily.  Started on remdesivir, continue during hospital stay for total of 5 days.  Empirically started on IV ceftriaxone, doxycycline.  Unlikely bacterial pneumonia given afebrile, no leukocytosis, low procalcitonin.  Follow clinical improvement in a.m., de-escalate to oral antibiotics or discontinue antibiotics.   Special precautions.  Aggressive incentive spirometry, Acapella use.  Wean off supplemental nasal oxygen.  Continue to taper oxygen as able      Mild hyponatremia likely from poor oral intake.  Sodium 133, chloride 96.  Bicarb 26, creatinine 0.55.  Blood glucose 100.  proBNP 556.  Gentle hydration with NS at 50 cc/h for 5 hours.  8/20 with sodium 133 stable and no overt changes  Monitor BMP daily      ALS (limb onset ALS)   Diagnosed since 2017 and followed (has slow progressive limb onset ALS).  Resident of care facility, needs assistance with ADLs.  Continue PTA riluzole with plans for 6-month follow-up 9/2024   Recommend to follow-up with neurology in clinic after discharge.     Rheumatoid arthritis  history of RA and + RA. stable on remicade reported in EPIC   - received q 8 weeks.      Osteoporosis.  Age-appropriate health maintenance including bone health maintenance on PCP visit.     Urinary retention.  Straight cath.  If fails straight cath x 3,  Buchanan  Monitor residuals.      Orders Placed This Encounter      Soft & Bite Sized Diet (level 6) Thin Liquids (level 0)  Discussion with her daughter and patient on Regular diet PTA         Diet: Room Service  Regular Diet Adult    DVT Prophylaxis: Pneumatic Compression Devices and need to wait to hear back from neurosurgery on lovenox or heparin   Buchanan Catheter: Not present  Lines: None     Cardiac Monitoring: ACTIVE order. Indication:  Tachyarrhythmias, acute (48 hours)  Code Status: No CPR- Do NOT Intubate           Disposition Plan     Medically Ready for Discharge: Anticipated in 2-4 Days         ROSA MARIA KING MD  Hospitalist Service  [facility]  Securely message with mySchoolNotebook (more info)  Text page via [employer] Paging/Directory   ______________________________________________________________________      Physical Exam   Vital Signs: Temp: 97.5  F (36.4  C) Temp src: Oral BP: (!) 144/94 Pulse: 83   Resp: 16 SpO2: 93 % O2 Device: Nasal cannula Oxygen Delivery: 2 LPM  Weight: 0 lbs 0 oz    General Appearance: Patient is awake and alert   Respiratory: Clear to auscultation bilaterally with no wheezes or rhonchi  Cardiovascular  Regular rate with no gallop or rub  GI: + BS, soft, non tender   Skin: No edema or redness  Left arm with contractures and not mobile   Right arm with distal wrist contracture     Medical Decision Making       60  MINUTES SPENT BY ME on the date of service doing chart review, history, exam, documentation & further activities per the note.      Data     I have personally reviewed the following data over the past 24 hrs:    N/A  \   14.9   / N/A     133 (L) 96 (L) 13.0 /  110 (H)   4.2 27 0.38 (L) \       Imaging results reviewed over the past 24 hrs:   Recent Results (from the past 24 hour(s))   MR Thoracic Spine w/o Contrast    Narrative    EXAM: MR THORACIC SPINE W/O CONTRAST  LOCATION: [facility]  DATE: 8/19/2024    INDICATION: T5 compression fx  COMPARISON: CT 8/18/2024.  TECHNIQUE: Routine Thoracic Spine MRI without IV contrast.    FINDINGS:   At T5, acute incomplete burst fracture, approximately 50% loss of height, no retropulsion. Moderate edema in T4-T5 and T5-T6 posterior interspinous ligament. Mild kyphosis at this level. Normal other vertebral body heights. Moderate bone marrow edema   within T5, otherwise normal marrow signal. Normal disc heights. No herniation.  Normal facets. No spinal canal or neural foraminal stenosis. No abnormal cord signal.     Right lower lobe atelectasis versus pneumonia. Small left lower lobe subsegmental atelectasis versus pneumonia. Moderate prevertebral soft tissue edema at T4-T5.      Impression    IMPRESSION:  1.  At T5, acute incomplete burst fracture, approximately 50% loss of height, no retropulsion.  2.  Moderate edema in the T4-T5 and T5-T6 posterior interspinous ligament. Anterior longitudinal ligament, posterior longitudinal ligament and ligamentum flavum are preserved.     Echocardiogram Complete   Result Value    LVEF  60-65%    [facility]    [id number]  [id number]  [id number]  [id number]^FERNANDO^ROSA MARIA^L     [facility]  Echocardiography Laboratory  [address] [place] [address]     Name: PHYLLIS DOYLE  MRN: [id number]  : 1940  Study Date: 2024 08:15 AM  Age: 84 yrs  Gender: Female  Patient Location: [facility]  Reason For Study: Dyspnea  Ordering Physician: ROSA MARIA KING  Referring Physician: Digna Krause  Performed By: Verna Anderson RDCS     BSA: 1.5 m2  Height: 63 in  Weight: 111 lb  HR: 75  BP: 153/89 mmHg  ______________________________________________________________________________  Procedure  Complete Portable Echo Adult.  ______________________________________________________________________________  Interpretation Summary     Left ventricular systolic function is normal. The visual ejection fraction is  60-65%.  No regional wall motion abnormalities noted.  The right ventricle is normal in size and function.  There is mild (1+) tricuspid regurgitation.  No change from prior study in .  ______________________________________________________________________________  Left Ventricle  The left ventricle is normal in size. There is normal left ventricular wall  thickness. Left ventricular systolic function is normal. The visual ejection  fraction is 60-65%. Grade I or early diastolic  dysfunction. No regional wall  motion abnormalities noted.     Right Ventricle  The right ventricle is normal in size and function.     Atria  There is mild biatrial enlargement.     Mitral Valve  The mitral valve leaflets appear normal. There is no evidence of stenosis,  fluttering, or prolapse. There is moderate mitral annular calcification. There  is mild (1+) mitral regurgitation.     Tricuspid Valve  Normal tricuspid valve. There is mild (1+) tricuspid regurgitation. The right  ventricular systolic pressure is approximated at 24.5 mmHg plus the right  atrial pressure.     Aortic Valve  There is mild trileaflet aortic sclerosis. There is trace aortic  regurgitation.     Pulmonic Valve  The pulmonic valve is not well visualized. There is mild (1+) pulmonic  valvular regurgitation.     Vessels  Normal size aorta. IVC diameter <2.1 cm collapsing >50% with sniff suggests a  normal RA pressure of 3 mmHg.     Pericardium  There is no pericardial effusion.     Rhythm  Sinus rhythm was noted.  ______________________________________________________________________________  MMode/2D Measurements & Calculations  IVSd: 1.1 cm     LVIDd: 3.3 cm  LVIDs: 2.2 cm  LVPWd: 1.0 cm  FS: 32.2 %  LV mass(C)d: 101.7 grams  LV mass(C)dI: 67.5 grams/m2  Ao root diam: 2.9 cm  LA dimension: 3.0 cm  asc Aorta Diam: 3.0 cm  LA/Ao: 1.0  LVOT diam: 1.9 cm  LVOT area: 2.7 cm2  Ao root diam index Ht(cm/m): 1.8  Ao root diam index BSA (cm/m2): 1.9  Asc Ao diam index BSA (cm/m2): 2.0  Asc Ao diam index Ht(cm/m): 1.9  LA Volume (BP): 38.7 ml     LA Volume Index (BP): 25.6 ml/m2  RWT: 0.61  TAPSE: 2.7 cm     Doppler Measurements & Calculations  MV E max ann: 64.5 cm/sec  MV A max ann: 130.0 cm/sec  MV E/A: 0.50  MV dec time: 0.21 sec  LV V1 max PG: 3.4 mmHg  LV V1 max: 91.7 cm/sec  LV V1 VTI: 22.2 cm  SV(LVOT): 60.1 ml  SI(LVOT): 39.9 ml/m2  TR max ann: 247.7 cm/sec  TR max P.5 mmHg  E/E' av.9  Lateral E/e': 12.1  Medial E/e': 11.6  RV S  Jeffy: 14.4 cm/sec     ______________________________________________________________________________  Report approved by: Liya Bender 08/20/2024 10:50 AM

## 2024-08-20 NOTE — PLAN OF CARE
Goal Outcome Evaluation:      Plan of Care Reviewed With: patient, child      Pt alert and oriented X4. Denied SOB, CP, N/V. On RA or 1 L NC to keep 02 >92. Pt on Tele. Order to bladder scan and keep under 500ml, Bladder scan this shift 388. Orthotic brace delivered and needs XR upright, PM shift updated XR ready whenever. Daughter requested regular diet. Messaged forwarded to MD Torres. Denied pain. On covid precautions.

## 2024-08-20 NOTE — PROGRESS NOTES
BL AC iv access points leaking when attempted to flush, dressings changed but unable to get IV to flush. RN attempted to place in R hand. Missed, IV consult placed.

## 2024-08-20 NOTE — PROGRESS NOTES
Request input from neurosurgery if heparin or lovenox ok for DVT prevention now that MRI completed.     Paged neurosurgery   sherri

## 2024-08-20 NOTE — PROGRESS NOTES
"S- Britt is an 84 yof that presents today at the [facility] room #2415 with her daughter for a fitting of a TLSO. The patient suffered from an T5 burst fracture.  O- Patient presents laying in bed and non weight bearing. The objective of todays appointment is to provide trunk support, triplanar control, and restrict gross trunk motion in the sagittal, coronal, and transverse planes.    A- I had measured the patients waist to be at 30\". I have fit the patient with the [place] [address] TLSO by adjusting the belt of the brace to an small. The sternal bar was contoured with a bending iron to improve the fit of the brace. Patient was instructed on wear and care of the brace. Fit and function appear adequate at this time.    P-Patient will wear the orthosis whenever she is sitting or standing. The orthosis is not needed as much while lying down. The patient will contact us with any question or concerns. Follow up as needed.    Electronically signed by: Christiano Tolliver, Board Eligible   "

## 2024-08-20 NOTE — CONSULTS
Care Management Initial Consult    General Information  Assessment completed with: Other (Facility Staff)Lynne  Type of CM/SW Visit: Initial Assessment    Primary Care Provider verified and updated as needed: Yes   Readmission within the last 30 days: no previous admission in last 30 days      Reason for Consult: discharge planning  Advance Care Planning: Advance Care Planning Reviewed: present on chart          Communication Assessment  Patient's communication style: spoken language (English or Bilingual)    Hearing Difficulty or Deaf: no   Wear Glasses or Blind: yes    Cognitive  Cognitive/Neuro/Behavioral: .WDL except, orientation  Level of Consciousness: confused, alert  Arousal Level: opens eyes spontaneously  Orientation: disoriented to, place, situation  Mood/Behavior: calm, cooperative  Best Language: 0 - No aphasia  Speech: clear, spontaneous    Living Environment:   People in home: alone     Current living Arrangements: apartment, assisted living  Name of Facility: [facility]   Able to return to prior arrangements: no       Family/Social Support:  Care provided by: self  Provides care for: no one  Marital Status:   Children          Description of Support System: Supportive, Involved    Support Assessment: Adequate family and caregiver support    Current Resources:   Patient receiving home care services: No     Community Resources: None  Equipment currently used at home: walker, rolling  Supplies currently used at home: None    Employment/Financial:  Employment Status: retired        Financial Concerns: none   Referral to Financial Worker: No       Does the patient's insurance plan have a 3 day qualifying hospital stay waiver?  Yes     Which insurance plan 3 day waiver is available? [employer]    Will the waiver be used for post-acute placement? Yes    Lifestyle & Psychosocial Needs:  Social Determinants of Health     Food Insecurity: Not on file   Depression: Not at risk  (3/14/2024)    PHQ-2     PHQ-2 Score: 1   Housing Stability: Not on file   Tobacco Use: Medium Risk (6/25/2024)    Patient History     Smoking Tobacco Use: Former     Smokeless Tobacco Use: Never     Passive Exposure: Not on file   Financial Resource Strain: Not on file   Alcohol Use: Not on file   Transportation Needs: Not on file   Physical Activity: Not on file   Interpersonal Safety: Not on file   Stress: Not on file   Social Connections: Not on file   Health Literacy: Not on file       Functional Status:  Prior to admission patient needed assistance:   Dependent ADLs:: Independent  Dependent IADLs:: Transportation  Assesssment of Functional Status: Not at baseline with mobility    Mental Health Status:          Chemical Dependency Status:                Values/Beliefs:  Spiritual, Cultural Beliefs, Quaker Practices, Values that affect care: no               Additional Information:    Consulted for discharge planning, Writer called [facility] and spoke with Anh MENDES ([phone]) and Pt lives alone in her apartment. Pt is independent with everything and only gets escorts 2x day for breakfast/lunch.   [facility] has a TCU attached and writer will send referral to their TCU and will need a private room due to COVID +.   Writer called and updated Pt's daughter, Courtney.          Inpatient Care Coordinator will continue to follow for discharge needs.          Dain Tran, RN, BSN, Care Coordinator

## 2024-08-20 NOTE — PROGRESS NOTES
Slept well overnight. Confused, orientated to self and time only. VSS on 1L O2. MRI completed overnight. Soft, bite sized diet. Intermittent IV abx continue. Purewick in place, good urine output.

## 2024-08-20 NOTE — PROVIDER NOTIFICATION
"Nurosurgery updated on MR from last night. \"Impression-At T5, acute incomplete burst fracture, approximately 50% loss of height, no retropulsion. \"  "

## 2024-08-20 NOTE — PROGRESS NOTES
[facility]  Neurosurgery Daily Progress Note    Assessment  84F with ALS, osteoporosis, COVID positive, admitted on 8/18 after a fall. Thoracic spine MRI completed:     EXAM: MR THORACIC SPINE W/O CONTRAST  LOCATION: [facility]  DATE: 8/19/2024                                                     IMPRESSION:  1.  At T5, acute incomplete burst fracture, approximately 50% loss of height, no retropulsion.  2.  Moderate edema in the T4-T5 and T5-T6 posterior interspinous ligament. Anterior longitudinal ligament, posterior longitudinal ligament and ligamentum flavum are preserved.    Plan   -No surgical intervention planned at this time  -Orthotics consult for TLSO brace   -Upright thoracic spine xray while wearing brace   -Recommend to wear brace when upright and out of bed   -Avoid heavy lifting, bending, twisting  -Will plan to advance activity and start PT after brace and xray are completed   -Continue pain control measures as needed  -Likely plan to follow up with NSGY clinic in 6 weeks with repeat xray    -Follow up with PCP or Endocrinology for osteoporosis management   -Appreciate assistance from specialties     Discussed with Dr. Lilia Mills, CNP  [facility] Neurosurgery  Tel [phone]  Pager [phone]    Interval History   Patient was seen laying in bed. Neuro exam stable. Denies any back pain, radicular pain, numbness, weakness, or new symptoms.     Physical Exam   Temp: 97.2  F (36.2  C) Temp src: Axillary BP: (!) 157/90 Pulse: 71   Resp: (!) 71 SpO2: 92 % O2 Device: None (Room air) Oxygen Delivery: 2 LPM    Mental status:  Alert and oriented x 3, speech is fluent, following commands  Motor:    Moves all extremities   RUE 5/5  RLE 5/5  LUE and LLE baseline weakness due to ALS, able to lift arm and leg, minimal  strength   Sensation:  Intact to light touch   Reflexes:  Negative clonus     Patient is currently on bedpan so unable  to assess for thoracic spine tenderness .

## 2024-08-21 NOTE — PROGRESS NOTES
Neurosurgery progress note    Upright x-rays in brace demonstrate normal alignment, stable appearance of the T5 compression fracture.    Patient denies significant pain when she is lying down.  Has been on bedrest.    Exam    Alert, oriented, no acute distress    Moving all extremities  Right upper extremity with 5 out of 5 strength  Right lower extremity and left lower extremity with 5 out of 5 strength     Left upper extremity affected by ALS, able to lift it up and hold antigravity, but minimal strength with hand grasp and flexion extension at the elbow.     Assessment    Acute T5 compression fracture, with 50% loss of height.    Plan    Activity as tolerated, wear brace when upright and out of bed.  Follow-up in 3 to 6 weeks with neurosurgery clinic with repeat thoracic x-rays at that time.    Okay for DVT prophylaxis.    Neurosurgery will help arrange follow-up.  Neurosurgery will sign off.

## 2024-08-21 NOTE — PLAN OF CARE
Goal Outcome Evaluation:       DATE & TIME: 8/21/24 [id number] shift report    Cognitive Concerns/ Orientation : A&O x4 with forgetfulness, forgot the name of the hospital   ABNL VS/O2: VSS except for Increased BP of 167/99 02 at 2 liters due to 02 sat of 88 on room air.  MOBILITY: To wear TLSO brace when upright and Out of Bed.   PAIN MANAGMENT: Denies pain  DIET: tolerating a Regular diet.Needs assistance with meals/feeding  BOWEL/BLADDER: Incontinent of B&B, Pure wick in place  TELEMETRY RHYTHM: NSR  SKIN: Scattered bruises  D/C DATE: TBD  IS THE PATIENT ON REMDESIVIR? Yes DATE OF LAST SCHEDULED DOSE 8/20/24  Lung sounds are clear but diminished, no cough noted. Remains on Special Precautions due to Covid Positive. Pt stated her L arm IV was stinging when infusing and redness noted, new IV of the R Lower forearm Tele NSR, Takes larger pills crushed. L hand/arm remains weaker. Remains on Strict bedrest.

## 2024-08-21 NOTE — PLAN OF CARE
Goal Outcome Evaluation:      Plan of Care Reviewed With: patient, child             Confused this morning. Oriented X4 later in shift. Denied SOB, CP, N/V. Brace when oob. No adverse reaction to IV ABT. Bladder scan WNL. PT eval ordered.

## 2024-08-21 NOTE — PROGRESS NOTES
DATE & TIME: 8/21/24: 1500-1930H               Cognitive Concerns/ Orientation : Disoriented to place.           ABNL VS/O2: VSS ex /79 on RA at the moment. 02 at 2Lnc this morning.   MOBILITY: To wear TLSO brace when upright and Out of Bed.   PAIN MANAGMENT: Denies pain  DIET: tolerating a Regular diet.Needs assistance with meals/feeding  BOWEL/BLADDER: Incontinent of B&B. Ambulating to the BR.  TELEMETRY RHYTHM: D/C'd this shift.   SKIN: Scattered bruises  D/C DATE: TBD,pending PT eval.   IS THE PATIENT ON REMDESIVIR? Yes DATE OF LAST SCHEDULED DOSE 8/22/24.3rd day of Remdesivir tonight.   Takes larger pills crushed. L hand/arm remains weaker-baseline. A1/G/W.

## 2024-08-21 NOTE — PROGRESS NOTES
[facility]    Medicine Progress Note - Hospitalist Service    Date of Admission:  8/18/2024    Interval History   Patient ok to increase activity as per neurosurgery. Ok with DVT prevention. Plans for TCU on discharge. More confused last few days lying in bed.   Discussion at bedside with her daughter.  She is more concerned that the patient gets out to the care facility so she can start rehabbing  Therapy okay today>> okay to get out of bed    Assessment & Plan   Britt Pichardo is a 84 year old female with medical history of ALS (mainly limb), rheumatoid arthritis, osteoporosis, hyperlipidemia, falls, cognitive impairment was sent from her assisted living on 8/18/2024 she had a fall off of her toilet.  She was found to be COVID-positive but also diagnosed with T5 compression fracture      Fall at care facility.  History of frequent falls.  Physical deconditioning from medical illness, senile frailty.  ALS   -Patient sent in from care facility after she fell off the toilet hitting her head on the wall.    Of note patient seen in ED on 6/13, 7/14, 8/16, 8/18 for falls.    - CT head no acute intracranial pathology.  - CT C-spine no fracture or posttraumatic subluxation.  - AT baseline she is able to ambulate per history from her daughter and uses a walker (limited upper extremity usage L> R) She has to lean over her walker when ambulating given her upper extremity limitations    Cognitive impairment, progressive per family report.   Delirium intermittent   - Patient at baseline has cognitive impairment  -Some slight confusion during hospital stay   -On steroids as well, COVID, sleep/wake cycle        Age-indeterminate T5 compression fracture on CXR   Small prevertebral hematoma. On CT not seen on MRI   - patient on admission tender at T5 spinal level.   - CT thoracic spine with Moderate age-indeterminate T5 compression fracture, favor acute with small prevertebral hematoma.   Neurosurgery  following,  MRI thoracic spine showing T5 acute incomplete burst fracture approximately 50% loss of height with no retropulsion. Moderate edema in the T4-T5 and T5-T6 posterior interspinous ligament. Anterior longitudinal ligament, posterior longitudinal ligament and ligamentum flavum are preserved.   - orthotics per neurosurgery   - Discussion with neurosurgery and ok for DVT prevention. 8/21   - orthotics now so ok to increase activity     Bacteriuria.  Cultures mixed cornelia (contaminate)   UA with nitrite negative, leukocyte esterase negative.  Few bacteria present  Already on antibiotics for possible pneumonia.  Continue course of antibiotics for URI        Elevated troponin likely in the setting of demand ischemia from fall, hypoxic respiratory failure.  Bilateral pleural effusions, cardiomegaly on imaging.  Patient denies any chest pain.  High-sensitivity troponin 25 >18.  proBNP 556.  EKG heart rate 75, QTc 480, sinus rhythm.  Cardiac monitoring overnight.  8/20 ECHO with normal EF.  No wall motion abnormality.  1+ mitral regurgitation.  - suspected supply/demand       Acute hypoxic respiratory failure likely multifactorial from COVID-19 infection, pleural effusion, cardiomegaly, possible pneumonia, deconditioning.  COVID-19 infection 8/18/2024.  Hazy left midlung opacity-edema versus infectious etiology  Report of hypoxia to 88% on admission, requiring 2 L nasal oxygen for O2 sats in mid 90s  -COVID-19 PCR positive, influenza A, B, RSV negative.  Afebrile.  WBC 7.3.  Procalcitonin 0.06.  - Chest x-ray with interstitial pulmonary edema, bilateral small pleural effusions and cardiomegaly suggestive of congestive heart failure, hazy left midlung opacity could reflect alveolar edema versus superimposed infectious process.  - CT chest: Bibasilar consolidation with volume loss likely atelectasis.  Subsegmental atelectasis in the medial right middle lobe and inferior lingula  - 8/18 decadron started 6 mg po daily  until discharge. (Start PPI with steroids)   - 8/18  Started on remdesivir, continue during hospital stay for total of 5 days. 8/18 complete 8/22   - Empirically started on IV ceftriaxone, doxycycline.  (Unlikely bacterial pneumonia given afebrile, no leukocytosis, low procalcitonin).   -Will complete 5 days of therapy for ceftriaxone and doxy 8/18 - 8/23 >> omnicef if ready for discharge    - May require oxygen on discharge. Continue to attempt to wean. 2 liters reported 8/21        Mild hyponatremia likely from poor oral intake.  Sodium 133, chloride 96.  Bicarb 26, creatinine 0.55.  Blood glucose 100.  proBNP 556.  Gentle hydration with NS at 50 cc/h for 5 hours.  8/20 with sodium 133      ALS (limb onset ALS)   Diagnosed since 2017 and followed (has slow progressive limb onset ALS).  Resident of care facility, needs assistance with ADLs.  Continue PTA riluzole with plans for 6-month follow-up 9/2024   Recommend to follow-up with neurology in clinic after discharge.     Rheumatoid arthritis  history of RA and + RA. stable on remicade reported in EPIC   - received q 8 weeks.      Osteoporosis.  Age-appropriate health maintenance including bone health maintenance on PCP visit.     Urinary retention.  Straight cath.  If fails straight cath x 3,  Buchanan  Monitor residuals.         Diet: Room Service  Regular Diet Adult    DVT Prophylaxis: Pneumatic Compression Devices Neurosurgery ok with DVT preventionh   Buchanan Catheter: Not present  Lines: None     Cardiac Monitoring: ACTIVE order. Indication: Tachyarrhythmias, acute (48 hours)  Code Status: No CPR- Do NOT Intubate           Disposition Plan     Medically Ready for Discharge: Anticipated Tomorrow         ROSA MARIA KING MD  Hospitalist Service  [facility]  Securely message with [employer] (more info)  Text page via [employer] Paging/Directory   ______________________________________________________________________      Physical Exam   Vital Signs:  Temp: 97.5  F (36.4  C) Temp src: Oral BP: (!) 152/79 Pulse: 73   Resp: 18 SpO2: 91 % O2 Device: None (Room air) Oxygen Delivery: 2 LPM  Weight: 0 lbs 0 oz    General Appearance: Patient is awake and alert   Respiratory: Clear to auscultation bilaterally with no wheezes or rhonchi  Cardiovascular  Regular rate with no gallop or rub  GI: + BS, soft, non tender   Skin: No edema or redness  Left arm with contractures and not mobile   Right arm with distal wrist contracture     Medical Decision Making       45  MINUTES SPENT BY ME on the date of service doing chart review, history, exam, documentation & further activities per the note.      Data     I have personally reviewed the following data over the past 24 hrs:    N/A  \   N/A   / 190     N/A N/A N/A /  N/A   N/A N/A N/A \       Imaging results reviewed over the past 24 hrs:   No results found for this or any previous visit (from the past 24 hour(s)).

## 2024-08-21 NOTE — PLAN OF CARE
Goal Outcome Evaluation:    Patient vital signs are at baseline: Yes  Patient able to ambulate as they were prior to admission or with assist devices provided by therapies during their stay:  No. Pt still strict bedrest. Waiting for doctors approval to change it since X-ray complete.   Patient MUST void prior to discharge:  Yes,  Reason: incont of bowl and bladder   Patient able to tolerate oral intake:  Yes, diet advanced to regular.  Pain has adequate pain control using Oral analgesics:  Yes, No prn pain medications required at this time.  Does patient have an identified :  Yes  Has goal D/C date and time been discussed with patient:  No,  Reason:  Discharge TBD.       Pt A&O x4 with intermittent confusion that increased as the shift went on. CMS intact. Denies SOB or chest pain. X-ray completed this shift.

## 2024-08-22 NOTE — PLAN OF CARE
Goal Outcome Evaluation:      Plan of Care Reviewed With: patient, child              Oriented X4. Denied SOB, CP, N/V. Brace when oob. No adverse reaction to IV ABT. Bladder scan WNL. PT eval completed. Pt unable to discharge today due to location not having private room (on special covid precautions). Daughter updated. Pt ambulated multiple times on shift.

## 2024-08-22 NOTE — PLAN OF CARE
Physical Therapy    Saw pt for scheduled PT evaluation.     Discharge recommendation:    Pt completed transfers sit<>stand from recliner using personal 4WW and Brian. She ambulated ~160ft w/4WW and CGA. She is wearing TLSO brace and lifting/bending/twisting precautions were reviewed with pt and her dtr.  Given that pt lives indep in [place] and does not receive assistance with any mobility at baseline, now requires Ax1 at all times, and the fact she has had 3 falls over the last 2 weeks, would recommend TCU at this time to progress safety and indep w/fn mobility and reduce risk of re-hospitalization. Anticipate pt would benefit from more supportive living arrangements once returning to [facility] side, TBD pending progress at TCU.     Nursing staff please use Ax1 using 4WW for in-room amb and transfers at this time.     Full note to follow by EOD.  Cyndie Larios, PT, DPT

## 2024-08-22 NOTE — PROGRESS NOTES
Orientation: A/O x 2, time and self, COVID-19 iso    Vitals/Tele: VSS ex on 2L O2    IV Access/drains: R PIV -SL    Diet: Regular     Mobility: A x 1 GB/W    GI/: Incontinent of B/B, up to BSC    Wound/Skin: Scattered bruising    Consults: L hand weakness-baseline    Discharge Plan: TBD      See Flow sheets for assessment

## 2024-08-22 NOTE — PROGRESS NOTES
[facility]    Medicine Progress Note - Hospitalist Service    Date of Admission:  8/18/2024    Interval History   Stable overnight.  Patient was seated in chair.  Apparently had ambulated with daughter to end of hallway.  Is doing better.  Daughter at bedside.  They are waiting for PT assessment-hoping for TCU placement.    Assessment & Plan   Britt Pichardo is a 84 year old female with medical history of ALS (mainly limb), rheumatoid arthritis, osteoporosis, hyperlipidemia, falls, cognitive impairment who was sent from her assisted living on 8/18/2024 she had a fall off of her toilet.  She was found to be COVID-positive but also diagnosed with T5 compression fracture      Fall at care facility.  History of frequent falls.  Physical deconditioning from medical illness, senile frailty.  ALS   -Patient sent in from care facility after she fell off the toilet hitting her head on the wall.    Of note patient seen in ED on 6/13, 7/14, 8/16, 8/18 for falls.    - CT head no acute intracranial pathology.  - CT C-spine no fracture or posttraumatic subluxation.  - AT baseline she is able to ambulate per history from her daughter and uses a walker (limited upper extremity usage L> R) She has to lean over her walker when ambulating given her upper extremity limitations    Cognitive impairment, progressive per family report.   Delirium intermittent   - Patient at baseline has cognitive impairment  -Some slight confusion during hospital stay   -On steroids as well, COVID, sleep/wake cycle        Age-indeterminate T5 compression fracture on CXR   Small prevertebral hematoma. On CT not seen on MRI   - patient on admission, tender at T5 spinal level.   - CT thoracic spine with Moderate age-indeterminate T5 compression fracture, favor acute with small prevertebral hematoma.   Neurosurgery following,  MRI thoracic spine showing T5 acute incomplete burst fracture approximately 50% loss of height with no  retropulsion. Moderate edema in the T4-T5 and T5-T6 posterior interspinous ligament. Anterior longitudinal ligament, posterior longitudinal ligament and ligamentum flavum are preserved.   - orthotics per neurosurgery   - Discussion with neurosurgery and ok for DVT prevention. 8/21   - wear brace when upright and out of bed   -Follow-up in 3 to 6 weeks with neurosurgery clinic with repeat thoracic x-rays at that time.     Bacteriuria.  Cultures mixed cornelia (contaminate)   UA with nitrite negative, leukocyte esterase negative.  Few bacteria present  Already on antibiotics for possible pneumonia.  Continue course of antibiotics for URI        Elevated troponin likely in the setting of demand ischemia from fall, hypoxic respiratory failure.  Bilateral pleural effusions, cardiomegaly on imaging.  Patient denies any chest pain.  High-sensitivity troponin 25 >18.  proBNP 556.  EKG heart rate 75, QTc 480, sinus rhythm.  Cardiac monitoring overnight.  8/20 ECHO with normal EF.  No wall motion abnormality.  1+ mitral regurgitation.  - suspected supply/demand       Acute hypoxic respiratory failure likely multifactorial from COVID-19 infection, pleural effusion, cardiomegaly, possible pneumonia, deconditioning.  COVID-19 infection 8/18/2024.  Hazy left midlung opacity-edema versus infectious etiology  Report of hypoxia to 88% on admission, requiring 2 L nasal oxygen for O2 sats in mid 90s  -COVID-19 PCR positive, influenza A, B, RSV negative.  Afebrile.  WBC 7.3.  Procalcitonin 0.06.  - Chest x-ray with interstitial pulmonary edema, bilateral small pleural effusions and cardiomegaly suggestive of congestive heart failure, hazy left midlung opacity could reflect alveolar edema versus superimposed infectious process.  - CT chest: Bibasilar consolidation with volume loss likely atelectasis.  Subsegmental atelectasis in the medial right middle lobe and inferior lingula  - 8/18 decadron started 6 mg po daily until discharge. (Start  PPI with steroids)   - 8/18  Started on remdesivir, continue during hospital stay for total of 5 days. 8/18 complete 8/22   - Empirically started on IV ceftriaxone, doxycycline.  (Unlikely bacterial pneumonia given afebrile, no leukocytosis, low procalcitonin).   -Will complete 5 days of therapy for ceftriaxone and doxy 8/18 - 8/23   - May require oxygen on discharge. Continue to attempt to wean. 1 liter reported 8/22.       Mild hyponatremia likely from poor oral intake.  Sodium 133, chloride 96.  Bicarb 26, creatinine 0.55.  Blood glucose 100.  proBNP 556.  Gentle hydration with NS at 50 cc/h for 5 hours.  8/22 with sodium 131     ALS (limb onset ALS)   Diagnosed since 2017 and followed (has slow progressive limb onset ALS).  Resident of care facility, needs assistance with ADLs.  Continue PTA riluzole with plans for 6-month follow-up 9/2024   Recommend to follow-up with neurology in clinic after discharge.     Rheumatoid arthritis  history of RA and + RA. stable on remicade reported in EPIC   - received q 8 weeks.      Osteoporosis.  Age-appropriate health maintenance including bone health maintenance on PCP visit.     Urinary retention.  Straight cath.  If fails straight cath x 3,  Buchanan  Monitor residuals.         Diet: Room Service  Regular Diet Adult    DVT Prophylaxis: Pneumatic Compression Devices Neurosurgery ok with DVT preventionh   Buchanan Catheter: Not present  Lines: None     Cardiac Monitoring: None  Code Status: No CPR- Do NOT Intubate           Disposition Plan     Medically Ready for Discharge: Ready Now, currently pending TCU availability         Mary Archibald MD  Hospitalist Service  [facility]  Securely message with [employer] (more info)  Text page via [employer] Paging/Directory   ______________________________________________________________________      Physical Exam   Vital Signs: Temp: 97.8  F (36.6  C) Temp src: Oral BP: (!) 151/81 Pulse: 87   Resp: 16 SpO2: 91 % O2  Device: Nasal cannula Oxygen Delivery: 1 LPM  Weight: 116 lbs 2.92 oz    General Appearance: Patient is awake and alert, seated in chair  Respiratory: Clear to auscultation bilaterally with no wheezes or rhonchi  Cardiovascular  Regular rate with no gallop or rub  GI: + BS, soft, non tender   Skin: No edema or redness  MSK: Left arm with contractures and not mobile , Right arm with distal wrist contracture     Medical Decision Making       45  MINUTES SPENT BY ME on the date of service doing chart review, history, exam, documentation & further activities per the note.      Data     I have personally reviewed the following data over the past 24 hrs:    N/A  \   N/A   / N/A     131 (L) 94 (L) 15.3 /  160 (H)   4.1 25 0.40 (L) \     ALT: N/A AST: N/A AP: N/A TBILI: N/A   ALB: N/A TOT PROTEIN: N/A LIPASE: N/A       Imaging results reviewed over the past 24 hrs:   No results found for this or any previous visit (from the past 24 hour(s)).

## 2024-08-22 NOTE — PLAN OF CARE
Goal Outcome Evaluation:      Diagnosis: Falls, Cognitive impairment, COVID-19    Mental Status:A & O  to self and time  Activity/dangle: Up with assist of 1, gait belt, walker, TLSO on when OOB  Diet:Regular  Pain:Denies  Buchanan/Voiding:Voided in BR, pure wick in place overnight  Tele/Restraints/Iso:Special precaution  02/LDA:SL, O2 at night  D/C Date:pending  Other Info:left hand weakness, hx of ALS

## 2024-08-23 NOTE — PLAN OF CARE
Care provided [id number]  Neuro: A&Ox2 disoriented to place and situation.   Tele/Cardiac: SR  Resp: on RA, LS diminished  Activity: A1 gb/walker and back brace  Pain: denies  Drips/IV: SL  GI/: purewick in place overnight.   Skin: scattered bruising  Diet: Diet: Room Service  Regular Diet Adult     Test/Procedures: n/a  Plan: awaiting TCU bed. Plan of care ongoing.      Called report to ortho spine, pt transferred at 0600

## 2024-08-23 NOTE — PLAN OF CARE
Goal Outcome Evaluation:    Shift Summary [id number]    Admitting Diagnosis: Hypoxia [R09.02]  Falls frequently [R29.6]  Compression fracture of thoracic vertebra, unspecified thoracic vertebral level, initial encounter (H) [S22.000A]  COVID-19 [U07.1]   Vitals - stable on RA  Pain - pt denied during shift   A&O to self   Voiding - external cath   Mobility - Ax1, GB/W  Tele - n/a  CMS - intact  GI - no bm this shift   Dressing - n/a    Orders Placed This Encounter      Regular Diet Adult      Diet       Plan: Discharge education provided to pt and pt daughter. Pt discharged to [facility] via wheelchair with daughter.

## 2024-08-23 NOTE — DISCHARGE SUMMARY
Discharge Summary  Hospitalist    Date of Admission:  8/18/2024  Date of Discharge:  8/23/2024  Discharging Provider: Mary Archibald MD, MD  Date of Service (when I saw the patient): 08/23/24    Discharge Diagnoses   Fall at care facility.  History of frequent falls.  Physical deconditioning from medical illness, senile frailty.  ALS     History of Present Illness   Please refer H & P for details.      Hospital Course     Britt Pichardo is a 84 year old female with medical history of ALS (mainly limb), rheumatoid arthritis, osteoporosis, hyperlipidemia, falls, cognitive impairment who was sent from her assisted living on 8/18/2024 she had a fall off of her toilet.  She was found to be COVID-positive but also diagnosed with T5 compression fracture      Fall at care facility.  History of frequent falls.  Physical deconditioning from medical illness, senile frailty.  ALS   -Patient sent in from care facility after she fell off the toilet hitting her head on the wall.    Of note patient seen in ED on 6/13, 7/14, 8/16, 8/18 for falls.    - CT head no acute intracranial pathology.  - CT C-spine no fracture or posttraumatic subluxation.  - AT baseline she is able to ambulate per history from her daughter and uses a walker (limited upper extremity usage L> R) She has to lean over her walker when ambulating given her upper extremity limitations     Cognitive impairment, progressive per family report.   Delirium intermittent   - Patient at baseline has cognitive impairment  -Some slight confusion during hospital stay   -On steroids as well, COVID, sleep/wake cycle         Age-indeterminate T5 compression fracture on CXR   Small prevertebral hematoma. On CT not seen on MRI   - patient on admission, tender at T5 spinal level.   - CT thoracic spine with Moderate age-indeterminate T5 compression fracture, favor acute with small prevertebral hematoma.   Neurosurgery following,  MRI thoracic spine showing T5 acute incomplete  burst fracture approximately 50% loss of height with no retropulsion. Moderate edema in the T4-T5 and T5-T6 posterior interspinous ligament. Anterior longitudinal ligament, posterior longitudinal ligament and ligamentum flavum are preserved.   - orthotics per neurosurgery   - Discussion with neurosurgery and ok for DVT prevention. 8/21   - wear brace when upright and out of bed   -Follow-up in 3 to 6 weeks with neurosurgery clinic with repeat thoracic x-rays at that time.      Bacteriuria.  Cultures mixed cornelia (contaminate)   UA with nitrite negative, leukocyte esterase negative.  Few bacteria present  Already on antibiotics for possible pneumonia.  Continue course of antibiotics for URI         Elevated troponin likely in the setting of demand ischemia from fall, hypoxic respiratory failure.  Bilateral pleural effusions, cardiomegaly on imaging.  Patient denies any chest pain.  High-sensitivity troponin 25 >18.  proBNP 556.  EKG heart rate 75, QTc 480, sinus rhythm.  Cardiac monitoring overnight.  8/20 ECHO with normal EF.  No wall motion abnormality.  1+ mitral regurgitation.  - suspected supply/demand        Acute hypoxic respiratory failure likely multifactorial from COVID-19 infection, pleural effusion, cardiomegaly, possible pneumonia, deconditioning.  COVID-19 infection 8/18/2024.  Hazy left midlung opacity-edema versus infectious etiology  Report of hypoxia to 88% on admission, requiring 2 L nasal oxygen for O2 sats in mid 90s  -COVID-19 PCR positive, influenza A, B, RSV negative.  Afebrile.  WBC 7.3.  Procalcitonin 0.06.  - Chest x-ray with interstitial pulmonary edema, bilateral small pleural effusions and cardiomegaly suggestive of congestive heart failure, hazy left midlung opacity could reflect alveolar edema versus superimposed infectious process.  - CT chest: Bibasilar consolidation with volume loss likely atelectasis.  Subsegmental atelectasis in the medial right middle lobe and inferior lingula  -  8/18 decadron started 6 mg po daily until discharge. (Start PPI with steroids)   - 8/18  Started on remdesivir, continue during hospital stay for total of 5 days. 8/18 complete 8/22   - Empirically started on IV ceftriaxone, doxycycline.  (Unlikely bacterial pneumonia given afebrile, no leukocytosis, low procalcitonin).   -Will complete 5 days of therapy for ceftriaxone and doxy 8/18 - 8/23   -Weaned off oxygen by day of discharge.        Mild hyponatremia likely from poor oral intake.  Sodium 133, chloride 96.  Bicarb 26, creatinine 0.55.  Blood glucose 100.  proBNP 556.  Gentle hydration with NS at 50 cc/h for 5 hours.  8/22 with sodium 131     ALS (limb onset ALS)   Diagnosed since 2017 and followed (has slow progressive limb onset ALS).  Resident of care facility, needs assistance with ADLs.  Continue PTA riluzole with plans for 6-month follow-up 9/2024   Recommend to follow-up with neurology in clinic after discharge.     Rheumatoid arthritis  history of RA and + RA. stable on remicade reported in EPIC   - received q 8 weeks.      Osteoporosis.  Age-appropriate health maintenance including bone health maintenance on PCP visit.     Urinary retention.  Straight cath.  If fails straight cath x 3,  [place]  Monitor residuals.        Mary Archibald MD, MD      Pending Results   These results will be followed up by Hospitalist team.  Unresulted Labs Ordered in the Past 30 Days of this Admission       No orders found from 7/19/2024 to 8/19/2024.            Code Status   DNR / DNI       Primary Care Physician   Digna Krause    Follow-ups Needed After Discharge   Follow-up Appointments     Follow Up and recommended labs and tests      Follow up with halfway physician.  The following labs/tests are   recommended: BMP in 5 days.  Follow up with primary Neurologist per previous schedule or 1- 2 months  Follow-up in 3 to 6 weeks with neurosurgery clinic with repeat thoracic   x-rays at that time            Physical  Exam   Temp: 97.5  F (36.4  C) Temp src: Oral BP: (!) 142/79 Pulse: 68   Resp: 18 SpO2: 92 % O2 Device: None (Room air)    Vitals:    08/22/24 0126 08/23/24 0500   Weight: 52.7 kg (116 lb 2.9 oz) 49.6 kg (109 lb 5.6 oz)     Vital Signs with Ranges  Temp:  [97.5  F (36.4  C)-98  F (36.7  C)] 97.5  F (36.4  C)  Pulse:  [68-83] 68  Resp:  [16-18] 18  BP: (135-155)/() 142/79  SpO2:  [91 %-96 %] 92 %  I/O last 3 completed shifts:  In: 120 [P.O.:120]  Out: 200 [Urine:200]    General Appearance:  Patient is awake and alert, seated in chair  Respiratory: Clear to auscultation bilaterally with no wheezes or rhonchi  Cardiovascular  Regular rate with no gallop or rub  GI: + BS, soft, non tender   Skin: No edema or redness  MSK: Left arm with contractures and not mobile , Right arm with distal wrist contracture       Discharge Disposition   Discharged to short-term care facility  Condition at discharge: Stable    Consultations This Hospital Stay   PHARMACY IP CONSULT  CARE MANAGEMENT / SOCIAL WORK IP CONSULT  NEUROSURGERY IP CONSULT  VASCULAR ACCESS ADULT IP CONSULT  VASCULAR ACCESS ADULT IP CONSULT  PHYSICAL THERAPY ADULT IP CONSULT  PHYSICAL THERAPY ADULT IP CONSULT  OCCUPATIONAL THERAPY ADULT IP CONSULT    Time Spent on this Encounter   IMary MD, personally saw the patient today and spent greater than 30 minutes discharging this patient.    Discharge Orders      XR Thoracic Spine 2 Views     Primary Care - Care Coordination Referral      General info for SNF    Length of Stay Estimate: Short Term Care: Estimated # of Days <30  Condition at Discharge: Improving  Level of care:skilled   Rehabilitation Potential: Excellent  Admission H&P remains valid and up-to-date: Yes  Recent Chemotherapy: N/A  Use Nursing Home Standing Orders: Yes     Mantoux instructions    Give two-step Mantoux (PPD) Per Facility Policy Yes     Follow Up and recommended labs and tests    Follow up with correction physician.  The  following labs/tests are recommended: BMP in 5 days.  Follow up with primary Neurologist per previous schedule or 1- 2 months  Follow-up in 3 to 6 weeks with neurosurgery clinic with repeat thoracic x-rays at that time     Reason for your hospital stay    Falls, spinal compression fracture, Covid 19     Activity - Up with nursing assistance    wear brace when upright and out of bed     Physical Therapy Adult Consult    Evaluate and treat as clinically indicated.    Reason:  weakness     Occupational Therapy Adult Consult    Evaluate and treat as clinically indicated.    Reason:  weakness     Airborne and Contact Isolation    Covid precautions     Fall precautions     Diet    Follow this diet upon discharge: Current Diet:Orders Placed This Encounter      Room Service      Regular Diet Adult     Discharge Medications   Current Discharge Medication List        START taking these medications    Details   acetaminophen (TYLENOL) 325 MG tablet Take 2 tablets (650 mg) by mouth every 4 hours as needed for mild pain or other (and adjunct with moderate or severe pain or per patient request).    Associated Diagnoses: Compression fracture of thoracic vertebra, unspecified thoracic vertebral level, initial encounter (H)      doxycycline hyclate (VIBRAMYCIN) 100 MG capsule Take 1 capsule (100 mg) by mouth every 12 hours for 1 dose.    Associated Diagnoses: Compression fracture of thoracic vertebra, unspecified thoracic vertebral level, initial encounter (H)      methocarbamol (ROBAXIN) 500 MG tablet Take 1 tablet (500 mg) by mouth 3 times daily as needed for muscle spasms.    Associated Diagnoses: Compression fracture of thoracic vertebra, unspecified thoracic vertebral level, initial encounter (H)           CONTINUE these medications which have NOT CHANGED    Details   calcium carbonate-vitamin D 600-200 MG-UNIT TABS Take 1 tablet by mouth daily      InFLIXimab (REMICADE IV) Inject 200 mg into the vein Patient takes this every 8  weeks      lovastatin (MEVACOR) 20 MG tablet Take 1 tablet (20 mg) by mouth At Bedtime  Qty: 90 tablet, Refills: 0    Associated Diagnoses: Hyperlipidemia LDL goal <130      oxyBUTYnin ER (DITROPAN XL) 5 MG 24 hr tablet Take 1 tablet (5 mg) by mouth once daily  Qty: 90 tablet, Refills: 0    Associated Diagnoses: Stress incontinence of urine      riluzole (RILUTEK) 50 MG tablet TAKE ONE TABLET BY MOUTH EVERY 12 HOURS  Qty: 60 tablet, Refills: 2    Associated Diagnoses: ALS (amyotrophic lateral sclerosis) (H)           Allergies   No Known Allergies  Data   Most Recent 3 CBC's:  Recent Labs   Lab Test 08/21/24  0832 08/20/24  0918 08/19/24  1025 08/18/24  1755 07/14/24  1822   WBC  --   --  5.2 7.3 11.1*   HGB  --  14.9 14.0 12.8 13.2   MCV  --   --  103* 99 99     --  180 204 198      Most Recent 3 BMP's:  Recent Labs   Lab Test 08/23/24  0915 08/22/24  0840 08/20/24  0918   * 131* 133*   POTASSIUM 3.7 4.1 4.2   CHLORIDE 93* 94* 96*   CO2 28 25 27   BUN 14.5 15.3 13.0   CR 0.37* 0.40* 0.38*   ANIONGAP 10 12 10   RACH 9.2 9.2 9.3   * 160* 110*     Most Recent 2 LFT's:  Recent Labs   Lab Test 08/21/24  0832 08/19/24  1025   AST 36 34   ALT 17 13   ALKPHOS 73 68   BILITOTAL 0.3 0.2     Most Recent INR's and Anticoagulation Dosing History:  Anticoagulation Dose History           No data to display              Most Recent 3 Troponin's:No lab results found.  Most Recent Cholesterol Panel:  Recent Labs   Lab Test 07/05/23  1140   CHOL 144   LDL 58   HDL 70   TRIG 82     Most Recent 6 Bacteria Isolates From Any Culture (See EPIC Reports for Culture Details):  Recent Labs   Lab Test 03/31/21  1030 01/28/19  1102   CULT 10,000 to 50,000 colonies/mL  mixed urogenital cornelia  Susceptibility testing not routinely done   No growth     Most Recent TSH, T4 and A1c Labs:  Recent Labs   Lab Test 07/05/23  1140   A1C 4.9       Results for orders placed or performed during the hospital encounter of 08/18/24   CT Head  w/o Contrast    Narrative    EXAM: CT HEAD W/O CONTRAST  LOCATION: [facility]  DATE: 8/18/2024    INDICATION: Head injury.  COMPARISON: CT 8/16/2024.  TECHNIQUE: Routine CT Head without IV contrast. Multiplanar reformats. Dose reduction techniques were used.    FINDINGS:  INTRACRANIAL CONTENTS: No intracranial hemorrhage, extraaxial collection, or mass effect.  No CT evidence of acute infarct. Moderate presumed chronic small vessel ischemic changes. Moderate generalized volume loss. No hydrocephalus.     VISUALIZED ORBITS/SINUSES/MASTOIDS: No intraorbital abnormality. No paranasal sinus mucosal disease. No middle ear or mastoid effusion.    BONES/SOFT TISSUES: No acute abnormality.      Impression    IMPRESSION:  1.  No CT evidence for acute intracranial process.  2.  Brain atrophy and presumed chronic microvascular ischemic changes similar to prior.   CT Cervical Spine w/o Contrast    Narrative    EXAM: CT CERVICAL SPINE W/O CONTRAST  LOCATION: [facility]  DATE: 8/18/2024    INDICATION: Head injury.  COMPARISON: None.  TECHNIQUE: Routine CT Cervical Spine without IV contrast. Multiplanar reformats. Dose reduction techniques were used.    FINDINGS:  VERTEBRA: Normal vertebral body heights and alignment. No fracture or posttraumatic subluxation.     CANAL/FORAMINA: No high-grade canal or neural foraminal stenosis.    PARASPINAL: No extraspinal abnormality.      Impression    IMPRESSION:  1.  No fracture or posttraumatic subluxation.   XR Chest 2 Views    Narrative    EXAM: XR CHEST 2 VIEWS  LOCATION: [facility]  DATE: 8/18/2024    INDICATION: Hypoxia.  COMPARISON: None.      Impression    IMPRESSION: Interstitial pulmonary edema, bilateral small pleural effusions and cardiomegaly suggestive of congestive heart failure. Hazy left mid lung opacity could reflect alveolar edema versus superimposed infectious/inflammatory process.      Age-indeterminate upper/mid thoracic vertebral body compression fracture. Lumbar spinal fusion hardware.       CT Chest w/o Contrast    Narrative    EXAM: CT CHEST W/O CONTRAST  LOCATION: [facility]  DATE: 8/18/2024    INDICATION: infiltrates and COVID  COMPARISON: None.  TECHNIQUE: CT chest without IV contrast. Multiplanar reformats were obtained. Dose reduction techniques were used.  CONTRAST: None.    FINDINGS:   LUNGS AND PLEURA: Bibasilar consolidation with volume loss, likely atelectasis. Subsegmental atelectasis in the medial right middle lobe and inferior lingula.    MEDIASTINUM/AXILLAE: No lymphadenopathy.    CORONARY ARTERY CALCIFICATION: Moderate.    UPPER ABDOMEN: No significant finding.    MUSCULOSKELETAL: T5 vertebral body compression fracture. Please see separate neuroradiology report.      Impression    IMPRESSION:   1.  Bibasilar consolidation with volume loss, likely atelectasis. Subsegmental atelectasis in the medial right middle lobe and inferior lingula.    2.  T5 vertebral body compression fracture.     CT Thoracic Spine w/o Contrast    Narrative    EXAM: CT THORACIC SPINE W/O CONTRAST  LOCATION: [facility]  DATE: 8/18/2024    INDICATION: thoracic fracture on CXR  COMPARISON: Chest x-ray August 18, 2024.  TECHNIQUE: Routine CT Thoracic Spine without IV contrast. Multiplanar reformats. Dose reduction techniques were used.     FINDINGS:  VERTEBRA: Moderate (50% height loss) T5 age-indeterminate compression fracture. No fracture or posttraumatic subluxation.     CANAL/FORAMINA: No canal or neural foraminal stenosis.    PARASPINAL: Likely small prevertebral hematoma at T5. Please see dedicated CT chest for further findings.      Impression    IMPRESSION:  1.  Moderate age-indeterminate T5 compression fracture, favor acute with small prevertebral hematoma.     MR Thoracic Spine w/o Contrast    Narrative    EXAM: MR THORACIC SPINE W/O  CONTRAST  LOCATION: [facility]  DATE: 8/19/2024    INDICATION: T5 compression fx  COMPARISON: CT 8/18/2024.  TECHNIQUE: Routine Thoracic Spine MRI without IV contrast.    FINDINGS:   At T5, acute incomplete burst fracture, approximately 50% loss of height, no retropulsion. Moderate edema in T4-T5 and T5-T6 posterior interspinous ligament. Mild kyphosis at this level. Normal other vertebral body heights. Moderate bone marrow edema   within T5, otherwise normal marrow signal. Normal disc heights. No herniation. Normal facets. No spinal canal or neural foraminal stenosis. No abnormal cord signal.     Right lower lobe atelectasis versus pneumonia. Small left lower lobe subsegmental atelectasis versus pneumonia. Moderate prevertebral soft tissue edema at T4-T5.      Impression    IMPRESSION:  1.  At T5, acute incomplete burst fracture, approximately 50% loss of height, no retropulsion.  2.  Moderate edema in the T4-T5 and T5-T6 posterior interspinous ligament. Anterior longitudinal ligament, posterior longitudinal ligament and ligamentum flavum are preserved.     XR Thoracic Spine 2 Views    Narrative    THORACIC SPINE TWO VIEWS  8/20/2024 4:27 PM     COMPARISON: Thoracic spine MR 8/19/2024.    HISTORY: Upright thoracic spine x-ray while wearing brace, T5  fracture.      Impression    IMPRESSION: Alignment of the thoracic vertebrae remains normal.  Moderate anterior wedge compression fracture deformity of the T5  vertebral body again noted without definite change. Vertebral body  heights and contours of the thoracic spine are otherwise normal.  Alignment of the thoracic vertebrae is normal.    SUSANA FRANZ MD         SYSTEM ID:  [id number]   Echocardiogram Complete     Value    LVEF  60-65%    Narrative    [id number]  [id number]  [id number]  [id number]^FERNANDO^ROSA MARIA^L     [facility]  Echocardiography Laboratory  [address] [place] [address]     Name: PHYLLIS DOYLE  MRN:  [id number]  : 1940  Study Date: 2024 08:15 AM  Age: 84 yrs  Gender: Female  Patient Location: [facility]  Reason For Study: Dyspnea  Ordering Physician: ROSA MARIA KING  Referring Physician: Digna Krause  Performed By: Verna Anderson RDCS     BSA: 1.5 m2  Height: 63 in  Weight: 111 lb  HR: 75  BP: 153/89 mmHg  ______________________________________________________________________________  Procedure  Complete Portable Echo Adult.  ______________________________________________________________________________  Interpretation Summary     Left ventricular systolic function is normal. The visual ejection fraction is  60-65%.  No regional wall motion abnormalities noted.  The right ventricle is normal in size and function.  There is mild (1+) tricuspid regurgitation.  No change from prior study in .  ______________________________________________________________________________  Left Ventricle  The left ventricle is normal in size. There is normal left ventricular wall  thickness. Left ventricular systolic function is normal. The visual ejection  fraction is 60-65%. Grade I or early diastolic dysfunction. No regional wall  motion abnormalities noted.     Right Ventricle  The right ventricle is normal in size and function.     Atria  There is mild biatrial enlargement.     Mitral Valve  The mitral valve leaflets appear normal. There is no evidence of stenosis,  fluttering, or prolapse. There is moderate mitral annular calcification. There  is mild (1+) mitral regurgitation.     Tricuspid Valve  Normal tricuspid valve. There is mild (1+) tricuspid regurgitation. The right  ventricular systolic pressure is approximated at 24.5 mmHg plus the right  atrial pressure.     Aortic Valve  There is mild trileaflet aortic sclerosis. There is trace aortic  regurgitation.     Pulmonic Valve  The pulmonic valve is not well visualized. There is mild (1+) pulmonic  valvular regurgitation.     Vessels  Normal size  aorta. IVC diameter <2.1 cm collapsing >50% with sniff suggests a  normal RA pressure of 3 mmHg.     Pericardium  There is no pericardial effusion.     Rhythm  Sinus rhythm was noted.  ______________________________________________________________________________  MMode/2D Measurements & Calculations  IVSd: 1.1 cm     LVIDd: 3.3 cm  LVIDs: 2.2 cm  LVPWd: 1.0 cm  FS: 32.2 %  LV mass(C)d: 101.7 grams  LV mass(C)dI: 67.5 grams/m2  Ao root diam: 2.9 cm  LA dimension: 3.0 cm  asc Aorta Diam: 3.0 cm  LA/Ao: 1.0  LVOT diam: 1.9 cm  LVOT area: 2.7 cm2  Ao root diam index Ht(cm/m): 1.8  Ao root diam index BSA (cm/m2): 1.9  Asc Ao diam index BSA (cm/m2): 2.0  Asc Ao diam index Ht(cm/m): 1.9  LA Volume (BP): 38.7 ml     LA Volume Index (BP): 25.6 ml/m2  RWT: 0.61  TAPSE: 2.7 cm     Doppler Measurements & Calculations  MV E max jeffy: 64.5 cm/sec  MV A max jeffy: 130.0 cm/sec  MV E/A: 0.50  MV dec time: 0.21 sec  LV V1 max PG: 3.4 mmHg  LV V1 max: 91.7 cm/sec  LV V1 VTI: 22.2 cm  SV(LVOT): 60.1 ml  SI(LVOT): 39.9 ml/m2  TR max jeffy: 247.7 cm/sec  TR max P.5 mmHg  E/E' av.9  Lateral E/e': 12.1  Medial E/e': 11.6  RV S Jeffy: 14.4 cm/sec     ______________________________________________________________________________  Report approved by: Liya Bender 2024 10:50 AM

## 2024-08-23 NOTE — PLAN OF CARE
Goal Outcome Evaluation:    Shift Summary [id number]    Admitting Diagnosis: Hypoxia [R09.02]  Falls frequently [R29.6]  Compression fracture of thoracic vertebra, unspecified thoracic vertebral level, initial encounter (H) [S22.000A]  COVID-19 [U07.1]   Vitals: Stable ex BP  Pain 0/10.   A&Ox2, disoriented to place and situation  Voiding: Incontinent. Purewick in place  Mobility: A1 gb/walker and back brace on when OOB  Tele: N/A  CMS: intact ex weakness on LUE  Lung Sounds diminished on RA  GI: Continent  Skin: scattered bruising. Scab to Left foot.      Orders Placed This Encounter      Regular Diet Adult. Room service in place       Plan: awaiting TCU bed.

## 2024-08-23 NOTE — PROGRESS NOTES
Care Management Discharge Note    Discharge Date: 08/23/2024       Discharge Disposition: Skilled Nursing Facility    Discharge Services: None    Discharge DME: None    Discharge Transportation: family or friend will provide    Private pay costs discussed: Not applicable    Does the patient's insurance plan have a 3 day qualifying hospital stay waiver?  Yes     Which insurance plan 3 day waiver is available? [employer]    Will the waiver be used for post-acute placement? No    PAS Confirmation Code: [id number]  Patient/family educated on Medicare website which has current facility and service quality ratings:      Education Provided on the Discharge Plan:    Persons Notified of Discharge Plans: Patient, TCU, and [facility] staff  Patient/Family in Agreement with the Plan: yes    Handoff Referral Completed: No    Additional Information:  Writer was made aware from the provider of patient's medical readiness. Writer updated the patient has a bed available at [facility] for Monday, but will try to find an earlier bed.     Writer sent out additional referrals.    Writer contacted the patient's daughter, Courtney ([phone]). Courtney stated agreement to find an alternative facility and will be ok with any facility.     Writer contacted a couple facilties, but most said they did not have private rooms avialable. Writer conctacted G[facility] at [facility] who stated they would be able to accept at 1700.    Writer contacted provider for orders and updated nursing staff.     Writer contacted Courtney who stated she would be able to transport the patient at that time.     Writer currently awaiting orders. Bedside rn stated patient is NOT  on any opioids or narcotics that would need scripts.     Patient will discharge from [facility] to [facility] with family transporting  at 1700.  Please refer to  progress notes for further details.    ARIE Carr  [facility]  Social Work

## 2024-08-23 NOTE — PROGRESS NOTES
"   08/22/24 1130   Appointment Info   Signing Clinician's Name / Credentials (PT) Cyndie Larios, PT, DPT   Rehab Comments (PT) \"TLSO when upright and out of bed\", \"Avoid heavy lifting, bending, twisting\"  for T5 burst fx; Limb-onset ALS dx in 2017, lives in [place] w/ no physical assistance for daily living, frequent falls, cognitive decline   Living Environment   People in Home alone   Current Living Arrangements assisted living   Home Accessibility no concerns   Transportation Anticipated family or friend will provide   Living Environment Comments Lives in [facility] with no services except meals; showers, gets dressed, ambulates without assistance   Self-Care   Usual Activity Tolerance moderate   Current Activity Tolerance fair   Fall history within last six months yes   Number of times patient has fallen within last six months 5   Activity/Exercise/Self-Care Comment Uses no AD or SEC or 4WW - no device (usually) in small quarters, 4WW in larger hallway of [facility]   General Information   Onset of Illness/Injury or Date of Surgery 08/18/24   Referring Physician Daisha Torres MD   Patient/Family Therapy Goals Statement (PT) Feel better, go home. Per dtr - \"I guess [email or web address] wants her to go to TCU before returning to her room.\"   Pertinent History of Current Problem (include personal factors and/or comorbidities that impact the POC) 84 year old female with medical history of ALS (mainly limb), rheumatoid arthritis, osteoporosis, hyperlipidemia, falls, cognitive impairment who was sent from her assisted living on 8/18/2024 she had a fall off of her toilet.  She was found to be COVID-positive but also diagnosed with T5 compression fracture   Existing Precautions/Restrictions fall;spinal   General Observations \"TLSO when upright and out of bed\", \"Avoid heavy lifting, bending, twisting\"   Cognition   Affect/Mental Status (Cognition) flat/blunted affect   Orientation Status (Cognition) oriented x 3 "   Follows Commands (Cognition) WFL   Safety Deficit (Cognition) awareness of need for assistance;insight into deficits/self-awareness;moderate deficit;safety precautions follow-through/compliance   Memory Deficit (Cognition) moderate deficit   Cognitive Status Comments A&O, forgetful of name of hospital; Per chart and dtr, baseline cognitive decline   Pain Assessment   Patient Currently in Pain No   Integumentary/Edema   Integumentary/Edema no deficits were identifed   Posture    Posture Kyphosis;Forward head position;Protracted shoulders   Range of Motion (ROM)   Range of Motion ROM deficits secondary to weakness   Strength (Manual Muscle Testing)   Strength Comments L active shoulder, elbow, wrist and hand but not w/ functional use. R shoulder biceps strength 4/5, shoulder flex 3+/5 ; R fairly weak with grasp but pt still able to use walker; Bilateral hip flexion 3+/5, B knee flexion/extension 5/5, L ankle dorsiflexion 4/5, R ankle DF 3+/5   Bed Mobility   Bed Mobility bed mobility (all) activities   All Activities, [place] (Bed Mobility) moderate assist (50% patient effort)   Bed Mobility Limitations decreased ability to use arms for pushing/pulling;decreased ability to use legs for bridging/pushing;impaired ability to control trunk for mobility   Impairments Contributing to Impaired Bed Mobility impaired balance;impaired coordination;decreased flexibility;abnormal muscle tone;decreased ROM;decreased strength;pain;impaired motor control   Assistive Device (Bed Mobility) draw sheet   Transfers   Comment, (Transfers) Brian   Gait/Stairs (Locomotion)   Comment, (Gait/Stairs) Brian   Balance   Balance Comments Impaired static and dynamic balance   Sensory Examination   Sensory Perception patient reports no sensory changes   Coordination   Coordination Comments Impaired 2/2 weakness, not formally tested, WFL with fn mobility   Muscle Tone   Muscle Tone Comments Mild incr tone with BLE   Clinical Impression    Criteria for Skilled Therapeutic Intervention Yes, treatment indicated   PT Diagnosis (PT) Impaired safety and indep w/fn mob, impaired ability to care for self   Influenced by the following impairments Strength, balance, tone, cognition, ROM, activity tolerance, cardiopulm endurance   Functional limitations due to impairments Transfers, amb   Clinical Presentation (PT Evaluation Complexity) evolving   Clinical Presentation Rationale Multiple affecitng comorbidities, assessment incl strength, alance, fn mob. Evolving presentation.   Clinical Decision Making (Complexity) moderate complexity   Planned Therapy Interventions (PT) bed mobility training;gait training;balance training;neuromuscular re-education;patient/family education;orthotic fitting/training;postural re-education;ROM (range of motion);strengthening;stretching;transfer training;progressive activity/exercise   Risk & Benefits of therapy have been explained evaluation/treatment results reviewed;care plan/treatment goals reviewed;risks/benefits reviewed;current/potential barriers reviewed;participants voiced agreement with care plan;participants included;patient;daughter   PT Total Evaluation Time   PT Eval, Moderate Complexity Minutes (95809) 10   Physical Therapy Goals   PT Frequency 5x/week   PT Predicted Duration/Target Date for Goal Attainment 09/01/24   PT Goals Bed Mobility;Transfers;Gait;Aerobic Activity   PT: Bed Mobility Supervision/stand-by assist   PT: Transfers Supervision/stand-by assist   PT: Gait Supervision/stand-by assist   PT: Perform aerobic activity with stable cardiovascular response 10 minutes;5 minutes;continuous activity;intermittent activity   Interventions   Interventions Quick Adds Therapeutic Activity;Gait Training   Therapeutic Activity   Therapeutic Activities: dynamic activities to improve functional performance Minutes (95957) 40   Symptoms Noted During/After Treatment Fatigue   Treatment Detail/Skilled Intervention  Amenable, dtr present and supportive.Sit>stand at reclner w/Brian at 4WW. Pt needing incr time to  walker. Completed amb in room with changing direction, small radius turns and CGA. Pt endorses she does not use AD in her room at her [facility]. Trialed without, pt needing Brian and gripping furniture. Reviewed safety prec, BLT, high falls risk, use of personal 4WW. Reviewed with RN as well to let pt use her 4WW as 2WW ot helpful with baseline E weakness. Incr time for establishing rapport, gentle encouragement, reviewing falls risk and imporance of AD use at this time. Pt/dtr in agreement. Left sititng in recliner, chair alarm on, call light in reach.   Gait Training   Treatment Detail/Skilled Intervention In room, using 4WW - see TA above   Distance in Feet 160'   PT Discharge Planning   PT Plan Review bed mobility, safety and indep w/transfers, amb; monitor vitals/O2 (decr vital capacity at baseline per chart and now here post-COVID infection, on room air and doing well)   PT Discharge Recommendation (DC Rec) Transitional Care Facility;home with assist;home with home care physical therapy  (Per dtr, [facility] facility requiring TCU stay prior to returning to private room)   PT Rationale for DC Rec Pt completed transfers sit<>stand from recliner using personal 4WW and Brian. She ambulated ~160ft w/4WW and CGA. She is wearing TLSO brace and lifting/bending/twisting precautions were reviewed with pt and her dtr.  Given that pt lives indep in [facility] and does not receive assistance with any mobility at baseline, now requires Ax1 at all times, and the fact she has had 3 falls over the last 2 weeks, would recommend TCU at this time to progress safety and indep w/fn mobility and reduce risk of re-hospitalization. Anticipate pt would benefit from more supportive living arrangements once returning to [facility] side, TBD pending progress at TCU.   PT Brief overview of current status CG-Brian x 1 person, use personal 4WW   PT Equipment Needed at  Discharge   (Already owns 2WW, 4WW, manual w/c)

## 2024-08-23 NOTE — PLAN OF CARE
Reason for Admission: fall, COVID +, T5 fx, hypoxia    Cognitive/Mentation: A/Ox 1; disoriented to time, place, situation    Neuros/CMS: Intact ex LUE weakness    VS: VSS on RA.     Tele: n/a.    /GI: incontinent of urine. BM this shift.     Pulmonary: LS clear. FUENTES.    Pain: denies.     Drains/Lines: SL PIV    Skin: flowsheet    Activity: Assist x 1 with GB/W. Brace on OOB.    Diet: regular with thin liquids. Takes pills whole.     Therapies recs: TCU    Discharge: Anne today at 1700    Aggression Stoplight Tool: green    End of shift summary: Discharging today.

## 2024-08-23 NOTE — PLAN OF CARE
Goal Outcome Evaluation:      Plan of Care Reviewed With: patient    Overall Patient Progress: improvingOverall Patient Progress: improving       Summary Fall , T5 compression fracture   She was found to be COVID-positive but also diagnosed with     Hx ALS (mainly limb), rheumatoid arthritis, osteoporosis, hyperlipidemia, falls, cognitive impairment     8/22/2024 [id number] pt transfer to [facility] due to unit closure on Ortho spine (Covid positive)     Orientation A & O  x4 with intermittent   confusion     Vitals/Tele VSS on RM air     Denies pain     IV Access/drains SL with intermittent ABX     Diet Reg, fair appetite     Mobility Ast 1 GB /W . Left upper extremity weakness , Back brace when out of bed     GI/ Mostly continent of B/B no BM this shift ( has needed straight cath in the past)     Wound/Skin Scattered bruising     Consults PT, OT     Discharge Plan Pending TCU placement delayed due to Covid       See Flow sheets for assessment

## 2024-08-26 PROBLEM — S22.051D: Status: ACTIVE | Noted: 2024-01-01

## 2024-08-26 PROBLEM — E87.1 HYPONATREMIA: Status: ACTIVE | Noted: 2024-01-01

## 2024-08-26 NOTE — PROGRESS NOTES
Clinic Care Coordination Contact  Care Coordination Transition Communication    Clinical Data: Patient was hospitalized at [facility] from 8/18/24 to 8/23/24 with diagnosis of Fall at care facility.  History of frequent falls.  Physical deconditioning from medical illness, senile frailty.  ALS      Assessment: Patient has transitioned to TCU/ARU for short term rehabilitation:    Facility Name: [facility]  Transition Communication:  Notified facility of Primary Care- Care Coordination support via TCU hand-in e-mail.    Plan: Care Coordinator will await notification from facility staff informing of patient's discharge plans/needs. Care Coordinator will review chart and outreach to facility staff every 4 weeks and as needed.     Harper Brito RN Clinic Care Coordinator  [facility] Clinics: [place], [place] (on-site Wednesdays), [facility] (on-site Thursdays) & [facility].  [email or web address]  Phone: [phone]

## 2024-08-26 NOTE — PROGRESS NOTES
[facility] GERIATRICS    PRIMARY CARE PROVIDER AND CLINIC:  Digna Krause MD, [address] [place] [address] / [facility] [id number]  Chief Complaint   Patient presents with    Hospital F/U      [facility] Medical Record Number:  [id number]  Place of Service where encounter took place:  [facility] (TCU) [[id number]]    Britt Pichardo  is a 84 year old  (1940), admitted to the above facility from  [facility]. Hospital stay 8/18/24 through 8/23/24..   Patient's living condition: lives in an assisted living facility    HPI:    Brief Summary of Hospital Course: Admitted to the hospital after a fall.  Noted several falls the last few months.  Also COVID-positive and treated with antiviral.  All treated for pneumonia with antibiotics.  Past medical history of ALS treated with riluzole and RA treated with Remicade.  Imaging revealed a T5 complete burst fracture.  Neurosurgery evaluated and recommended brace when out of bed.  MEDICATION CHANGES: Start acetaminophen, doxycycline for monitoring was, methocarbamol.  RECOMMENDED FOLLOW UP: Neurosurgery in 3 to 6 weeks.  Primary neurologist for frequent falls.  Updates on Status Since Skilled nursing Admission: none    Today: Patient denies pain today.  Denies worsening of weakness in legs.  Left upper extremity this is at baseline.  She denies cough or cold symptoms.  She is seen today with 2 daughters present there but is concerned that she can get out of her COVID precautions.  She reports the brace uncomfortable but she is tolerating it.  She denies skin concerns due to the brace or otherwise.  Denies abdominal complaints.  She has not used any of the methocarbamol from permission.    External notes reviewed: Facility EHR including progress notes, vital signs. Hospital discharge summary reviewed. Hospital discharge orders reviewed.  Reviewed NP role during her TCU stay.    CODE STATUS/ADVANCE DIRECTIVES DISCUSSION: Full code    ALLERGIES: No Known  "Allergies   PAST MEDICAL HISTORY:   Past Medical History:   Diagnosis Date    ALS (amyotrophic lateral sclerosis) (H)     Osteoporosis     Pure hypercholesterolemia     RA (rheumatoid arthritis) (H)       PAST SURGICAL HISTORY:   has a past surgical history that includes Lumbar Fusion (2002,2004).  FAMILY HISTORY: family history includes Heart Disease in her mother; Melanoma in her sister.  SOCIAL HISTORY:   reports that she quit smoking about 56 years ago. Her smoking use included cigarettes. She started smoking about 71 years ago. She has never used smokeless tobacco. She reports current alcohol use. She reports that she does not use drugs.    Post Discharge Medication Reconciliation Status: medication reconcilation previously completed during another office visit  Current Outpatient Medications   Medication Sig Dispense Refill    acetaminophen (TYLENOL) 325 MG tablet Take 2 tablets (650 mg) by mouth every 4 hours as needed for mild pain or other (and adjunct with moderate or severe pain or per patient request).      calcium carbonate-vitamin D 600-200 MG-UNIT TABS Take 1 tablet by mouth daily      InFLIXimab (REMICADE IV) Inject 200 mg into the vein Patient takes this every 8 weeks      lovastatin (MEVACOR) 20 MG tablet Take 1 tablet (20 mg) by mouth At Bedtime 90 tablet 0    methocarbamol (ROBAXIN) 500 MG tablet Take 1 tablet (500 mg) by mouth 3 times daily as needed for muscle spasms.      oxyBUTYnin ER (DITROPAN XL) 5 MG 24 hr tablet Take 1 tablet (5 mg) by mouth once daily 90 tablet 0    riluzole (RILUTEK) 50 MG tablet TAKE ONE TABLET BY MOUTH EVERY 12 HOURS 60 tablet 2     No current facility-administered medications for this visit.       ROS:  4 point ROS including Respiratory, CV, GI and , other than that noted in the HPI,  is negative    Vitals:  BP (!) 133/92   Pulse 67   Temp 98  F (36.7  C)   Resp 18   Ht 1.6 m (5' 3\")   Wt 46.5 kg (102 lb 9.6 oz)   SpO2 91%   BMI 18.17 kg/m    Exam:  GENERAL " APPEARANCE:  Alert, in no distress  RESP:  respiratory effort normal   CV:  edema none  M/S:  Gait and station sitting in chair, wearing her back brace.  Moves both lower extremities.  Left upper extremity weakness noted.   SKIN:  Inspection and palpation of skin and subcutaneous tissue at baseline  PSYCH:  insight and judgement, memory seems intact, affect and mood normal    Lab/Diagnostic data: Pertinent hospital labs: Sodium 131 potassium 3.7 BUN 14.5 creatinine 0.37 hemoglobin 14 point    ASSESSMENT/PLAN:  ALS (amyotrophic lateral sclerosis) (H)  Rheumatoid arthritis, involving unspecified site, unspecified whether rheumatoid factor present (H)  Both comorbid conditions affecting stength and function. Continue treatment as ordered and follow up with neurology as recommended.     Falls frequently  Impaired mobility and activities of daily living  Closed stable burst fracture of fifth thoracic vertebra with routine healing, subsequent encounter  Admitted to the TCU for therapy and nursing care following hospital stay.  -Continue PT/Ocupational Therapy  -Nursing for monitoring  - for discharge planning  -Continue with as needed methocarbamol.  -Continue as needed acetaminophen    Hyponatremia  Chronic.  Stable.  Recheck if symptomatic    Covid  No current symptoms.  -Continue precautions per facility    Orders:  No changes    45 MINUTES SPENT BY ME on the date of service doing chart review, history, exam, documentation & further activities per the note.      Electronically signed by: Ariana Forte NP

## 2024-09-04 NOTE — PROGRESS NOTES
"Britt Pichardo is a 84 year old female seen September 4, 2024 at [facility] where she was admitted after [facility] hospitalization 8/18-23 following multiple falls.   Pt presented after falling from the toilet.  Imaging showed an acute incomplete T5 burst fracture.   Also found to be COVID19 + and hypoxia; treated with remdesivir, dexamethasone and O2    Also treated for a possible bacterial pneumonia with ceftriaxone and doxycycline   Able to be weaned off oxygen, and discharged to TCU for ongoing recovery and Rehab.        Today she is seen in her room up to chair, wearing TLSO brace    reports the \"brace is not good, hard to put on\" and interferes with eating /drinking.  Breathing is fine, doesn't think she had COVID    Hoping to return to her apartment after she has worked with therapies in TCU      Pt has RA followed by Dr Haines Rheumatology, and treated with Remicade q8 weeks   Also has ALS (limb onset dx'd in 2017)  followed by Dr Beckham Neurology and treated with riluzole.    Falls have become more frequent: seen in ED after falls on 4/1, 6/13, 7/14, 8/16 and 8/18       Past Medical History:   Diagnosis Date    ALS (amyotrophic lateral sclerosis) (H)     Osteoporosis     Pure hypercholesterolemia     RA (rheumatoid arthritis) (H)    Cognitive impairment  Frequent falls    Past Surgical History:   Procedure Laterality Date    LUMBAR FUSION  2002,2004       Family History   Problem Relation Age of Onset    Heart Disease Mother         details unknown    Melanoma Sister     Diabetes No family hx of     Cerebrovascular Disease No family hx of     Coronary Artery Disease Early Onset No family hx of     Myocardial Infarction No family hx of     Breast Cancer No family hx of     Ovarian Cancer No family hx of     Colon Cancer No family hx of        Social History     Tobacco Use    Smoking status: Former     Current packs/day: 0.00     Types: Cigarettes     Start date: 12/28/1953     Quit " "date: 1967     Years since quittin.7    Smokeless tobacco: Never    Tobacco comments:     social smoking only   Substance Use Topics    Alcohol use: Yes     Comment: several drinks/week      SH:  Lives the [facility] [place], been there since 2024   [place]   Other daughter daughter is an RN   Works at [employer]     Review Of Systems  Skin: negative   Eyes: impaired vision, glasses  Ears/Nose/Throat: hearing loss  Respiratory: dyspnea on exertion  Cardiovascular: negative  Gastrointestinal: negative  Genitourinary: retention  Musculoskeletal: ambulates with 4WW short distances, leaning over her walker secondary to UE weakness   Has a WC for longer trips    Needs mod assist with dressing and ADLs, mod assist of one person for transfers,  EMS 6  Ambulates 45' with 4WW and min assist   Neurologic: ALS  Psychiatric: negative  Hematologic/Lymphatic/Immunologic: negative  Endocrine: negative   Wt Readings from Last 5 Encounters:   24 46.3 kg (102 lb)   24 46.5 kg (102 lb 9.6 oz)   24 49.6 kg (109 lb 5.6 oz)   24 50.3 kg (111 lb)   24 47.9 kg (105 lb 11.2 oz)      GENERAL APPEARANCE: frail, alert and no distress  /85   Pulse 85   Temp 97.6  F (36.4  C)   Resp 19   Ht 1.6 m (5' 3\")   Wt 46.3 kg (102 lb)   SpO2 90%   BMI 18.07 kg/m     HEENT: normocephalic, no lesion or abnormalities  NECK: no adenopathy, no asymmetry, masses, or scars and thyroid normal to palpation  RESP: lungs clear to auscultation - no rales, rhonchi or wheezes  CV: regular rate and rhythm, normal S1 S2  ABDOMEN:  soft, nontender, no HSM or masses and bowel sounds normal  MS: deforming changes of RA in hands, no ext edema   Wearing TLSO brace, +kyphosis.   SKIN: no suspicious lesions or rashes  NEURO: wordfinding and naming difficulties     PSYCH: affect okay, pleasant   LYMPHATICS: No cervical,  or supraclavicular nodes    Lab Results   Component Value Date     (L) " 08/23/2024    POTASSIUM 3.7 08/23/2024    CHLORIDE 93 (L) 08/23/2024    CO2 28 08/23/2024    ANIONGAP 10 08/23/2024     (H) 08/23/2024    BUN 14.5 08/23/2024    CR 0.37 (L) 08/23/2024    GFRESTIMATED >90 08/23/2024    RACH 9.2 08/23/2024     Lab Results   Component Value Date    AST 36 08/21/2024      ALBUMIN 3.7 08/21/2024      ALKPHOS 73 08/21/2024     Lab Results   Component Value Date    WBC 5.2 08/19/2024      HGB 14.9 08/20/2024       08/19/2024       08/21/2024     Lab Results   Component Value Date    A1C 4.9 07/05/2023     ECHO 8/20/2024    Left ventricular systolic function is normal. The visual ejection fraction is 60-65%.  No regional wall motion abnormalities noted.  The right ventricle is normal in size and function.  There is mild (1+) tricuspid regurgitation.  No change from prior study in 2020.     MR THORACIC SPINE W/O CONTRAST   8/19/2024  At T5, acute incomplete burst fracture, approximately 50% loss of height, no retropulsion. Moderate edema in T4-T5 and T5-T6 posterior interspinous ligament. Mild kyphosis at this level. Normal other vertebral body heights. Moderate bone marrow edema within T5, otherwise normal marrow signal. Normal disc heights. No herniation. Normal facets. No spinal canal or neural foraminal stenosis. No abnormal cord signal.   Right lower lobe atelectasis versus pneumonia. Small left lower lobe subsegmental atelectasis versus pneumonia. Moderate prevertebral soft tissue edema at T4-T5.                                                               IMPRESSION:  1.  At T5, acute incomplete burst fracture, approximately 50% loss of height, no retropulsion.  2.  Moderate edema in the T4-T5 and T5-T6 posterior interspinous ligament. Anterior longitudinal ligament, posterior longitudinal ligament and ligamentum flavum are preserved.     CT HEAD W/O CONTRAST  8/18/2024  INDICATION: Head injury.  INTRACRANIAL CONTENTS: No intracranial hemorrhage, extraaxial  collection, or mass effect.  No CT evidence of acute infarct. Moderate presumed chronic small vessel ischemic changes. Moderate generalized volume loss. No hydrocephalus.                                                              IMPRESSION:  1.  No CT evidence for acute intracranial process.  2.  Brain atrophy and presumed chronic microvascular ischemic changes similar to prior.      IMP/PLAN:   (S22.287B) Closed stable burst fracture of fifth thoracic vertebra with routine healing, subsequent encounter   Comment: seen by Neurosurgery and Orthotics   Plan: TLSO brace when out of bed   Pain management with PRN acetaminophen, PRN methocarbamol  Follow up with Neurosurgery    (R29.6) Falls frequently  (Z74.09,  Z78.9) Impaired mobility and activities of daily living  (R53.81) Physical deconditioning  Comment: multiple falls, very low EMS  Plan: PHYSICAL THERAPY / OCCUPATIONAL THERAPY for transfers, balance, gait, ADLs    Pt's discharge goal is return to her [place] apartment with services     (G12.21) ALS (amyotrophic lateral sclerosis) (H)  Comment: limb onset  Plan: riluzole 50 mg bid   Follow up with Neurology     (M06.9) Rheumatoid arthritis, involving unspecified site, unspecified whether rheumatoid factor present (H)  Comment: followed by Rheumatology Dr Haines  Plan: remains on infliximab 200 mg IV q8 weeks     (E87.1) Hyponatremia  Comment: 131-133    Plan: follow BMP    (U07.1) Infection due to 2019 novel coronavirus  Comment: had remdesivir and dexamethasone in the the hospital   Also received abx for possible bacterial pneumonia   No longer hypoxic   Plan: supportive care     (R33.9) Urinary retention  Comment: required straight cath during hospital stay   Plan: >>>would discontinue oxybutynin as likely cause        Yohana Nicole MD

## 2024-09-04 NOTE — LETTER
" 9/4/2024      Britt Pichardo  [id number] [facility] [address]  [place] [address]        Britt Pichardo is a 84 year old female seen September 4, 2024 at [facility] where she was admitted after [facility] hospitalization 8/18-23 following multiple falls.   Pt presented after falling from the toilet.  Imaging showed an acute incomplete T5 burst fracture.   Also found to be COVID19 + and hypoxia; treated with remdesivir, dexamethasone and O2    Also treated for a possible bacterial pneumonia with ceftriaxone and doxycycline   Able to be weaned off oxygen, and discharged to TCU for ongoing recovery and Rehab.        Today she is seen in her room up to chair, wearing TLSO brace    reports the \"brace is not good, hard to put on\" and interferes with eating /drinking.  Breathing is fine, doesn't think she had COVID    Hoping to return to her apartment after she has worked with therapies in TCU      Pt has RA followed by Dr Haines Rheumatology, and treated with Remicade q8 weeks   Also has ALS (limb onset dx'd in 2017)  followed by Dr Beckham Neurology and treated with riluzole.    Falls have become more frequent: seen in ED after falls on 4/1, 6/13, 7/14, 8/16 and 8/18       Past Medical History:   Diagnosis Date     ALS (amyotrophic lateral sclerosis) (H)      Osteoporosis      Pure hypercholesterolemia      RA (rheumatoid arthritis) (H)    Cognitive impairment  Frequent falls    Past Surgical History:   Procedure Laterality Date     LUMBAR FUSION  2002,2004       Family History   Problem Relation Age of Onset     Heart Disease Mother         details unknown     Melanoma Sister      Diabetes No family hx of      Cerebrovascular Disease No family hx of      Coronary Artery Disease Early Onset No family hx of      Myocardial Infarction No family hx of      Breast Cancer No family hx of      Ovarian Cancer No family hx of      Colon Cancer No family hx of        Social History     Tobacco Use     " "Smoking status: Former     Current packs/day: 0.00     Types: Cigarettes     Start date: 1952     Quit date: 1967     Years since quittin.7     Smokeless tobacco: Never     Tobacco comments:     social smoking only   Substance Use Topics     Alcohol use: Yes     Comment: several drinks/week      SH:  Lives the [facility], been there since 2024   Daughter in [employer]   Other daughter daughter is an RN   Works at [employer]     Review Of Systems  Skin: negative   Eyes: impaired vision, glasses  Ears/Nose/Throat: hearing loss  Respiratory: dyspnea on exertion  Cardiovascular: negative  Gastrointestinal: negative  Genitourinary: retention  Musculoskeletal: ambulates with 4WW short distances, leaning over her walker secondary to UE weakness   Has a WC for longer trips    Needs mod assist with dressing and ADLs, mod assist of one person for transfers,  EMS 6  Ambulates 45' with 4WW and min assist   Neurologic: ALS  Psychiatric: negative  Hematologic/Lymphatic/Immunologic: negative  Endocrine: negative   Wt Readings from Last 5 Encounters:   24 46.3 kg (102 lb)   24 46.5 kg (102 lb 9.6 oz)   24 49.6 kg (109 lb 5.6 oz)   24 50.3 kg (111 lb)   24 47.9 kg (105 lb 11.2 oz)      GENERAL APPEARANCE: frail, alert and no distress  /85   Pulse 85   Temp 97.6  F (36.4  C)   Resp 19   Ht 1.6 m (5' 3\")   Wt 46.3 kg (102 lb)   SpO2 90%   BMI 18.07 kg/m     HEENT: normocephalic, no lesion or abnormalities  NECK: no adenopathy, no asymmetry, masses, or scars and thyroid normal to palpation  RESP: lungs clear to auscultation - no rales, rhonchi or wheezes  CV: regular rate and rhythm, normal S1 S2  ABDOMEN:  soft, nontender, no HSM or masses and bowel sounds normal  MS: deforming changes of RA in hands, no ext edema   Wearing TLSO brace, +kyphosis.   SKIN: no suspicious lesions or rashes  NEURO: wordfinding and naming difficulties     PSYCH: affect okay, " pleasant   LYMPHATICS: No cervical,  or supraclavicular nodes    Lab Results   Component Value Date     (L) 08/23/2024    POTASSIUM 3.7 08/23/2024    CHLORIDE 93 (L) 08/23/2024    CO2 28 08/23/2024    ANIONGAP 10 08/23/2024     (H) 08/23/2024    BUN 14.5 08/23/2024    CR 0.37 (L) 08/23/2024    GFRESTIMATED >90 08/23/2024    RACH 9.2 08/23/2024     Lab Results   Component Value Date    AST 36 08/21/2024      ALBUMIN 3.7 08/21/2024      ALKPHOS 73 08/21/2024     Lab Results   Component Value Date    WBC 5.2 08/19/2024      HGB 14.9 08/20/2024       08/19/2024       08/21/2024     Lab Results   Component Value Date    A1C 4.9 07/05/2023     ECHO 8/20/2024    Left ventricular systolic function is normal. The visual ejection fraction is 60-65%.  No regional wall motion abnormalities noted.  The right ventricle is normal in size and function.  There is mild (1+) tricuspid regurgitation.  No change from prior study in 2020.     MR THORACIC SPINE W/O CONTRAST   8/19/2024  At T5, acute incomplete burst fracture, approximately 50% loss of height, no retropulsion. Moderate edema in T4-T5 and T5-T6 posterior interspinous ligament. Mild kyphosis at this level. Normal other vertebral body heights. Moderate bone marrow edema within T5, otherwise normal marrow signal. Normal disc heights. No herniation. Normal facets. No spinal canal or neural foraminal stenosis. No abnormal cord signal.   Right lower lobe atelectasis versus pneumonia. Small left lower lobe subsegmental atelectasis versus pneumonia. Moderate prevertebral soft tissue edema at T4-T5.                                                               IMPRESSION:  1.  At T5, acute incomplete burst fracture, approximately 50% loss of height, no retropulsion.  2.  Moderate edema in the T4-T5 and T5-T6 posterior interspinous ligament. Anterior longitudinal ligament, posterior longitudinal ligament and ligamentum flavum are preserved.     CT HEAD W/O  CONTRAST  8/18/2024  INDICATION: Head injury.  INTRACRANIAL CONTENTS: No intracranial hemorrhage, extraaxial collection, or mass effect.  No CT evidence of acute infarct. Moderate presumed chronic small vessel ischemic changes. Moderate generalized volume loss. No hydrocephalus.                                                              IMPRESSION:  1.  No CT evidence for acute intracranial process.  2.  Brain atrophy and presumed chronic microvascular ischemic changes similar to prior.      IMP/PLAN:   (S22.1D) Closed stable burst fracture of fifth thoracic vertebra with routine healing, subsequent encounter   Comment: seen by Neurosurgery and Orthotics   Plan: TLSO brace when out of bed   Pain management with PRN acetaminophen, PRN methocarbamol  Follow up with Neurosurgery    (R29.6) Falls frequently  (Z74.09,  Z78.9) Impaired mobility and activities of daily living  (R53.81) Physical deconditioning  Comment: multiple falls, very low EMS  Plan: PHYSICAL THERAPY / OCCUPATIONAL THERAPY for transfers, balance, gait, ADLs    Pt's discharge goal is return to her AL apartment with services     (G12.21) ALS (amyotrophic lateral sclerosis) (H)  Comment: limb onset  Plan: riluzole 50 mg bid   Follow up with Neurology     (M06.9) Rheumatoid arthritis, involving unspecified site, unspecified whether rheumatoid factor present (H)  Comment: followed by Rheumatology Dr Haines  Plan: remains on infliximab 200 mg IV q8 weeks     (E87.1) Hyponatremia  Comment: 131-133    Plan: follow BMP    (U07.1) Infection due to 2019 novel coronavirus  Comment: had remdesivir and dexamethasone in the the hospital   Also received abx for possible bacterial pneumonia   No longer hypoxic   Plan: supportive care     (R33.9) Urinary retention  Comment: required straight cath during hospital stay   Plan: >>>would discontinue oxybutynin as likely cause        Yohana Nicole MD       Sincerely,        Yohana Nicole MD

## 2024-09-05 NOTE — PROGRESS NOTES
"[facility]  [place] Medical Record Number:  [id number]  Place of Service where encounter took place:  [facility] ([facility]) [[id number]]  Chief Complaint   Patient presents with    Nursing Home Acute       HPI:    Britt Pichardo  is a 84 year old (1940), who is being seen today for an episodic care visit.  HPI information obtained from: facility chart records, facility staff, patient report, and [facility] chart review. Today's concern is: says has no pain, does not like the TLSO but wearing it. Said top of scalp/head has been numb off and on since the fall--\"I think I hit the top of my head, too\"- but vague--no pain     Closed stable burst fracture of fifth thoracic vertebra with routine healing, subsequent encounter  Falls frequently  ALS (amyotrophic lateral sclerosis) (H)  Impaired mobility and activities of daily living  Rheumatoid arthritis, involving unspecified site, unspecified whether rheumatoid factor present (H)  Physical deconditioning  Hyponatremia  Infection due to 2019 novel coronavirus  Urinary retention     Past Medical and Surgical History reviewed in Epic today.    MEDICATIONS:     Current Outpatient Medications   Medication Sig Dispense Refill    acetaminophen (TYLENOL) 325 MG tablet Take 2 tablets (650 mg) by mouth every 4 hours as needed for mild pain or other (and adjunct with moderate or severe pain or per patient request).      calcium carbonate-vitamin D 600-200 MG-UNIT TABS Take 1 tablet by mouth daily      InFLIXimab (REMICADE IV) Inject 200 mg into the vein Patient takes this every 8 weeks      lovastatin (MEVACOR) 20 MG tablet Take 1 tablet (20 mg) by mouth At Bedtime 90 tablet 0    methocarbamol (ROBAXIN) 500 MG tablet Take 1 tablet (500 mg) by mouth 3 times daily as needed for muscle spasms.      riluzole (RILUTEK) 50 MG tablet TAKE ONE TABLET BY MOUTH EVERY 12 HOURS 60 tablet 2     TODAY DURING EXAM/ROS:  No CP, SOB, Cough, " "dizziness, fevers, chills, HA, N/V, dysuria or Bowel Abnormalities. Appetite is good.  No pains.      Objective:  /80   Pulse 75   Temp 98.1  F (36.7  C)   Resp 18   Ht 1.6 m (5' 3\")   Wt 46.3 kg (102 lb)   SpO2 92%   BMI 18.07 kg/m    Exam:  GENERAL APPEARANCE:  Alert, in no distress, appears healthy, oriented, cooperative  ENT:  Mouth and posterior oropharynx normal, moist mucous membranes, normal hearing acuity  EYES:  EOM, conjunctivae, lids, pupils and irises normal  RESP:  respiratory effort and palpation of chest normal, lungs clear to auscultation , no respiratory distress  CV:  Palpation and auscultation of heart done , regular rate and rhythm, no murmur, rub, or gallop, no edema  ABDOMEN:  normal bowel sounds, soft, nontender, no hepatosplenomegaly or other masses  M/S:   PABLO equally, has TLSO on   SKIN:  Inspection of skin and subcutaneous tissue baseline  NEURO:   Cranial nerves 2-12 are normal tested and grossly at patient's baseline  PSYCH:  oriented X 3, normal insight, judgement and memory, affect and mood normal    Labs:   Recent Labs   Lab Test 08/23/24  0915 08/22/24  0840   * 131*   POTASSIUM 3.7 4.1   CHLORIDE 93* 94*   CO2 28 25   ANIONGAP 10 12   * 160*   BUN 14.5 15.3   CR 0.37* 0.40*   RACH 9.2 9.2     Hemoglobin   Date Value Ref Range Status   08/20/2024 14.9 11.7 - 15.7 g/dL Final   08/19/2024 14.0 11.7 - 15.7 g/dL Final   03/31/2021 13.3 11.7 - 15.7 g/dL Final   12/28/2016 14.2 11.7 - 15.7 g/dL Final     ASSESSMENT / PLAN:  (S22.728R) Closed stable burst fracture of fifth thoracic vertebra with routine healing, subsequent encounter  (primary encounter diagnosis)  (R29.6) Falls frequently  (G12.21) ALS (amyotrophic lateral sclerosis) (H)  (Z74.09,  Z78.9) Impaired mobility and activities of daily living  (M06.9) Rheumatoid arthritis, involving unspecified site, unspecified whether rheumatoid factor present (H)  (R53.81) Physical deconditioning  Comment: cont " TLSO--F/u neurosurgery re T Fx--F/U Rheum,and Neurology on other issues, cont with current meds, and monitor.    (E87.1) Hyponatremia  Comment: check BMP on 9/9    (U07.1) Infection due to 2019 novel coronavirus  Comment: resolved, monitor    (R33.9) Urinary retention  Comment: discontinue oxybutynin due to retention and need for straight cath in hospital, monitor.    Total time for the visit was 45 minutes including, but not limited to, non-face-to-face time spent reviewing records, counseling, and coordination of care.      Electronically signed by:  TIFFANI Tyler CNP

## 2024-09-06 PROBLEM — Z78.9 IMPAIRED MOBILITY AND ACTIVITIES OF DAILY LIVING: Status: ACTIVE | Noted: 2024-01-01

## 2024-09-06 PROBLEM — R53.81 PHYSICAL DECONDITIONING: Status: ACTIVE | Noted: 2024-01-01

## 2024-09-06 PROBLEM — U07.1 INFECTION DUE TO 2019 NOVEL CORONAVIRUS: Status: ACTIVE | Noted: 2024-01-01

## 2024-09-06 PROBLEM — R33.9 URINARY RETENTION: Status: ACTIVE | Noted: 2024-01-01

## 2024-09-06 PROBLEM — Z74.09 IMPAIRED MOBILITY AND ACTIVITIES OF DAILY LIVING: Status: ACTIVE | Noted: 2024-01-01

## 2024-09-09 NOTE — PROGRESS NOTES
[facility]  [place] Medical Record Number:  [id number]  Place of Service where encounter took place:  [facility] ([facility]) [[id number]]  Chief Complaint   Patient presents with    Nursing Home Acute       HPI:    Britt Pichardo  is a 84 year old (1940), who is being seen today for an episodic care visit.  HPI information obtained from: facility chart records, facility staff, patient report, and [facility] chart review. Today's concern is: found on floor this am-see pcc notes. She is not sure why she got up, says her left arm is always week from the ALS. She says has no pain from the fall. Said top of scalp/head has been numb still since the first fall- but  no headache pain.     Falls frequently  Closed stable burst fracture of fifth thoracic vertebra with routine healing, subsequent encounter  ALS (amyotrophic lateral sclerosis) (H)  Impaired mobility and activities of daily living  Rheumatoid arthritis, involving unspecified site, unspecified whether rheumatoid factor present (H)  Physical deconditioning  Hyponatremia  Infection due to 2019 novel coronavirus     Past Medical and Surgical History reviewed in Epic today.    MEDICATIONS:     Current Outpatient Medications   Medication Sig Dispense Refill    acetaminophen (TYLENOL) 325 MG tablet Take 2 tablets (650 mg) by mouth every 4 hours as needed for mild pain or other (and adjunct with moderate or severe pain or per patient request).      calcium carbonate-vitamin D 600-200 MG-UNIT TABS Take 1 tablet by mouth daily      InFLIXimab (REMICADE IV) Inject 200 mg into the vein Patient takes this every 8 weeks      lovastatin (MEVACOR) 20 MG tablet Take 1 tablet (20 mg) by mouth At Bedtime 90 tablet 0    methocarbamol (ROBAXIN) 500 MG tablet Take 1 tablet (500 mg) by mouth 3 times daily as needed for muscle spasms.      riluzole (RILUTEK) 50 MG tablet TAKE ONE TABLET BY MOUTH EVERY 12 HOURS 60 tablet 2     TODAY  "DURING EXAM/ROS:  No CP, SOB, Cough, dizziness, fevers, chills, HA, N/V, dysuria or Bowel Abnormalities. Appetite is good.  No Pain.      Objective:  /78   Pulse 67   Temp 97.7  F (36.5  C)   Resp 18   Ht 1.6 m (5' 3\")   Wt 46.3 kg (102 lb)   SpO2 97%   BMI 18.07 kg/m    Exam:  GENERAL APPEARANCE:  Alert, in no distress, appears healthy, oriented, cooperative  ENT:  Mouth with moist mucous membranes, normal hearing acuity  EYES: Conjunctivae, lids, pupils and irises normal  RESP:  respiratory effort of chest normal, lungs CTA,  NAD  CV:  Auscultation of heart done , RRR, no murmur, rub, or gallop, no edema  ABDOMEN:  normal bowel sounds, soft, nontender.  M/S:   PABLO equally, has TLSO on sitting in chair.  SKIN:  Inspection of skin and subcutaneous tissue baseline  NEURO:   Cranial nerves are grossly intact and at patient's baseline  PSYCH:  oriented X 3--forgetful this am, not sure why she got up.    Labs:   Recent Labs   Lab Test 09/09/24  0945 08/23/24  0915   * 131*   POTASSIUM 4.4 3.7   CHLORIDE 96* 93*   CO2 24 28   ANIONGAP 14 10   * 159*   BUN 18.8 14.5   CR 0.64 0.37*   RACH 9.0 9.2       Hemoglobin   Date Value Ref Range Status   08/20/2024 14.9 11.7 - 15.7 g/dL Final   08/19/2024 14.0 11.7 - 15.7 g/dL Final   03/31/2021 13.3 11.7 - 15.7 g/dL Final   12/28/2016 14.2 11.7 - 15.7 g/dL Final     ASSESSMENT / PLAN:  (S22.051D) Closed stable burst fracture of fifth thoracic vertebra with routine healing, subsequent encounter  (primary encounter diagnosis)  (R29.6) Falls frequently  (G12.21) ALS (amyotrophic lateral sclerosis) (H)  (Z74.09,  Z78.9) Impaired mobility and activities of daily living  (M06.9) Rheumatoid arthritis, involving unspecified site, unspecified whether rheumatoid factor present (H)  (R53.81) Physical deconditioning  Comment: cont TLSO--F/u neurosurgery re T Fx--F/U Rheum,and Neurology on other issues, cont with current meds, monitor.    (E87.1) Hyponatremia  Comment: " BMP better--134.    (U07.1) Infection due to 2019 novel coronavirus  Comment: resolved, monitor        Total time for the visit was 35 minutes including, but not limited to, non-face-to-face time spent reviewing records, counseling, and coordination of care.      Electronically signed by:  TIFFANI Tyler CNP

## 2024-09-09 NOTE — PROGRESS NOTES
ALS Care Coordination - Outreach Note    REASON FOR CONTACT:   Clinic Care Coordination - Follow-up (pre-visit)       ASSESSMENT:       9/6/2024   Since last visit   Key event: Hospitalized, skin breakdown, fall? Hospitalized;Fall with injury   Principle concerns: Any new? Multiple falls. Cognitive decline. Weak cough. Decreased walking endurance.   Respiratory, sleep, energy symptoms: Any new? Difficulty concentrating   Bulbar symptoms: Any new or worse? No symptoms   Communication: Any new issues? No symptoms   Gastrostomy: Any concerns? Not applicable, do not have a gastrostomy   Gross motor and mobility: Any new concerns? Increased difficulty walking   Pain/discomfort: Any new symptoms? No pain   Medication: Taking riluzole, Radicava, or Relyvrio? Riluzole   Medication: Have had to stop riluzole, Radicava, or Relyvrio? No, none of these   Medication: Taking Mucinex or Robinul? No   Home care: Currently receiving services? No   Home care: Equipment used at home? No   Home care: Medical equipment company? NA         PLAN:   - Patient to follow up in clinic as scheduled.      Sandi Nina LPN  ALS Neurology Care Coordinator  [facility] Neuro[facility] Service Line

## 2024-09-12 NOTE — PATIENT INSTRUCTIONS
Follow-up 4 months  If you end up discharged in the facility with 24-hour help, we can offer you BiPAP machine at night before you leave the TCU  You reported a weak cough.  It would be helpful to use a CoughAssist device to help you clear the phlegm.  This is medically necessary.

## 2024-09-12 NOTE — PROGRESS NOTES
"Digna Krause MD  [place], September 12, 2024    Dear Dr. Krause,      I had the pleasure to see Britt in follow-up at the [facility] today.  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  She was escorted by her daughter.  At this point, the disease is quite advanced.  In the last 4 months, Britt has had increasing frequency of falls, which led to head injury.  After 1 of those falls, she had numbness on the top of her scalp at the midline.  She had a laceration of the right forehead for which she had to get stitches.  CT scans of the head did not show any intracranial injury.  The latest fall also led to a T5 compression fracture for which she was put on a spinal brace.  She is currently at [facility], and the plan is to discharge her to a long-term care facility.  Her hand weakness has also progressed from the last visit and she needs assistance with all ADLs.  She still denies significant dysphagia or dysarthria.  She has minimal sialorrhea.    Her daughter tells me that there are concerns about memory dysfunction and forgetfulness lately.  Britt's mother had dementia with similar features.  Those concerns have been present for at least a year.    Britt does not have significant dyspnea on exertion orthopnea.  Denies morning headaches. She reports a burning sensation and irritation at both eyes, but no blurry or double vision.     /84 (BP Location: Right arm, Patient Position: Sitting, Cuff Size: Adult Small)   Pulse 76   Resp 16   Ht 1.492 m (4' 10.75\")   Wt 47.2 kg (104 lb 1.6 oz)   SpO2 92%   BMI 21.20 kg/m          Latest Ref Rng & Units 9/12/2024     9:50 AM   PFT   FVC L 0.81  P   FEV1 L 0.72  P   FVC% % 44  P   FEV1% % 50  P      P Preliminary result     Exam was not repeated due to time constraints.    In summary, Britt has slowly progressive limb onset ALS, but at this point her disease " is advanced.  We discussed a number of practical issues.  Fortunately, she has addressed her advanced directives and a POLST was filed on September 5, 2024.  She is DNR/DNI and would elect transfer to the hospital if she is acutely ill, but not intubation or ICU admission.  She is somewhat reluctant to get a gastrostomy.    Given her worsening pulmonary function test from the last visit with FVC values below 50%, NIV is medically necessary.  NIV can help neuromuscular respiratory failure, prolonged survival, improve energy, and quality of life in patients with ALS.  I attest to the necessity of the equipment.    Due to reported weak cough, she would also benefit from a CoughAssist device.    Britt still takes riluzole 50 mg twice daily for ALS.  She asked whether she should continue it.  I explained to her that if her priority is comfort care and quality of life, this drug does not offer too much.  If however, she values the prolongation of life expectancy offered by this drug, and she has no side effects from it, she can certainly continue it.  I will allow her to decide.    Regarding her cognitive dysfunction, I suspect that she may have a mild cognitive impairment or early dementia, possibly Alzheimer's.  I offered her donepezil or another anticholinesterase drug for symptomatic improvement, but the memory dysfunction does not bother her too much, so this will not be necessary.  I do not think she would be eligible for anti-amyloid drugs due to her coexistent advanced ALS, and for that reason I did not offer amyloid biomarker testing.  We will simply monitor the symptom.    She should use eye drops three times a day and a lubricating eye ointment; I suspect she has dry eyes.     She will return to clinic for follow-up in 4 months, sooner if needed.    Sincerely,    Mason Beckham MD, FAAN    The longitudinal plan of care for the diagnosis(es)/condition(s) as documented were addressed during this visit. Due  to the added complexity in care, I will continue to support Britt in the subsequent management and with ongoing continuity of care.    Billing MDM level 4 (moderate) based on 1) Problems assessed: One chronic disorder with progression, exacerbation, or side effects of treatment (ALS) and 2)Risk: prescription drug management, see above.

## 2024-09-12 NOTE — LETTER
"9/12/2024       RE: Britt Pichardo  [address] [facility] [address]  [place] [address]     Dear Colleague,    Thank you for referring your patient, Britt Pichardo, to the [facility] NEUROLOGY CLINIC [place] [facility]. Please see a copy of my visit note below.    Digna Krause MD  [place], September 12, 2024    Dear Dr. Krause,      I had the pleasure to see Britt in follow-up at the [facility] today.  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  She was escorted by her daughter.  At this point, the disease is quite advanced.  In the last 4 months, Britt has had increasing frequency of falls, which led to head injury.  After 1 of those falls, she had numbness on the top of her scalp at the midline.  She had a laceration of the right forehead for which she had to get stitches.  CT scans of the head did not show any intracranial injury.  The latest fall also led to a T5 compression fracture for which she was put on a spinal brace.  [facility] is currently at [facility], and the plan is to discharge her to a long-term care facility.  Her hand weakness has also progressed from the last visit and she needs assistance with all ADLs.  She still denies significant dysphagia or dysarthria.  She has minimal sialorrhea.    Her daughter tells me that there are concerns about memory dysfunction and forgetfulness lately.  Britt's mother had dementia with similar features.  Those concerns have been present for at least a year.    Britt does not have significant dyspnea on exertion orthopnea.  Denies morning headaches. She reports a burning sensation and irritation at both eyes, but no blurry or double vision.     /84 (BP Location: Right arm, Patient Position: Sitting, Cuff Size: Adult Small)   Pulse 76   Resp 16   Ht 1.492 m (4' 10.75\")   Wt 47.2 kg (104 lb " 1.6 oz)   SpO2 92%   BMI 21.20 kg/m          Latest Ref Rng & Units 9/12/2024     9:50 AM   PFT   FVC L 0.81  P   FEV1 L 0.72  P   FVC% % 44  P   FEV1% % 50  P      P Preliminary result     Exam was not repeated due to time constraints.    In summary, Britt has slowly progressive limb onset ALS, but at this point her disease is advanced.  We discussed a number of practical issues.  Fortunately, she has addressed her advanced directives and a POLST was filed on September 5, 2024.  She is DNR/DNI and would elect transfer to the hospital if she is acutely ill, but not intubation or ICU admission.  She is somewhat reluctant to get a gastrostomy.    Given her worsening pulmonary function test from the last visit with FVC values below 50%, NIV is medically necessary.  NIV can help neuromuscular respiratory failure, prolonged survival, improve energy, and quality of life in patients with ALS.  I attest to the necessity of the equipment.    Due to reported weak cough, she would also benefit from a CoughAssist device.    Britt still takes riluzole 50 mg twice daily for ALS.  She asked whether she should continue it.  I explained to her that if her priority is comfort care and quality of life, this drug does not offer too much.  If however, she values the prolongation of life expectancy offered by this drug, and she has no side effects from it, she can certainly continue it.  I will allow her to decide.    Regarding her cognitive dysfunction, I suspect that she may have a mild cognitive impairment or early dementia, possibly Alzheimer's.  I offered her donepezil or another anticholinesterase drug for symptomatic improvement, but the memory dysfunction does not bother her too much, so this will not be necessary.  I do not think she would be eligible for anti-amyloid drugs due to her coexistent advanced ALS, and for that reason I did not offer amyloid biomarker testing.  We will simply monitor the symptom.    She should use eye  drops three times a day and a lubricating eye ointment; I suspect she has dry eyes.     She will return to clinic for follow-up in 4 months, sooner if needed.    Sincerely,    Mason Beckham MD, FAAN    The longitudinal plan of care for the diagnosis(es)/condition(s) as documented were addressed during this visit. Due to the added complexity in care, I will continue to support Britt in the subsequent management and with ongoing continuity of care.    Billing MDM level 4 (moderate) based on 1) Problems assessed: One chronic disorder with progression, exacerbation, or side effects of treatment (ALS) and 2)Risk: prescription drug management, see above.        Again, thank you for allowing me to participate in the care of your patient.      Sincerely,    Mason Beckham MD

## 2024-09-12 NOTE — TELEPHONE ENCOUNTER
Called by nursing staff with reports that patient had an unwitnessed fall, hitting her head.  She was found on the floor, alert.  Patient reported falling.  History recent fall 7 days ago.  Patient complains of mild headache and dizziness.  Neither present prior to fall.  No signs of head injury per nursing staff.  She is oriented to self and general surroundings.  Perhaps slightly more confused than baseline per patient.  She appears to have normal range of motion of head.  She does not have upper extremity weakness or facial asymmetry per nursing staff.  She is not on anticoagulation  Vital signs stable.  O2 sats 90% on room air    Assessment apparent unwitnessed fall with head strike.  Complaints of mild headache and dizziness following fall.  Vital signs stable.  No focal weakness per nursing staff.  Mild increased confusion from baseline.  Patient states she feels fine other than headache, does not want to go to the emergency room.    Plan:  Neurochecks every hour for the next 8 hours.  Nursing staff to update on call if patient complains of worsening headache, dizziness or if change in mental or neurostatus.  Update primary provider in a.m.  Nursing staff is comfortable with this plan

## 2024-09-12 NOTE — NURSING NOTE
"Chief Complaint   Patient presents with    RECHECK     /84 (BP Location: Right arm, Patient Position: Sitting, Cuff Size: Adult Small)   Pulse 76   Resp 16   Ht 1.492 m (4' 10.75\")   Wt 47.2 kg (104 lb 1.6 oz)   SpO2 92%   BMI 21.20 kg/m      BRIANA HEARD    "

## 2024-09-16 PROBLEM — R42 DIZZINESS: Status: ACTIVE | Noted: 2024-01-01

## 2024-09-16 NOTE — PROGRESS NOTES
[place] [facility]  [place] Medical Record Number:  [id number]  Place of Service where encounter took place:  [facility] ([facility]) [[id number]]  Chief Complaint   Patient presents with    Nursing Home Acute       HPI:    Britt Pichardo  is a 84 year old (1940), who is being seen today for an episodic care visit.  HPI information obtained from: facility chart records, facility staff, patient report, and [facility] chart review. Today's concern is: no more falls, c/o dry skin under brace. Feels dizzy at times. Occas cough. Per therapies, pt seems weaker that past few days but no acute issues. Had appt with her ALS MD and worsening ALS, balance and spirometry worsened.     Falls frequently  Closed stable burst fracture of fifth thoracic vertebra with routine healing, subsequent encounter  ALS (amyotrophic lateral sclerosis) (H)  Impaired mobility and activities of daily living  Rheumatoid arthritis, involving unspecified site, unspecified whether rheumatoid factor present (H)  Physical deconditioning  Hyponatremia  Dizziness  Infection due to 2019 novel coronavirus     Past Medical and Surgical History reviewed in Epic today.    MEDICATIONS:     Current Outpatient Medications   Medication Sig Dispense Refill    acetaminophen (TYLENOL) 325 MG tablet Take 2 tablets (650 mg) by mouth every 4 hours as needed for mild pain or other (and adjunct with moderate or severe pain or per patient request).      calcium carbonate-vitamin D 600-200 MG-UNIT TABS Take 1 tablet by mouth daily      InFLIXimab (REMICADE IV) Inject 200 mg into the vein Patient takes this every 8 weeks      lovastatin (MEVACOR) 20 MG tablet Take 1 tablet (20 mg) by mouth At Bedtime 90 tablet 0    methocarbamol (ROBAXIN) 500 MG tablet Take 1 tablet (500 mg) by mouth 3 times daily as needed for muscle spasms. (Patient not taking: Reported on 9/12/2024)      riluzole (RILUTEK) 50 MG tablet TAKE ONE TABLET BY MOUTH  "EVERY 12 HOURS 60 tablet 2     TODAY DURING EXAM/ROS:  No CP, SOB, fevers, chills, HA, N/V, dysuria or Bowel Abnormalities. Appetite is good.  No Pain.  Occas cough.  Feels dizziness when stands up    Objective:  BP (!) 149/83   Pulse 77   Temp 98  F (36.7  C)   Resp 18   Ht 1.499 m (4' 11\")   Wt 47.4 kg (104 lb 6.4 oz)   SpO2 90%   BMI 21.09 kg/m    Exam:  GENERAL APPEARANCE:  Alert, in no distress, appears healthy, oriented, cooperative  ENT:  Mouth with moist mucous membranes, normal hearing acuity  EYES: Conjunctivae, lids, pupils and irises normal  RESP:  respiratory effort of chest normal, lungs clear but decreased mildly,  NAD  CV:  Auscultation of heart done , RRR, no murmur, rub, or gallop, no edema  ABDOMEN:  normal bowel sounds, soft, nontender.  M/S:   PABLO equally, has TLSO on sitting in chair. Weaker left arm--at baseline.  SKIN:  Inspection of skin and subcutaneous tissue baseline--per staff dry skin under TLSO  NEURO:   Cranial nerves are grossly intact and at patient's baseline  PSYCH:  oriented X 3--forgetful at times.      Labs:   Recent Labs   Lab Test 09/09/24  0945 08/23/24  0915   * 131*   POTASSIUM 4.4 3.7   CHLORIDE 96* 93*   CO2 24 28   ANIONGAP 14 10   * 159*   BUN 18.8 14.5   CR 0.64 0.37*   RACH 9.0 9.2       Hemoglobin   Date Value Ref Range Status   08/20/2024 14.9 11.7 - 15.7 g/dL Final   08/19/2024 14.0 11.7 - 15.7 g/dL Final   03/31/2021 13.3 11.7 - 15.7 g/dL Final   12/28/2016 14.2 11.7 - 15.7 g/dL Final         ASSESSMENT / PLAN:  (O04.940C) Closed stable burst fracture of fifth thoracic vertebra with routine healing, subsequent encounter  (primary encounter diagnosis)  (R29.6) Falls frequently  (G12.21) ALS (amyotrophic lateral sclerosis) (H)  (Z74.09,  Z78.9) Impaired mobility and activities of daily living  (M06.9) Rheumatoid arthritis, involving unspecified site, unspecified whether rheumatoid factor present (H)  (R53.81) Physical deconditioning  Comment: " cont TLSO--F/u neurosurgery re T Fx--F/U Rheum. Saw Neurology on 9/12- they rec NIV(CPAP) and a CoughAssistDevice.,no other changes., will cont with current meds, monitor.    (E87.1) Hyponatremia  Comment: BMP better last week--check BMP in am 9/17     (R42) Dizziness  Comment: will check lying and sitting Vitals--standing if able. Encourage po. Check bmp in am     (U07.1) Infection due to 2019 novel coronavirus  Comment: resolved, monitor--rare cough.        Total time for the visit was 35 minutes including, but not limited to, non-face-to-face time spent reviewing records, counseling, and coordination of care.      Electronically signed by:  TIFFANI Tyler CNP

## 2024-09-17 PROBLEM — E87.1 HYPO-OSMOLALITY AND HYPONATREMIA: Status: ACTIVE | Noted: 2024-01-01

## 2024-09-17 PROBLEM — R53.1 WEAKNESS GENERALIZED: Status: ACTIVE | Noted: 2024-01-01

## 2024-09-17 NOTE — PROGRESS NOTES
"CC: Lab Recheck     HPI:    Britt Pichardo is a 84 year old  (1940), who is being seen today for an episodic care visit at [facility] ([facility]) . Today's concern: lab recheck of BMP. Per staff and therapies, pt is more and more weak. She still c/o dizziness but is not postural significantly when checked.  She is 88% on RA today at rest in room with PT present.      ALS (amyotrophic lateral sclerosis) (H)  Dizziness  Hypoxia  Weakness generalized  Hyponatremia       SUBJECTIVE/OBJECTIVE: No C/O's but feels tired.. A & O x 3-forgetful and confused at times., NAD. Lungs CTA but decreased,  non labored. RRR,  no   edema.  Abdomen soft, nontender, +BT'S. No new focal neurological deficits--left arm weak.    /72   Pulse 78   Temp 98.1  F (36.7  C)   Resp 18   Ht 1.499 m (4' 11\")   Wt 47.4 kg (104 lb 6.4 oz)   SpO2 92%   BMI 21.09 kg/m    Current Outpatient Medications   Medication Sig Dispense Refill    acetaminophen (TYLENOL) 325 MG tablet Take 2 tablets (650 mg) by mouth every 4 hours as needed for mild pain or other (and adjunct with moderate or severe pain or per patient request).      calcium carbonate-vitamin D 600-200 MG-UNIT TABS Take 1 tablet by mouth daily      InFLIXimab (REMICADE IV) Inject 200 mg into the vein Patient takes this every 8 weeks      lovastatin (MEVACOR) 20 MG tablet Take 1 tablet (20 mg) by mouth At Bedtime 90 tablet 0    methocarbamol (ROBAXIN) 500 MG tablet Take 1 tablet (500 mg) by mouth 3 times daily as needed for muscle spasms. (Patient not taking: Reported on 9/12/2024)      riluzole (RILUTEK) 50 MG tablet TAKE ONE TABLET BY MOUTH EVERY 12 HOURS 60 tablet 2       LABS: today   Recent Labs   Lab Test 09/17/24  0805 09/09/24  0945   * 134*   POTASSIUM 3.9 4.4   CHLORIDE 92* 96*   CO2 27 24   ANIONGAP 11 14   GLC 72 188*   BUN 20.7 18.8   CR 0.59 0.64   RACH 9.0 9.0       ASSESSMENT / PLAN:  (G12.21) ALS (amyotrophic lateral sclerosis) " (H)  (primary encounter diagnosis)  (R09.02) Hypoxia  (R53.1) Weakness generalized  Comment: cont PT OT, will consider discontinue or decreased statin as wonder the benefit versus risk in this pt.  O2 at 1-3 l as needed. May need NIV per ALS MD note.     (R42) Dizziness  Comment: ? Etiology-- not significantly postural, will cont to check a few more.    (E87.1) Hyponatremia  Comment: 130--no fluid restriction but decrease plain water intake, monitor.    I emailed in [facility] her ALS MD( Dr Beckham)--he did not think the Rilutek the cause of her dizziness has been on it for some time. He is fine if we trial discontinue it to check.   Please see notes in [facility] from his visit with pt and daughter on 9/12/24    Electronically Signed by:    Lindy Frias RN, CNP

## 2024-09-19 PROBLEM — M62.81 MUSCLE WEAKNESS OF LEFT UPPER EXTREMITY: Status: ACTIVE | Noted: 2024-01-01

## 2024-09-19 PROBLEM — Z99.81 ON SUPPLEMENTAL OXYGEN THERAPY: Status: ACTIVE | Noted: 2024-01-01

## 2024-09-19 NOTE — PROGRESS NOTES
[place] [facility]  [place] Medical Record Number:  [id number]  Place of Service where encounter took place:  [facility] ([facility]) [[id number]]  Chief Complaint   Patient presents with    Nursing Home Acute       HPI:    Britt Pichardo  is a 84 year old (1940), who is being seen today for an episodic care visit.  HPI information obtained from: facility chart records, facility staff, patient report, and [facility] chart review. Today's concern is: no more falls, c/o dry skin under brace. Feels dizzy at times. Occas cough. Per therapies, pt seems weaker that past few days but no acute issues. Had appt with her ALS MD and worsening ALS, balance and spirometry worsened.     ALS (amyotrophic lateral sclerosis)  Impaired mobility and activities of daily living  Muscle weakness of left upper extremity  Weakness generalized  Hypoxia  On supplemental oxygen therapy  Closed stable burst fracture of fifth thoracic vertebra with routine healing, subsequent encounter  Physical deconditioning  Dizziness     Past Medical and Surgical History reviewed in Epic today.    MEDICATIONS:     Current Outpatient Medications   Medication Sig Dispense Refill    acetaminophen (TYLENOL) 325 MG tablet Take 2 tablets (650 mg) by mouth every 4 hours as needed for mild pain or other (and adjunct with moderate or severe pain or per patient request).      calcium carbonate-vitamin D 600-200 MG-UNIT TABS Take 1 tablet by mouth daily      InFLIXimab (REMICADE IV) Inject 200 mg into the vein Patient takes this every 8 weeks      methocarbamol (ROBAXIN) 500 MG tablet Take 1 tablet (500 mg) by mouth 3 times daily as needed for muscle spasms.      riluzole (RILUTEK) 50 MG tablet TAKE ONE TABLET BY MOUTH EVERY 12 HOURS 60 tablet 2     TODAY DURING EXAM/ROS:  No CP, SOB, fevers, chills, HA, N/V, dysuria or Bowel Abnormalities. Appetite is good.  No Pain.  Occas cough.  Feels dizziness when stands  "up    Objective:  /69   Pulse 73   Temp 98.6  F (37  C)   Resp 18   Ht 1.499 m (4' 11\")   Wt 44.5 kg (98 lb)   SpO2 93%   BMI 19.79 kg/m    Exam:  GENERAL APPEARANCE:  Alert, in no distress, appears healthy, oriented, cooperative  ENT:  Mouth with moist mucous membranes, normal hearing acuity  EYES: Conjunctivae, lids, pupils and irises normal  RESP:  respiratory effort of chest normal, lungs clear but decreased mildly,  NAD  CV:  Auscultation of heart done , RRR, no murmur, rub, or gallop, no edema  ABDOMEN:  normal bowel sounds, soft, nontender.  M/S:   PABLO equally, has TLSO on sitting in chair. Weaker left arm--at baseline.  SKIN:  Inspection of skin and subcutaneous tissue baseline--per staff dry skin under TLSO  NEURO:   Cranial nerves are grossly intact and at patient's baseline  PSYCH:  oriented X 3--forgetful at times.      Labs:   Recent Labs   Lab Test 09/09/24  0945 08/23/24  0915   * 131*   POTASSIUM 4.4 3.7   CHLORIDE 96* 93*   CO2 24 28   ANIONGAP 14 10   * 159*   BUN 18.8 14.5   CR 0.64 0.37*   RACH 9.0 9.2       Hemoglobin   Date Value Ref Range Status   08/20/2024 14.9 11.7 - 15.7 g/dL Final   08/19/2024 14.0 11.7 - 15.7 g/dL Final   03/31/2021 13.3 11.7 - 15.7 g/dL Final   12/28/2016 14.2 11.7 - 15.7 g/dL Final        ASSESSMENT / PLAN:  (G12.21) ALS (amyotrophic lateral sclerosis) (H)  (primary encounter diagnosis)  (Z74.09,  Z78.9) Impaired mobility and activities of daily living  (M62.81) Muscle weakness of left upper extremity  (R53.1) Weakness generalized  (S22.051D) Closed stable burst fracture of fifth thoracic vertebra with routine healing, subsequent encounter  (R53.81) Physical deconditioning  Comment: weaker in left arm even from last week. Had a f/u  Neurology on 9/12- they rec NIV(CPAP) and a CoughAssistDevice.,  Appreciate input from neurology (ALS) Dr. Beckham--Cont with current meds, monitor.    (R09.02) Hypoxia  (Z99.81) On supplemental oxygen " therapy  Comment: 1-2 l prn if O2 sat <90%--weaker resp muscles with ALS. monitor    (R42) Dizziness  Comment: much less last two days. Monitor.    Pt agrees to discontinue of Mevacor as benefits < risks with her age and other medical issues       Total time for the visit was 35 minutes including, but not limited to, non-face-to-face time spent reviewing records, counseling, and coordination of care.  I called and updated Kareem Valdez who agrees with all the above and POC.        Electronically signed by:  TIFFANI Tyler CNP

## 2024-09-19 NOTE — PROGRESS NOTES
Clinic Care Coordination Contact    Situation: Patient chart reviewed by care coordinator.    Background: Patient was inpatient at [facility]. Ad[facility].     Assessment: Patient still currently admitted to [facility].     Plan/Recommendations: Will monitor chart monthly.     Harper Brito RN Clinic Care Coordinator  [facility] Clinics: [place], [place] (on-site Wednesdays), [facility] (on-site Thursdays) & [facility].  [email or web address]  Phone: [phone]

## 2024-10-01 NOTE — NURSING NOTE
"Britt Pichardo is a 84 year old female who presents for:  Chief Complaint   Patient presents with    RECHECK     T5 fx - no pain or complaints - just wants her brace off        Initial Vitals:  /82   Pulse 76   SpO2 95%  Estimated body mass index is 19.79 kg/m  as calculated from the following:    Height as of 9/18/24: 4' 11\" (1.499 m).    Weight as of 9/18/24: 98 lb (44.5 kg).. There is no height or weight on file to calculate BSA. BP completed using cuff size: small regular  No Pain (0)    Nursing Comments:     Ramu Naik    "

## 2024-10-01 NOTE — PROGRESS NOTES
"Neurosurgery Clinic Follow up    Assessment:  Britt presents to clinic today for further evaluation of her T5 compression fracture that was noted to be a burst fracture on MRI.  Patient has since been wearing TLSO brace.  Patient has always been asymptomatic.  X-ray of thoracic spine obtained prior to appointment today and reviewed with patient.  X-ray relatively stable with maybe slight increase in height loss.  Please refer to radiology report for further details.    Exam:  Patient is neurologically intact at her baseline level of function.  She denies ever having tenderness to palpation of thoracic spine or any paresthesias or radicular symptoms.  She presents to clinic today wearing oxygen and being pushed in a wheelchair.    Plan:  -Can wean out of brace  -No need for follow-up imaging or appointment  -Patient instructed to follow-up as needed or if symptoms develop/worsen while weaning out of brace    Patient reports brace is uncomfortable impacting her quality of life.  Patient also states \"she does not know how much longer she has\".  Given the patient has been asymptomatic up to this point and fracture remains grossly stable I think it is fine for patient to wean out of her brace and no need for follow-up imaging.  Patient instructed to follow-up as needed in future.    Hector Dow, FLORENCE  [address] [address]    Tel [phone]    Dragon Disclosure   This was created at least in part with a voice recognition software. Mistakes/typos may be present.      "

## 2024-10-01 NOTE — TELEPHONE ENCOUNTER
Faxed OV note from today to [facility] per [facility] request.     Faxed  OV note  October 1, 2024 to fax number [phone]    Right Fax confirmed at 3:36 PM    Updated patient via [employer].

## 2024-10-01 NOTE — LETTER
"10/1/2024      Britt Pichardo  [address]  [place] [address]      Dear Colleague,    Thank you for referring your patient, Britt Pichardo, to the [facility]UROLOGY CLIN[facility]. Please see a copy of my visit note below.    Neurosurgery Clinic Follow up    Assessment:  Britt presents to clinic today for further evaluation of her T5 compression fracture that was noted to be a burst fracture on MRI.  Patient has since been wearing TLSO brace.  Patient has always been asymptomatic.  X-ray of thoracic spine obtained prior to appointment today and reviewed with patient.  X-ray relatively stable with maybe slight increase in height loss.  Please refer to radiology report for further details.    Exam:  Patient is neurologically intact at her baseline level of function.  She denies ever having tenderness to palpation of thoracic spine or any paresthesias or radicular symptoms.  She presents to clinic today wearing oxygen and being pushed in a wheelchair.    Plan:  -Can wean out of brace  -No need for follow-up imaging or appointment  -Patient instructed to follow-up as needed or if symptoms develop/worsen while weaning out of brace    Patient reports brace is uncomfortable impacting her quality of life.  Patient also states \"she does not know how much longer she has\".  Given the patient has been asymptomatic up to this point and fracture remains grossly stable I think it is fine for patient to wean out of her brace and no need for follow-up imaging.  Patient instructed to follow-up as needed in future.    Hector Dow DNP  [address] [address]    Tel [phone]    Dragon Disclosure   This was created at least in part with a voice recognition software. Mistakes/typos may be present.          Again, thank you for allowing me to participate in the care of your patient.        Sincerely,        Hector Dow, CNP  "

## 2024-10-03 NOTE — PROGRESS NOTES
"[facility]  [facility] Medical Record Number:  [id number]  Place of Service where encounter took place:  [facility] () [[id number]]  Chief Complaint   Patient presents with    Nursing Home Acute       HPI:    Britt Pichardo  is a 84 year old (1940), who is being seen today for an episodic care visit.  HPI information obtained from: facility chart records, facility staff, patient report, and [facility] chart review. Today's concern is: no more falls,  no pain  saw neuro sx on 10/1--healing present on scans, no need to wear the TLSO any longer.     ALS (amyotrophic lateral sclerosis) (H)  Impaired mobility and activities of daily living  Muscle weakness of left upper extremity  Weakness generalized  Hypoxia  On supplemental oxygen therapy  Closed stable burst fracture of fifth thoracic vertebra with routine healing, subsequent encounter  Physical deconditioning     Past Medical and Surgical History reviewed in Epic today.    MEDICATIONS:     Current Outpatient Medications   Medication Sig Dispense Refill    acetaminophen (TYLENOL) 325 MG tablet Take 2 tablets (650 mg) by mouth every 4 hours as needed for mild pain or other (and adjunct with moderate or severe pain or per patient request). (Patient not taking: Reported on 10/1/2024)      calcium carbonate-vitamin D 600-200 MG-UNIT TABS Take 1 tablet by mouth daily      InFLIXimab (REMICADE IV) Inject 200 mg into the vein Patient takes this every 8 weeks      riluzole (RILUTEK) 50 MG tablet TAKE ONE TABLET BY MOUTH EVERY 12 HOURS 60 tablet 2     TODAY DURING EXAM/ROS:  No CP, SOB, fevers, chills, HA, N/V, dysuria or Bowel Abnormalities. Appetite is good.  No Pain.  Occas cough.  v    Objective:  /78   Pulse 86   Temp 97.8  F (36.6  C)   Resp 18   Ht 1.499 m (4' 11\")   Wt 44.5 kg (98 lb)   SpO2 94%   BMI 19.79 kg/m    Exam:  GENERAL APPEARANCE:  A&O, NAD, cooperative  ENT:  Mouth with moist mucous " membranes, normal hearing acuity  RESP:  respiratory effort of chest normal, lungs clear but decreased, on O2/1 nc.  CV:  Auscultation of heart done , RRR-IRRR no murmur, rub, or gallop, no edema  ABDOMEN:  normal bowel sounds, soft, nontender.  M/S:   PABLO equally is sitting in chair. Weaker left arm--at baseline.  SKIN:  Inspection of skin at baseline.  NEURO:   Cranial nerves are grossly intact and at patient's baseline  PSYCH:  oriented X 3--forgetful at times.      Labs:   Recent Labs   Lab Test 09/09/24  0945 08/23/24  0915   * 131*   POTASSIUM 4.4 3.7   CHLORIDE 96* 93*   CO2 24 28   ANIONGAP 14 10   * 159*   BUN 18.8 14.5   CR 0.64 0.37*   RACH 9.0 9.2       Hemoglobin   Date Value Ref Range Status   08/20/2024 14.9 11.7 - 15.7 g/dL Final   08/19/2024 14.0 11.7 - 15.7 g/dL Final   03/31/2021 13.3 11.7 - 15.7 g/dL Final   12/28/2016 14.2 11.7 - 15.7 g/dL Final        ASSESSMENT / PLAN:  (G12.21) ALS (amyotrophic lateral sclerosis) (H)  (primary encounter diagnosis)  (Z74.09,  Z78.9) Impaired mobility and activities of daily living  (M62.81) Muscle weakness of left upper extremity  (R53.1) Weakness generalized  (S22.051D) Closed stable burst fracture of fifth thoracic vertebra with routine healing, subsequent encounter  (R53.81) Physical deconditioning  Comment: weaker in left arm as before. Had neuro sx F/u on 10/1--went well, out of TLSO, discontinue robaxin.  Appreciate input from neurology (ALS) Dr. Beckham--Cont same POC, monitor.    (R09.02) Hypoxia  (Z99.81) On supplemental oxygen therapy  Comment: 1-2 l prn if O2 sat <90%--weaker resp muscles with ALS. monitor         Total time for the visit was 30 minutes including, but not limited to, non-face-to-face time spent reviewing records, counseling, and coordination of care.       Electronically signed by:  TIFFANI Tyler CNP

## 2024-10-17 NOTE — PROGRESS NOTES
Clinic Care Coordination Contact     Situation: Patient chart reviewed by care coordinator.     Background: Patient was inpatient at [facility]. Admitted to [facility] on 8/23/2024.      Assessment: Patient still currently admitted to [facility].      Plan/Recommendations: Will monitor chart monthly.      Harper Brito RN Clinic Care Coordinator  [facility] Clinics: [place], [place] (on-site Wednesdays), [facility] & [place].  [email or web address]  Phone: [phone]

## 2024-10-21 NOTE — PROGRESS NOTES
Called patient due to their preventative visit is overdue. Transferred patient to scheduling line. [phone]

## 2024-10-22 PROBLEM — R40.4 UNRESPONSIVE EPISODE: Status: ACTIVE | Noted: 2024-01-01

## 2024-10-22 NOTE — PROGRESS NOTES
"CC: unresponsive episode    HPI: called to see pt as unresponsive but vss in recliner.  Her cousin was in room visiting and noted Britt got very pale and unresponsive though was breathing normal.   Staff checked vitals==see below.  S/O: with a hard sternal rub pt did respond though not initially--a squeezing of big toes did not elicit response prior to this.  She was appropriated, did not know what had happened. Says feels tired today  Lungs CTA,  on 2l/nc O2, RRR, soft abd, no edema, warm feet, no obvious neuro changes.  Visit Vitals  BP (!) 163/71   Pulse 90   Temp 97.2  F (36.2  C)   Resp 16    Accucheck is 97    ASSESSMENT / PLAN:  (R40.4) Unresponsive episode  (primary encounter diagnosis)  (G12.21) ALS (amyotrophic lateral sclerosis) (H)  Comment: ?etiology, possibly r/t ALS? But has hx low Na will check bmp on 10/24     Total time for the visit was 20 minutes including, but not limited to, non-face-to-face time spent reviewing records, counseling, and coordination of care.  Daughter came in to room and updated, said her mom was \"off\" today with activities in main room.      ELECTRONICALLY SIGNED:  MADHAV CASTRO RN, [facility]         "

## 2024-10-23 NOTE — PROGRESS NOTES
"CC: Lab Recheck     HPI:    Britt Pichardo is a 84 year old  (1940), who is being seen today for an episodic care visit at [facility] () . Today's concern: lab recheck of BMP and F/u on decreased LOC episode on 10/22/24.  Family said pt had been very alert and engaged yesterday and so far today.      ALS (amyotrophic lateral sclerosis) (H)  Hypoxia  On supplemental oxygen therapy  Weakness generalized  Hyponatremia       SUBJECTIVE/OBJECTIVE: alert, soft voice but appropriate responses.  Has No C/O's except weak and not very hungry.  NAD. Lungs CTA but very decreased ada in lower 1/2.  Non labored. RRR, S1/S2 w/o murmur,gallop or rub.  No edema.  Abdomen soft, nontender, +BT'S. No new focal neurological deficits.      /85   Pulse 76   Temp 97.6  F (36.4  C)   Resp 18   Ht 1.499 m (4' 11\")   Wt 43.5 kg (96 lb)   SpO2 97%   BMI 19.39 kg/m      LABS: today    Recent Labs   Lab Test 10/24/24  0942 09/17/24  0805    130*   POTASSIUM 4.1 3.9   CHLORIDE 98 92*   CO2 35* 27   ANIONGAP 8 11   * 72   BUN 22.6 20.7   CR 0.47* 0.59   RACH 9.3 9.0     ASSESSMENT / PLAN:  (G12.21) ALS (amyotrophic lateral sclerosis) (H)  (primary encounter diagnosis)  (R09.02) Hypoxia  (Z99.81) On supplemental oxygen therapy  (R53.1) Weakness generalized  Comment: overall has been declining,weaker.  Chg RN d/w family yesterday and a \"Do Not hospitalize order written\".  Daughter Courtney is considering bringing in pt's CPAP and trialing it when she is present only.  Also added lopez Azar, monitor.  Family considering Hospice in future.  Their goal is comfort.  Weight has dropped from 104# on 8/29/ to 963 on 10/23    (E87.1) Hyponatremia  Comment: better than expected BMP despite lower appetite.      Electronically Signed by:    Lindy Frias RN, CNP     "

## 2024-10-23 NOTE — TELEPHONE ENCOUNTER
[facility] Triage Nurse Telephone Encounter    Provider: TIFFANI Collado CNP    Facility: [facility]  Facility Type:  LTC    Caller: Britt Rowe  Call Back Number: [phone]    Allergies:  No Known Allergies     Reason for call: Nursing is calling to request an order for do not hospitalize as the patient is comfort care and family/POA would like an order in the chart.     Verbal Order/Direction given by Provider: Okay for do not hospitalize -comfort care focused    Provider giving Order:  Yohana Nicole MD    Verbal Order given to: Britt Kowalski RN

## 2024-11-14 ENCOUNTER — PATIENT OUTREACH (OUTPATIENT)
Dept: CARE COORDINATION | Facility: CLINIC | Age: 84
End: 2024-11-14
Payer: MEDICARE

## 2024-11-14 NOTE — PROGRESS NOTES
Clinic Care Coordination Contact    Situation: Patient chart reviewed by care coordinator.    Background: Clinic Care Coordination Referral received from inpatient care team for transition handoff communication following hospital admission.    Assessment: Upon chart review, patient is not a candidate for Primary Care Clinic Care Coordination enrollment due to reason stated below:  Patient has passed away.    Plan/Recommendations: Clinic Care Coordination Referral/order cancelled. RN/SW CC will perform no further monitoring/outreaches at this time and will remain available as needed. If new needs arise, a new Care Coordination Referral may be placed.    Harper Brito RN Clinic Care Coordinator  [facility] Clinics: [place], [place] (on-site Wednesdays), [facility] & [facility].  [email or web address]  Phone: [phone]

## (undated) RX ORDER — LIDOCAINE HYDROCHLORIDE 10 MG/ML
INJECTION, SOLUTION EPIDURAL; INFILTRATION; INTRACAUDAL; PERINEURAL
Status: DISPENSED
Start: 2019-01-28

## (undated) RX ORDER — DEXAMETHASONE SODIUM PHOSPHATE 10 MG/ML
INJECTION, SOLUTION INTRAMUSCULAR; INTRAVENOUS
Status: DISPENSED
Start: 2017-12-18

## (undated) RX ORDER — LIDOCAINE HYDROCHLORIDE 10 MG/ML
INJECTION, SOLUTION EPIDURAL; INFILTRATION; INTRACAUDAL; PERINEURAL
Status: DISPENSED
Start: 2017-12-18